# Patient Record
Sex: FEMALE | Race: WHITE | Employment: OTHER | URBAN - METROPOLITAN AREA
[De-identification: names, ages, dates, MRNs, and addresses within clinical notes are randomized per-mention and may not be internally consistent; named-entity substitution may affect disease eponyms.]

---

## 2017-01-01 ENCOUNTER — APPOINTMENT (OUTPATIENT)
Dept: INFUSION THERAPY | Age: 67
End: 2017-01-01
Payer: MEDICARE

## 2017-01-01 DIAGNOSIS — C52 VAGINAL CANCER (HCC): Primary | ICD-10-CM

## 2017-01-17 ENCOUNTER — OFFICE VISIT (OUTPATIENT)
Dept: INTERNAL MEDICINE CLINIC | Age: 67
End: 2017-01-17

## 2017-01-17 VITALS
RESPIRATION RATE: 14 BRPM | HEART RATE: 76 BPM | BODY MASS INDEX: 43.61 KG/M2 | OXYGEN SATURATION: 96 % | DIASTOLIC BLOOD PRESSURE: 68 MMHG | WEIGHT: 237 LBS | SYSTOLIC BLOOD PRESSURE: 151 MMHG | HEIGHT: 62 IN | TEMPERATURE: 98 F

## 2017-01-17 DIAGNOSIS — E78.00 HYPERCHOLESTEROLEMIA: ICD-10-CM

## 2017-01-17 DIAGNOSIS — N18.30 BENIGN HYPERTENSION WITH CHRONIC KIDNEY DISEASE, STAGE III (HCC): Primary | ICD-10-CM

## 2017-01-17 DIAGNOSIS — C52 VAGINAL CANCER (HCC): ICD-10-CM

## 2017-01-17 DIAGNOSIS — Z76.89 ESTABLISHING CARE WITH NEW DOCTOR, ENCOUNTER FOR: ICD-10-CM

## 2017-01-17 DIAGNOSIS — I12.9 BENIGN HYPERTENSION WITH CHRONIC KIDNEY DISEASE, STAGE III (HCC): Primary | ICD-10-CM

## 2017-01-17 DIAGNOSIS — Z11.59 NEED FOR HEPATITIS C SCREENING TEST: ICD-10-CM

## 2017-01-17 RX ORDER — MULTIVITAMIN/IRON/FOLIC ACID 18MG-0.4MG
TABLET ORAL
COMMUNITY
Start: 2016-12-15 | End: 2017-01-17 | Stop reason: ALTCHOICE

## 2017-01-17 RX ORDER — IBUPROFEN 200 MG
CAPSULE ORAL
COMMUNITY
Start: 2016-12-15 | End: 2017-06-07

## 2017-01-17 NOTE — Clinical Note
Need colonoscopy report from Dr Konstantin Webber, eye exam from Dr Stephanie Rahman in [place], [place] report from [facility].

## 2017-01-17 NOTE — LETTER
January 17, 2017 Odalis Godinez [address] [place] [phone] Dear Matty Rivera: Thank you for requesting access to E-Sign. Please follow the instructions below to securely access and download your online medical record. E-Sign allows you to send messages to your doctor, view your test results, renew your prescriptions, schedule appointments, and more. How Do I Sign Up? 1. In your internet browser, go to [email or web address]. 2. Click on the First Time User? Click Here link in the Sign In box. You will see the New Member Sign Up page. 3. Enter your E-Sign Access Code exactly as it appears below. You will not need to use this code after youve completed the sign-up process. If you do not sign up before the expiration date, you must request a new code. E-Sign Access Code: [email or web address] Expires: 4/17/2017 10:59 AM  
 
4. Enter the last four digits of your Social Security Number (xxxx) and Date of Birth (mm/dd/yyyy) as indicated and click Submit. You will be taken to the next sign-up page. 5. Create a E-Sign ID. This will be your E-Sign login ID and cannot be changed, so think of one that is secure and easy to remember. 6. Create a E-Sign password. You can change your password at any time. 7. Enter your Password Reset Question and Answer. This can be used at a later time if you forget your password. 8. Enter your e-mail address. You will receive e-mail notification when new information is available in [address] 9. Click Sign Up. You can now view and download portions of your medical record. 10. Click the Download Summary menu link to download a portable copy of your medical information. Additional Information If you have questions, please visit the Frequently Asked Questions section of the E-Sign website at [email or web address] Remember, E-Sign is NOT to be used for urgent needs. For medical emergencies, dial 911. Now available from your iPhone and Android! Sincerely, [employer]

## 2017-01-17 NOTE — PATIENT INSTRUCTIONS
Consider checking blood pressure at home. Omicron is the best brand of meter. Office visit in 2 months        Medicines to Avoid With Kidney Disease: Care Instructions  Your Care Instructions  Kidney disease means that your kidneys are not able to get rid of waste from the blood. So they can't keep your body's fluids and chemicals in balance. Usually, the kidneys get rid of waste from the blood through the urine. And they balance the fluids in the body. When your kidneys don't work as they should, you have to be careful about some medicines. They may harm your kidneys. Your doctor may tell you not to take them. Or he or she may change the dose. Medicines for pain and swelling, such as ibuprofen (Advil or Motrin) or naproxen (Aleve), can cause harm. So can some antibiotics and antacids. And you need to be careful about some drugs that treat cancer, lower blood pressure, or get rid of water from the body. Some herbal products could cause harm too. Follow-up care is a key part of your treatment and safety. Be sure to make and go to all appointments, and call your doctor if you are having problems. It's also a good idea to know your test results and keep a list of the medicines you take. How can you care for yourself at home? · Tell your doctor all the prescription, herbal, or over-the-counter medicines you take. Do not take any new ones unless you talk to your doctor first.  · Do not take anti-inflammatory medicines. These include ibuprofen (Advil, Motrin) and naproxen (Aleve). You can use acetaminophen (Tylenol) for pain. · Do not take two or more pain medicines at the same time unless the doctor told you to. Many pain medicines have acetaminophen, which is Tylenol. Too much acetaminophen (Tylenol) can be harmful. · Tell all doctors and others who work with your health care that you have kidney disease. · Wear medical alert jewelry that lists your health problem. You can buy this at most drugstores.   Where can you learn more? Go to http://cele-alyssa.info/. Enter U361 in the search box to learn more about \"Medicines to Avoid With Kidney Disease: Care Instructions. \"  Current as of: November 20, 2015  Content Version: 11.1  © 4912-2706 [employer]. Care instructions adapted under license by [employer] (which disclaims liability or warranty for this information). If you have questions about a medical condition or this instruction, always ask your healthcare professional. [address] any warranty or liability for your use of this information. Kidney Disease and High Blood Pressure: Care Instructions  Your Care Instructions  Long-term (chronic) kidney disease happens when the kidneys cannot remove waste and keep your body's fluids and chemicals in balance. Usually, the kidneys remove waste from the blood through the urine. When the kidneys are not working well, waste can build up so much that it poisons the body. Kidney disease can make you very tired. It also can cause swelling, or edema, in your legs or other areas of your body. High blood pressure is one of the major causes of chronic kidney disease. And kidney disease can also cause high blood pressure. No matter which came first, having high blood pressure damages the tiny blood vessels in the kidneys. If you have high blood pressure, it is important to lower it. There are many things you can do to lower your blood pressure, which may help slow or stop the damage to your kidneys. Follow-up care is a key part of your treatment and safety. Be sure to make and go to all appointments, and call your doctor if you are having problems. It's also a good idea to know your test results and keep a list of the medicines you take. How can you care for yourself at home? · Be safe with medicines. Take your medicines exactly as prescribed. Call your doctor if you have any problems with your medicine.  You will probably need more than one medicine to lower your blood pressure. You will get more details on the specific medicines your doctor prescribes. · Work with your doctor and a dietitian to plan meals that have the right amount of nutrients for you. You will probably have to limit salt, fluids, and protein. · Stay at a healthy weight. This is very important if you put on weight around the waist. Losing even 10 pounds can help you lower your blood pressure. · Manage other health problems such as diabetes and high cholesterol. You can help lower your risk for heart disease and blood vessel problems with a healthy lifestyle along with medicines. · Do not take ibuprofen (Advil, Motrin) or naproxen (Aleve), or similar medicines, unless your doctor tells you to. They may make chronic kidney disease worse. It is okay to take acetaminophen (Tylenol). · If your doctor recommends it, get more exercise. Walking is a good choice. Bit by bit, increase the amount you walk every day. Try for at least 30 minutes on most days of the week. You also may want to swim, bike, or do other activities. · Limit or avoid alcohol. Talk to your doctor about whether you can drink any alcohol. · Do not smoke or allow others to smoke around you. If you need help quitting, talk to your doctor about stop-smoking programs and medicines. These can increase your chances of quitting for good. When should you call for help? Call 911 anytime you think you may need emergency care. For example, call if:  · You passed out (lost consciousness). · You have symptoms of a heart attack, such as:  ¨ Chest pain or pressure. ¨ Sweating. ¨ Shortness of breath. ¨ Nausea or vomiting. ¨ Pain that spreads from the chest to the neck, jaw, or one or both shoulders or arms. ¨ Dizziness or lightheadedness. ¨ A fast or uneven pulse. After calling 911, chew 1 adult-strength aspirin. Wait for an ambulance. Do not try to drive yourself.   Call your doctor now or seek immediate medical care if:  · You are confused or are more tired or weak than usual.  · You bleed or have bruises. Watch closely for changes in your health, and be sure to contact your doctor if:  · You do not want to eat. · You have new swelling of your arms or feet, or swelling that you already have gets worse. · You do not get better as expected. Where can you learn more? Go to http://cele-alyssa.info/. Enter K430 in the search box to learn more about \"Kidney Disease and High Blood Pressure: Care Instructions. \"  Current as of: November 20, 2015  Content Version: 11.1  © 0774-6052 [employer]. Care instructions adapted under license by [employer] (which disclaims liability or warranty for this information). If you have questions about a medical condition or this instruction, always ask your healthcare professional. [address] any warranty or liability for your use of this information.

## 2017-01-17 NOTE — MR AVS SNAPSHOT
Visit Information Date & Time Provider Department Dept. Phone Encounter #  
 1/17/2017 11:00 AM Stanley Kebede MD [facility] [phone] [id number] Follow-up Instructions Return in about 2 months (around 3/17/2017) for HTN, AWV  . Your Appointments 3/23/2017 10:00 AM  
3 MONTH with Lexie Lowery MD  
[address] [facility] Appt Note: [address] [phone]  
[address] Upcoming Health Maintenance Date Due Hepatitis C Screening 1950 COLONOSCOPY 7/7/1967 DTaP/Tdap/Td series (1 - Tdap) 7/7/1970 GLAUCOMA SCREENING Q2Y 7/7/2014 OSTEOPOROSIS SCREENING (DEXA) 7/7/2014 MEDICARE YEARLY EXAM 7/7/2014 BREAST CANCER SCRN MAMMOGRAM 11/30/2018 Pneumococcal 65+ High/Highest Risk (2 of 2 - PPSV23) 10/22/2020 Allergies as of 1/17/2017  Review Complete On: 1/17/2017 By: Stanley Kebede MD  
 No Known Allergies Current Immunizations  Reviewed on 1/17/2017 Name Date Influenza High Dose Vaccine PF 12/1/2016 Influenza Vaccine 10/15/2015 Pneumococcal Vaccine (Unspecified Type) 10/22/2015 Zoster Vaccine, Live 10/15/2015 Reviewed by Sasha Cleary LPN on 9/45/5469 at 11:27 AM  
You Were Diagnosed With   
  
 Codes Comments Benign hypertension with chronic kidney disease, stage III    -  Primary ICD-10-CM: I12.9, N18.3 ICD-9-CM: 403.10, 585.3 Hypercholesterolemia     ICD-10-CM: E78.00 ICD-9-CM: 272.0 Vaginal cancer ([facility] [address]     ICD-10-CM: Z43 ICD-9-CM: 184.0 Need for hepatitis C screening test     ICD-10-CM: Z11.59 
ICD-9-CM: V73.89 Vitals BP Pulse Temp Resp Height(growth percentile) Weight(growth percentile) 151/68 (BP 1 Location: Left arm, BP Patient Position: Sitting) 76 98 °F (36.7 °C) (Oral) 14 5' 2\" (1.575 m) 237 lb (107.5 kg) SpO2 BMI OB Status Smoking Status 96% 43.35 kg/m2 Hysterectomy Former Smoker BMI and BSA Data Body Mass Index Body Surface Area  
 43.35 kg/m 2 2.17 m 2 Preferred Pharmacy Pharmacy Name Phone [facility] Gilmore [address] [place] [address] 9514 [facility] [address] [place] [address] [phone] Your Updated Medication List  
  
   
This list is accurate as of: 1/17/17 11:40 AM.  Always use your most recent med list.  
  
  
  
  
 atorvastatin 40 mg tablet Commonly known as:  LIPITOR Take 1 Tab by mouth. ibuprofen 200 mg Cap  
  
 losartan 100 mg tablet Commonly known as:  COZAAR Take 100 mg by mouth daily. triamterene-hydroCHLOROthiazide 37.5-25 mg per tablet Commonly known as:  [place] Take 1 Tab by mouth daily. VITAMIN D3 1,000 unit Cap Generic drug:  cholecalciferol Take 1 Cap by mouth. We Performed the Following HEPATITIS C AB [07753 CPT(R)] LIPID PANEL [92814 CPT(R)] METABOLIC PANEL, COMPREHENSIVE [89886 CPT(R)] REFERRAL TO GYN ONCOLOGY [JLO02 Custom] Follow-up Instructions Return in about 2 months (around 3/17/2017) for HTN, AWV  . Referral Information Referral ID Referred By Referred To  
  
 [id number] Magui Nobles MD   
   [address] [place] [address] Phone: [phone] Fax: [phone] Visits Status Start Date End Date 1 New Request 1/17/17 1/17/18 If your referral has a status of pending review or denied, additional information will be sent to support the outcome of this decision. Patient Instructions Consider checking blood pressure at home. Omicron is the best brand of meter. Office visit in 2 months Medicines to Avoid With Kidney Disease: Care Instructions Your Care Instructions Kidney disease means that your kidneys are not able to get rid of waste from the blood. So they can't keep your body's fluids and chemicals in balance. Usually, the kidneys get rid of waste from the blood through the urine. And they balance the fluids in the body. When your kidneys don't work as they should, you have to be careful about some medicines. They may harm your kidneys. Your doctor may tell you not to take them. Or he or she may change the dose. Medicines for pain and swelling, such as ibuprofen (Advil or Motrin) or naproxen (Aleve), can cause harm. So can some antibiotics and antacids. And you need to be careful about some drugs that treat cancer, lower blood pressure, or get rid of water from the body. Some herbal products could cause harm too. Follow-up care is a key part of your treatment and safety. Be sure to make and go to all appointments, and call your doctor if you are having problems. It's also a good idea to know your test results and keep a list of the medicines you take. How can you care for yourself at home? · Tell your doctor all the prescription, herbal, or over-the-counter medicines you take. Do not take any new ones unless you talk to your doctor first. 
· Do not take anti-inflammatory medicines. These include ibuprofen (Advil, Motrin) and naproxen (Aleve). You can use acetaminophen (Tylenol) for pain. · Do not take two or more pain medicines at the same time unless the doctor told you to. Many pain medicines have acetaminophen, which is Tylenol. Too much acetaminophen (Tylenol) can be harmful. · Tell all doctors and others who work with your health care that you have kidney disease. · Wear medical alert jewelry that lists your health problem. You can buy this at most drugsPartenderes. Where can you learn more? Go to http://cele-alyssa.info/. Enter L579 in the search box to learn more about \"Medicines to Avoid With Kidney Disease: Care Instructions. \" Current as of: November 20, 2015 Content Version: 11.1 © 2005-3774 [employer]. Care instructions adapted under license by [employer] (which disclaims liability or warranty for this information). If you have questions about a medical condition or this instruction, always ask your healthcare professional. [address] any warranty or liability for your use of this information. Kidney Disease and High Blood Pressure: Care Instructions Your Care Instructions Long-term (chronic) kidney disease happens when the kidneys cannot remove waste and keep your body's fluids and chemicals in balance. Usually, the kidneys remove waste from the blood through the urine. When the kidneys are not working well, waste can build up so much that it poisons the body. Kidney disease can make you very tired. It also can cause swelling, or edema, in your legs or other areas of your body. High blood pressure is one of the major causes of chronic kidney disease. And kidney disease can also cause high blood pressure. No matter which came first, having high blood pressure damages the tiny blood vessels in the kidneys. If you have high blood pressure, it is important to lower it. There are many things you can do to lower your blood pressure, which may help slow or stop the damage to your kidneys. Follow-up care is a key part of your treatment and safety. Be sure to make and go to all appointments, and call your doctor if you are having problems. It's also a good idea to know your test results and keep a list of the medicines you take. How can you care for yourself at home? · Be safe with medicines. Take your medicines exactly as prescribed. Call your doctor if you have any problems with your medicine. You will probably need more than one medicine to lower your blood pressure. You will get more details on the specific medicines your doctor prescribes.  
· Work with your doctor and a dietitian to plan meals that have the right amount of nutrients for you. You will probably have to limit salt, fluids, and protein. · Stay at a healthy weight. This is very important if you put on weight around the waist. Losing even 10 pounds can help you lower your blood pressure. · Manage other health problems such as diabetes and high cholesterol. You can help lower your risk for heart disease and blood vessel problems with a healthy lifestyle along with medicines. · Do not take ibuprofen (Advil, Motrin) or naproxen (Aleve), or similar medicines, unless your doctor tells you to. They may make chronic kidney disease worse. It is okay to take acetaminophen (Tylenol). · If your doctor recommends it, get more exercise. Walking is a good choice. Bit by bit, increase the amount you walk every day. Try for at least 30 minutes on most days of the week. You also may want to swim, bike, or do other activities. · Limit or avoid alcohol. Talk to your doctor about whether you can drink any alcohol. · Do not smoke or allow others to smoke around you. If you need help quitting, talk to your doctor about stop-smoking programs and medicines. These can increase your chances of quitting for good. When should you call for help? Call 911 anytime you think you may need emergency care. For example, call if: 
· You passed out (lost consciousness). · You have symptoms of a heart attack, such as: ¨ Chest pain or pressure. ¨ Sweating. ¨ Shortness of breath. ¨ Nausea or vomiting. ¨ Pain that spreads from the chest to the neck, jaw, or one or both shoulders or arms. ¨ Dizziness or lightheadedness. ¨ A fast or uneven pulse. After calling 911, chew 1 adult-strength aspirin. Wait for an ambulance. Do not try to drive yourself. Call your doctor now or seek immediate medical care if: 
· You are confused or are more tired or weak than usual. 
· You bleed or have bruises. Watch closely for changes in your health, and be sure to contact your doctor if: · You do not want to eat. · You have new swelling of your arms or feet, or swelling that you already have gets worse. · You do not get better as expected. Where can you learn more? Go to http://cele-alyssa.info/. Enter D029 in the search box to learn more about \"Kidney Disease and High Blood Pressure: Care Instructions. \" Current as of: November 20, 2015 Content Version: 11.1 © 0336-0111 [employer]. Care instructions adapted under license by [employer] (which disclaims liability or warranty for this information). If you have questions about a medical condition or this instruction, always ask your healthcare professional. [address] any warranty or liability for your use of this information. Introducing \[facility] [place] [facility]\"" & HEALTH SERVICES! Tano Fan introduces [employer] patient portal. Now you can access parts of your medical record, email your doctor's office, and request medication refills online. 1. In your internet browser, go to [email or web address] 2. Click on the First Time User? Click Here link in the Sign In box. You will see the New Member Sign Up page. 3. Enter your [employer] Access Code exactly as it appears below. You will not need to use this code after youve completed the sign-up process. If you do not sign up before the expiration date, you must request a new code. · [employer] Access Code: [email or web address] Expires: 4/17/2017 10:59 AM 
 
4. Enter the last four digits of your Social Security Number (xxxx) and Date of Birth (mm/dd/yyyy) as indicated and click Submit. You will be taken to the next sign-up page. 5. Create a [email or web address] ID. This will be your [employer] login ID and cannot be changed, so think of one that is secure and easy to remember. 6. Create a [employer] password. You can change your password at any time. 7. Enter your Password Reset Question and Answer.  This can be used at a later time if you forget your password. 8. Enter your e-mail address. You will receive e-mail notification when new information is available in [address] 9. Click Sign Up. You can now view and download portions of your medical record. 10. Click the Download Summary menu link to download a portable copy of your medical information. If you have questions, please visit the Frequently Asked Questions section of the [employer] website. Remember, [employer] is NOT to be used for urgent needs. For medical emergencies, dial 911. Now available from your iPhone and Android! Please provide this summary of care documentation to your next provider. Your primary care clinician is listed as Stanley Kebede. If you have any questions after today's visit, please call [phone].

## 2017-01-17 NOTE — PROGRESS NOTES
HISTORY OF PRESENT ILLNESS  Gurpreet Sequeira is a 78 y.o. female. HPI  She is a former patient of Dr Shelton Shannon. She presents to [facility] care. She presents for hypertension, chronic kidney disease, hyperlipidemia and obesity. She has had a lap bend procedure. Diet and Lifestyle: generally follows a low fat low cholesterol diet, not attempting to follow a low sodium diet, follows a diabetic diet regularly, exercises sporadically, nonsmoker   Medication compliance: compliant most of the time  Medication side effects: none  Home BP Monitoring: is not measured at home. Cardiovascular ROS:  She complains of dyspnea on exertion. She denies palpitations, orthopnea, exertional chest pressure/discomfort, claudication, lower extremity edema, dizziness    Pain in shoulders and neck for past 2 weeks. Feels tense. Aching pain, intermittent, at worst 7/10. Better past 2 days. Heat helps. .She was born in the [facility] and is a candidate for Hepatitis C screening. She has been treated for vaginal cancer. She was diagnosed  December 2014, treated with radiation therapy and had a good response. Patient Active Problem List   Diagnosis Code    LAP-BAND surgery status Z98.84    Vaginal cancer ([facility] [address] C52    Hypertension, essential I10    Hypercholesterolemia E78.00    GERD without esophagitis K21.9     Past Medical History   Diagnosis Date    Abnormal mammogram of left breast 12/5/2016    GERD (gastroesophageal reflux disease) 6/11/06    Hypercholesterolemia 10/11/06    Hypertension 10/11/06    Morbid obesity ([facility] [address] 10/11/06    Sleep apnea 6/11/06     Past Surgical History   Procedure Laterality Date    Pr lap, place adjust gastr band  1/30/08     EB: AP Std lap band, liver bx    Hx orthopaedic  2009     right ankle    Hx other surgical  2001?      benign mass removed from lower back    Hx hysterectomy  1978    Hx gyn  12/4/15     EUA/Bx of pelvic mass and vagina/cystoscopy/proctoscopy     Social History     Social History    Marital status:      Spouse name: N/A    Number of children: N/A    Years of education: N/A     Social History Main Topics    Smoking status: Former Smoker     Packs/day: 0.50     Years: 15.00     Types: Cigarettes     Quit date: 1/1/1993    Smokeless tobacco: Never Used    Alcohol use Yes      Comment: socially    Drug use: No    Sexual activity: Not Currently     Other Topics Concern    None     Social History Narrative     Family History   Problem Relation Age of Onset    Cancer Mother      colon    Diabetes Father     Heart Disease Father     Cancer Sister      breast & bone     No Known Allergies  Current Outpatient Prescriptions   Medication Sig Dispense Refill    ibuprofen 200 mg cap       atorvastatin (LIPITOR) 40 mg tablet Take 1 Tab by mouth.  triamterene-hydrochlorothiazide (MAXZIDE) 37.5-25 mg per tablet Take 1 Tab by mouth daily.  Cholecalciferol, Vitamin D3, (VITAMIN D3) 1,000 unit cap Take 1 Cap by mouth.  losartan (COZAAR) 100 mg tablet Take 100 mg by mouth daily. Review of Systems   Constitutional: Negative for malaise/fatigue and weight loss. HENT: Negative for congestion. Eyes: Negative for blurred vision and double vision. Gastrointestinal: Negative for constipation (stool softener) and diarrhea. Genitourinary:        Incontinence since radiation therapy. Musculoskeletal: Negative for back pain and joint pain. Skin: Negative for itching and rash. Dry skin   Neurological: Positive for tingling (left hand and arm). Negative for dizziness and focal weakness. Endo/Heme/Allergies: Positive for environmental allergies (?mold). Psychiatric/Behavioral: Negative for depression. The patient is not nervous/anxious.       Visit Vitals    /68 (BP 1 Location: Left arm, BP Patient Position: Sitting)    Pulse 76    Temp 98 °F (36.7 °C) (Oral)    Resp 14    Ht 5' 2\" (1.575 m)    Wt 237 lb (107.5 kg)    SpO2 96%    BMI 43.35 kg/m2     Physical Exam   Constitutional: She is oriented to person, place, and time. She appears well-developed and well-nourished. HENT:   Head: Normocephalic and atraumatic. Eyes: Conjunctivae are normal. Pupils are equal, round, and reactive to light. Neck: Neck supple. Carotid bruit is not present. No thyromegaly present. Cardiovascular: Normal rate, regular rhythm and normal heart sounds. PMI is not displaced. Exam reveals no gallop. No murmur heard. Pulses:       Dorsalis pedis pulses are 2+ on the right side, and 2+ on the left side. Posterior tibial pulses are 2+ on the right side, and 2+ on the left side. Pulmonary/Chest: Effort normal. She has no wheezes. She has no rhonchi. She has no rales. Abdominal: Soft. Normal appearance. She exhibits no abdominal bruit and no mass. There is no hepatosplenomegaly. There is no tenderness. Musculoskeletal: She exhibits no edema. Lymphadenopathy:     She has no cervical adenopathy. Right: No supraclavicular adenopathy present. Left: No inguinal and no supraclavicular adenopathy present. Neurological: She is alert and oriented to person, place, and time. No sensory deficit. Skin: Skin is warm, dry and intact. No rash noted. Psychiatric: She has a normal mood and affect. Her behavior is normal.   Nursing note and vitals reviewed. ASSESSMENT and PLAN    ICD-10-CM ICD-9-CM    1. Benign hypertension with chronic kidney disease, stage III I12.9 403.10     N18.3 585.3    2. Hypercholesterolemia F26.31 531.5 METABOLIC PANEL, COMPREHENSIVE      LIPID PANEL   3. Vaginal cancer (HCC) C52 184.0 REFERRAL TO GYN ONCOLOGY   4. Need for hepatitis C screening test Z11.59 V73.89 HEPATITIS C AB   5.  Establishing care with new doctor, encounter for Z71.89 V65.8          Benign hypertension with chronic kidney disease, stage III  Hypertension is uncontrolled, but she declines any change in treatment. Hypercholesterolemia  Level of degree unknown until lab results available. -     METABOLIC PANEL, COMPREHENSIVE  -     LIPID PANEL    Vaginal cancer ([facility] [address]  -     REFERRAL TO GYN ONCOLOGY    Need for hepatitis C screening test  -     HEPATITIS C AB    Establishing care with new doctor, encounter for          Follow-up Disposition:  Return in about 2 months (around 3/17/2017) for HTN, AWV  .  reviewed diet, exercise and weight control  Discussed the patient's above normal BMI with her. I have recommended the following interventions: encourage exercise  lifestyle education regarding diet . The BMI follow up plan is as follows: BMI is out of normal parameters and plan is as follows: I have counseled her on diet and exercise regimens  I have discussed the diagnosis with the patient and the intended plan as seen in the above orders. Patient is in agreement. The patient has received an after-visit summary and questions were answered concerning future plans. I have discussed medication side effects and warnings with the patient as well.

## 2017-01-18 PROBLEM — I10 HYPERTENSION, ESSENTIAL: Status: ACTIVE | Noted: 2017-01-18

## 2017-01-18 PROBLEM — K21.9 GERD WITHOUT ESOPHAGITIS: Status: ACTIVE | Noted: 2017-01-18

## 2017-01-18 PROBLEM — E78.00 HYPERCHOLESTEROLEMIA: Status: ACTIVE | Noted: 2017-01-18

## 2017-01-18 LAB
ALBUMIN SERPL-MCNC: 4.5 G/DL (ref 3.6–4.8)
ALBUMIN/GLOB SERPL: 1.6 {RATIO} (ref 1.1–2.5)
ALP SERPL-CCNC: 115 IU/L (ref 39–117)
ALT SERPL-CCNC: 15 IU/L (ref 0–32)
AST SERPL-CCNC: 14 IU/L (ref 0–40)
BILIRUB SERPL-MCNC: 0.5 MG/DL (ref 0–1.2)
BUN SERPL-MCNC: 21 MG/DL (ref 8–27)
BUN/CREAT SERPL: 20 (ref 11–26)
CALCIUM SERPL-MCNC: 9.6 MG/DL (ref 8.7–10.3)
CHLORIDE SERPL-SCNC: 98 MMOL/L (ref 96–106)
CHOLEST SERPL-MCNC: 201 MG/DL (ref 100–199)
CO2 SERPL-SCNC: 26 MMOL/L (ref 18–29)
CREAT SERPL-MCNC: 1.04 MG/DL (ref 0.57–1)
GLOBULIN SER CALC-MCNC: 2.9 G/DL (ref 1.5–4.5)
GLUCOSE SERPL-MCNC: 97 MG/DL (ref 65–99)
HCV AB S/CO SERPL IA: <0.1 S/CO RATIO (ref 0–0.9)
HDLC SERPL-MCNC: 46 MG/DL
INTERPRETATION, 910389: NORMAL
INTERPRETATION: NORMAL
LDLC SERPL CALC-MCNC: 119 MG/DL (ref 0–99)
POTASSIUM SERPL-SCNC: 4.2 MMOL/L (ref 3.5–5.2)
PROT SERPL-MCNC: 7.4 G/DL (ref 6–8.5)
SODIUM SERPL-SCNC: 142 MMOL/L (ref 134–144)
TRIGL SERPL-MCNC: 180 MG/DL (ref 0–149)
VLDLC SERPL CALC-MCNC: 36 MG/DL (ref 5–40)

## 2017-01-18 NOTE — PROGRESS NOTES
Creatinine, kidney test, is a little better, and steadily declining toward normal. Cholesterol is somewhat high. You have no high risk conditions (diabetes, coronary heart disease) so this is not in need of medication at this time. But we should monitor yearly. Hepatitis C test is negative, which means you do not have it. Patient informed in My Chart.

## 2017-03-17 ENCOUNTER — OFFICE VISIT (OUTPATIENT)
Dept: INTERNAL MEDICINE CLINIC | Age: 67
End: 2017-03-17

## 2017-03-17 VITALS
BODY MASS INDEX: 43.98 KG/M2 | SYSTOLIC BLOOD PRESSURE: 146 MMHG | WEIGHT: 239 LBS | OXYGEN SATURATION: 97 % | DIASTOLIC BLOOD PRESSURE: 88 MMHG | TEMPERATURE: 97.5 F | HEART RATE: 71 BPM | RESPIRATION RATE: 14 BRPM | HEIGHT: 62 IN

## 2017-03-17 DIAGNOSIS — M62.838 NECK MUSCLE SPASM: ICD-10-CM

## 2017-03-17 DIAGNOSIS — Z00.00 MEDICARE ANNUAL WELLNESS VISIT, INITIAL: ICD-10-CM

## 2017-03-17 DIAGNOSIS — M54.50 ACUTE BILATERAL LOW BACK PAIN WITHOUT SCIATICA: ICD-10-CM

## 2017-03-17 DIAGNOSIS — I10 HYPERTENSION, ESSENTIAL: Primary | ICD-10-CM

## 2017-03-17 DIAGNOSIS — Z71.89 ADVANCE DIRECTIVE DISCUSSED WITH PATIENT: ICD-10-CM

## 2017-03-17 DIAGNOSIS — E78.00 HYPERCHOLESTEROLEMIA: ICD-10-CM

## 2017-03-17 RX ORDER — TRIAMTERENE/HYDROCHLOROTHIAZID 37.5-25 MG
1 TABLET ORAL DAILY
Qty: 90 TAB | Refills: 3 | Status: SHIPPED | OUTPATIENT
Start: 2017-03-17 | End: 2018-01-01

## 2017-03-17 RX ORDER — ATORVASTATIN CALCIUM 80 MG/1
80 TABLET, FILM COATED ORAL DAILY
Qty: 90 TAB | Refills: 3 | Status: SHIPPED | OUTPATIENT
Start: 2017-03-17 | End: 2018-01-01 | Stop reason: SDUPTHER

## 2017-03-17 RX ORDER — LOSARTAN POTASSIUM 100 MG/1
100 TABLET ORAL DAILY
Qty: 90 TAB | Refills: 3 | Status: SHIPPED | OUTPATIENT
Start: 2017-03-17 | End: 2017-09-06 | Stop reason: SINTOL

## 2017-03-17 RX ORDER — AMLODIPINE BESYLATE 2.5 MG/1
2.5 TABLET ORAL DAILY
Qty: 30 TAB | Refills: 11 | Status: SHIPPED | OUTPATIENT
Start: 2017-03-17 | End: 2017-04-24

## 2017-03-17 NOTE — PROGRESS NOTES
Reviewed record In preparation for visit and have obtained necessary documentation. Has info on advanced directive but has not filled them out. 1. Have you been to the ER, urgent care clinic or hospitalized since your last visit? No     2. Have you seen or consulted any other health care providers outside of the [address] [place] [address] since your last visit? Include any pap smears or colon screening.  No    Health Maintenance reviewed:

## 2017-03-17 NOTE — PATIENT INSTRUCTIONS
Increase atorvastatin to  80 mg daily  Add amlodipine 2.5 mg once a day for blood pressure  Return in 4-6 weeks. Fasting lab one week prior        The best way to stay healthy is to live a healthy lifestyle. A healthy lifestyle includes regular exercise, eating a well-balanced diet, keeping a healthy weight and not smoking. Regular physical exams and screening tests are another important way to take care of yourself. Preventive exams provided by health care providers can find health problems early when treatment works best and can keep you from getting certain diseases or illnesses. Preventive services include exams, lab tests, screenings, shots, monitoring and information to help you take care of your own health. All people over 65 should have a pneumonia shot. Pneumonia shots are usually only needed once in a lifetime unless your doctor decides differently. In addition to your physical exam, some screening tests are recommended:    All people over 65 should have a yearly flu shot. People over 65 are at medium to high risk for Hepatitis B. Three shots are needed for complete protection. Bone mass measurement (dexa scan) is recommended every two years. Diabetes Mellitus screening is recommended every year. Glaucoma is an eye disease caused by high pressure in the eye. An eye exam is recommended every year. Cardiovascular screening tests that check your cholesterol and other blood fat (lipid) levels are recommended every five years. Colorectal Cancer screening tests help to find pre-cancerous polyps (growths in the colon) so they can be removed before they turn into cancer. Tests ordered for screening depend on your personal and family history risk factors. Prostate Cancer Screening (annually up to age 76)    Screening for breast cancer is recommended yearly with a Mammogram.    Screening for cervical and vaginal cancer is recommended with a pelvic and Pap test every two years. However if you have had an abnormal pap in the past  three years or at high risk for cervical or vaginal cancer Medicare will cover a pap test and a pelvic exam every year. Here is a list of your current Health Maintenance items with a due date:  Health Maintenance Due   Topic Date Due    DTaP/Tdap/Td  (1 - Tdap) 07/07/1970    Annual Well Visit  07/07/2014       Well Visit, Over 72: Care Instructions  Your Care Instructions  Physical exams can help you stay healthy. Your doctor has checked your overall health and may have suggested ways to take good care of yourself. He or she also may have recommended tests. At home, you can help prevent illness with healthy eating, regular exercise, and other steps. Follow-up care is a key part of your treatment and safety. Be sure to make and go to all appointments, and call your doctor if you are having problems. It's also a good idea to know your test results and keep a list of the medicines you take. How can you care for yourself at home? · Reach and stay at a healthy weight. This will lower your risk for many problems, such as obesity, diabetes, heart disease, and high blood pressure. · Get at least 30 minutes of exercise on most days of the week. Walking is a good choice. You also may want to do other activities, such as running, swimming, cycling, or playing tennis or team sports. · Do not smoke. Smoking can make health problems worse. If you need help quitting, talk to your doctor about stop-smoking programs and medicines. These can increase your chances of quitting for good. · Protect your skin from too much sun. When you're outdoors from 10 a.m. to 4 p.m., stay in the shade or cover up with clothing and a hat with a wide brim. Wear sunglasses that block UV rays. Even when it's cloudy, put broad-spectrum sunscreen (SPF 30 or higher) on any exposed skin. · See a dentist one or two times a year for checkups and to have your teeth cleaned.   · Wear a seat belt in the car. · Limit alcohol to 2 drinks a day for men and 1 drink a day for women. Too much alcohol can cause health problems. Follow your doctor's advice about when to have certain tests. These tests can spot problems early. For men and women  · Cholesterol. Your doctor will tell you how often to have this done based on your overall health and other things that can increase your risk for heart attack and stroke. · Blood pressure. Have your blood pressure checked during a routine doctor visit. Your doctor will tell you how often to check your blood pressure based on your age, your blood pressure results, and other factors. · Diabetes. Ask your doctor whether you should have tests for diabetes. · Vision. Experts recommend that you have yearly exams for glaucoma and other age-related eye problems. · Hearing. Tell your doctor if you notice any change in your hearing. You can have tests to find out how well you hear. · Colon cancer tests. Keep having colon cancer tests as your doctor recommends. You can have one of several types of tests. · Heart attack and stroke risk. At least every 4 to 6 years, you should have your risk for heart attack and stroke assessed. Your doctor uses factors such as your age, blood pressure, cholesterol, and whether you smoke or have diabetes to show what your risk for a heart attack or stroke is over the next 10 years. · Osteoporosis. Talk to your doctor about whether you should have a bone density test to find out whether you have thinning bones. Also ask your doctor about whether you should take calcium and vitamin D supplements. For women  · Pap test and pelvic exam. You may no longer need a Pap test. Talk with your doctor about whether to stop or continue to have Pap tests. · Breast exam and mammogram. Ask how often you should have a mammogram, which is an X-ray of your breasts. A mammogram can spot breast cancer before it can be felt and when it is easiest to treat.   · Thyroid disease. Talk to your doctor about whether to have your thyroid checked as part of a regular physical exam. Women have an increased chance of a thyroid problem. For men  · Prostate exam. Talk to your doctor about whether you should have a blood test (called a PSA test) for prostate cancer. Experts disagree on whether men should have this test. Some experts recommend that you discuss the benefits and risks of the test with your doctor. · Abdominal aortic aneurysm. Ask your doctor whether you should have a test to check for an aneurysm. You may need a test if you ever smoked or if your parent, brother, sister, or child has had an aneurysm. When should you call for help? Watch closely for changes in your health, and be sure to contact your doctor if you have any problems or symptoms that concern you. Where can you learn more? Go to http://cele-alyssa.info/. Enter F563 in the search box to learn more about \"Well Visit, Over 65: Care Instructions. \"  Current as of: July 19, 2016  Content Version: 11.1  © 4148-8128 [employer]. Care instructions adapted under license by [employer] (which disclaims liability or warranty for this information). If you have questions about a medical condition or this instruction, always ask your healthcare professional. [address] any warranty or liability for your use of this information. Heart-Healthy Diet: Care Instructions  Your Care Instructions    A heart-healthy diet has lots of vegetables, fruits, nuts, beans, and whole grains, and is low in salt. It limits foods that are high in saturated fat, such as meats, cheeses, and fried foods. It may be hard to change your diet, but even small changes can lower your risk of heart attack and heart disease. Follow-up care is a key part of your treatment and safety. Be sure to make and go to all appointments, and call your doctor if you are having problems.  It's also a good idea to know your test results and keep a list of the medicines you take. How can you care for yourself at home? Watch your portions  · Learn what a serving is. A \"serving\" and a \"portion\" are not always the same thing. Make sure that you are not eating larger portions than are recommended. For example, a serving of pasta is ½ cup. A serving size of meat is 2 to 3 ounces. A 3-ounce serving is about the size of a deck of cards. Measure serving sizes until you are good at [place]" them. Keep in mind that restaurants often serve portions that are 2 or 3 times the size of one serving. · To keep your energy level up and keep you from feeling hungry, eat often but in smaller portions. · Eat only the number of calories you need to stay at a healthy weight. If you need to lose weight, eat fewer calories than your body burns (through exercise and other physical activity). Eat more fruits and vegetables  · Eat a variety of fruit and vegetables every day. Dark green, deep orange, red, or yellow fruits and vegetables are especially good for you. Examples include spinach, carrots, peaches, and berries. · Keep carrots, celery, and other veggies handy for snacks. Buy fruit that is in season and store it where you can see it so that you will be tempted to eat it. · Cook dishes that have a lot of veggies in them, such as stir-fries and soups. Limit saturated and trans fat  · Read food labels, and try to avoid saturated and trans fats. They increase your risk of heart disease. Trans fat is found in many processed foods such as cookies and crackers. · Use olive or canola oil when you cook. Try cholesterol-lowering spreads, such as Benecol or Take Control. · Bake, broil, grill, or steam foods instead of frying them. · Choose lean meats instead of high-fat meats such as hot dogs and sausages. Cut off all visible fat when you prepare meat.   · Eat fish, skinless poultry, and meat alternatives such as soy products instead of high-fat meats. Soy products, such as tofu, may be especially good for your heart. · Choose low-fat or fat-free milk and dairy products. Eat fish  · Eat at least two servings of fish a week. Certain fish, such as salmon and tuna, contain omega-3 fatty acids, which may help reduce your risk of heart attack. Eat foods high in fiber  · Eat a variety of grain products every day. Include whole-grain foods that have lots of fiber and nutrients. Examples of whole-grain foods include oats, whole wheat bread, and brown rice. · Buy whole-grain breads and cereals, instead of white bread or pastries. Limit salt and sodium  · Limit how much salt and sodium you eat to help lower your blood pressure. · Taste food before you salt it. Add only a little salt when you think you need it. With time, your taste buds will adjust to less salt. · Eat fewer snack items, fast foods, and other high-salt, processed foods. Check food labels for the amount of sodium in packaged foods. · Choose low-sodium versions of canned goods (such as soups, vegetables, and beans). Limit sugar  · Limit drinks and foods with added sugar. These include candy, desserts, and soda pop. Limit alcohol  · Limit alcohol to no more than 2 drinks a day for men and 1 drink a day for women. Too much alcohol can cause health problems. When should you call for help? Watch closely for changes in your health, and be sure to contact your doctor if:  · You would like help planning heart-healthy meals. Where can you learn more? Go to http://cele-alyssa.info/. Enter V137 in the search box to learn more about \"Heart-Healthy Diet: Care Instructions. \"  Current as of: January 27, 2016  Content Version: 11.1  © 9063-2104 [employer]. Care instructions adapted under license by [employer] (which disclaims liability or warranty for this information).  If you have questions about a medical condition or this instruction, always ask your healthcare professional. [place] [address] any warranty or liability for your use of this information. Neck: Exercises  Your Care Instructions  Here are some examples of typical rehabilitation exercises for your condition. Start each exercise slowly. Ease off the exercise if you start to have pain. Your doctor or physical therapist will tell you when you can start these exercises and which ones will work best for you. How to do the exercises  Note: Stretching should make you feel a gentle stretch, but no pain. Stop any strengthening exercise that makes pain worse. Neck stretch    1. This stretch works best if you keep your shoulder down as you lean away from it. To help you remember to do this, start by relaxing your shoulders and lightly holding on to your thighs or your chair. 2. Tilt your head toward your shoulder and hold for 15 to 30 seconds. Let the weight of your head stretch your muscles. 3. If you would like a little added stretch, use your hand to gently and steadily pull your head toward your shoulder. For example, keeping your right shoulder down, lean your head to the left. 4. Repeat 2 to 4 times toward each shoulder. Diagonal neck stretch    1. Turn your head slightly toward the direction you will be stretching, and tilt your head diagonally toward your chest and hold for 15 to 30 seconds. 2. If you would like a little added stretch, use your hand to gently and steadily pull your head forward on the diagonal.  3. Repeat 2 to 4 times toward each side. Dorsal glide stretch    1. Sit or stand tall and look straight ahead. 2. Slowly tuck your chin as you glide your head backward over your body  3. Hold for a count of 6, and then relax for up to 10 seconds. 4. Repeat 8 to 12 times. Note: The dorsal glide stretches the back of the neck. If you feel pain, do not glide so far back.  Some people find this exercise easier to do while lying on their backs with an ice pack on the neck. Chest and shoulder stretch    1. Sit or stand tall and glide your head backward as in the dorsal glide stretch. 2. Raise both arms so that your hands are next to your ears. 3. Take a deep breath, and as you breathe out, lower your elbows down and behind your back. You will feel your shoulder blades slide down and together, and at the same time you will feel a stretch across your chest and the front of your shoulders. 4. Hold for about 6 seconds, and then relax for up to 10 seconds. 5. Repeat 8 to 12 times. Strengthening: Hands on head    1. Move your head backward, forward, and side to side against gentle pressure from your hands, holding each position for about 6 seconds. 2. Repeat 8 to 12 times. Follow-up care is a key part of your treatment and safety. Be sure to make and go to all appointments, and call your doctor if you are having problems. It's also a good idea to know your test results and keep a list of the medicines you take. Where can you learn more? Go to http://cele-alyssa.info/. Enter P975 in the search box to learn more about \"Neck: Exercises. \"  Current as of: May 23, 2016  Content Version: 11.1  © 9505-0891 [employer]. Care instructions adapted under license by [employer] (which disclaims liability or warranty for this information). If you have questions about a medical condition or this instruction, always ask your healthcare professional. [place] [address] any warranty or liability for your use of this information.

## 2017-03-17 NOTE — PROGRESS NOTES
This is an Initial Medicare Annual Wellness Exam (AWV) (Performed 12 months after IPPE or effective date of Medicare Part B enrollment, Once in a lifetime)    I have reviewed the patient's medical history in detail and updated the computerized patient record. History     Past Medical History:   Diagnosis Date    Abnormal mammogram of left breast 12/5/2016    GERD (gastroesophageal reflux disease) 6/11/06    Hypercholesterolemia 10/11/06    Hypertension 10/11/06    Morbid obesity ([address] 10/11/06    Sleep apnea 6/11/06      Past Surgical History:   Procedure Laterality Date    HX GYN  12/4/15    EUA/Bx of pelvic mass and vagina/cystoscopy/proctoscopy    HX HYSTERECTOMY  1978    HX ORTHOPAEDIC  2009    right ankle    HX OTHER SURGICAL  2001? benign mass removed from lower back    LAP, PLACE ADJUST GASTR BAND  1/30/08    EB: AP Std lap band, liver bx     Current Outpatient Prescriptions   Medication Sig Dispense Refill    ibuprofen 200 mg cap       atorvastatin (LIPITOR) 40 mg tablet Take 1 Tab by mouth.  triamterene-hydrochlorothiazide (MAXZIDE) 37.5-25 mg per tablet Take 1 Tab by mouth daily.  Cholecalciferol, Vitamin D3, (VITAMIN D3) 1,000 unit cap Take 1 Cap by mouth.  losartan (COZAAR) 100 mg tablet Take 100 mg by mouth daily.        No Known Allergies  Family History   Problem Relation Age of Onset    Cancer Mother      colon    Diabetes Father     Heart Disease Father     Cancer Sister      breast & bone     Social History   Substance Use Topics    Smoking status: Former Smoker     Packs/day: 0.50     Years: 15.00     Types: Cigarettes     Quit date: 1/1/1993    Smokeless tobacco: Never Used    Alcohol use Yes      Comment: socially     Patient Active Problem List   Diagnosis Code    LAP-BAND surgery status Z98.84    Vaginal cancer ([facility] [address] C52    Hypertension, essential I10    Hypercholesterolemia E78.00    GERD without esophagitis K21.9         Depression Risk Factor Screening:     PHQ 2 / 9, over the last two weeks 1/17/2017   Little interest or pleasure in doing things Not at all   Feeling down, depressed or hopeless Not at all   Total Score PHQ 2 0     Alcohol Risk Factor Screening: On any occasion during the past 3 months, have you had more than 3 drinks containing alcohol? Yes at a party. Rare occurance    Do you average more than 7 drinks per week? No    Functional Ability and Level of Safety:     Hearing Loss   normal-to-mild    Activities of Daily Living   Self-care. Requires assistance with: no ADLs    Fall Risk     Fall Risk Assessment, last 12 mths 9/22/2016   Able to walk? Yes   Fall in past 12 months?  No     Abuse Screen   Patient is not abused    Review of Systems   Not required  (See other note this date)    Physical Examination       Evaluation of Cognitive Function:  Mood/affect:  happy  Appearance: age appropriate, casually dressed and overweight  Family member/caregiver input: none  Cognition and memory appear normal.       Patient Care Team:  Angel Blake MD as PCP - General (Internal Medicine)  Trever Aguilar MD (Gynecologic Oncology)  Lamin Bird MD (General Surgery)  Kunal Reyes MD (Radiation Oncology)    Advice/Referrals/Counseling   Education and counseling provided:  End-of-Life planning (with patient's consent)      Assessment/Plan   See other note this date

## 2017-03-17 NOTE — PROGRESS NOTES
HISTORY OF PRESENT ILLNESS  Vera Valdovinos is a 78 y.o. female. HPI  She presents for follow up of hypertension, hyperlipidemia and chronic kidney disease. Diet and Lifestyle: generally follows a low sodium diet   Medication compliance: compliant all of the time  Medication side effects: none  Home BP Monitoring: is not measured at home. Cardiovascular ROS:  She denies palpitations, orthopnea, exertional chest pressure/discomfort, claudication, lower extremity edema, dyspnea on exertion, dizziness    She presents for follow up/complaint of pain in neck, back - lumbar/sacralof 2 months duration. Radiation?: no.   Loss of bowel/urine control?  no .  It occurs every day. Quality is throbbing. Intensity at worst is  5/10 and at least is 0/10. Neck pain is worse in the day and back pain is worse at night. Neck pain worse when: turning head; back pain is worse when lies down. Bending twisting does not bother back. . Pain is alleviated by Tylenol: helps a little. Heat helps neck for the time it is being  applied. Over time pain is the same. Patient Active Problem List   Diagnosis Code    LAP-BAND surgery status Z98.84    Vaginal cancer ([facility] [address] C52    Hypertension, essential I10    Hypercholesterolemia E78.00    GERD without esophagitis K21.9     Past Medical History:   Diagnosis Date    Abnormal mammogram of left breast 12/5/2016    GERD (gastroesophageal reflux disease) 6/11/06    Hypercholesterolemia 10/11/06    Hypertension 10/11/06    Morbid obesity ([facility] [address] 10/11/06    Sleep apnea 6/11/06     Past Surgical History:   Procedure Laterality Date    HX GYN  12/4/15    EUA/Bx of pelvic mass and vagina/cystoscopy/proctoscopy    HX HYSTERECTOMY  1978    HX ORTHOPAEDIC  2009    right ankle    HX OTHER SURGICAL  2001?     benign mass removed from lower back    LAP, PLACE ADJUST GASTR BAND  1/30/08    EB: AP Std lap band, liver bx     Social History     Social History    Marital status:      Spouse name: N/A    Number of children: N/A    Years of education: N/A     Social History Main Topics    Smoking status: Former Smoker     Packs/day: 0.50     Years: 15.00     Types: Cigarettes     Quit date: 1/1/1993    Smokeless tobacco: Never Used    Alcohol use Yes      Comment: socially    Drug use: No    Sexual activity: Not Currently     Other Topics Concern    None     Social History Narrative     Family History   Problem Relation Age of Onset    Cancer Mother      colon    Diabetes Father     Heart Disease Father     Cancer Sister      breast & bone     No Known Allergies  Current Outpatient Prescriptions   Medication Sig Dispense Refill    ibuprofen 200 mg cap       atorvastatin (LIPITOR) 40 mg tablet Take 1 Tab by mouth.  triamterene-hydrochlorothiazide (MAXZIDE) 37.5-25 mg per tablet Take 1 Tab by mouth daily.  Cholecalciferol, Vitamin D3, (VITAMIN D3) 1,000 unit cap Take 1 Cap by mouth.  losartan (COZAAR) 100 mg tablet Take 100 mg by mouth daily. Review of Systems   Constitutional: Negative for malaise/fatigue and weight loss. Gastrointestinal: Negative for constipation, diarrhea and heartburn. Musculoskeletal: Negative for back pain and joint pain. Neurological: Negative for dizziness, tingling and focal weakness. Visit Vitals    /88 (BP 1 Location: Left arm, BP Patient Position: Sitting)    Pulse 71    Temp 97.5 °F (36.4 °C) (Oral)    Resp 14    Ht 5' 2\" (1.575 m)    Wt 239 lb (108.4 kg)    SpO2 97%    BMI 43.71 kg/m2     Physical Exam   Constitutional: She is oriented to person, place, and time. She appears well-developed and well-nourished. HENT:   Head: Normocephalic and atraumatic. Eyes: Conjunctivae are normal. Pupils are equal, round, and reactive to light. Neck: Neck supple. Carotid bruit is not present. No thyromegaly present. Cardiovascular: Normal rate, regular rhythm and normal heart sounds. PMI is not displaced.   Exam reveals no gallop. No murmur heard. Pulses:       Dorsalis pedis pulses are 2+ on the right side, and 2+ on the left side. Posterior tibial pulses are 2+ on the right side, and 2+ on the left side. Pulmonary/Chest: Effort normal. She has no wheezes. She has no rhonchi. She has no rales. Abdominal: Soft. Normal appearance. She exhibits no abdominal bruit and no mass. There is no hepatosplenomegaly. There is no tenderness. Musculoskeletal: She exhibits no edema. Cervical back: She exhibits tenderness, pain and spasm (on the right). She exhibits normal range of motion, no bony tenderness and no deformity. Lumbar back: She exhibits pain. She exhibits normal range of motion, no tenderness, no bony tenderness, no deformity and no spasm. Back:    Negative straight leg raise test   Lymphadenopathy:     She has no cervical adenopathy. Right: No supraclavicular adenopathy present. Left: No inguinal and no supraclavicular adenopathy present. Neurological: She is alert and oriented to person, place, and time. She has normal strength. No sensory deficit. Motor strength bialterally is 5/5 to finger flexion/extension, abduction and opposition, wrist flexion/extension, elbow flexion extension  Motor strength bilaterally is 5/5 to dorsiflexion/plantar flexion of ankle and great toe, flexion/extension of knee, flexion of hip     Skin: Skin is warm, dry and intact. No rash noted. Psychiatric: She has a normal mood and affect. Her behavior is normal.   Nursing note and vitals reviewed.         Results for orders placed or performed in visit on 05/66/09   METABOLIC PANEL, COMPREHENSIVE   Result Value Ref Range    Glucose 97 65 - 99 mg/dL    BUN 21 8 - 27 mg/dL    Creatinine 1.04 (H) 0.57 - 1.00 mg/dL    GFR est non-AA 56 (L) >59 mL/min/1.73    GFR est AA 64 >59 mL/min/1.73    BUN/Creatinine ratio 20 11 - 26    Sodium 142 134 - 144 mmol/L    Potassium 4.2 3.5 - 5.2 mmol/L Chloride 98 96 - 106 mmol/L    CO2 26 18 - 29 mmol/L    Calcium 9.6 8.7 - 10.3 mg/dL    Protein, total 7.4 6.0 - 8.5 g/dL    Albumin 4.5 3.6 - 4.8 g/dL    GLOBULIN, TOTAL 2.9 1.5 - 4.5 g/dL    A-G Ratio 1.6 1.1 - 2.5    Bilirubin, total 0.5 0.0 - 1.2 mg/dL    Alk. phosphatase 115 39 - 117 IU/L    AST (SGOT) 14 0 - 40 IU/L    ALT (SGPT) 15 0 - 32 IU/L   LIPID PANEL   Result Value Ref Range    Cholesterol, total 201 (H) 100 - 199 mg/dL    Triglyceride 180 (H) 0 - 149 mg/dL    HDL Cholesterol 46 >39 mg/dL    VLDL, calculated 36 5 - 40 mg/dL    LDL, calculated 119 (H) 0 - 99 mg/dL   HEPATITIS C AB   Result Value Ref Range    Hep C Virus Ab <0.1 0.0 - 0.9 s/co ratio   CVD REPORT   Result Value Ref Range    INTERPRETATION NTAP    CKD REPORT   Result Value Ref Range    Interpretation Note        ASSESSMENT and PLAN    ICD-10-CM ICD-9-CM    1. Hypertension, essential I10 401.9 amLODIPine (NORVASC) 2.5 mg tablet      losartan (COZAAR) 100 mg tablet      triamterene-hydroCHLOROthiazide (MAXZIDE) 37.5-25 mg per tablet   2. Hypercholesterolemia E78.00 272.0 atorvastatin (LIPITOR) 80 mg tablet      LIPID PANEL   3. Neck muscle spasm M62.838 728.85 REFERRAL TO CHIROPRACTIC   4. Acute bilateral low back pain without sciatica M54.5 724.2 REFERRAL TO CHIROPRACTIC     338.19    5. Medicare annual wellness visit, initial Z00.00 V70.0    6. Advance directive discussed with patient Z71.89 V65.49          Hypertension, essential  Hypertension is uncontrolled - medication changes/orders  -     Add amLODIPine (NORVASC) 2.5 mg tablet; Take 1 Tab by mouth daily. -     losartan (COZAAR) 100 mg tablet; Take 1 Tab by mouth daily. -     triamterene-hydroCHLOROthiazide (MAXZIDE) 37.5-25 mg per tablet; Take 1 Tab by mouth daily. Hypercholesterolemia  Hyperlipidemia is uncontrolled - medication changes/orders  -     Increase atorvastatin (LIPITOR) 80 mg tablet; Take 1 Tab by mouth daily.  -     LIPID PANEL;  Future    Neck muscle spasm  - REFERRAL TO CHIROPRACTIC    Acute bilateral low back pain without sciatica  -     REFERRAL TO CHIROPRACTIC    Medicare annual wellness visit, initial    Advance directive discussed with patient      Follow-up Disposition:  Return in about 5 weeks (around 4/21/2017) for HTN, chol  Fasting lab one week prior . lab results and schedule of future lab studies reviewed with patient  reviewed diet, exercise and weight control  cardiovascular risk and specific lipid/LDL goals reviewed  Discussed the patient's BMI with her. The BMI follow up plan is as follows: I have counseled this patient on diet and exercise regimens  I have discussed the diagnosis with the patient and the intended plan as seen in the above orders. Patient is in agreement. The patient has received an after-visit summary and questions were answered concerning future plans. I have discussed medication side effects and warnings with the patient as well.

## 2017-03-17 NOTE — MR AVS SNAPSHOT
Visit Information Date & Time Provider Department Dept. Phone Encounter #  
 3/17/2017 10:00 AM Garry Marshall Tricia Novoa [phone] [id number] Follow-up Instructions Return in about 5 weeks (around 4/21/2017) for HTN, chol  Fasting lab one week prior . Your Appointments 3/23/2017 10:00 AM  
3 MONTH with Chauncey Gamboa MD  
[facility] Gynecologic Oncology [facility]) Appt Note: [address] [id number]  
[address] [place] [address] Upcoming Health Maintenance Date Due DTaP/Tdap/Td series (1 - Tdap) 7/7/1970 MEDICARE YEARLY EXAM 7/7/2014 GLAUCOMA SCREENING Q2Y 11/16/2017 COLONOSCOPY 11/16/2017 BREAST CANCER SCRN MAMMOGRAM 11/30/2018 Pneumococcal 65+ High/Highest Risk (2 of 2 - PPSV23) 10/22/2020 Allergies as of 3/17/2017  Review Complete On: 3/17/2017 By: Garry Marshall MD  
 No Known Allergies Current Immunizations  Reviewed on 3/17/2017 Name Date Influenza High Dose Vaccine PF 12/1/2016 Influenza Vaccine 10/15/2015 Pneumococcal Vaccine (Unspecified Type) 10/22/2015 Zoster Vaccine, Live 10/15/2015 Reviewed by Yennifer Esqueda LPN on 9/05/0508 at  9:57 AM  
You Were Diagnosed With   
  
 Codes Comments Hypertension, essential    -  Primary ICD-10-CM: I10 
ICD-9-CM: 401.9 Hypercholesterolemia     ICD-10-CM: E78.00 ICD-9-CM: 272.0 Neck muscle spasm     ICD-10-CM: U60.024 ICD-9-CM: 728.85 Acute bilateral low back pain without sciatica     ICD-10-CM: M54.5 ICD-9-CM: 724.2, 338.19 Medicare annual wellness visit, initial     ICD-10-CM: Z00.00 ICD-9-CM: V70.0 Advance directive discussed with patient     ICD-10-CM: Z71.89 ICD-9-CM: V65.49 Vitals BP Pulse Temp Resp Height(growth percentile) Weight(growth percentile) 146/88 (BP 1 Location: Left arm, BP Patient Position: Sitting) 71 97.5 °F (36.4 °C) (Oral) 14 5' 2\" (1.575 m) 239 lb (108.4 kg) SpO2 BMI OB Status Smoking Status 97% 43.71 kg/m2 Hysterectomy Former Smoker Vitals History BMI and BSA Data Body Mass Index Body Surface Area 43.71 kg/m 2 2.18 m 2 Preferred Pharmacy Pharmacy Name Phone [facility] [address] [place] [address] [address] [facility] [address] [phone] Your Updated Medication List  
  
   
This list is accurate as of: 3/17/17 10:50 AM.  Always use your most recent med list. amLODIPine 2.5 mg tablet Commonly known as:  Love Breach Take 1 Tab by mouth daily. atorvastatin 80 mg tablet Commonly known as:  LIPITOR Take 1 Tab by mouth daily. ibuprofen 200 mg Cap  
  
 losartan 100 mg tablet Commonly known as:  COZAAR Take 1 Tab by mouth daily. triamterene-hydroCHLOROthiazide 37.5-25 mg per tablet Commonly known as:  Mary Friar Take 1 Tab by mouth daily. VITAMIN D3 1,000 unit Cap Generic drug:  cholecalciferol Take 1 Cap by mouth. Prescriptions Sent to Pharmacy Refills  
 amLODIPine (NORVASC) 2.5 mg tablet 11 Sig: Take 1 Tab by mouth daily. Class: Normal  
 Pharmacy: [facility], [address] [place] [address] Ph #: [phone] Route: Oral  
 atorvastatin (LIPITOR) 80 mg tablet 3 Sig: Take 1 Tab by mouth daily. Class: Normal  
 Pharmacy: [address] [place] [address] Ph #: [phone] Route: Oral  
 losartan (COZAAR) 100 mg tablet 3 Sig: Take 1 Tab by mouth daily. Class: Normal  
 Pharmacy: [address] [place] [address] Ph #: [phone] Route: Oral  
 triamterene-hydroCHLOROthiazide (MAXZIDE) 37.5-25 mg per tablet 3 Sig: Take 1 Tab by mouth daily.   
 Class: Normal  
 Pharmacy: [address] [place] [address], [facility] [address] [place] [address] #: [phone] Route: Oral  
  
We Performed the Following REFERRAL TO CHIROPRACTIC [REF16 Custom] Follow-up Instructions Return in about 5 weeks (around 4/21/2017) for HTN, chol  Fasting lab one week prior . To-Do List   
 04/17/2017 Lab:  LIPID PANEL Referral Information Referral ID Referred By Referred To  
  
 [id number] [facility], [address] Phone: [phone] Fax: [phone] Visits Status Start Date End Date 1 New Request 3/17/17 3/17/18 If your referral has a status of pending review or denied, additional information will be sent to support the outcome of this decision. Patient Instructions Increase atorvastatin to  80 mg daily Add amlodipine 2.5 mg once a day for blood pressure Return in 4-6 weeks. Fasting lab one week prior The best way to stay healthy is to live a healthy lifestyle. A healthy lifestyle includes regular exercise, eating a well-balanced diet, keeping a healthy weight and not smoking. Regular physical exams and screening tests are another important way to take care of yourself. Preventive exams provided by health care providers can find health problems early when treatment works best and can keep you from getting certain diseases or illnesses. Preventive services include exams, lab tests, screenings, shots, monitoring and information to help you take care of your own health. All people over 65 should have a pneumonia shot. Pneumonia shots are usually only needed once in a lifetime unless your doctor decides differently. In addition to your physical exam, some screening tests are recommended: 
 
All people over 65 should have a yearly flu shot. People over 65 are at medium to high risk for Hepatitis B.  Three shots are needed for complete protection. Bone mass measurement (dexa scan) is recommended every two years. Diabetes Mellitus screening is recommended every year. Glaucoma is an eye disease caused by high pressure in the eye. An eye exam is recommended every year. Cardiovascular screening tests that check your cholesterol and other blood fat (lipid) levels are recommended every five years. Colorectal Cancer screening tests help to find pre-cancerous polyps (growths in the colon) so they can be removed before they turn into cancer. Tests ordered for screening depend on your personal and family history risk factors. Prostate Cancer Screening (annually up to age 76) Screening for breast cancer is recommended yearly with a Mammogram. 
 
Screening for cervical and vaginal cancer is recommended with a pelvic and Pap test every two years. However if you have had an abnormal pap in the past  three years or at high risk for cervical or vaginal cancer Medicare will cover a pap test and a pelvic exam every year. Here is a list of your current Health Maintenance items with a due date: 
Health Maintenance Due Topic Date Due  
 DTaP/Tdap/Td  (1 - Tdap) [birth year]/1970 [address] [place] [address] Annual Well Visit  07/07/2014 Well Visit, Over 72: Care Instructions Your Care Instructions Physical exams can help you stay healthy. Your doctor has checked your overall health and may have suggested ways to take good care of yourself. He or she also may have recommended tests. At home, you can help prevent illness with healthy eating, regular exercise, and other steps. Follow-up care is a key part of your treatment and safety. Be sure to make and go to all appointments, and call your doctor if you are having problems. It's also a good idea to know your test results and keep a list of the medicines you take. How can you care for yourself at home? · Reach and stay at a healthy weight.  This will lower your risk for many problems, such as obesity, diabetes, heart disease, and high blood pressure. · Get at least 30 minutes of exercise on most days of the week. Walking is a good choice. You also may want to do other activities, such as running, swimming, cycling, or playing tennis or team sports. · Do not smoke. Smoking can make health problems worse. If you need help quitting, talk to your doctor about stop-smoking programs and medicines. These can increase your chances of quitting for good. · Protect your skin from too much sun. When you're outdoors from 10 a.m. to 4 p.m., stay in the shade or cover up with clothing and a hat with a wide brim. Wear sunglasses that block UV rays. Even when it's cloudy, put broad-spectrum sunscreen (SPF 30 or higher) on any exposed skin. · See a dentist one or two times a year for checkups and to have your teeth cleaned. · Wear a seat belt in the car. · Limit alcohol to 2 drinks a day for men and 1 drink a day for women. Too much alcohol can cause health problems. Follow your doctor's advice about when to have certain tests. These tests can spot problems early. For men and women · Cholesterol. Your doctor will tell you how often to have this done based on your overall health and other things that can increase your risk for heart attack and stroke. · Blood pressure. Have your blood pressure checked during a routine doctor visit. Your doctor will tell you how often to check your blood pressure based on your age, your blood pressure results, and other factors. · Diabetes. Ask your doctor whether you should have tests for diabetes. · Vision. Experts recommend that you have yearly exams for glaucoma and other age-related eye problems. · Hearing. Tell your doctor if you notice any change in your hearing. You can have tests to find out how well you hear. · Colon cancer tests. Keep having colon cancer tests as your doctor recommends. You can have one of several types of tests. · Heart attack and stroke risk. At least every 4 to 6 years, you should have your risk for heart attack and stroke assessed. Your doctor uses factors such as your age, blood pressure, cholesterol, and whether you smoke or have diabetes to show what your risk for a heart attack or stroke is over the next 10 years. · Osteoporosis. Talk to your doctor about whether you should have a bone density test to find out whether you have thinning bones. Also ask your doctor about whether you should take calcium and vitamin D supplements. For women · Pap test and pelvic exam. You may no longer need a Pap test. Talk with your doctor about whether to stop or continue to have Pap tests. · Breast exam and mammogram. Ask how often you should have a mammogram, which is an X-ray of your breasts. A mammogram can spot breast cancer before it can be felt and when it is easiest to treat. · Thyroid disease. Talk to your doctor about whether to have your thyroid checked as part of a regular physical exam. Women have an increased chance of a thyroid problem. For men · Prostate exam. Talk to your doctor about whether you should have a blood test (called a PSA test) for prostate cancer. Experts disagree on whether men should have this test. Some experts recommend that you discuss the benefits and risks of the test with your doctor. · Abdominal aortic aneurysm. Ask your doctor whether you should have a test to check for an aneurysm. You may need a test if you ever smoked or if your parent, brother, sister, or child has had an aneurysm. When should you call for help? Watch closely for changes in your health, and be sure to contact your doctor if you have any problems or symptoms that concern you. Where can you learn more? Go to http://cele-alyssa.info/. Enter O782 in the search box to learn more about \"Well Visit, Over 65: Care Instructions. \" Current as of: July 19, 2016 Content Version: 11.1 © 4743-4990 Healthwise, Incorporated. Care instructions adapted under license by [employer]uka! (which disclaims liability or warranty for this information). If you have questions about a medical condition or this instruction, always ask your healthcare professional. [address] any warranty or liability for your use of this information. Heart-Healthy Diet: Care Instructions Your Care Instructions A heart-healthy diet has lots of vegetables, fruits, nuts, beans, and whole grains, and is low in salt. It limits foods that are high in saturated fat, such as meats, cheeses, and fried foods. It may be hard to change your diet, but even small changes can lower your risk of heart attack and heart disease. Follow-up care is a key part of your treatment and safety. Be sure to make and go to all appointments, and call your doctor if you are having problems. It's also a good idea to know your test results and keep a list of the medicines you take. How can you care for yourself at home? Watch your portions · Learn what a serving is. A \"serving\" and a \"portion\" are not always the same thing. Make sure that you are not eating larger portions than are recommended. For example, a serving of pasta is ½ cup. A serving size of meat is 2 to 3 ounces. A 3-ounce serving is about the size of a deck of cards. Measure serving sizes until you are good at [place]" them. Keep in mind that restaurants often serve portions that are 2 or 3 times the size of one serving. · To keep your energy level up and keep you from feeling hungry, eat often but in smaller portions. · Eat only the number of calories you need to stay at a healthy weight. If you need to lose weight, eat fewer calories than your body burns (through exercise and other physical activity). Eat more fruits and vegetables · Eat a variety of fruit and vegetables every day.  Dark green, deep orange, red, or yellow fruits and vegetables are especially good for you. Examples include spinach, carrots, peaches, and berries. · Keep carrots, celery, and other veggies handy for snacks. Buy fruit that is in season and store it where you can see it so that you will be tempted to eat it. · Cook dishes that have a lot of veggies in them, such as stir-fries and soups. Limit saturated and trans fat · Read food labels, and try to avoid saturated and trans fats. They increase your risk of heart disease. Trans fat is found in many processed foods such as cookies and crackers. · Use olive or canola oil when you cook. Try cholesterol-lowering spreads, such as Benecol or Take Control. · Bake, broil, grill, or steam foods instead of frying them. · Choose lean meats instead of high-fat meats such as hot dogs and sausages. Cut off all visible fat when you prepare meat. · Eat fish, skinless poultry, and meat alternatives such as soy products instead of high-fat meats. Soy products, such as tofu, may be especially good for your heart. · Choose low-fat or fat-free milk and dairy products. Eat fish · Eat at least two servings of fish a week. Certain fish, such as salmon and tuna, contain omega-3 fatty acids, which may help reduce your risk of heart attack. Eat foods high in fiber · Eat a variety of grain products every day. Include whole-grain foods that have lots of fiber and nutrients. Examples of whole-grain foods include oats, whole wheat bread, and brown rice. · Buy whole-grain breads and cereals, instead of white bread or pastries. Limit salt and sodium · Limit how much salt and sodium you eat to help lower your blood pressure. · Taste food before you salt it. Add only a little salt when you think you need it. With time, your taste buds will adjust to less salt. · Eat fewer snack items, fast foods, and other high-salt, processed foods. Check food labels for the amount of sodium in packaged foods. · Choose low-sodium versions of canned goods (such as soups, vegetables, and beans). Limit sugar · Limit drinks and foods with added sugar. These include candy, desserts, and soda pop. Limit alcohol · Limit alcohol to no more than 2 drinks a day for men and 1 drink a day for women. Too much alcohol can cause health problems. When should you call for help? Watch closely for changes in your health, and be sure to contact your doctor if: 
· You would like help planning heart-healthy meals. Where can you learn more? Go to http://celeBOOM! Entertainmentalyssa.info/. Enter V137 in the search box to learn more about \"Heart-Healthy Diet: Care Instructions. \" Current as of: January 27, 2016 Content Version: 11.1 © 0421-0840 [employer]. Care instructions adapted under license by Dashbid (which disclaims liability or warranty for this information). If you have questions about a medical condition or this instruction, always ask your healthcare professional. [address] any warranty or liability for your use of this information. Neck: Exercises Your Care Instructions Here are some examples of typical rehabilitation exercises for your condition. Start each exercise slowly. Ease off the exercise if you start to have pain. Your doctor or physical therapist will tell you when you can start these exercises and which ones will work best for you. How to do the exercises Note: Stretching should make you feel a gentle stretch, but no pain. Stop any strengthening exercise that makes pain worse. Neck stretch 1. This stretch works best if you keep your shoulder down as you lean away from it. To help you remember to do this, start by relaxing your shoulders and lightly holding on to your thighs or your chair. 2. Tilt your head toward your shoulder and hold for 15 to 30 seconds. Let the weight of your head stretch your muscles. 3. If you would like a little added stretch, use your hand to gently and steadily pull your head toward your shoulder. For example, keeping your right shoulder down, lean your head to the left. 4. Repeat 2 to 4 times toward each shoulder. Diagonal neck stretch 1. Turn your head slightly toward the direction you will be stretching, and tilt your head diagonally toward your chest and hold for 15 to 30 seconds. 2. If you would like a little added stretch, use your hand to gently and steadily pull your head forward on the diagonal. 
3. Repeat 2 to 4 times toward each side. Dorsal glide stretch 1. Sit or stand tall and look straight ahead. 2. Slowly tuck your chin as you glide your head backward over your body 3. Hold for a count of 6, and then relax for up to 10 seconds. 4. Repeat 8 to 12 times. Note: The dorsal glide stretches the back of the neck. If you feel pain, do not glide so far back. Some people find this exercise easier to do while lying on their backs with an ice pack on the neck. Chest and shoulder stretch 1. Sit or stand tall and glide your head backward as in the dorsal glide stretch. 2. Raise both arms so that your hands are next to your ears. 3. Take a deep breath, and as you breathe out, lower your elbows down and behind your back. You will feel your shoulder blades slide down and together, and at the same time you will feel a stretch across your chest and the front of your shoulders. 4. Hold for about 6 seconds, and then relax for up to 10 seconds. 5. Repeat 8 to 12 times. Strengthening: Hands on head 1. Move your head backward, forward, and side to side against gentle pressure from your hands, holding each position for about 6 seconds. 2. Repeat 8 to 12 times. Follow-up care is a key part of your treatment and safety. Be sure to make and go to all appointments, and call your doctor if you are having problems.  It's also a good idea to know your test results and keep a list of the medicines you take. Where can you learn more? Go to http://cele-alyssa.info/. Enter P975 in the search box to learn more about \"Neck: Exercises. \" Current as of: May 23, 2016 Content Version: 11.1 © 4407-3221 [employer], Incorporated. Care instructions adapted under license by Zanbato (which disclaims liability or warranty for this information). If you have questions about a medical condition or this instruction, always ask your healthcare professional. [address] any warranty or liability for your use of this information. Introducing [facility] & HEALTH SERVICES! Dear Ebony Harper: Thank you for requesting a Spontly account. Our records indicate that you already have an active Spontly account. You can access your account anytime at [email or web address] Did you know that you can access your hospital and ER discharge instructions at any time in Spontly? You can also review all of your test results from your hospital stay or ER visit. Additional Information If you have questions, please visit the Frequently Asked Questions section of the Spontly website at [email or web address] Remember, Spontly is NOT to be used for urgent needs. For medical emergencies, dial 911. Now available from your iPhone and Android! Please provide this summary of care documentation to your next provider. Your primary care clinician is listed as Charmaine Williamson. If you have any questions after today's visit, please call [phone].

## 2017-03-19 NOTE — ACP (ADVANCE CARE PLANNING)
With patient's permission advance care planning discussed. Primary SDM would be . Secondary SDM would be son Chidi Stauffer. She wants no life prolonging treatment if death is imminent. She wants no life prolonging treatment if there is overwhelming, permanent neurologic injury. Reviewed paperwork. Given name of NN who can be contacted with any questions or help needed completing forms. Conversation took 4 minutes.

## 2017-03-23 ENCOUNTER — HOSPITAL ENCOUNTER (OUTPATIENT)
Dept: LAB | Age: 67
Discharge: HOME | End: 2017-03-23
Payer: MEDICARE

## 2017-03-23 ENCOUNTER — OFFICE VISIT (OUTPATIENT)
Dept: GYNECOLOGY | Age: 67
End: 2017-03-23

## 2017-03-23 VITALS
WEIGHT: 238 LBS | HEIGHT: 62 IN | HEART RATE: 67 BPM | BODY MASS INDEX: 43.79 KG/M2 | SYSTOLIC BLOOD PRESSURE: 162 MMHG | DIASTOLIC BLOOD PRESSURE: 89 MMHG

## 2017-03-23 DIAGNOSIS — C52 VAGINAL CANCER (HCC): Primary | ICD-10-CM

## 2017-03-23 PROCEDURE — 88175 CYTOPATH C/V AUTO FLUID REDO: CPT | Performed by: OBSTETRICS & GYNECOLOGY

## 2017-03-23 NOTE — PROGRESS NOTES
3 month check up, Patient states no abnormal spotting or bleeding. Patient states no questions or concerns for today's visit.

## 2017-03-23 NOTE — PROGRESS NOTES
[address] [facility] [address], [address] [facility]  [phone]  MD Valentin Lopez MD  Office Note  Patient ID:  Name:  Sima Maldonado  MRN:  [id number]  :   y.o. Date:  3/23/2017      HISTORY OF PRESENT ILLNESS:  Sima Maldonado is a 78 y.o.  postmenopausal female who was referred to me for a pelvic mass by Dr. Dhaval Ly, who noted the mass on routine gynecologic exam.  He sent her for a CT of the pelvis, which revealed a mass at the vaginal apex, that appeared to be invading into the soft tissues of the left pelvis. There were several enlarged lymph nodes on the left. There was also questionable bladder invasion, and possibly even involvement of the rectosigmoid colon. She reported a history of having a mass removed from one of her ovaries in 1 at [facility], which required a second surgery to remove her uterus and contralateral ovary. She said she did not have to take any chemotherapy, but she did have to take some kind of pill for a year. She has not had any gynecologic issues since that time. Presumably her hysterectomy was a total, rather than a supracervical, but she is unsure and there are no records. She denied any vaginal bleeding, except for after her examination with Dr. Jim Saunders. She reported minimal pain in the pelvis. I took her the OR for and exam under anesthesia with cystoscopy and proctoscopy. Pathology revealed a squamous cell carcinoma, consistent with a vaginal primary. Examination revealed a fixed mass involving the left upper vagina with extension to the left pelvic sidewall. There also appeared to be involvement of the posterior bladder wall, but no mucosal involvement. The mass did push against the rectal wall, but there did not appear to be any direct involvement. I referred her to radiation oncology and she saw Dr. Jodi Johnson.   She completed pelvic radiation along with concurrent cisplatin chemotherapy in February 2016 and had a great response. She presents today for follow-up. She has not complaints. She has not been using her vaginal dilator that often due to discomfort. ROS:   and GI review: Negative  Cardiopulmonary review:Negative   Musculoskeletal:  Negative    A comprehensive review of systems was negative except for that written in the History of Present Illness. , 10 point ROS    OB/GYN ROS:  There is no history of significant gyn problems or procedures, except that mentioned above. Patient denies any abnormal bleeding or vaginal discharge. Problem List:  Patient Active Problem List    Diagnosis Date Noted    Hypertension, essential 01/18/2017    Hypercholesterolemia 01/18/2017    GERD without esophagitis 01/18/2017    Vaginal cancer ([address] 12/10/2015    LAP-BAND surgery status 05/17/2012     PMH:  Past Medical History:   Diagnosis Date    Abnormal mammogram of left breast 12/5/2016    GERD (gastroesophageal reflux disease) 6/11/06    Hypercholesterolemia 10/11/06    Hypertension 10/11/06    Morbid obesity ([address] 10/11/06    Sleep apnea 6/11/06      PSH:  Past Surgical History:   Procedure Laterality Date    HX GYN  12/4/15    EUA/Bx of pelvic mass and vagina/cystoscopy/proctoscopy    HX HYSTERECTOMY  1978    HX ORTHOPAEDIC  2009    right ankle    HX OTHER SURGICAL  2001?     benign mass removed from lower back    LAP, PLACE ADJUST GASTR BAND  1/30/08    EB: AP Std lap band, liver bx      Social History:  Social History   Substance Use Topics    Smoking status: Former Smoker     Packs/day: 0.50     Years: 15.00     Types: Cigarettes     Quit date: 1/1/1993    Smokeless tobacco: Never Used    Alcohol use Yes      Comment: socially      Family History:  Family History   Problem Relation Age of Onset    Cancer Mother      colon    Diabetes Father     Heart Disease Father     Cancer Sister      breast & bone      Medications: (reviewed)  Current Outpatient Prescriptions   Medication Sig    amLODIPine (NORVASC) 2.5 mg tablet Take 1 Tab by mouth daily.  atorvastatin (LIPITOR) 80 mg tablet Take 1 Tab by mouth daily.  losartan (COZAAR) 100 mg tablet Take 1 Tab by mouth daily.  triamterene-hydroCHLOROthiazide (MAXZIDE) 37.5-25 mg per tablet Take 1 Tab by mouth daily.  ibuprofen 200 mg cap     Cholecalciferol, Vitamin D3, (VITAMIN D3) 1,000 unit cap Take 1 Cap by mouth. No current facility-administered medications for this visit. Allergies: (reviewed)  No Known Allergies       OBJECTIVE:    Physical Exam:  VITAL SIGNS: Vitals:    03/23/17 1031   BP: 162/89   Pulse: 67   Weight: 238 lb (108 kg)   Height: 5' 2.01\" (1.575 m)     Body mass index is 43.52 kg/(m^2). GENERAL MIGUELANGEL: Conversant, alert, oriented. No acute distress. HEENT: HEENT. No thyroid enlargement. No JVD. Neck: Supple without restrictions. RESPIRATORY: Clear to auscultation and percussion to the bases. No CVAT. CARDIOVASC: RRR without murmur/rub. GASTROINT: soft, non-tender, without masses or organomegaly   MUSCULOSKEL: no joint tenderness, deformity or swelling   EXTREMITIES: extremities normal, atraumatic, no cyanosis or edema   PELVIC: Normal external genitalia. Normal distal vagina. Scarring from the radiation noted. No masses. RECTAL: Deferred   PATRICIO SURVEY: No suspicious lymphadenopathy or edema noted. NEURO: Grossly intact. No acute deficit. CT of pelvis (11/24/15)  There is no evidence of bowel obstruction old. There is a heterogeneous mass   in the deep left pelvis posterior to the urinary bladder which is irregular   in shape and has a lucent center. This measures approximately 4.1 x 6.1 x   5.1 cm but irregular shape it is difficult. This is seen in the perirectal   fat as well as in the fat plane surrounding the posterior wall of the distal   sigmoid colon.  This mass, however extends through the peritoneal reflection into the extraperitoneal region of the deep pelvis at the level of the   acetabulum. Tiny amount of gas is noted along the inferior border of this   mass which may represent gas in the vaginal vault. Sagittal imaging suggests   that this is arising from the apex of the vaginal vault posterior to the   urinary bladder. This may be invading and thickening the anterior wall of   the urinary bladder as well on the left. Invasion of adjacent rectal wall   cannot be excluded as well. There is an enlarged lymph node in the deep left   pelvis (series 3 image 26) which is 2.6 x 1.9 cm in diameter. Note is made   of the patient's history of hysterectomy. Remainder of the urinary bladder is unremarkable. There are a few scattered   diverticuli in the sigmoid colon. Osseous structures demonstrate no sclerotic or lytic lesion. Degenerative   changes of lower lumbar spine. IMPRESSION:  Heterogeneous mass in the deep left pelvis which invades and involves the   extraperitoneal region on the left. Adjacent adenopathy is noted. There is   also possible involvement of the posterior wall of the urinary bladder on   the left as well as adjacent rectum and sigmoid colon wall. The epicenter of   this mass lies near the left side of the apex of the vaginal vault. PET/CT (12/14/15)  HEAD/NECK: No apparent foci of abnormal hypermetabolism. Cerebral evaluation    is limited by normal intense activity.     CHEST: No foci of abnormal hypermetabolism. Granulomatous changes are noted.    There is a 3 mm subpleural nodule right middle lobe.     ABDOMEN/PELVIS: Patient status post gastric banding. There is a 2.5 cm right    adrenal adenoma. There are gallstones.     Imaging of the pelvis demonstrates a large mass in the region of the vaginal    apex measuring 5.4 x 4.8 cm extending to and involving the left pelvic    sidewall. There is a 1 cm lymph node left sigmoid mesentery with increased    metabolic activity.  There is a left pelvic sidewall lymph node measuring 2    cm with increased metabolic activity of 7.5.     The mass extends to the rectum. Activity in the mass the vaginal apex is    inseparable from the activity in the urinary bladder.     There is a 1.1 cm lymph node in the left pelvis posterior to the acetabulum    anterior to the left gluteal muscles suspicious for a lymph node which    demonstrates increased metabolic activity of 4.2     SKELETON: No foci of abnormal hypermetabolism in the axial and visualized    appendicular skeleton.       IMPRESSION:    1. There is a large mass in the vaginal apex which extends into and involves    the left pelvic sidewall. There is an enlarged lymph node in the left    external iliac chain and left sigmoid mesentery with increased metabolic    activity suspicious for metastatic disease. There is also small lymph node    posterior to the left acetabulum anterior to the left gluteal muscle with    increased metabolic activity suspicious for metastatic disease. 2. The mass extends to the rectum. Activity in the vaginal apex mass is    inseparable from activity in the urinary bladder and invasion cannot be    excluded. IMPRESSION/PLAN:  Musa Robison is a 78 y.o. female with a diagnosis of stage III vaginal carcinoma. She was treated with pelvic radiation therapy and concurrent cisplatin chemotherapy. She has no evidence of disease on today's exam.  We will follow up on today's pap smear and I will see her back in three months for continued disease surveillance.        Signed By: Cami Wang MD     3/23/2017/4:31 PM

## 2017-04-18 ENCOUNTER — APPOINTMENT (OUTPATIENT)
Dept: INTERNAL MEDICINE CLINIC | Age: 67
End: 2017-04-18

## 2017-04-18 DIAGNOSIS — E78.00 HYPERCHOLESTEROLEMIA: ICD-10-CM

## 2017-04-19 LAB
CHOLEST SERPL-MCNC: 172 MG/DL (ref 100–199)
HDLC SERPL-MCNC: 47 MG/DL
INTERPRETATION, 910389: NORMAL
LDLC SERPL CALC-MCNC: 95 MG/DL (ref 0–99)
TRIGL SERPL-MCNC: 152 MG/DL (ref 0–149)
VLDLC SERPL CALC-MCNC: 30 MG/DL (ref 5–40)

## 2017-04-24 ENCOUNTER — OFFICE VISIT (OUTPATIENT)
Dept: INTERNAL MEDICINE CLINIC | Age: 67
End: 2017-04-24

## 2017-04-24 VITALS
BODY MASS INDEX: 43.89 KG/M2 | HEIGHT: 62 IN | SYSTOLIC BLOOD PRESSURE: 118 MMHG | TEMPERATURE: 97.2 F | OXYGEN SATURATION: 97 % | DIASTOLIC BLOOD PRESSURE: 68 MMHG | HEART RATE: 69 BPM | WEIGHT: 238.5 LBS | RESPIRATION RATE: 14 BRPM

## 2017-04-24 DIAGNOSIS — M54.2 NECK PAIN, BILATERAL: Primary | ICD-10-CM

## 2017-04-24 DIAGNOSIS — I10 HYPERTENSION, ESSENTIAL: ICD-10-CM

## 2017-04-24 DIAGNOSIS — Z23 NEED FOR TDAP VACCINATION: ICD-10-CM

## 2017-04-24 NOTE — MR AVS SNAPSHOT
Visit Information Date & Time Provider Department Dept. Phone Encounter #  
 4/24/2017 10:30 AM Ever Baig MD [facility] [phone] [id number] Follow-up Instructions Return in about 6 months (around 10/24/2017) for HTN, chol  NON-fasting lab one week prior . Your Appointments 6/22/2017 10:00 AM  
3 MONTH with Bethany Olea MD  
[facility] Gynecologic Oncology [address]) Appt Note: [address] [facility] [address] [id number]  
[address] Upcoming Health Maintenance Date Due DTaP/Tdap/Td series (1 - Tdap) 7/7/1970 GLAUCOMA SCREENING Q2Y 11/16/2017 COLONOSCOPY 11/16/2017 MEDICARE YEARLY EXAM 3/18/2018 BREAST CANCER SCRN MAMMOGRAM 11/30/2018 Allergies as of 4/24/2017  Review Complete On: 4/24/2017 By: Ever Baig MD  
  
 Severity Noted Reaction Type Reactions Amlodipine  04/24/2017   Side Effect Other (comments) Edema in LE's Current Immunizations  Reviewed on 4/24/2017 Name Date Influenza High Dose Vaccine PF 12/1/2016 Influenza Vaccine 10/15/2015 Pneumococcal Conjugate (PCV-13) 9/4/2015 Pneumococcal Vaccine (Unspecified Type) 10/22/2015 Zoster Vaccine, Live 10/15/2015 Reviewed by Lela Flores LPN on 8/50/7688 at  7:58 AM  
 Reviewed by Lela Flores LPN on 4/17/5178 at 57:28 AM  
You Were Diagnosed With   
  
 Codes Comments Neck pain, bilateral    -  Primary ICD-10-CM: M54.2 ICD-9-CM: 723.1 Hypertension, essential     ICD-10-CM: I10 
ICD-9-CM: 401.9 Need for Tdap vaccination     ICD-10-CM: E54 ICD-9-CM: V06.1 Vitals BP Pulse Temp Resp Height(growth percentile) Weight(growth percentile)  
 118/68 (BP 1 Location: Left arm, BP Patient Position: Sitting) 69 97.2 °F (36.2 °C) (Oral) 14 5' 2\" (1.575 m) 238 lb 8 oz (108.2 kg) SpO2 BMI OB Status Smoking Status 97% 43.62 kg/m2 Hysterectomy Former Smoker Vitals History BMI and BSA Data Body Mass Index Body Surface Area  
 43.62 kg/m 2 2.18 m 2 Preferred Pharmacy Pharmacy Name Phone Violeta Gilmore [address] [place], [place] [id number] [facility] [address] [place] [address] [phone] Your Updated Medication List  
  
   
This list is accurate as of: 17 11:18 AM.  Always use your most recent med list.  
  
  
  
  
 atorvastatin 80 mg tablet Commonly known as:  LIPITOR Take 1 Tab by mouth daily. Diphth, Pertus(Acell), Tetanus 2.5-8-5 Lf-mcg-Lf/0.5mL Susp susp Commonly known as:  BOOSTRIX TDAP  
0.5 mL by IntraMUSCular route once for 1 dose. ibuprofen 200 mg Cap  
  
 losartan 100 mg tablet Commonly known as:  COZAAR Take 1 Tab by mouth daily. triamterene-hydroCHLOROthiazide 37.5-25 mg per tablet Commonly known as:  Eduardo Rolling Take 1 Tab by mouth daily. VITAMIN D3 1,000 unit Cap Generic drug:  cholecalciferol Take 1 Cap by mouth. Prescriptions Printed Refills Jenelle Alanise,, Tetanus (BOOSTRIX TDAP) 2.5-8-5 Lf-mcg-Lf/0.5mL susp susp 0 Si.5 mL by IntraMUSCular route once for 1 dose. Class: Print Route: IntraMUSCular We Performed the Following REFERRAL TO PHYSICAL THERAPY [WZK60 Custom] Follow-up Instructions Return in about 6 months (around 10/24/2017) for HTN, chol  NON-fasting lab one week prior . To-Do List   
 2017 Imaging:  XR SPINE CERV 4 OR 5 V   
  
 10/24/2017 Lab:  METABOLIC PANEL, BASIC Referral Information Referral ID Referred By Referred To  
  
 [id number] Lisa Valentin Not Available Visits Status Start Date End Date 1 New Request 17 If your referral has a status of pending review or denied, additional information will be sent to support the outcome of this decision. Patient Instructions See physical therapy regarding neck pain [facility] [address] [facility] Zuleyma Nava, [address] Tel: [phone] [facility] [address] [facility] [address], [address] [place], [address] Tel: [phone] Tel: [phone] Continue ice or heat as it helps Office visit in 6 months with fasting lab work a week before visit. Use heat before and ice after neck exercises. Neck: Exercises Your Care Instructions Here are some examples of typical rehabilitation exercises for your condition. Start each exercise slowly. Ease off the exercise if you start to have pain. Your doctor or physical therapist will tell you when you can start these exercises and which ones will work best for you. How to do the exercises Note: Stretching should make you feel a gentle stretch, but no pain. Stop any strengthening exercise that makes pain worse. Neck stretch 1. This stretch works best if you keep your shoulder down as you lean away from it. To help you remember to do this, start by relaxing your shoulders and lightly holding on to your thighs or your chair. 2. Tilt your head toward your shoulder and hold for 15 to 30 seconds. Let the weight of your head stretch your muscles. 3. If you would like a little added stretch, use your hand to gently and steadily pull your head toward your shoulder. For example, keeping your right shoulder down, lean your head to the left. 4. Repeat 2 to 4 times toward each shoulder. Diagonal neck stretch 1. Turn your head slightly toward the direction you will be stretching, and tilt your head diagonally toward your chest and hold for 15 to 30 seconds. 2. If you would like a little added stretch, use your hand to gently and steadily pull your head forward on the diagonal. 
3. Repeat 2 to 4 times toward each side. Dorsal glide stretch 1. Sit or stand tall and look straight ahead. 2. Slowly tuck your chin as you glide your head backward over your body 3. Hold for a count of 6, and then relax for up to 10 seconds. 4. Repeat 8 to 12 times. Note: The dorsal glide stretches the back of the neck. If you feel pain, do not glide so far back. Some people find this exercise easier to do while lying on their backs with an ice pack on the neck. Chest and shoulder stretch 1. Sit or stand tall and glide your head backward as in the dorsal glide stretch. 2. Raise both arms so that your hands are next to your ears. 3. Take a deep breath, and as you breathe out, lower your elbows down and behind your back. You will feel your shoulder blades slide down and together, and at the same time you will feel a stretch across your chest and the front of your shoulders. 4. Hold for about 6 seconds, and then relax for up to 10 seconds. 5. Repeat 8 to 12 times. Strengthening: Hands on head 1. Move your head backward, forward, and side to side against gentle pressure from your hands, holding each position for about 6 seconds. 2. Repeat 8 to 12 times. Follow-up care is a key part of your treatment and safety. Be sure to make and go to all appointments, and call your doctor if you are having problems. It's also a good idea to know your test results and keep a list of the medicines you take. Where can you learn more? Go to http://cele-alyssa.info/. Enter P975 in the search box to learn more about \"Neck: Exercises. \" Current as of: May 23, 2016 Content Version: 11.2 © 5214-3118 [employer]. Care instructions adapted under license by [employer] (which disclaims liability or warranty for this information). If you have questions about a medical condition or this instruction, always ask your healthcare professional. [address] any warranty or liability for your use of this information. Introducing [facility]! Dear Antoinette Platt: Thank you for requesting a Nite[employer] account. Our records indicate that you already have an active NiteTables account. You can access your account anytime at [email or web address]. [email or web address] Did you know that you can access your hospital and ER discharge instructions at any time in NiteTables? You can also review all of your test results from your hospital stay or ER visit. Additional Information If you have questions, please visit the Frequently Asked Questions section of the NiteTables website at [email or web address]. [email or web address] Remember, NiteTables is NOT to be used for urgent needs. For medical emergencies, dial 911. Now available from your iPhone and Android! Please provide this summary of care documentation to your next provider. Your primary care clinician is listed as Devin Hammond. If you have any questions after today's visit, please call [phone].

## 2017-04-24 NOTE — PROGRESS NOTES
HISTORY OF PRESENT ILLNESS  Hosea Nash is a 78 y.o. female. HPI   She presents for follow up of hypertension. Diet and Lifestyle: generally follows a low sodium diet   Medication compliance: compliant all of the time  Medication side effects: Amlodipine LE edema  Home BP Monitoring: is not measured at home. Cardiovascular ROS:  She denies palpitations, exertional chest pressure/discomfort, dyspnea on exertion     Pain neck right>left for at least 12 weeks. Hurts to turn head and move shoulders. Pain into left upper arm, right scapular region and posterior head. Interferes with sleep. Pain is throbbing and aching. It is constant, 9/10 intensity, assoc with nausea at time. Aleve helps best, but does not give good relief. Patient Active Problem List   Diagnosis Code    LAP-BAND surgery status Z98.84    Vaginal cancer ([facility] C52    Hypertension, essential I10    Hypercholesterolemia E78.00    GERD without esophagitis K21.9     Past Medical History:   Diagnosis Date    Abnormal mammogram of left breast 12/5/2016    GERD (gastroesophageal reflux disease) 6/11/06    Hypercholesterolemia 10/11/06    Hypertension 10/11/06    Morbid obesity ([facility] 10/11/06    Sleep apnea 6/11/06     Past Surgical History:   Procedure Laterality Date    HX GYN  12/4/15    EUA/Bx of pelvic mass and vagina/cystoscopy/proctoscopy    HX HYSTERECTOMY  1978    HX ORTHOPAEDIC  2009    right ankle    HX OTHER SURGICAL  2001?     benign mass removed from lower back    LAP, PLACE ADJUST GASTR BAND  1/30/08    EB: AP Std lap band, liver bx     Social History     Social History    Marital status:      Spouse name: N/A    Number of children: N/A    Years of education: N/A     Social History Main Topics    Smoking status: Former Smoker     Packs/day: 0.50     Years: 15.00     Types: Cigarettes     Quit date: 1/1/1993    Smokeless tobacco: Never Used    Alcohol use Yes      Comment: socially    Drug use: No    Sexual activity: Not Currently     Other Topics Concern    None     Social History Narrative     Family History   Problem Relation Age of Onset    Cancer Mother      colon    Diabetes Father     Heart Disease Father     Cancer Sister      breast & bone     Allergies   Allergen Reactions    Amlodipine Other (comments)     Edema in LE's     Current Outpatient Prescriptions   Medication Sig Dispense Refill    atorvastatin (LIPITOR) 80 mg tablet Take 1 Tab by mouth daily. 90 Tab 3    losartan (COZAAR) 100 mg tablet Take 1 Tab by mouth daily. 90 Tab 3    triamterene-hydroCHLOROthiazide (MAXZIDE) 37.5-25 mg per tablet Take 1 Tab by mouth daily. 90 Tab 3    ibuprofen 200 mg cap       Cholecalciferol, Vitamin D3, (VITAMIN D3) 1,000 unit cap Take 1 Cap by mouth. ROS       Visit Vitals    /68 (BP 1 Location: Left arm, BP Patient Position: Sitting)    Pulse 69    Temp 97.2 °F (36.2 °C) (Oral)    Resp 14    Ht 5' 2\" (1.575 m)    Wt 238 lb 8 oz (108.2 kg)    SpO2 97%    BMI 43.62 kg/m2     Physical Exam   Constitutional: She is oriented to person, place, and time. She appears well-developed and well-nourished. HENT:   Head: Normocephalic and atraumatic. Eyes: Pupils are equal, round, and reactive to light. Neck: Neck supple. Cardiovascular: Normal rate, regular rhythm and normal heart sounds. Exam reveals no gallop. No murmur heard. Pulmonary/Chest: Effort normal and breath sounds normal. She has no wheezes. She has no rales. Musculoskeletal: She exhibits no edema. Back:    Neurological: She is alert and oriented to person, place, and time. She has normal strength. Reflex Scores:       Bicep reflexes are 2+ on the right side and 2+ on the left side. Motor strength is 5/5 to finger flexion/extension, abduction and opposition, wrist flexion/extension, elbow flexion extension     Skin: Skin is warm and dry. No erythema. Vitals reviewed.         I have personally viewed the X-rays and given results to the patient. Results for orders placed or performed in visit on 04/24/17   XR SPINE CERV 4 OR 5 V    Narrative    EXAM:  XR SPINE CERV 4 OR 5 V    INDICATION:  Bilateral neck pain for 2 months, which radiates to left upper arm    COMPARISON: None. FINDINGS: AP, lateral, bilateral oblique and open mouth odontoid views  of the  cervical spine were obtained. The alignment is remarkable for straightening as  well as a mild C-shaped curvature, the apex directed to the left. The vertebral  body heights are well-preserved. There is disc space narrowing at the C5-6 and  C6-7, with endplate hypertrophy. There is no fracture or subluxation. The  prevertebral soft tissues are normal.  The odontoid process is intact and the  C1-C2 relationship is normal. The neural foramina are symmetrical.       Impression    IMPRESSION: Mild torticollis             ASSESSMENT and PLAN    ICD-10-CM ICD-9-CM    1. Neck pain, bilateral M54.2 723.1 XR SPINE CERV 4 OR 5 V      REFERRAL TO PHYSICAL THERAPY   2. Hypertension, essential N73 870.7 METABOLIC PANEL, BASIC   3. Need for Tdap vaccination Z23 V06.1 Diphth, Pertus,Acell,, Tetanus (BOOSTRIX TDAP) 2.5-8-5 Lf-mcg-Lf/0.5mL susp susp         Neck pain, bilateral  Given home exercise to try. Use heat prior and ice pack after.   -     XR SPINE CERV 4 OR 5 V; Future  -     REFERRAL TO PHYSICAL THERAPY    Hypertension, essential  Hypertension is uncontrolled - she insists BP is up due to pain, but pain is now longstanding. She declined adjusting medication as I recommended. -     METABOLIC PANEL, BASIC; Future    Need for Tdap vaccination  -     Diphth, Pertus,Acell,, Tetanus (BOOSTRIX TDAP) 2.5-8-5 Lf-mcg-Lf/0.5mL susp susp; 0.5 mL by IntraMUSCular route once for 1 dose. Follow-up Disposition:  Return in about 6 months (around 10/24/2017) for HTN, chol  NON-fasting lab one week prior .   radiology results and schedule of future radiology studies reviewed with patient  Discussed the patient's BMI with her. The BMI follow up plan is as follows: I have counseled this patient on diet and exercise regimens  I have discussed the diagnosis with the patient and the intended plan as seen in the above orders. Patient is in agreement. The patient has received an after-visit summary and questions were answered concerning future plans. I have discussed medication side effects and warnings with the patient as well.

## 2017-04-24 NOTE — PROGRESS NOTES
Reviewed record In preparation for visit and have obtained necessary documentation. Has info on advanced directive but has not filled them out. 1. Have you been to the ER, urgent care clinic or hospitalized since your last visit? No     2. Have you seen or consulted any other health care providers outside of the [address] [place] [address] since your last visit? Include any pap smears or colon screening.  Dr Kelechi Yeager, chiropractor     Health Maintenance reviewed:

## 2017-04-24 NOTE — PATIENT INSTRUCTIONS
See physical therapy regarding neck pain  [facility]   [address], [address]   Tel: [phone]     [facility]   [address] [facility] [address], [address]   Tel: [phone] Tel: [phone]     Continue ice or heat as it helps  Office visit in 6 months with fasting lab work a week before visit. Use heat before and ice after neck exercises. Neck: Exercises  Your Care Instructions  Here are some examples of typical rehabilitation exercises for your condition. Start each exercise slowly. Ease off the exercise if you start to have pain. Your doctor or physical therapist will tell you when you can start these exercises and which ones will work best for you. How to do the exercises  Note: Stretching should make you feel a gentle stretch, but no pain. Stop any strengthening exercise that makes pain worse. Neck stretch    1. This stretch works best if you keep your shoulder down as you lean away from it. To help you remember to do this, start by relaxing your shoulders and lightly holding on to your thighs or your chair. 2. Tilt your head toward your shoulder and hold for 15 to 30 seconds. Let the weight of your head stretch your muscles. 3. If you would like a little added stretch, use your hand to gently and steadily pull your head toward your shoulder. For example, keeping your right shoulder down, lean your head to the left. 4. Repeat 2 to 4 times toward each shoulder. Diagonal neck stretch    1. Turn your head slightly toward the direction you will be stretching, and tilt your head diagonally toward your chest and hold for 15 to 30 seconds. 2. If you would like a little added stretch, use your hand to gently and steadily pull your head forward on the diagonal.  3. Repeat 2 to 4 times toward each side. Dorsal glide stretch    1. Sit or stand tall and look straight ahead.   2. Slowly tuck your chin as you glide your head backward over your body  3. Hold for a count of 6, and then relax for up to 10 seconds. 4. Repeat 8 to 12 times. Note: The dorsal glide stretches the back of the neck. If you feel pain, do not glide so far back. Some people find this exercise easier to do while lying on their backs with an ice pack on the neck. Chest and shoulder stretch    1. Sit or stand tall and glide your head backward as in the dorsal glide stretch. 2. Raise both arms so that your hands are next to your ears. 3. Take a deep breath, and as you breathe out, lower your elbows down and behind your back. You will feel your shoulder blades slide down and together, and at the same time you will feel a stretch across your chest and the front of your shoulders. 4. Hold for about 6 seconds, and then relax for up to 10 seconds. 5. Repeat 8 to 12 times. Strengthening: Hands on head    1. Move your head backward, forward, and side to side against gentle pressure from your hands, holding each position for about 6 seconds. 2. Repeat 8 to 12 times. Follow-up care is a key part of your treatment and safety. Be sure to make and go to all appointments, and call your doctor if you are having problems. It's also a good idea to know your test results and keep a list of the medicines you take. Where can you learn more? Go to http://cele-alyssa.info/. Enter P975 in the search box to learn more about \"Neck: Exercises. \"  Current as of: May 23, 2016  Content Version: 11.2  © 6086-3516 [employer]. Care instructions adapted under license by [employer] (which disclaims liability or warranty for this information). If you have questions about a medical condition or this instruction, always ask your healthcare professional. [address] any warranty or liability for your use of this information.

## 2017-05-11 ENCOUNTER — TELEPHONE (OUTPATIENT)
Dept: INTERNAL MEDICINE CLINIC | Age: 67
End: 2017-05-11

## 2017-05-11 DIAGNOSIS — C52 VAGINAL CANCER (HCC): Primary | ICD-10-CM

## 2017-05-11 DIAGNOSIS — Z09 RADIOTHERAPY FOLLOW-UP: ICD-10-CM

## 2017-05-11 NOTE — TELEPHONE ENCOUNTER
----- Message from Radha Fitzgerald sent at 5/11/2017 10:22 AM EDT -----  Regarding: Bettye Oglesby  Pt needs referral  Going to See: Dr. Duane Moloney = [phone]  Amelie Titus  (F) [phone]     When: 5/11/17 @ 10AM     For: f/u to radiation, C52 (diagnosis code)

## 2017-05-11 NOTE — TELEPHONE ENCOUNTER
Pt needs referral  Going to See: Dr. Powers Most = [phone]  (L) [phone]  (F) [phone]    When: 5/11/17 @ 10AM    For: f/u to radiation, C52 (diagnosis code)

## 2017-05-16 ENCOUNTER — HOSPITAL ENCOUNTER (OUTPATIENT)
Dept: CT IMAGING | Age: 67
Discharge: HOME | End: 2017-05-16
Attending: RADIOLOGY
Payer: MEDICARE

## 2017-05-16 DIAGNOSIS — C52 MALIGNANT NEOPLASM OF VAGINA (HCC): ICD-10-CM

## 2017-05-16 LAB — CREAT BLD-MCNC: 1.2 MG/DL (ref 0.6–1.3)

## 2017-05-16 PROCEDURE — 74011636320 HC RX REV CODE- 636/320: Performed by: RADIOLOGY

## 2017-05-16 PROCEDURE — 74177 CT ABD & PELVIS W/CONTRAST: CPT

## 2017-05-16 PROCEDURE — 82565 ASSAY OF CREATININE: CPT

## 2017-05-16 PROCEDURE — 74011000258 HC RX REV CODE- 258: Performed by: RADIOLOGY

## 2017-05-16 RX ORDER — SODIUM CHLORIDE 0.9 % (FLUSH) 0.9 %
10 SYRINGE (ML) INJECTION
Status: COMPLETED | OUTPATIENT
Start: 2017-05-16 | End: 2017-05-16

## 2017-05-16 RX ADMIN — SODIUM CHLORIDE 100 ML: 900 INJECTION, SOLUTION INTRAVENOUS at 14:48

## 2017-05-16 RX ADMIN — IOPAMIDOL 100 ML: 755 INJECTION, SOLUTION INTRAVENOUS at 14:47

## 2017-05-16 RX ADMIN — IOHEXOL 50 ML: 240 INJECTION, SOLUTION INTRATHECAL; INTRAVASCULAR; INTRAVENOUS; ORAL at 13:04

## 2017-05-16 RX ADMIN — Medication 10 ML: at 14:48

## 2017-06-05 ENCOUNTER — HOSPITAL ENCOUNTER (OUTPATIENT)
Dept: PET IMAGING | Age: 67
Discharge: HOME | End: 2017-06-05
Attending: RADIOLOGY
Payer: MEDICARE

## 2017-06-05 VITALS — WEIGHT: 254 LBS | HEIGHT: 62 IN | BODY MASS INDEX: 46.74 KG/M2

## 2017-06-05 DIAGNOSIS — C52 MALIGNANT NEOPLASM OF VAGINA (HCC): ICD-10-CM

## 2017-06-05 PROCEDURE — A9552 F18 FDG: HCPCS

## 2017-06-05 RX ORDER — SODIUM CHLORIDE 0.9 % (FLUSH) 0.9 %
10 SYRINGE (ML) INJECTION
Status: COMPLETED | OUTPATIENT
Start: 2017-06-05 | End: 2017-06-05

## 2017-06-05 RX ADMIN — Medication 10 ML: at 09:03

## 2017-06-06 ENCOUNTER — OFFICE VISIT (OUTPATIENT)
Dept: GYNECOLOGY | Age: 67
End: 2017-06-06

## 2017-06-06 VITALS
DIASTOLIC BLOOD PRESSURE: 92 MMHG | BODY MASS INDEX: 47 KG/M2 | HEART RATE: 93 BPM | WEIGHT: 255.4 LBS | HEIGHT: 62 IN | SYSTOLIC BLOOD PRESSURE: 158 MMHG

## 2017-06-06 DIAGNOSIS — C52 VAGINAL CANCER (HCC): Primary | ICD-10-CM

## 2017-06-06 NOTE — PROGRESS NOTES
[address] [place] [address] [address], [address] [facility]  [phone]  MD Stu Garza MD  Office Note  Patient ID:  Name:  Jefe Tobin  MRN:  [id number]  :   y.o. Date:  2017      HISTORY OF PRESENT ILLNESS:  Jefe Tobin is a 78 y.o.  postmenopausal female who was referred to me in 2015 for a pelvic mass by Dr. Joaquin Spears, who noted the mass on routine gynecologic exam.  He sent her for a CT of the pelvis, which revealed a mass at the vaginal apex, that appeared to be invading into the soft tissues of the left pelvis. There were several enlarged lymph nodes on the left. There was also questionable bladder invasion, and possibly even involvement of the rectosigmoid colon. She reported a history of having a mass removed from one of her ovaries in 1 at [facility], which required a second surgery to remove her uterus and contralateral ovary. She said she did not have to take any chemotherapy, but she did have to take some kind of pill for a year. She has not had any gynecologic issues since that time. Presumably her hysterectomy was a total, rather than a supracervical, but she is unsure and there are no records. She denied any vaginal bleeding, except for after her examination with Dr. Lukas Haile. She reported minimal pain in the pelvis. I took her the OR for and exam under anesthesia with cystoscopy and proctoscopy. Pathology revealed a squamous cell carcinoma, consistent with a vaginal primary. Examination revealed a fixed mass involving the left upper vagina with extension to the left pelvic sidewall. There also appeared to be involvement of the posterior bladder wall, but no mucosal involvement. The mass did push against the rectal wall, but there did not appear to be any direct involvement. I referred her to radiation oncology and she saw Dr. Melody Cervantes.   She completed pelvic radiation along with concurrent cisplatin chemotherapy in February 2016 and had a great response. Her pelvic exam and pap smear in March 2017 were both negative. When I saw her in the office in March 2017 for follow up she was doing well and without complaints. Since then she began having back pain that had progressively worsened. She saw Melody Cervantes in May and she ordered a CT. This unfortunately demonstrated extensive metastatic disease outside of the prior radiated field. Dr. Renate Bridges then sent her for a PET/CT. She presents today to review the scans and to discuss treatment options. ROS:   and GI review: Negative  Cardiopulmonary review:Negative   Musculoskeletal:  Negative    A comprehensive review of systems was negative except for that written in the History of Present Illness. , 10 point ROS        Problem List:  Patient Active Problem List    Diagnosis Date Noted    Hypertension, essential 01/18/2017    Hypercholesterolemia 01/18/2017    GERD without esophagitis 01/18/2017    Vaginal cancer ([facility] [address] 12/10/2015    LAP-BAND surgery status 05/17/2012     PMH:  Past Medical History:   Diagnosis Date    Abnormal mammogram of left breast 12/5/2016    GERD (gastroesophageal reflux disease) 6/11/06    Hypercholesterolemia 10/11/06    Hypertension 10/11/06    Morbid obesity ([address] 10/11/06    Sleep apnea 6/11/06      PSH:  Past Surgical History:   Procedure Laterality Date    HX GYN  12/4/15    EUA/Bx of pelvic mass and vagina/cystoscopy/proctoscopy    HX HYSTERECTOMY  1978    HX ORTHOPAEDIC  2009    right ankle    HX OTHER SURGICAL  2001?     benign mass removed from lower back    LAP, PLACE ADJUST GASTR BAND  1/30/08    EB: AP Std lap band, liver bx      Social History:  Social History   Substance Use Topics    Smoking status: Former Smoker     Packs/day: 0.50     Years: 15.00     Types: Cigarettes     Quit date: 1/1/1993    Smokeless tobacco: Never Used    Alcohol use Yes      Comment: socially Family History:  Family History   Problem Relation Age of Onset    Cancer Mother      colon    Diabetes Father     Heart Disease Father     Cancer Sister      breast & bone      Medications: (reviewed)  Current Outpatient Prescriptions   Medication Sig    atorvastatin (LIPITOR) 80 mg tablet Take 1 Tab by mouth daily.  losartan (COZAAR) 100 mg tablet Take 1 Tab by mouth daily.  triamterene-hydroCHLOROthiazide (MAXZIDE) 37.5-25 mg per tablet Take 1 Tab by mouth daily.  Cholecalciferol, Vitamin D3, (VITAMIN D3) 1,000 unit cap Take 1 Cap by mouth.  ibuprofen 200 mg cap      No current facility-administered medications for this visit. Allergies: (reviewed)  Allergies   Allergen Reactions    Amlodipine Other (comments)     Edema in LE's          OBJECTIVE:    Physical Exam:  VITAL SIGNS: Vitals:    06/06/17 1317   Weight: 115.8 kg (255 lb 6.4 oz)   Height: 5' 2.01\" (1.575 m)     Body mass index is 46.7 kg/(m^2). GENERAL MIGUELANGEL: Conversant, alert, oriented. No acute distress. HEENT: HEENT. No thyroid enlargement. No JVD. Neck: Supple without restrictions. RESPIRATORY: Clear to auscultation and percussion to the bases. No CVAT. CARDIOVASC: RRR without murmur/rub. GASTROINT: soft, non-tender, without masses or organomegaly   MUSCULOSKEL: no joint tenderness, deformity or swelling   EXTREMITIES: extremities normal, atraumatic, no cyanosis or edema   PELVIC: Deferred   RECTAL: Deferred   PATRICIO SURVEY: No suspicious lymphadenopathy or edema noted. NEURO: Grossly intact. No acute deficit. CT of abdomen/pelvis (5/16/17)  LUNG BASES: Multiple bilateral pulmonary nodules. INCIDENTALLY IMAGED HEART AND MEDIASTINUM: Unremarkable. LIVER: No mass or biliary dilatation. GALLBLADDER: Cholelithiasis. SPLEEN: Calcified granulomata. PANCREAS: No mass or ductal dilatation. ADRENALS: Unremarkable. KIDNEYS: Right renal cyst.  STOMACH: Unremarkable.   SMALL BOWEL: No dilatation or wall thickening. COLON: No dilatation or wall thickening. APPENDIX: Unremarkable. PERITONEUM: No ascites or pneumoperitoneum. RETROPERITONEUM: Significant retroperitoneal lymphadenopathy, largest dimension  surrounding the aorta 6.5 x 4.2 cm. REPRODUCTIVE ORGANS: There is 4.6 x 2.4 cm soft tissue in the region of  previously identified left pelvic sidewall mass. URINARY BLADDER: No mass or calculus. BONES: No destructive bone lesion. Degenerative changes of L4-L5 and L5-S1. ADDITIONAL COMMENTS: N/A     IMPRESSION:  1. Bilateral pulmonary nodules and retroperitoneal lymphadenopathy compatible  with metastatic disease. 2. Much smaller mass in the left pelvis, or scar. 3. Cholelithiasis. PET/CT (6/6/17)  HEAD/NECK: No apparent foci of abnormal hypermetabolism. Cerebral evaluation is  limited by normal intense activity.     CHEST: There are hypermetabolic left supraclavicular, superior mediastinal, and  paraesophageal lymph nodes. The small pulmonary nodules are minimally  hypermetabolic, maximum SUV of the lesion in the right middle lobe is 1.9.     ABDOMEN/PELVIS: Para-aortic and aortocaval lymphadenopathy is hypermetabolic,  maximum SUV 8.0. Small but hypermetabolic bilateral external iliac lymph nodes  are noted.     SKELETON: There is a hypermetabolic lytic lesion in the sternum, maximum SUV is  9.7. There is a small sclerotic lesion at L3 which is new compared to the prior  PET/CT. Given the tracer activity corresponding to the adjacent retroperitoneal  lymphadenopathy that is difficult to determine if this small lesion is  hypermetabolic.     IMPRESSION:   1. Mildly hypermetabolic pulmonary nodules are concerning for metastatic  disease. 2. Hypermetabolic left supraclavicular, superior mediastinal, and paraesophageal  lymph nodes as well as retroperitoneal and bilateral external iliac lymph nodes. 3. Hypermetabolic lytic lesion in the sternum is concerning for metastatic  disease.  Small sclerotic lesion at L3 is new compared to the prior PET/CT but  too small for accurate characterization by PET CT. IMPRESSION/PLAN:  Jonathan Chapman is a 78 y.o. female with a history of stage III vaginal carcinoma. She was treated with pelvic radiation therapy and concurrent cisplatin chemotherapy. She now has recurrent disease with multiple sites of metastases. I had a long discussion with the patient and her  discussing the best course of management. Surgery is not an option due to the widespread nature of her disease. The same is true for radiation therapy. Her only option is systemic chemotherapy. Since vaginal cancer behaves like cervical carcinoma, I have recommended using the standard regimen for metastatic cervical carcinoma, Taxol/Cisplatin/Avastin. We will reevaluate after a couple of cycles to assess response. She will need an IV port to facilitate treatment. I will hopefully place that within the next couple of weeks.        Signed By: Sergio Joseph MD     6/6/2017/4:31 PM

## 2017-06-07 LAB
ALBUMIN SERPL-MCNC: 4.4 G/DL (ref 3.6–4.8)
ALBUMIN/GLOB SERPL: 1.6 {RATIO} (ref 1.2–2.2)
ALP SERPL-CCNC: 116 IU/L (ref 39–117)
ALT SERPL-CCNC: 12 IU/L (ref 0–32)
AST SERPL-CCNC: 13 IU/L (ref 0–40)
BASOPHILS # BLD AUTO: 0 X10E3/UL (ref 0–0.2)
BASOPHILS NFR BLD AUTO: 0 %
BILIRUB SERPL-MCNC: 0.5 MG/DL (ref 0–1.2)
BUN SERPL-MCNC: 17 MG/DL (ref 8–27)
BUN/CREAT SERPL: 16 (ref 12–28)
CALCIUM SERPL-MCNC: 9.3 MG/DL (ref 8.7–10.3)
CHLORIDE SERPL-SCNC: 98 MMOL/L (ref 96–106)
CO2 SERPL-SCNC: 26 MMOL/L (ref 18–29)
CREAT SERPL-MCNC: 1.05 MG/DL (ref 0.57–1)
EOSINOPHIL # BLD AUTO: 0.1 X10E3/UL (ref 0–0.4)
EOSINOPHIL NFR BLD AUTO: 2 %
ERYTHROCYTE [DISTWIDTH] IN BLOOD BY AUTOMATED COUNT: 16 % (ref 12.3–15.4)
GLOBULIN SER CALC-MCNC: 2.8 G/DL (ref 1.5–4.5)
GLUCOSE SERPL-MCNC: 96 MG/DL (ref 65–99)
HCT VFR BLD AUTO: 35.8 % (ref 34–46.6)
HGB BLD-MCNC: 11.6 G/DL (ref 11.1–15.9)
IMM GRANULOCYTES # BLD: 0 X10E3/UL (ref 0–0.1)
IMM GRANULOCYTES NFR BLD: 1 %
LYMPHOCYTES # BLD AUTO: 0.6 X10E3/UL (ref 0.7–3.1)
LYMPHOCYTES NFR BLD AUTO: 11 %
MAGNESIUM SERPL-MCNC: 2.1 MG/DL (ref 1.6–2.3)
MCH RBC QN AUTO: 27.5 PG (ref 26.6–33)
MCHC RBC AUTO-ENTMCNC: 32.4 G/DL (ref 31.5–35.7)
MCV RBC AUTO: 85 FL (ref 79–97)
MONOCYTES # BLD AUTO: 0.4 X10E3/UL (ref 0.1–0.9)
MONOCYTES NFR BLD AUTO: 6 %
NEUTROPHILS # BLD AUTO: 4.8 X10E3/UL (ref 1.4–7)
NEUTROPHILS NFR BLD AUTO: 80 %
PLATELET # BLD AUTO: 228 X10E3/UL (ref 150–379)
POTASSIUM SERPL-SCNC: 4.3 MMOL/L (ref 3.5–5.2)
PROT SERPL-MCNC: 7.2 G/DL (ref 6–8.5)
RBC # BLD AUTO: 4.22 X10E6/UL (ref 3.77–5.28)
SODIUM SERPL-SCNC: 143 MMOL/L (ref 134–144)
WBC # BLD AUTO: 5.9 X10E3/UL (ref 3.4–10.8)

## 2017-06-07 RX ORDER — DOCUSATE SODIUM 100 MG/1
100 CAPSULE, LIQUID FILLED ORAL DAILY
Status: ON HOLD | COMMUNITY
End: 2018-01-01

## 2017-06-07 NOTE — PERIOP NOTES
PAT PHONE INTERVIEW COMPLETED WITH PT.  PT WAS GIVEN INFECTION PREVENTION INFORMATION VERBALLY; PT VOICED UNDERSTANDING. PT WAS GIVEN THE OPPORTUNITY TO ASK ADDITIONAL QUESTIONS.

## 2017-06-08 ENCOUNTER — ANESTHESIA EVENT (OUTPATIENT)
Dept: SURGERY | Age: 67
End: 2017-06-08
Payer: MEDICARE

## 2017-06-09 ENCOUNTER — ANESTHESIA (OUTPATIENT)
Dept: SURGERY | Age: 67
End: 2017-06-09
Payer: MEDICARE

## 2017-06-09 ENCOUNTER — APPOINTMENT (OUTPATIENT)
Dept: GENERAL RADIOLOGY | Age: 67
End: 2017-06-09
Attending: OBSTETRICS & GYNECOLOGY
Payer: MEDICARE

## 2017-06-09 ENCOUNTER — HOSPITAL ENCOUNTER (OUTPATIENT)
Age: 67
Setting detail: OUTPATIENT SURGERY
Discharge: HOME | End: 2017-06-09
Attending: OBSTETRICS & GYNECOLOGY | Admitting: OBSTETRICS & GYNECOLOGY
Payer: MEDICARE

## 2017-06-09 VITALS
TEMPERATURE: 97.6 F | DIASTOLIC BLOOD PRESSURE: 78 MMHG | BODY MASS INDEX: 46.93 KG/M2 | OXYGEN SATURATION: 98 % | WEIGHT: 255 LBS | RESPIRATION RATE: 16 BRPM | SYSTOLIC BLOOD PRESSURE: 127 MMHG | HEIGHT: 62 IN | HEART RATE: 65 BPM

## 2017-06-09 LAB
ATRIAL RATE: 63 BPM
CALCULATED P AXIS, ECG09: 9 DEGREES
CALCULATED R AXIS, ECG10: -18 DEGREES
CALCULATED T AXIS, ECG11: -7 DEGREES
DIAGNOSIS, 93000: NORMAL
P-R INTERVAL, ECG05: 136 MS
Q-T INTERVAL, ECG07: 404 MS
QRS DURATION, ECG06: 84 MS
QTC CALCULATION (BEZET), ECG08: 413 MS
VENTRICULAR RATE, ECG03: 63 BPM

## 2017-06-09 PROCEDURE — 74011250636 HC RX REV CODE- 250/636: Performed by: OBSTETRICS & GYNECOLOGY

## 2017-06-09 PROCEDURE — 76060000033 HC ANESTHESIA 1 TO 1.5 HR: Performed by: OBSTETRICS & GYNECOLOGY

## 2017-06-09 PROCEDURE — 71010 XR CHEST SNGL V: CPT

## 2017-06-09 PROCEDURE — C1788 PORT, INDWELLING, IMP: HCPCS | Performed by: OBSTETRICS & GYNECOLOGY

## 2017-06-09 PROCEDURE — 74011000250 HC RX REV CODE- 250: Performed by: OBSTETRICS & GYNECOLOGY

## 2017-06-09 PROCEDURE — 93005 ELECTROCARDIOGRAM TRACING: CPT

## 2017-06-09 PROCEDURE — 77030031139 HC SUT VCRL2 J&J -A: Performed by: OBSTETRICS & GYNECOLOGY

## 2017-06-09 PROCEDURE — 77030002933 HC SUT MCRYL J&J -A: Performed by: OBSTETRICS & GYNECOLOGY

## 2017-06-09 PROCEDURE — 77030020256 HC SOL INJ NACL 0.9%  500ML: Performed by: OBSTETRICS & GYNECOLOGY

## 2017-06-09 PROCEDURE — 76210000000 HC OR PH I REC 2 TO 2.5 HR: Performed by: OBSTETRICS & GYNECOLOGY

## 2017-06-09 PROCEDURE — 76010000149 HC OR TIME 1 TO 1.5 HR: Performed by: OBSTETRICS & GYNECOLOGY

## 2017-06-09 PROCEDURE — 77030011640 HC PAD GRND REM COVD -A: Performed by: OBSTETRICS & GYNECOLOGY

## 2017-06-09 PROCEDURE — 74011000250 HC RX REV CODE- 250

## 2017-06-09 PROCEDURE — 74011000258 HC RX REV CODE- 258

## 2017-06-09 PROCEDURE — 74011250637 HC RX REV CODE- 250/637: Performed by: ANESTHESIOLOGY

## 2017-06-09 PROCEDURE — 76000 FLUOROSCOPY <1 HR PHYS/QHP: CPT

## 2017-06-09 PROCEDURE — 77030002986 HC SUT PROL J&J -A: Performed by: OBSTETRICS & GYNECOLOGY

## 2017-06-09 PROCEDURE — 76210000021 HC REC RM PH II 0.5 TO 1 HR: Performed by: OBSTETRICS & GYNECOLOGY

## 2017-06-09 PROCEDURE — 74011250636 HC RX REV CODE- 250/636

## 2017-06-09 DEVICE — SYSTEM INFUS PRT CATH 8FR L66CM INTRO 8FR CHST TI SGL LUMN: Type: IMPLANTABLE DEVICE | Site: CHEST  WALL | Status: FUNCTIONAL

## 2017-06-09 RX ORDER — ACETAMINOPHEN 325 MG/1
650 TABLET ORAL
COMMUNITY
End: 2017-11-06 | Stop reason: ALTCHOICE

## 2017-06-09 RX ORDER — PROPOFOL 10 MG/ML
INJECTION, EMULSION INTRAVENOUS AS NEEDED
Status: DISCONTINUED | OUTPATIENT
Start: 2017-06-09 | End: 2017-06-09 | Stop reason: HOSPADM

## 2017-06-09 RX ORDER — LIDOCAINE HYDROCHLORIDE 10 MG/ML
0.1 INJECTION, SOLUTION EPIDURAL; INFILTRATION; INTRACAUDAL; PERINEURAL AS NEEDED
Status: DISCONTINUED | OUTPATIENT
Start: 2017-06-09 | End: 2017-06-09 | Stop reason: HOSPADM

## 2017-06-09 RX ORDER — SODIUM CHLORIDE 9 MG/ML
25 INJECTION, SOLUTION INTRAVENOUS CONTINUOUS
Status: DISCONTINUED | OUTPATIENT
Start: 2017-06-09 | End: 2017-06-09 | Stop reason: HOSPADM

## 2017-06-09 RX ORDER — MIDAZOLAM HYDROCHLORIDE 1 MG/ML
1 INJECTION, SOLUTION INTRAMUSCULAR; INTRAVENOUS AS NEEDED
Status: DISCONTINUED | OUTPATIENT
Start: 2017-06-09 | End: 2017-06-09 | Stop reason: HOSPADM

## 2017-06-09 RX ORDER — ROPIVACAINE HYDROCHLORIDE 5 MG/ML
30 INJECTION, SOLUTION EPIDURAL; INFILTRATION; PERINEURAL AS NEEDED
Status: DISCONTINUED | OUTPATIENT
Start: 2017-06-09 | End: 2017-06-09 | Stop reason: HOSPADM

## 2017-06-09 RX ORDER — FENTANYL CITRATE 50 UG/ML
50 INJECTION, SOLUTION INTRAMUSCULAR; INTRAVENOUS AS NEEDED
Status: DISCONTINUED | OUTPATIENT
Start: 2017-06-09 | End: 2017-06-09 | Stop reason: HOSPADM

## 2017-06-09 RX ORDER — SODIUM CHLORIDE, SODIUM LACTATE, POTASSIUM CHLORIDE, CALCIUM CHLORIDE 600; 310; 30; 20 MG/100ML; MG/100ML; MG/100ML; MG/100ML
1000 INJECTION, SOLUTION INTRAVENOUS CONTINUOUS
Status: DISCONTINUED | OUTPATIENT
Start: 2017-06-09 | End: 2017-06-09 | Stop reason: HOSPADM

## 2017-06-09 RX ORDER — MORPHINE SULFATE 10 MG/ML
2 INJECTION, SOLUTION INTRAMUSCULAR; INTRAVENOUS
Status: DISCONTINUED | OUTPATIENT
Start: 2017-06-09 | End: 2017-06-09 | Stop reason: HOSPADM

## 2017-06-09 RX ORDER — FENTANYL CITRATE 50 UG/ML
INJECTION, SOLUTION INTRAMUSCULAR; INTRAVENOUS AS NEEDED
Status: DISCONTINUED | OUTPATIENT
Start: 2017-06-09 | End: 2017-06-09 | Stop reason: HOSPADM

## 2017-06-09 RX ORDER — ONDANSETRON 2 MG/ML
4 INJECTION INTRAMUSCULAR; INTRAVENOUS AS NEEDED
Status: DISCONTINUED | OUTPATIENT
Start: 2017-06-09 | End: 2017-06-09 | Stop reason: HOSPADM

## 2017-06-09 RX ORDER — FENTANYL CITRATE 50 UG/ML
25 INJECTION, SOLUTION INTRAMUSCULAR; INTRAVENOUS
Status: DISCONTINUED | OUTPATIENT
Start: 2017-06-09 | End: 2017-06-09 | Stop reason: HOSPADM

## 2017-06-09 RX ORDER — DIPHENHYDRAMINE HYDROCHLORIDE 50 MG/ML
12.5 INJECTION, SOLUTION INTRAMUSCULAR; INTRAVENOUS AS NEEDED
Status: DISCONTINUED | OUTPATIENT
Start: 2017-06-09 | End: 2017-06-09 | Stop reason: HOSPADM

## 2017-06-09 RX ORDER — GLYCOPYRROLATE 0.2 MG/ML
0.2 INJECTION INTRAMUSCULAR; INTRAVENOUS
Status: DISCONTINUED | OUTPATIENT
Start: 2017-06-09 | End: 2017-06-09 | Stop reason: HOSPADM

## 2017-06-09 RX ORDER — ONDANSETRON 2 MG/ML
INJECTION INTRAMUSCULAR; INTRAVENOUS AS NEEDED
Status: DISCONTINUED | OUTPATIENT
Start: 2017-06-09 | End: 2017-06-09 | Stop reason: HOSPADM

## 2017-06-09 RX ORDER — ALBUTEROL SULFATE 0.83 MG/ML
2.5 SOLUTION RESPIRATORY (INHALATION) AS NEEDED
Status: DISCONTINUED | OUTPATIENT
Start: 2017-06-09 | End: 2017-06-09 | Stop reason: HOSPADM

## 2017-06-09 RX ORDER — SODIUM CHLORIDE, SODIUM LACTATE, POTASSIUM CHLORIDE, CALCIUM CHLORIDE 600; 310; 30; 20 MG/100ML; MG/100ML; MG/100ML; MG/100ML
INJECTION, SOLUTION INTRAVENOUS
Status: DISCONTINUED | OUTPATIENT
Start: 2017-06-09 | End: 2017-06-09 | Stop reason: HOSPADM

## 2017-06-09 RX ORDER — HEPARIN 100 UNIT/ML
SYRINGE INTRAVENOUS AS NEEDED
Status: DISCONTINUED | OUTPATIENT
Start: 2017-06-09 | End: 2017-06-09 | Stop reason: HOSPADM

## 2017-06-09 RX ORDER — MIDAZOLAM HYDROCHLORIDE 1 MG/ML
0.5 INJECTION, SOLUTION INTRAMUSCULAR; INTRAVENOUS
Status: DISCONTINUED | OUTPATIENT
Start: 2017-06-09 | End: 2017-06-09 | Stop reason: HOSPADM

## 2017-06-09 RX ORDER — OXYCODONE AND ACETAMINOPHEN 5; 325 MG/1; MG/1
1 TABLET ORAL
Qty: 30 TAB | Refills: 0 | Status: SHIPPED | OUTPATIENT
Start: 2017-06-09 | End: 2017-06-29 | Stop reason: SDUPTHER

## 2017-06-09 RX ORDER — HYDROMORPHONE HYDROCHLORIDE 1 MG/ML
0.2 INJECTION, SOLUTION INTRAMUSCULAR; INTRAVENOUS; SUBCUTANEOUS
Status: DISCONTINUED | OUTPATIENT
Start: 2017-06-09 | End: 2017-06-09 | Stop reason: HOSPADM

## 2017-06-09 RX ORDER — HYDROCODONE BITARTRATE AND ACETAMINOPHEN 5; 325 MG/1; MG/1
1 TABLET ORAL AS NEEDED
Status: DISCONTINUED | OUTPATIENT
Start: 2017-06-09 | End: 2017-06-09 | Stop reason: HOSPADM

## 2017-06-09 RX ADMIN — PROPOFOL 20 MG: 10 INJECTION, EMULSION INTRAVENOUS at 09:51

## 2017-06-09 RX ADMIN — PROPOFOL 20 MG: 10 INJECTION, EMULSION INTRAVENOUS at 09:38

## 2017-06-09 RX ADMIN — PROPOFOL 20 MG: 10 INJECTION, EMULSION INTRAVENOUS at 09:45

## 2017-06-09 RX ADMIN — FENTANYL CITRATE 50 MCG: 50 INJECTION, SOLUTION INTRAMUSCULAR; INTRAVENOUS at 09:35

## 2017-06-09 RX ADMIN — PROPOFOL 20 MG: 10 INJECTION, EMULSION INTRAVENOUS at 09:54

## 2017-06-09 RX ADMIN — PROPOFOL 20 MG: 10 INJECTION, EMULSION INTRAVENOUS at 09:30

## 2017-06-09 RX ADMIN — ONDANSETRON 4 MG: 2 INJECTION INTRAMUSCULAR; INTRAVENOUS at 10:00

## 2017-06-09 RX ADMIN — PROPOFOL 20 MG: 10 INJECTION, EMULSION INTRAVENOUS at 09:41

## 2017-06-09 RX ADMIN — FENTANYL CITRATE 50 MCG: 50 INJECTION, SOLUTION INTRAMUSCULAR; INTRAVENOUS at 10:03

## 2017-06-09 RX ADMIN — SODIUM CHLORIDE, SODIUM LACTATE, POTASSIUM CHLORIDE, CALCIUM CHLORIDE: 600; 310; 30; 20 INJECTION, SOLUTION INTRAVENOUS at 09:25

## 2017-06-09 RX ADMIN — PROPOFOL 20 MG: 10 INJECTION, EMULSION INTRAVENOUS at 09:36

## 2017-06-09 RX ADMIN — PROPOFOL 20 MG: 10 INJECTION, EMULSION INTRAVENOUS at 09:48

## 2017-06-09 RX ADMIN — HYDROCODONE BITARTRATE AND ACETAMINOPHEN 1 TABLET: 5; 325 TABLET ORAL at 11:23

## 2017-06-09 RX ADMIN — PROPOFOL 20 MG: 10 INJECTION, EMULSION INTRAVENOUS at 09:33

## 2017-06-09 RX ADMIN — PROPOFOL 100 MG: 10 INJECTION, EMULSION INTRAVENOUS at 09:25

## 2017-06-09 RX ADMIN — PROPOFOL 20 MG: 10 INJECTION, EMULSION INTRAVENOUS at 09:28

## 2017-06-09 RX ADMIN — PROPOFOL 20 MG: 10 INJECTION, EMULSION INTRAVENOUS at 09:39

## 2017-06-09 RX ADMIN — PROPOFOL 20 MG: 10 INJECTION, EMULSION INTRAVENOUS at 09:32

## 2017-06-09 NOTE — BRIEF OP NOTE
BRIEF OPERATIVE NOTE    Date of Procedure: 6/9/2017   Preoperative Diagnosis: VAGINAL CANCER  Postoperative Diagnosis: VAGINAL CANCER    Procedure(s):  PORT A CATH INSERTION  Surgeon(s) and Role:     * Ellard Cheadle., MD - Primary  Surgical Staff:  Circ-1: Nell Bingham, CHRISTIE  Circ-Relief: Awilda Gautam RN  Scrub RN-1: Dennis Morse RN  Float Staff: Eileen Iraheta RN  Event Time In   Incision Start 3027   Incision Close 1002     Anesthesia: General   Estimated Blood Loss: 5 cc  Specimens: * No specimens in log *   Findings: Normal anatomy. Complications: None  Implants:   Implant Name Type Inv.  Item Serial No.  Lot No. LRB No. Used Action   PORT VASC INFUS SET 8FR TI -- SMART PORT CT - SN/A   PORT VASC INFUS SET 8FR TI -- SMART PORT CT N/A Kelsey Del Cid [id number] N/A 1 Implanted     Ellard Cheadle., MD

## 2017-06-09 NOTE — IP AVS SNAPSHOT
[address] 
[phone] Patient: Julia Nunez MRN: [id number] UCS:6[birth year]3/[birth year]72 You are allergic to the following Allergen Reactions Amlodipine Other (comments) Edema in LE's Recent Documentation Height Weight BMI OB Status Smoking Status 1.575 m 115.7 kg 46.64 kg/m2 Hysterectomy Former Smoker Emergency Contacts Name Discharge Info Relation Home Work Mobile [facility] CAREGIVER [3] Spouse [3] [phone] About your hospitalization You were admitted on:  June 9, 2017 You last received care in the:  [facility] PACU You were discharged on:  June 9, 2017 Unit phone number:  [phone] Why you were hospitalized Your primary diagnosis was:  Not on File Providers Seen During Your Hospitalizations Provider Role Specialty Primary office phone Fred Lilly MD Attending Provider Gynecologic Oncology [phone] Your Primary Care Physician (PCP) Primary Care Physician Office Phone Office Fax 8321 [facility], [address] [place] [address] [phone] Follow-up Information Follow up With Details Comments Contact Info Corine Thomas MD   [address]ove[address] [place] [address] [phone] Fred Lilly MD Follow up in 1 month(s) call to schedule an appointment [address] [place] [address] [address] 
[phone] Your Appointments Wednesday June 14, 2017  8:00 AM EDT INFUSION 330 with RM 102C - CHAIR LIO [facility] ([address]) [address] [phone] Go to [address] [facility], ground floor. Thursday June 29, 2017 11:45 AM EDT CHEMOTHERAPY with Fred Lilly MD  
1310 [facility] Gynecologic Oncology 3651 [facility]) [address] [place] [address] [address] [place] 7 [phone]  
[phone] Wednesday July 05, 2017  8:00 AM EDT INFUSION 330 with LIO INFUSION NURSE 5  
227 [facility] ([address]) [address] [phone]  
[phone] Go to [address] [facility] 81, ground floor. Current Discharge Medication List  
  
START taking these medications Dose & Instructions Dispensing Information Comments Morning Noon Evening Bedtime  
 oxyCODONE-acetaminophen 5-325 mg per tablet Commonly known as:  PERCOCET Your last dose was: Your next dose is:    
   
   
 Dose:  1 Tab Take 1 Tab by mouth every four (4) hours as needed for Pain. Max Daily Amount: 6 Tabs. Quantity:  30 Tab Refills:  0 CONTINUE these medications which have NOT CHANGED Dose & Instructions Dispensing Information Comments Morning Noon Evening Bedtime  
 atorvastatin 80 mg tablet Commonly known as:  LIPITOR Your last dose was: Your next dose is:    
   
   
 Dose:  80 mg Take 1 Tab by mouth daily. Quantity:  90 Tab Refills:  3  
     
   
   
   
  
 losartan 100 mg tablet Commonly known as:  COZAAR Your last dose was: Your next dose is:    
   
   
 Dose:  100 mg Take 1 Tab by mouth daily. Quantity:  90 Tab Refills:  3 STOOL SOFTENER 100 mg capsule Generic drug:  docusate sodium Your last dose was: Your next dose is:    
   
   
 Dose:  100 mg Take 100 mg by mouth daily. Refills:  0  
     
   
   
   
  
 triamterene-hydroCHLOROthiazide 37.5-25 mg per tablet Commonly known as:  Tricia Kenny Your last dose was: Your next dose is:    
   
   
 Dose:  1 Tab Take 1 Tab by mouth daily. Quantity:  90 Tab Refills:  3  
     
   
   
   
  
 TYLENOL 325 mg tablet Generic drug:  acetaminophen Your last dose was: Your next dose is:    
   
   
 Dose:  650 mg Take 650 mg by mouth every four (4) hours as needed for Pain. Refills:  0  
     
   
   
   
  
 VITAMIN D3 1,000 unit Cap Generic drug:  cholecalciferol Your last dose was: Your next dose is:    
   
   
 Dose:  1 Cap Take 1 Cap by mouth. Refills:  0 Where to Get Your Medications Information on where to get these meds will be given to you by the nurse or doctor. ! Ask your nurse or doctor about these medications  
  oxyCODONE-acetaminophen 5-325 mg per tablet Discharge Instructions   
  
______________________________________________________________________ Anesthesia Discharge Instructions After general anesthesia or intervenous sedation, for 24 hours or while taking prescription Narcotics: · Limit your activities · Do not drive or operate hazardous machinery · If you have not urinated within 8 hours after discharge, please contact your surgeon on call. · Do not make important personal or business decisions · Do not drink alcoholic beverages Report the following to your surgeon: 
· Excessive pain, swelling, redness or odor of or around the surgical area · Temperature over 100.5 degrees · Nausea and vomiting lasting longer than 4 hours or if unable to take medication · Any signs of decreased circulation or nerve impairment to extremity:  Change in color, persistent numbness, tingling, coldness or increased pain. · Any questions Learning About a Central Venous Catheter What is a central venous catheter? A central venous catheter (CVC), also called a central line, is a long, thin, flexible tube used to give medicines, fluids, nutrients, or blood products over a long period of time, usually several weeks or more. It can also be used to do blood tests.  The catheter makes doing these things more comfortable for you because they are put directly into the catheter. You are not stuck with a needle every time. The CVC is inserted in your arm, chest, or neck. It is put through the skin and into a large vein. The catheter is threaded through this vein until it reaches a large vein near the heart. In most cases, the other end of the catheterthe end used to give medicinessticks out of the skin. Some of the common CVCs that are used outside the hospital or for longer periods of time include: · A peripherally inserted central catheter, or PICC line (say \"pick\"), which usually goes into a vein in your arm. · A tunneled catheter, which is surgically inserted into a vein in the neck or chest. 
· An implanted port, which is similar to a tunneled catheter but is left entirely under the skin. Medicines are injected through a \"port\" placed under the skin. Once your CVC is in place, you may stay in the hospital to receive your medicines, or you may get your medicines at home. What problems can occur? Possible problems with a central venous catheter include: · Bleeding. This may happen when the doctor inserts the catheter. It is usually mild and will stop by itself. · Infection. If infection occurs, you will need antibiotics or the catheter will be removed. · Blockage or kinking of the catheter. Regular flushing of the catheter helps reduce blockage. A kinked catheter must be repositioned or replaced. · Pain. You may experience pain at the place where the catheter is inserted or where it lies under your skin. · Collapsed lung (pneumothorax). This is rare. It is most likely to happen during placement of a catheter in the chest. 
· Shifting of the catheter. A catheter that has moved out of place can sometimes be repositioned. If this does not work, it must be replaced. What happens when you get a central venous catheter? Your catheter will be inserted by your doctor.  Your doctor may use ultrasound to locate the blood vessel and help with the placement of the catheter. When you are in the hospital, a nursing team will take care of you and your CVC. Insertion and care of the CVC Your nursing team will: · Check the catheter site and dressing regularly. How often this is done depends on the situation. · Wash their hands before and after handling the catheter. · Replace the catheter when needed. · Clean or replace the catheter components when needed. Changing the dressing Your team will: · Use clean and proper materials for the dressing, which covers the catheter site. · Clean the insertion site and area whenever they change the dressing. · Replace the dressing when it is damp, loose, or dirty. It will be changed at least once a week. At home If you go home with a CVC, your team will give you detailed instructions on how to care for it. In general: · Always wash your hands before you touch your CVC. Make sure anyone who touches the catheter also washes his or her hands. · Try to keep the exit site dry. This can help prevent infection. When you shower, cover the site with waterproof material, such as plastic wrap. Be sure you cover both the exit site and the central line cap(s). · Fasten or tape the central line to your body to prevent it from pulling or dangling. Avoid bending or crimping your central line. And wear clothing that doesn't rub or pull on your central line. Follow-up care is a key part of your treatment and safety. Be sure to make and go to all appointments, and call your doctor if you are having problems. It's also a good idea to know your test results and keep a list of the medicines you take. Where can you learn more? Go to http://cele-alyssa.info/. Enter K874 in the search box to learn more about \"Learning About a Central Venous Catheter. \" Current as of: May 27, 2016 Content Version: 11.2 © 2299-8570 Healthwise, Incorporated. Care instructions adapted under license by [employer] (which disclaims liability or warranty for this information). If you have questions about a medical condition or this instruction, always ask your healthcare professional. [address] any warranty or liability for your use of this information. Discharge Instructions Attachments/References MEFS - OXYCODONE/ACETAMINOPHEN (PERCOCET, ROXICET) - (BY MOUTH) (ENGLISH) Discharge Orders None Introducing [facility]! Dear Rula Boateng: Thank you for requesting a [employer] account. Our records indicate that you already have an active [employer] account. You can access your account anytime at [email or web address]. [email or web address] Did you know that you can access your hospital and ER discharge instructions at any time in 71l[employer]? You can also review all of your test results from your hospital stay or ER visit. Additional Information If you have questions, please visit the Frequently Asked Questions section of the 71[employer] website at [email or web address] Remember, 71[employer] is NOT to be used for urgent needs. For medical emergencies, dial 911. Now available from your iPhone and Android! General Information Please provide this summary of care documentation to your next provider. Patient Signature:  ____________________________________________________________ Date:  ____________________________________________________________  
  
Brittany Cota Provider Signature:  ____________________________________________________________ Date:  ____________________________________________________________ More Information Oxycodone/Acetaminophen (Percocet, Roxicet) - (By mouth) Why this medicine is used:  
Treats pain. This medicine contains a narcotic pain reliever. Contact a nurse or doctor right away if you have: 
· Extreme weakness, shallow breathing, slow heartbeat · Sweating or cold, clammy skin · Skin blisters, rash, or peeling Common side effects: 
· Constipation · Nausea, vomiting · Tiredness © 2017 [address] Information is for End User's use only and may not be sold, redistributed or otherwise used for commercial purposes.

## 2017-06-09 NOTE — OP NOTES
Gynecologic Oncology Operative Report    Jean Moss  6/9/2017    Pre-operative diagnosis:   1) Endometrial CA      2) Requires venous access for chemotherapy     Post-operative diagnosis: 1) Endometrial CA      2) Requires venous access for chemotherapy    Procedure:  IV Mediport placement    Surgeon:  Ruben Ganser, MD    Assistant:  N/A    Anesthesia:  MAC, plus local    EBL:  Minimal    Complications:  None    Operative indications:  77 yo WF with recurrent endometrial cancer. Scheduled to begin systemic chemotherapy. Procedure in detail:  After the risks, benefits, indications, and alternatives of the procedure were discussed with the patient and informed consent was obtained, the patient was taken to the operating room. She was then correctly identified, administered IV sedation per anesthesia, and then prepped and draped in the supine position in the usual fashion. Her arms were also tucked at each side. The patient was then placed in slight Trendelenburg tilt and the anatomy of the anterior thoracic wall was noted. 1% lidocaine without epinephrine was then injected just underneath the left mid clavicle to provide local anesthesia. The left subclavian vein was then located and entered with an 18-gauge needle. A guidewire was then inserted through the needle and proper placement was confirmed by fluoroscopy. A small incision was made with an #11-blade scalpel at the insertion site and the catheter introducer and dilator were inserted over the guidewire. The guidewire and dilator were then removed. The 8-Spanish catheter was then inserted through the introducer to a depth of 20 cm at skin level. Proper placement was confirmed by fluoroscopy with the catheter tip positioned in the superior vena cava just above the right atrium. The peel-away catheter introducer was then removed. Good flow and return were noted through the catheter. Attention was then directed to creation of the port pocket. The location of the pocket was selected and 1% lidocaine was injected. The site was approximately 4 cm caudad to the catheter insertion site. An  4 cm skin incision was made transversely with a #15-blade scalpel. The incision was then carried down to the pectoralis fascia with electro-cautery. A port pocket was then bluntly created, large enough to accommodate the standard-size [employer] Smartport. The port was then secured to the pectoralis fascia with 2-0 Proline suture at three locations. The catheter was then tunneled from the insertion site to the port site using the tunneling device. The catheter was cut to fit and then attached to the port. The catheter was secured to the port using the locking device. The port was then accessed with a Garcia needle and good glow and returned were noted. The port was then flushed with concentrated Heparin solution. The port pocket was then irrigated and made hemostatic with electro-cautery. The incision was then closed in two layers. The subcutaneous fat was reapproximated with a running 3-0 Vicryl. The skin was then reapproximated with a 4-0 Monocryl in a running subcuticular fashion. Steri-strips were then applied, followed by a sterile pressure dressing. The patient was then awakened from anesthesia and taken to the recovery room in stable condition. All instrument, sponge, and needle counts were correct. A chest X-ray was ordered for the recovery room, but the results are pending at the time of this dictation.       Donna Madden MD  6/9/2017  10:03 AM

## 2017-06-09 NOTE — PERIOP NOTES
Unable to establish IV access in pre-op area despite multiple attempts. Dr. Aurelia Warren aware and attempted without success.

## 2017-06-09 NOTE — ANESTHESIA PREPROCEDURE EVALUATION
Anesthetic History   No history of anesthetic complications            Review of Systems / Medical History  Patient summary reviewed, nursing notes reviewed and pertinent labs reviewed    Pulmonary  Within defined limits                 Neuro/Psych   Within defined limits           Cardiovascular  Within defined limits  Hypertension                   GI/Hepatic/Renal  Within defined limits   GERD           Endo/Other  Within defined limits      Morbid obesity     Other Findings              Physical Exam    Airway  Mallampati: II  TM Distance: > 6 cm  Neck ROM: normal range of motion   Mouth opening: Normal     Cardiovascular  Regular rate and rhythm,  S1 and S2 normal,  no murmur, click, rub, or gallop             Dental  No notable dental hx       Pulmonary  Breath sounds clear to auscultation               Abdominal  GI exam deferred       Other Findings            Anesthetic Plan    ASA: 3  Anesthesia type: general          Induction: Inhalational  Anesthetic plan and risks discussed with: Patient

## 2017-06-09 NOTE — DISCHARGE INSTRUCTIONS
______________________________________________________________________    Anesthesia Discharge Instructions    After general anesthesia or intervenous sedation, for 24 hours or while taking prescription Narcotics:  · Limit your activities  · Do not drive or operate hazardous machinery  · If you have not urinated within 8 hours after discharge, please contact your surgeon on call. · Do not make important personal or business decisions  · Do not drink alcoholic beverages    Report the following to your surgeon:  · Excessive pain, swelling, redness or odor of or around the surgical area  · Temperature over 100.5 degrees  · Nausea and vomiting lasting longer than 4 hours or if unable to take medication  · Any signs of decreased circulation or nerve impairment to extremity:  Change in color, persistent numbness, tingling, coldness or increased pain. · Any questions         Learning About a Central Venous Catheter  What is a central venous catheter? A central venous catheter (CVC), also called a central line, is a long, thin, flexible tube used to give medicines, fluids, nutrients, or blood products over a long period of time, usually several weeks or more. It can also be used to do blood tests. The catheter makes doing these things more comfortable for you because they are put directly into the catheter. You are not stuck with a needle every time. The CVC is inserted in your arm, chest, or neck. It is put through the skin and into a large vein. The catheter is threaded through this vein until it reaches a large vein near the heart. In most cases, the other end of the catheter--the end used to give medicines--sticks out of the skin. Some of the common CVCs that are used outside the hospital or for longer periods of time include:  · A peripherally inserted central catheter, or PICC line (say \"pick\"), which usually goes into a vein in your arm.   · A tunneled catheter, which is surgically inserted into a vein in the neck or chest.  · An implanted port, which is similar to a tunneled catheter but is left entirely under the skin. Medicines are injected through a \"port\" placed under the skin. Once your CVC is in place, you may stay in the hospital to receive your medicines, or you may get your medicines at home. What problems can occur? Possible problems with a central venous catheter include:  · Bleeding. This may happen when the doctor inserts the catheter. It is usually mild and will stop by itself. · Infection. If infection occurs, you will need antibiotics or the catheter will be removed. · Blockage or kinking of the catheter. Regular flushing of the catheter helps reduce blockage. A kinked catheter must be repositioned or replaced. · Pain. You may experience pain at the place where the catheter is inserted or where it lies under your skin. · Collapsed lung (pneumothorax). This is rare. It is most likely to happen during placement of a catheter in the chest.  · Shifting of the catheter. A catheter that has moved out of place can sometimes be repositioned. If this does not work, it must be replaced. What happens when you get a central venous catheter? Your catheter will be inserted by your doctor. Your doctor may use ultrasound to locate the blood vessel and help with the placement of the catheter. When you are in the hospital, a nursing team will take care of you and your CVC. Insertion and care of the CVC  Your nursing team will:  · Check the catheter site and dressing regularly. How often this is done depends on the situation. · Wash their hands before and after handling the catheter. · Replace the catheter when needed. · Clean or replace the catheter components when needed. Changing the dressing  Your team will:  · Use clean and proper materials for the dressing, which covers the catheter site. · Clean the insertion site and area whenever they change the dressing.   · Replace the dressing when it is damp, loose, or dirty. It will be changed at least once a week. At home  If you go home with a CVC, your team will give you detailed instructions on how to care for it. In general:  · Always wash your hands before you touch your CVC. Make sure anyone who touches the catheter also washes his or her hands. · Try to keep the exit site dry. This can help prevent infection. When you shower, cover the site with waterproof material, such as plastic wrap. Be sure you cover both the exit site and the central line cap(s). · Fasten or tape the central line to your body to prevent it from pulling or dangling. Avoid bending or crimping your central line. And wear clothing that doesn't rub or pull on your central line. Follow-up care is a key part of your treatment and safety. Be sure to make and go to all appointments, and call your doctor if you are having problems. It's also a good idea to know your test results and keep a list of the medicines you take. Where can you learn more? Go to http://cele-alyssa.info/. Enter L368 in the search box to learn more about \"Learning About a Central Venous Catheter. \"  Current as of: May 27, 2016  Content Version: 11.2  © 3714-3315 [employer]. Care instructions adapted under license by [employer] (which disclaims liability or warranty for this information). If you have questions about a medical condition or this instruction, always ask your healthcare professional. [place] [address] any warranty or liability for your use of this information.

## 2017-06-09 NOTE — PERIOP NOTES
Patient: Amira Ramon MRN: [id number]  SSN: [id number]   YOB: 1950  Age: 78 y.o. Sex: female     Patient is status post Procedure(s):  PORT A CATH INSERTION. Surgeon(s) and Role:     * Bryant Edwards MD - Primary    Local/Dose/Irrigation:  See [facility]                Venous Access Device 06/09/17 Upper chest (subclavicular area), left (Active)      Peripheral IV 01/15/16 Arm (Active)                           Dressing/Packing:  Wound Chest Anterior;Left;Upper-DRESSING TYPE: 2 x 2;Adhesive wound closure strips (Steri-Strips); Adhesive wound dressing (Mastisol); Transparent film (06/09/17 0900)

## 2017-06-09 NOTE — H&P
[address] [place] [address] [address], [address]  [phone]  MD Leonidas Maldonado MD Charolet Sitter, NP      Patient ID:  Name:  Nhung Navas  MRN:  [id number]  :   y.o. Date:  2017      HISTORY OF PRESENT ILLNESS:  Nhung Navas is a 78 y.o.  postmenopausal female who was referred to me in 2015 for a pelvic mass by Dr. Umu Jama, who noted the mass on routine gynecologic exam. He sent her for a CT of the pelvis, which revealed a mass at the vaginal apex, that appeared to be invading into the soft tissues of the left pelvis. There were several enlarged lymph nodes on the left. There was also questionable bladder invasion, and possibly even involvement of the rectosigmoid colon. She reported a history of having a mass removed from one of her ovaries in 1 at [facility], which required a second surgery to remove her uterus and contralateral ovary. She said she did not have to take any chemotherapy, but she did have to take some kind of pill for a year. She has not had any gynecologic issues since that time. Presumably her hysterectomy was a total, rather than a supracervical, but she is unsure and there are no records. She denied any vaginal bleeding, except for after her examination with Dr. Leidy Singh. She reported minimal pain in the pelvis. I took her the OR for and exam under anesthesia with cystoscopy and proctoscopy. Pathology revealed a squamous cell carcinoma, consistent with a vaginal primary. Examination revealed a fixed mass involving the left upper vagina with extension to the left pelvic sidewall. There also appeared to be involvement of the posterior bladder wall, but no mucosal involvement. The mass did push against the rectal wall, but there did not appear to be any direct involvement.      I referred her to radiation oncology and she saw Dr. Jason Montoya.  She completed pelvic radiation along with concurrent cisplatin chemotherapy in February 2016 and had a great response. Her pelvic exam and pap smear in March 2017 were both negative.     When I saw her in the office in March 2017 for follow up she was doing well and without complaints. Since then she began having back pain that had progressively worsened. She saw Tj Farr in May and she ordered a CT. This unfortunately demonstrated extensive metastatic disease outside of the prior radiated field. Dr. Christine Woods then sent her for a PET/CT. She presents today to review the scans and to discuss treatment options. ROS:   and GI review: Negative  Cardiopulmonary review:Negative   Musculoskeletal:  Negative     A comprehensive review of systems was negative except for that written in the History of Present Illness. , 10 point ROS      Problem List:  Patient Active Problem List    Diagnosis Date Noted    Hypertension, essential 01/18/2017    Hypercholesterolemia 01/18/2017    GERD without esophagitis 01/18/2017    Vaginal cancer ([address] 12/10/2015    LAP-BAND surgery status 05/17/2012     PMH:  Past Medical History:   Diagnosis Date    Abnormal mammogram of left breast 12/5/2016    Cancer ([address] 06/2017    LYMPH NODES IN BACK    Cancer ([address] 2016    VAGINAL CANCER    GERD (gastroesophageal reflux disease) 6/11/06    Hypercholesterolemia 10/11/06    Hypertension 10/11/06    Morbid obesity ([address] 10/11/06      PSH:  Past Surgical History:   Procedure Laterality Date    HX APPENDECTOMY      HX GASTRIC BYPASS  2009    LAP BAND    HX GYN  12/4/15    EUA/Bx of pelvic mass and vagina/cystoscopy/proctoscopy    HX HYSTERECTOMY  1978    HX ORTHOPAEDIC  2009    right ankle    HX OTHER SURGICAL  2001?     benign mass removed from lower back    LAP, PLACE ADJUST GASTR BAND  1/30/08    EB: AP Std lap band, liver bx      Social History:  Social History   Substance Use Topics    Smoking status: Former Smoker     Packs/day: 0.50     Years: 15.00     Types: Cigarettes     Quit date: 1/1/1993    Smokeless tobacco: Never Used    Alcohol use Yes      Comment: socially-WINE ONCE A WEEK      Family History:  Family History   Problem Relation Age of Onset    Cancer Mother      colon WITH METS TO LIVER    Diabetes Father     Heart Disease Father     Cancer Sister      breast & bone    Cancer Maternal Aunt      BREAST    Cancer Paternal Aunt      BREAST    Anesth Problems Neg Hx       Medications: (reviewed)  No current facility-administered medications for this encounter. Current Outpatient Prescriptions   Medication Sig    docusate sodium (STOOL SOFTENER) 100 mg capsule Take 100 mg by mouth daily.  atorvastatin (LIPITOR) 80 mg tablet Take 1 Tab by mouth daily.  losartan (COZAAR) 100 mg tablet Take 1 Tab by mouth daily.  triamterene-hydroCHLOROthiazide (MAXZIDE) 37.5-25 mg per tablet Take 1 Tab by mouth daily.  Cholecalciferol, Vitamin D3, (VITAMIN D3) 1,000 unit cap Take 1 Cap by mouth. Allergies: (reviewed)  Allergies   Allergen Reactions    Amlodipine Other (comments)     Edema in LE's          OBJECTIVE:    Physical Exam:  VITAL SIGNS: Vitals:    06/07/17 1652   Weight: 115.7 kg (255 lb)   Height: 5' 2\" (1.575 m)     Body mass index is 46.64 kg/(m^2). GENERAL MIGUELANGEL: Conversant, alert, oriented. No acute distress. HEENT: HEENT. No thyroid enlargement. No JVD. Neck: Supple without restrictions. RESPIRATORY: Clear to auscultation and percussion to the bases. No CVAT. CARDIOVASC: RRR without murmur/rub. GASTROINT: soft, non-tender, without masses or organomegaly   MUSCULOSKEL: no joint tenderness, deformity or swelling   EXTREMITIES: extremities normal, atraumatic, no cyanosis or edema   PELVIC: Deferred   RECTAL: Deferred   PATRICIO SURVEY: No suspicious lymphadenopathy or edema noted. NEURO: Grossly intact. No acute deficit.        Lab Results   Component Value Date/Time    WBC 5.9 06/06/2017 03:02 PM    HGB 11.6 06/06/2017 03:02 PM HCT 35.8 06/06/2017 03:02 PM    PLATELET 874 65/84/5817 03:02 PM    MCV 85 06/06/2017 03:02 PM     Lab Results   Component Value Date/Time    Sodium 143 06/06/2017 03:02 PM    Potassium 4.3 06/06/2017 03:02 PM    Chloride 98 06/06/2017 03:02 PM    CO2 26 06/06/2017 03:02 PM    Anion gap 9 02/23/2016 09:24 AM    Glucose 96 06/06/2017 03:02 PM    BUN 17 06/06/2017 03:02 PM    Creatinine 1.05 06/06/2017 03:02 PM    BUN/Creatinine ratio 16 06/06/2017 03:02 PM    GFR est AA 64 06/06/2017 03:02 PM    GFR est non-AA 55 06/06/2017 03:02 PM    Calcium 9.3 06/06/2017 03:02 PM       CT of abdomen/pelvis (5/16/17)  LUNG BASES: Multiple bilateral pulmonary nodules. INCIDENTALLY IMAGED HEART AND MEDIASTINUM: Unremarkable. LIVER: No mass or biliary dilatation. GALLBLADDER: Cholelithiasis. SPLEEN: Calcified granulomata. PANCREAS: No mass or ductal dilatation. ADRENALS: Unremarkable. KIDNEYS: Right renal cyst.  STOMACH: Unremarkable. SMALL BOWEL: No dilatation or wall thickening. COLON: No dilatation or wall thickening. APPENDIX: Unremarkable. PERITONEUM: No ascites or pneumoperitoneum. RETROPERITONEUM: Significant retroperitoneal lymphadenopathy, largest dimension  surrounding the aorta 6.5 x 4.2 cm. REPRODUCTIVE ORGANS: There is 4.6 x 2.4 cm soft tissue in the region of  previously identified left pelvic sidewall mass. URINARY BLADDER: No mass or calculus. BONES: No destructive bone lesion. Degenerative changes of L4-L5 and L5-S1. ADDITIONAL COMMENTS: N/A      IMPRESSION:  1. Bilateral pulmonary nodules and retroperitoneal lymphadenopathy compatible  with metastatic disease. 2. Much smaller mass in the left pelvis, or scar. 3. Cholelithiasis.       PET/CT (6/6/17)  HEAD/NECK: No apparent foci of abnormal hypermetabolism. Cerebral evaluation is  limited by normal intense activity.      CHEST: There are hypermetabolic left supraclavicular, superior mediastinal, and  paraesophageal lymph nodes.  The small pulmonary nodules are minimally  hypermetabolic, maximum SUV of the lesion in the right middle lobe is 1.9.      ABDOMEN/PELVIS: Para-aortic and aortocaval lymphadenopathy is hypermetabolic,  maximum SUV 8.0. Small but hypermetabolic bilateral external iliac lymph nodes  are noted.      SKELETON: There is a hypermetabolic lytic lesion in the sternum, maximum SUV is  9.7. There is a small sclerotic lesion at L3 which is new compared to the prior  PET/CT. Given the tracer activity corresponding to the adjacent retroperitoneal  lymphadenopathy that is difficult to determine if this small lesion is  hypermetabolic.      IMPRESSION:   1. Mildly hypermetabolic pulmonary nodules are concerning for metastatic  disease. 2. Hypermetabolic left supraclavicular, superior mediastinal, and paraesophageal  lymph nodes as well as retroperitoneal and bilateral external iliac lymph nodes. 3. Hypermetabolic lytic lesion in the sternum is concerning for metastatic  disease. Small sclerotic lesion at L3 is new compared to the prior PET/CT but  too small for accurate characterization by PET CT.        IMPRESSION/PLAN:  David Cha is a 78 y.o. female with a history of stage III vaginal carcinoma. She was treated with pelvic radiation therapy and concurrent cisplatin chemotherapy. She now has recurrent disease with multiple sites of metastases. I had a long discussion with the patient and her  discussing the best course of management. Surgery is not an option due to the widespread nature of her disease. The same is true for radiation therapy. Her only option is systemic chemotherapy. Since vaginal cancer behaves like cervical carcinoma, I have recommended using the standard regimen for metastatic cervical carcinoma, Taxol/Cisplatin/Avastin. We will reevaluate after a couple of cycles to assess response. She will need an IV port to facilitate treatment. I will hopefully place that within the next couple of weeks.         Signed By: Edward Roman MD     6/9/2017/6:20 AM

## 2017-06-12 RX ORDER — DIPHENHYDRAMINE HYDROCHLORIDE 50 MG/ML
50 INJECTION, SOLUTION INTRAMUSCULAR; INTRAVENOUS ONCE
Status: COMPLETED | OUTPATIENT
Start: 2017-06-14 | End: 2017-06-14

## 2017-06-12 RX ORDER — DEXAMETHASONE SODIUM PHOSPHATE 4 MG/ML
20 INJECTION, SOLUTION INTRA-ARTICULAR; INTRALESIONAL; INTRAMUSCULAR; INTRAVENOUS; SOFT TISSUE ONCE
Status: DISCONTINUED | OUTPATIENT
Start: 2017-06-14 | End: 2017-06-13

## 2017-06-12 RX ORDER — GRANISETRON HYDROCHLORIDE 1 MG/ML
1 INJECTION INTRAVENOUS ONCE
Status: COMPLETED | OUTPATIENT
Start: 2017-06-14 | End: 2017-06-14

## 2017-06-13 ENCOUNTER — TELEPHONE (OUTPATIENT)
Dept: GYNECOLOGY | Age: 67
End: 2017-06-13

## 2017-06-13 RX ORDER — ONDANSETRON 4 MG/1
4 TABLET, ORALLY DISINTEGRATING ORAL
Qty: 30 TAB | Refills: 2 | Status: SHIPPED | OUTPATIENT
Start: 2017-06-13 | End: 2017-10-26 | Stop reason: SDUPTHER

## 2017-06-13 RX ORDER — DEXAMETHASONE SODIUM PHOSPHATE 4 MG/ML
12 INJECTION, SOLUTION INTRA-ARTICULAR; INTRALESIONAL; INTRAMUSCULAR; INTRAVENOUS; SOFT TISSUE ONCE
Status: COMPLETED | OUTPATIENT
Start: 2017-06-14 | End: 2017-06-14

## 2017-06-13 RX ORDER — DEXAMETHASONE 4 MG/1
TABLET ORAL
Qty: 36 TAB | Refills: 2 | Status: SHIPPED | OUTPATIENT
Start: 2017-06-13 | End: 2017-10-26 | Stop reason: SDUPTHER

## 2017-06-13 RX ORDER — LIDOCAINE AND PRILOCAINE 25; 25 MG/G; MG/G
CREAM TOPICAL
Qty: 30 G | Refills: 2 | Status: ON HOLD | OUTPATIENT
Start: 2017-06-13 | End: 2018-01-01

## 2017-06-13 NOTE — TELEPHONE ENCOUNTER
Pt did not get her email, but will come into office at 7:30am tomorrow to get chemo information and sign consents.

## 2017-06-13 NOTE — TELEPHONE ENCOUNTER
Requested Prescriptions     Signed Prescriptions Disp Refills    dexamethasone (DECADRON) 4 mg tablet 36 Tab 2     Sig: Take 2 tablets with breakfast the day before chemo and for 2 days after chemo     Authorizing Provider: Allan Guidry     Ordering User: Renny Padilla    lidocaine-prilocaine (EMLA) topical cream 30 g 2     Sig: Apply small amount over port area one hour before chemo treatment and cover with a Band-Aid     Authorizing Provider: Allan Guidry     Ordering User: Renny Padilla    ondansetron (ZOFRAN ODT) 4 mg disintegrating tablet 30 Tab 2     Sig: Take 1 Tab by mouth every eight (8) hours as needed for Nausea. Indications: CANCER CHEMOTHERAPY-INDUCED NAUSEA AND VOMITING     Authorizing Provider: Allan Guidry     Ordering User: Benja Larry to pharmacy per vo Dr. Jeff Zhang to be used for chemotherapy.

## 2017-06-14 ENCOUNTER — HOSPITAL ENCOUNTER (OUTPATIENT)
Dept: INFUSION THERAPY | Age: 67
Discharge: HOME | End: 2017-06-14
Payer: MEDICARE

## 2017-06-14 VITALS
BODY MASS INDEX: 46.74 KG/M2 | WEIGHT: 254 LBS | DIASTOLIC BLOOD PRESSURE: 72 MMHG | TEMPERATURE: 97.4 F | OXYGEN SATURATION: 97 % | RESPIRATION RATE: 18 BRPM | HEART RATE: 62 BPM | SYSTOLIC BLOOD PRESSURE: 125 MMHG | HEIGHT: 62 IN

## 2017-06-14 PROBLEM — R60.0 BILATERAL LOWER EXTREMITY EDEMA: Status: ACTIVE | Noted: 2017-06-14

## 2017-06-14 PROBLEM — I89.0 LYMPH EDEMA: Status: ACTIVE | Noted: 2017-06-14

## 2017-06-14 LAB — CANCER AG125 SERPL-ACNC: 129 U/ML (ref 0–30)

## 2017-06-14 PROCEDURE — 36415 COLL VENOUS BLD VENIPUNCTURE: CPT | Performed by: OBSTETRICS & GYNECOLOGY

## 2017-06-14 PROCEDURE — 86304 IMMUNOASSAY TUMOR CA 125: CPT | Performed by: OBSTETRICS & GYNECOLOGY

## 2017-06-14 PROCEDURE — 96417 CHEMO IV INFUS EACH ADDL SEQ: CPT

## 2017-06-14 PROCEDURE — 74011000250 HC RX REV CODE- 250: Performed by: OBSTETRICS & GYNECOLOGY

## 2017-06-14 PROCEDURE — 96415 CHEMO IV INFUSION ADDL HR: CPT

## 2017-06-14 PROCEDURE — 74011250636 HC RX REV CODE- 250/636: Performed by: OBSTETRICS & GYNECOLOGY

## 2017-06-14 PROCEDURE — 74011000258 HC RX REV CODE- 258: Performed by: OBSTETRICS & GYNECOLOGY

## 2017-06-14 PROCEDURE — 96413 CHEMO IV INFUSION 1 HR: CPT

## 2017-06-14 PROCEDURE — 96366 THER/PROPH/DIAG IV INF ADDON: CPT

## 2017-06-14 PROCEDURE — 96361 HYDRATE IV INFUSION ADD-ON: CPT

## 2017-06-14 PROCEDURE — 77030012965 HC NDL HUBR BBMI -A

## 2017-06-14 PROCEDURE — 96375 TX/PRO/DX INJ NEW DRUG ADDON: CPT

## 2017-06-14 RX ORDER — SODIUM CHLORIDE 9 MG/ML
10 INJECTION INTRAMUSCULAR; INTRAVENOUS; SUBCUTANEOUS AS NEEDED
Status: ACTIVE | OUTPATIENT
Start: 2017-06-14 | End: 2017-06-15

## 2017-06-14 RX ORDER — SODIUM CHLORIDE 0.9 % (FLUSH) 0.9 %
10 SYRINGE (ML) INJECTION AS NEEDED
Status: ACTIVE | OUTPATIENT
Start: 2017-06-14 | End: 2017-06-15

## 2017-06-14 RX ORDER — HEPARIN 100 UNIT/ML
500 SYRINGE INTRAVENOUS AS NEEDED
Status: ACTIVE | OUTPATIENT
Start: 2017-06-14 | End: 2017-06-15

## 2017-06-14 RX ADMIN — DEXAMETHASONE SODIUM PHOSPHATE 12 MG: 4 INJECTION, SOLUTION INTRA-ARTICULAR; INTRALESIONAL; INTRAMUSCULAR; INTRAVENOUS; SOFT TISSUE at 08:57

## 2017-06-14 RX ADMIN — SODIUM CHLORIDE, PRESERVATIVE FREE 500 UNITS: 5 INJECTION INTRAVENOUS at 14:45

## 2017-06-14 RX ADMIN — GRANISETRON HYDROCHLORIDE 1 MG: 1 INJECTION INTRAVENOUS at 08:51

## 2017-06-14 RX ADMIN — MAGNESIUM SULFATE HEPTAHYDRATE: 500 INJECTION, SOLUTION INTRAMUSCULAR; INTRAVENOUS at 10:23

## 2017-06-14 RX ADMIN — Medication 10 ML: at 14:45

## 2017-06-14 RX ADMIN — SODIUM CHLORIDE 10 ML: 9 INJECTION, SOLUTION INTRAMUSCULAR; INTRAVENOUS; SUBCUTANEOUS at 08:40

## 2017-06-14 RX ADMIN — DIPHENHYDRAMINE HYDROCHLORIDE 50 MG: 50 INJECTION INTRAMUSCULAR; INTRAVENOUS at 08:52

## 2017-06-14 RX ADMIN — SODIUM CHLORIDE 150 MG: 900 INJECTION, SOLUTION INTRAVENOUS at 09:54

## 2017-06-14 RX ADMIN — FAMOTIDINE 20 MG: 10 INJECTION INTRAVENOUS at 08:59

## 2017-06-14 RX ADMIN — Medication 10 ML: at 08:40

## 2017-06-14 RX ADMIN — PACLITAXEL 304 MG: 6 INJECTION, SOLUTION INTRAVENOUS at 10:25

## 2017-06-14 RX ADMIN — CISPLATIN 112 MG: 100 INJECTION, SOLUTION INTRAVENOUS at 13:21

## 2017-06-14 NOTE — PROGRESS NOTES
[facility] - Chemotherapy Progress Note    0815 Pt admit to [facility] for taxol/Cisplatin ambulatory in stable condition. Assessment completed. Pt complains of increased edema from the hips down to bilateral extremities. Port was placed last week and swelling noted to site. Was difficult to feel the parameters of the port so 1.5 inch needle used to access. Positive blood return noted. CA -125 drawn but other labs were done on 6/6/17 and used for today's treatment. IV attached to NS at [facility]. Chemotherapy Flowsheet 6/14/2017   Cycle C1   Date 6/14/2017   Drug / Regimen Taxol/Cisplatin   Pre Hydration given   Post Hydration given   Notes -       Visit Vitals    /72    Pulse 62    Temp 97.4 °F (36.3 °C)    Resp 18    Ht 5' 2\" (1.575 m)    Wt 115.2 kg (254 lb)    SpO2 97%    BMI 46.46 kg/m2       Medications:  500cc NS + K and Mag pre-hydration  Benadryl  Pepcid  Kytril  Decadron  Emend  Paclitaxel  Cisplatin  500cc NS +K and Mag post-hydration    1445 Pt tolerated treatment well. Port maintained positive blood return throughout treatment, flushed with positive blood return at conclusion. D/c home ambulatory in no distress.  Pt aware of next appointment scheduled for 7/2/17    Recent Results (from the past 12 hour(s))   CANCER ANTIGEN 125    Collection Time: 06/14/17  8:41 AM   Result Value Ref Range    CA-125 129 (H) 0 - 30 U/mL

## 2017-06-14 NOTE — PROGRESS NOTES
Shannan Mack. ALONSO Meza      Date of visit: 6/14/2017   HISTORY OF PRESENT ILLNESS:        Lida Reyes is a 78 y.o.  postmenopausal female who was referred to Dr. Rosa Maria Wallis in December 2015 for a pelvic mass by Dr. Lanette Hummel, who noted the mass on routine gynecologic exam. He sent her for a CT of the pelvis, which revealed a mass at the vaginal apex, that appeared to be invading into the soft tissues of the left pelvis. There were several enlarged lymph nodes on the left. There was also questionable bladder invasion, and possibly even involvement of the rectosigmoid colon. She reported a history of having a mass removed from one of her ovaries in 1 at [facility], which required a second surgery to remove her uterus and contralateral ovary. She said she did not have to take any chemotherapy, but she did have to take some kind of pill for a year. She has not had any gynecologic issues since that time. Presumably her hysterectomy was a total, rather than a supracervical, but she is unsure and there are no records. She denied any vaginal bleeding, except for after her examination with Dr. Jules Logan. She reported minimal pain in the pelvis. Dr. Rosa Maria Wallis took her the OR for and exam under anesthesia with cystoscopy and proctoscopy. Pathology revealed a squamous cell carcinoma, consistent with a vaginal primary. Examination revealed a fixed mass involving the left upper vagina with extension to the left pelvic sidewall. There also appeared to be involvement of the posterior bladder wall, but no mucosal involvement. The mass did push against the rectal wall, but there did not appear to be any direct involvement.       Dr. Rosa Maria Wallis referred her to radiation oncology and she saw Dr. Archana Pineda. She completed pelvic radiation along with concurrent cisplatin chemotherapy in February 2016 and had a great response.  Her pelvic exam and pap smear in March 2017 were both negative.      When Dr. Jyoti Dang saw her in the office in March 2017 for follow up, she was doing well and without complaints. Since then she began having back pain that had progressively worsened. She saw Edyta Zhu in May and she ordered a CT. This unfortunately demonstrated extensive metastatic disease outside of the prior radiated field. Dr. Tolu Alanis then sent her for a PET/CT on June 5th. This revealed: \"1. Mildly hypermetabolic pulmonary nodules are concerning for metastatic  disease. 2. Hypermetabolic left supraclavicular, superior mediastinal, and paraesophageal lymph nodes as well as retroperitoneal and bilateral external iliac lymph nodes. 3. Hypermetabolic lytic lesion in the sternum is concerning for metastatic disease. Small sclerotic lesion at L3 is new compared to the prior PET/CT but too small for accurate characterization by PET CT\"   Pt and Dr. Jyoti Dang met and discussed options and it was agreed to proceed with sytstemic chemotherapy. Since vaginal cancer behaves like cervical carcinoma, Dr. Jyoti Dang recommended using the standard regimen for metastatic cervical carcinoma, Taxol/Cisplatin/Avastin. Unfortunately, even with [employer] with her insurance company and explaining that metastatic vaginal cancer is so rare that little studies exist on chemotherapy, but that it does appear to act like cervical cancer and recommendations is to treat as metastatic cervical cancer, the insurance company refused to pay for the Avastin, but would only approve the Taxol/Carbo. We will reevaluate after a couple of cycles of Taxol/Carbo to assess response    Subjective: Today, pt presents for her first treatment. Her  is present and supportive. Pt is in good, positive spirits. She had her port placed last Friday, 6/9 and apparently, she was a very difficult stick and has multiple bruise marks on both arms and right shoulder area from failed attempts.    She says her lower extremities have been very swollen for the last \"few weeks\". She said this is new and she says it has been causing her some discomfort. Says it is not painful, but \"bothersome\". Current Outpatient Prescriptions   Medication Sig    dexamethasone (DECADRON) 4 mg tablet Take 2 tablets with breakfast the day before chemo and for 2 days after chemo    lidocaine-prilocaine (EMLA) topical cream Apply small amount over port area one hour before chemo treatment and cover with a Band-Aid    ondansetron (ZOFRAN ODT) 4 mg disintegrating tablet Take 1 Tab by mouth every eight (8) hours as needed for Nausea. Indications: CANCER CHEMOTHERAPY-INDUCED NAUSEA AND VOMITING    acetaminophen (TYLENOL) 325 mg tablet Take 650 mg by mouth every four (4) hours as needed for Pain.  oxyCODONE-acetaminophen (PERCOCET) 5-325 mg per tablet Take 1 Tab by mouth every four (4) hours as needed for Pain. Max Daily Amount: 6 Tabs.  docusate sodium (STOOL SOFTENER) 100 mg capsule Take 100 mg by mouth daily.  atorvastatin (LIPITOR) 80 mg tablet Take 1 Tab by mouth daily.  losartan (COZAAR) 100 mg tablet Take 1 Tab by mouth daily.  triamterene-hydroCHLOROthiazide (MAXZIDE) 37.5-25 mg per tablet Take 1 Tab by mouth daily.  Cholecalciferol, Vitamin D3, (VITAMIN D3) 1,000 unit cap Take 1 Cap by mouth. Current Facility-Administered Medications   Medication Dose Route Frequency    sodium chloride 0.9 % injection 10 mL  10 mL IntraVENous PRN    heparin (porcine) pf 500 Units  500 Units IntraVENous PRN    sodium chloride (NS) flush 10 mL  10 mL IntraVENous PRN        Review of Systems:  General: Denies wt loss, fatigue  HEENT: Denies visual changes, dysphagia or headache  Resp: Denies SOB, BLACKBURN, wheezing or cough  CV: Denies CP, palpitations  GI/: Denies N/V, Diarrhea, constipation or dysuria  MuskSkel: Acknowledges lower back pain and some discomfort with walking a lot due to edema.    Neuro: Denies dizzyness or syncope    Objective:     Patient Vitals for the past 8 hrs:   BP Temp Pulse Resp SpO2 Height Weight   06/14/17 1443 125/72 97.4 °F (36.3 °C) 62 18 - - -   06/14/17 0814 148/81 97 °F (36.1 °C) 76 18 97 % 5' 2\" (1.575 m) 254 lb (115.2 kg)       Physical Exam:  General: A&O X3 in NAD with pleasant affect  HEENT: Sclera anicteric, Mucosa pink, moist   Neck: No JVD noted. Although noted on PET/CT that there was some \"supraclavicular\" lymph nodes, none were able to be palpated today  Heart: Regular without M/R/G  Lungs: CTA Bilat without wheezing or rales  Abd: Soft, obese, NT/ND with + BS throughout  Ext: With 4+ edema to both lowere ext's. Some mild discoloration of skin just above ankle/lower shin area. Toes noted with good blanching. + pedal pulses bilat  Neuro: grossly intact        Assessment:     Patient Active Problem List   Diagnosis Code    LAP-BAND surgery status Z98.84    Vaginal cancer ([facility] C52    Hypertension, essential I10    Hypercholesterolemia E78.00    GERD without esophagitis K21.9    Lymph edema I89.0    Bilateral lower extremity edema R60.0         Plan: Will proceed with first cycle of Taxol/Carbo  Discussed importance of elevating legs as much as possible. Reviewed management of symptoms at length and plan going forward.  pending and will follow up on this. Pt was encouraged to call for any concerns or questions   Hydration importance reviewed and encouraged  She will continue antihypertensive meds and follow up closely with PCP. Hopefull edema to lower legs will decrease with chemo as possibility lymphedema from disease. Will monitor closely  Greater than 45 minutes spent with pt and  reviewing the above.    Franny Esparza NP

## 2017-06-29 ENCOUNTER — OFFICE VISIT (OUTPATIENT)
Dept: GYNECOLOGY | Age: 67
End: 2017-06-29

## 2017-06-29 VITALS
HEART RATE: 81 BPM | DIASTOLIC BLOOD PRESSURE: 77 MMHG | SYSTOLIC BLOOD PRESSURE: 161 MMHG | HEIGHT: 62 IN | WEIGHT: 244 LBS | BODY MASS INDEX: 44.9 KG/M2

## 2017-06-29 DIAGNOSIS — C52 VAGINAL CANCER (HCC): Primary | ICD-10-CM

## 2017-06-29 RX ORDER — OXYCODONE AND ACETAMINOPHEN 5; 325 MG/1; MG/1
1-2 TABLET ORAL
Qty: 50 TAB | Refills: 0 | Status: SHIPPED | OUTPATIENT
Start: 2017-06-29 | End: 2017-11-06 | Stop reason: SDUPTHER

## 2017-06-29 RX ORDER — FUROSEMIDE 20 MG/1
20 TABLET ORAL
Qty: 30 TAB | Refills: 3 | Status: ON HOLD | OUTPATIENT
Start: 2017-06-29 | End: 2018-01-01

## 2017-06-29 NOTE — PROGRESS NOTES
[address] [place] [address] Mathias Moritz [address], 1975 [facility]  [phone]  MD Dutch Montgomery MD  Office Note  Patient ID:  Name:  Pedro Luis Newman  MRN:  [id number]  :   y.o. Date:  2017      HISTORY OF PRESENT ILLNESS:  Pedro Luis Newman is a 78 y.o.  postmenopausal female who was referred to me in 2015 for a pelvic mass by Dr. Beronica Duffy, who noted the mass on routine gynecologic exam.  He sent her for a CT of the pelvis, which revealed a mass at the vaginal apex, that appeared to be invading into the soft tissues of the left pelvis. There were several enlarged lymph nodes on the left. There was also questionable bladder invasion, and possibly even involvement of the rectosigmoid colon. She reported a history of having a mass removed from one of her ovaries in 1 at [facility], which required a second surgery to remove her uterus and contralateral ovary. She said she did not have to take any chemotherapy, but she did have to take some kind of pill for a year. She has not had any gynecologic issues since that time. Presumably her hysterectomy was a total, rather than a supracervical, but she is unsure and there are no records. She denied any vaginal bleeding, except for after her examination with Dr. Raheem Ramsey. She reported minimal pain in the pelvis. I took her the OR for and exam under anesthesia with cystoscopy and proctoscopy. Pathology revealed a squamous cell carcinoma, consistent with a vaginal primary. Examination revealed a fixed mass involving the left upper vagina with extension to the left pelvic sidewall. There also appeared to be involvement of the posterior bladder wall, but no mucosal involvement. The mass did push against the rectal wall, but there did not appear to be any direct involvement. I referred her to radiation oncology and she saw Dr. Geovanna Unger.   She completed pelvic radiation along with concurrent cisplatin chemotherapy in February 2016 and had a great response. When I saw her in the office in March 2017 for follow up she was doing well and without complaints. Her pelvic exam and pap smear at that time were both negative. Subsequent to that visit she began having back pain that had progressively worsened. She saw Nava Turner in May and she ordered a CT. This unfortunately demonstrated extensive metastatic disease outside of the prior radiated field. Dr. Joselo Mg then sent her for a PET/CT which demonstrated widely metastatic disease. I recommended systemic chemotherapy with Taxol/Cisplatin/Avastin, which is the standard for metastatic cervical cancer, since vaginal cancer behaves similar to cervical cancer. Unfortunately, the Avastin was denied by her insurance company. We chose to proceed with Taxol/Cisplatin. She has completed one cycle so far. She did rather well with her first cycle. She denied nausea, but she reported some neuropathy and left shoulder pain. She also complains of some persistent lower extremity edema. ROS:   and GI review: Negative  Cardiopulmonary review:Negative   Musculoskeletal:  Negative    A comprehensive review of systems was negative except for that written in the History of Present Illness.  , 10 point ROS        Problem List:  Patient Active Problem List    Diagnosis Date Noted    Lymph edema 06/14/2017    Bilateral lower extremity edema 06/14/2017    Hypertension, essential 01/18/2017    Hypercholesterolemia 01/18/2017    GERD without esophagitis 01/18/2017    Vaginal cancer ([facility] [address] 12/10/2015    LAP-BAND surgery status 05/17/2012     PMH:  Past Medical History:   Diagnosis Date    Abnormal mammogram of left breast 12/5/2016    Cancer ([address] 06/2017    LYMPH NODES IN BACK    Cancer ([address] 2016    VAGINAL CANCER    GERD (gastroesophageal reflux disease) 6/11/06    Hypercholesterolemia 10/11/06    Hypertension 10/11/06    Morbid obesity ([address] 10/11/06      PSH:  Past Surgical History:   Procedure Laterality Date    HX APPENDECTOMY      HX GASTRIC BYPASS  2009    LAP BAND    HX GYN  12/4/15    EUA/Bx of pelvic mass and vagina/cystoscopy/proctoscopy    HX HYSTERECTOMY  1978    HX ORTHOPAEDIC  2009    right ankle    HX OTHER SURGICAL  2001? benign mass removed from lower back    LAP, PLACE ADJUST GASTR BAND  1/30/08    EB: AP Std lap band, liver bx      Social History:  Social History   Substance Use Topics    Smoking status: Former Smoker     Packs/day: 0.50     Years: 15.00     Types: Cigarettes     Quit date: 1/1/1993    Smokeless tobacco: Never Used    Alcohol use Yes      Comment: socially-WINE ONCE A WEEK      Family History:  Family History   Problem Relation Age of Onset    Cancer Mother      colon WITH METS TO LIVER    Diabetes Father     Heart Disease Father     Cancer Sister      breast & bone    Cancer Maternal Aunt      BREAST    Cancer Paternal Aunt      BREAST    Anesth Problems Neg Hx       Medications: (reviewed)  Current Outpatient Prescriptions   Medication Sig    dexamethasone (DECADRON) 4 mg tablet Take 2 tablets with breakfast the day before chemo and for 2 days after chemo    lidocaine-prilocaine (EMLA) topical cream Apply small amount over port area one hour before chemo treatment and cover with a Band-Aid    ondansetron (ZOFRAN ODT) 4 mg disintegrating tablet Take 1 Tab by mouth every eight (8) hours as needed for Nausea. Indications: CANCER CHEMOTHERAPY-INDUCED NAUSEA AND VOMITING    acetaminophen (TYLENOL) 325 mg tablet Take 650 mg by mouth every four (4) hours as needed for Pain.  oxyCODONE-acetaminophen (PERCOCET) 5-325 mg per tablet Take 1 Tab by mouth every four (4) hours as needed for Pain. Max Daily Amount: 6 Tabs.  docusate sodium (STOOL SOFTENER) 100 mg capsule Take 100 mg by mouth daily.  atorvastatin (LIPITOR) 80 mg tablet Take 1 Tab by mouth daily.     losartan (COZAAR) 100 mg tablet Take 1 Tab by mouth daily.  triamterene-hydroCHLOROthiazide (MAXZIDE) 37.5-25 mg per tablet Take 1 Tab by mouth daily.  Cholecalciferol, Vitamin D3, (VITAMIN D3) 1,000 unit cap Take 1 Cap by mouth. No current facility-administered medications for this visit. Allergies: (reviewed)  Allergies   Allergen Reactions    Amlodipine Other (comments)     Edema in LE's          OBJECTIVE:    Physical Exam:  VITAL SIGNS: There were no vitals filed for this visit. There is no height or weight on file to calculate BMI. GENERAL MIGUELANGEL: Conversant, alert, oriented. No acute distress. HEENT: HEENT. No thyroid enlargement. No JVD. Neck: Supple without restrictions. RESPIRATORY: Clear to auscultation and percussion to the bases. No CVAT. CARDIOVASC: RRR without murmur/rub. GASTROINT: soft, non-tender, without masses or organomegaly   MUSCULOSKEL: no joint tenderness, deformity or swelling   EXTREMITIES: extremities normal, atraumatic, no cyanosis or edema   PELVIC: Deferred   RECTAL: Deferred   PATRICIO SURVEY: No suspicious lymphadenopathy or edema noted. NEURO: Grossly intact. No acute deficit. CT of abdomen/pelvis (5/16/17)  LUNG BASES: Multiple bilateral pulmonary nodules. INCIDENTALLY IMAGED HEART AND MEDIASTINUM: Unremarkable. LIVER: No mass or biliary dilatation. GALLBLADDER: Cholelithiasis. SPLEEN: Calcified granulomata. PANCREAS: No mass or ductal dilatation. ADRENALS: Unremarkable. KIDNEYS: Right renal cyst.  STOMACH: Unremarkable. SMALL BOWEL: No dilatation or wall thickening. COLON: No dilatation or wall thickening. APPENDIX: Unremarkable. PERITONEUM: No ascites or pneumoperitoneum. RETROPERITONEUM: Significant retroperitoneal lymphadenopathy, largest dimension  surrounding the aorta 6.5 x 4.2 cm. REPRODUCTIVE ORGANS: There is 4.6 x 2.4 cm soft tissue in the region of  previously identified left pelvic sidewall mass. URINARY BLADDER: No mass or calculus.   BONES: No destructive bone lesion. Degenerative changes of L4-L5 and L5-S1. ADDITIONAL COMMENTS: N/A     IMPRESSION:  1. Bilateral pulmonary nodules and retroperitoneal lymphadenopathy compatible  with metastatic disease. 2. Much smaller mass in the left pelvis, or scar. 3. Cholelithiasis. PET/CT (6/6/17)  HEAD/NECK: No apparent foci of abnormal hypermetabolism. Cerebral evaluation is  limited by normal intense activity.     CHEST: There are hypermetabolic left supraclavicular, superior mediastinal, and  paraesophageal lymph nodes. The small pulmonary nodules are minimally  hypermetabolic, maximum SUV of the lesion in the right middle lobe is 1.9.     ABDOMEN/PELVIS: Para-aortic and aortocaval lymphadenopathy is hypermetabolic,  maximum SUV 8.0. Small but hypermetabolic bilateral external iliac lymph nodes  are noted.     SKELETON: There is a hypermetabolic lytic lesion in the sternum, maximum SUV is  9.7. There is a small sclerotic lesion at L3 which is new compared to the prior  PET/CT. Given the tracer activity corresponding to the adjacent retroperitoneal  lymphadenopathy that is difficult to determine if this small lesion is  hypermetabolic.     IMPRESSION:   1. Mildly hypermetabolic pulmonary nodules are concerning for metastatic  disease. 2. Hypermetabolic left supraclavicular, superior mediastinal, and paraesophageal  lymph nodes as well as retroperitoneal and bilateral external iliac lymph nodes. 3. Hypermetabolic lytic lesion in the sternum is concerning for metastatic  disease. Small sclerotic lesion at L3 is new compared to the prior PET/CT but  too small for accurate characterization by PET CT. IMPRESSION/PLAN:  [facility] is a 78 y.o. female with a history of stage III vaginal carcinoma. She was treated with pelvic radiation therapy and concurrent cisplatin chemotherapy. She now has recurrent disease with multiple sites of metastases.   I had a long discussion with the patient and her  discussing the best course of management. Surgery is not an option due to the widespread nature of her disease. The same is true for radiation therapy. Her only option is systemic chemotherapy. Since vaginal cancer behaves like cervical carcinoma, I recommended using the standard regimen for metastatic cervical carcinoma, Taxol/Cisplatin/Avastin. Unfortunately, the Avastin was denied by her insurance company. We chose to proceed with Taxol/Cisplatin. She has completed one cycle so far. We will continue with the current regimen and we will plan on repeating a scan after 3 cycles.        Signed By: Leora Ayoub MD     6/29/2017/4:31 PM

## 2017-06-30 RX ORDER — DEXAMETHASONE SODIUM PHOSPHATE 4 MG/ML
12 INJECTION, SOLUTION INTRA-ARTICULAR; INTRALESIONAL; INTRAMUSCULAR; INTRAVENOUS; SOFT TISSUE ONCE
Status: COMPLETED | OUTPATIENT
Start: 2017-07-05 | End: 2017-07-05

## 2017-06-30 RX ORDER — DIPHENHYDRAMINE HYDROCHLORIDE 50 MG/ML
50 INJECTION, SOLUTION INTRAMUSCULAR; INTRAVENOUS ONCE
Status: COMPLETED | OUTPATIENT
Start: 2017-07-05 | End: 2017-07-05

## 2017-06-30 RX ORDER — GRANISETRON HYDROCHLORIDE 1 MG/ML
1 INJECTION INTRAVENOUS ONCE
Status: COMPLETED | OUTPATIENT
Start: 2017-07-05 | End: 2017-07-05

## 2017-07-02 NOTE — ANESTHESIA POSTPROCEDURE EVALUATION
Post-Anesthesia Evaluation and Assessment    Patient: Miguel May MRN: [id number]  SSN: [id number]    YOB: 1950  Age: 78 y.o. Sex: female       Cardiovascular Function/Vital Signs  Visit Vitals    /78 (BP 1 Location: Left arm, BP Patient Position: At rest)    Pulse 65    Temp 36.4 °C (97.6 °F)    Resp 16    Ht 5' 2\" (1.575 m)    Wt 115.7 kg (255 lb)    SpO2 98%    BMI 46.64 kg/m2       Patient is status post general anesthesia for Procedure(s):  PORT A CATH INSERTION. Nausea/Vomiting: None    Postoperative hydration reviewed and adequate. Pain:  Pain Scale 1: Numeric (0 - 10) (06/09/17 1252)  Pain Intensity 1: 0 (06/10/17 1814)   Managed    Neurological Status:   Neuro (WDL): Within Defined Limits (06/09/17 1130)   At baseline    Mental Status and Level of Consciousness: Arousable    Pulmonary Status:   O2 Device: Room air (06/09/17 1252)   Adequate oxygenation and airway patent    Complications related to anesthesia: None    Post-anesthesia assessment completed.  No concerns    Signed By: Kellee Quintana MD     July 1, 2017

## 2017-07-05 ENCOUNTER — HOSPITAL ENCOUNTER (OUTPATIENT)
Dept: INFUSION THERAPY | Age: 67
Discharge: HOME | End: 2017-07-05
Payer: MEDICARE

## 2017-07-05 ENCOUNTER — TELEPHONE (OUTPATIENT)
Dept: GYNECOLOGY | Age: 67
End: 2017-07-05

## 2017-07-05 VITALS
DIASTOLIC BLOOD PRESSURE: 76 MMHG | BODY MASS INDEX: 45.16 KG/M2 | TEMPERATURE: 98.3 F | WEIGHT: 245.4 LBS | HEART RATE: 76 BPM | RESPIRATION RATE: 18 BRPM | SYSTOLIC BLOOD PRESSURE: 150 MMHG | HEIGHT: 62 IN

## 2017-07-05 LAB
ALBUMIN SERPL BCP-MCNC: 3.4 G/DL (ref 3.5–5)
ALBUMIN/GLOB SERPL: 0.9 {RATIO} (ref 1.1–2.2)
ALP SERPL-CCNC: 130 U/L (ref 45–117)
ALT SERPL-CCNC: 20 U/L (ref 12–78)
ANION GAP BLD CALC-SCNC: 8 MMOL/L (ref 5–15)
AST SERPL W P-5'-P-CCNC: 9 U/L (ref 15–37)
BASOPHILS # BLD AUTO: 0 K/UL (ref 0–0.1)
BASOPHILS # BLD: 0 % (ref 0–1)
BILIRUB SERPL-MCNC: 0.4 MG/DL (ref 0.2–1)
BUN SERPL-MCNC: 17 MG/DL (ref 6–20)
BUN/CREAT SERPL: 13 (ref 12–20)
CALCIUM SERPL-MCNC: 9.1 MG/DL (ref 8.5–10.1)
CANCER AG125 SERPL-ACNC: 97 U/ML (ref 0–30)
CHLORIDE SERPL-SCNC: 104 MMOL/L (ref 97–108)
CO2 SERPL-SCNC: 27 MMOL/L (ref 21–32)
CREAT SERPL-MCNC: 1.27 MG/DL (ref 0.55–1.02)
DIFFERENTIAL METHOD BLD: ABNORMAL
EOSINOPHIL # BLD: 0 K/UL (ref 0–0.4)
EOSINOPHIL NFR BLD: 0 % (ref 0–7)
ERYTHROCYTE [DISTWIDTH] IN BLOOD BY AUTOMATED COUNT: 14.7 % (ref 11.5–14.5)
GLOBULIN SER CALC-MCNC: 3.9 G/DL (ref 2–4)
GLUCOSE SERPL-MCNC: 135 MG/DL (ref 65–100)
HCT VFR BLD AUTO: 36.7 % (ref 35–47)
HGB BLD-MCNC: 11.7 G/DL (ref 11.5–16)
LYMPHOCYTES # BLD AUTO: 11 % (ref 12–49)
LYMPHOCYTES # BLD: 0.9 K/UL (ref 0.8–3.5)
MAGNESIUM SERPL-MCNC: 2.1 MG/DL (ref 1.6–2.4)
MCH RBC QN AUTO: 26.8 PG (ref 26–34)
MCHC RBC AUTO-ENTMCNC: 31.9 G/DL (ref 30–36.5)
MCV RBC AUTO: 84.2 FL (ref 80–99)
METAMYELOCYTES NFR BLD MANUAL: 4 %
MONOCYTES # BLD: 0.2 K/UL (ref 0–1)
MONOCYTES NFR BLD AUTO: 3 % (ref 5–13)
NEUTS BAND NFR BLD MANUAL: 1 % (ref 0–6)
NEUTS SEG # BLD: 6.6 K/UL (ref 1.8–8)
NEUTS SEG NFR BLD AUTO: 81 % (ref 32–75)
PLATELET # BLD AUTO: 183 K/UL (ref 150–400)
POTASSIUM SERPL-SCNC: 3.8 MMOL/L (ref 3.5–5.1)
PROT SERPL-MCNC: 7.3 G/DL (ref 6.4–8.2)
RBC # BLD AUTO: 4.36 M/UL (ref 3.8–5.2)
RBC MORPH BLD: ABNORMAL
RBC MORPH BLD: ABNORMAL
SODIUM SERPL-SCNC: 139 MMOL/L (ref 136–145)
WBC # BLD AUTO: 8.1 K/UL (ref 3.6–11)

## 2017-07-05 PROCEDURE — 96360 HYDRATION IV INFUSION INIT: CPT

## 2017-07-05 PROCEDURE — 74011250636 HC RX REV CODE- 250/636: Performed by: OBSTETRICS & GYNECOLOGY

## 2017-07-05 PROCEDURE — 74011000258 HC RX REV CODE- 258: Performed by: OBSTETRICS & GYNECOLOGY

## 2017-07-05 PROCEDURE — 96415 CHEMO IV INFUSION ADDL HR: CPT

## 2017-07-05 PROCEDURE — 96366 THER/PROPH/DIAG IV INF ADDON: CPT

## 2017-07-05 PROCEDURE — 36415 COLL VENOUS BLD VENIPUNCTURE: CPT | Performed by: OBSTETRICS & GYNECOLOGY

## 2017-07-05 PROCEDURE — 96413 CHEMO IV INFUSION 1 HR: CPT

## 2017-07-05 PROCEDURE — 96375 TX/PRO/DX INJ NEW DRUG ADDON: CPT

## 2017-07-05 PROCEDURE — 96361 HYDRATE IV INFUSION ADD-ON: CPT

## 2017-07-05 PROCEDURE — 74011000250 HC RX REV CODE- 250: Performed by: OBSTETRICS & GYNECOLOGY

## 2017-07-05 PROCEDURE — 85025 COMPLETE CBC W/AUTO DIFF WBC: CPT | Performed by: OBSTETRICS & GYNECOLOGY

## 2017-07-05 PROCEDURE — 86304 IMMUNOASSAY TUMOR CA 125: CPT | Performed by: OBSTETRICS & GYNECOLOGY

## 2017-07-05 PROCEDURE — 96367 TX/PROPH/DG ADDL SEQ IV INF: CPT

## 2017-07-05 PROCEDURE — 83735 ASSAY OF MAGNESIUM: CPT | Performed by: OBSTETRICS & GYNECOLOGY

## 2017-07-05 PROCEDURE — 96417 CHEMO IV INFUS EACH ADDL SEQ: CPT

## 2017-07-05 PROCEDURE — 77030012965 HC NDL HUBR BBMI -A

## 2017-07-05 PROCEDURE — 80053 COMPREHEN METABOLIC PANEL: CPT | Performed by: OBSTETRICS & GYNECOLOGY

## 2017-07-05 RX ORDER — HEPARIN 100 UNIT/ML
500 SYRINGE INTRAVENOUS AS NEEDED
Status: ACTIVE | OUTPATIENT
Start: 2017-07-05 | End: 2017-07-06

## 2017-07-05 RX ORDER — SODIUM CHLORIDE 0.9 % (FLUSH) 0.9 %
10-40 SYRINGE (ML) INJECTION AS NEEDED
Status: ACTIVE | OUTPATIENT
Start: 2017-07-05 | End: 2017-07-06

## 2017-07-05 RX ORDER — SODIUM CHLORIDE 9 MG/ML
10 INJECTION INTRAMUSCULAR; INTRAVENOUS; SUBCUTANEOUS AS NEEDED
Status: DISPENSED | OUTPATIENT
Start: 2017-07-05 | End: 2017-07-06

## 2017-07-05 RX ADMIN — SODIUM CHLORIDE 150 MG: 900 INJECTION, SOLUTION INTRAVENOUS at 11:32

## 2017-07-05 RX ADMIN — PACLITAXEL 304 MG: 6 INJECTION, SOLUTION INTRAVENOUS at 12:03

## 2017-07-05 RX ADMIN — DIPHENHYDRAMINE HYDROCHLORIDE 50 MG: 50 INJECTION INTRAMUSCULAR; INTRAVENOUS at 09:57

## 2017-07-05 RX ADMIN — CISPLATIN 112 MG: 1 INJECTION INTRAVENOUS at 15:09

## 2017-07-05 RX ADMIN — SODIUM CHLORIDE 10 ML: 9 INJECTION, SOLUTION INTRAMUSCULAR; INTRAVENOUS; SUBCUTANEOUS at 08:40

## 2017-07-05 RX ADMIN — GRANISETRON HYDROCHLORIDE 1 MG: 1 INJECTION INTRAVENOUS at 11:11

## 2017-07-05 RX ADMIN — FAMOTIDINE 20 MG: 10 INJECTION, SOLUTION INTRAVENOUS at 11:08

## 2017-07-05 RX ADMIN — DEXAMETHASONE SODIUM PHOSPHATE 12 MG: 4 INJECTION, SOLUTION INTRA-ARTICULAR; INTRALESIONAL; INTRAMUSCULAR; INTRAVENOUS; SOFT TISSUE at 11:17

## 2017-07-05 RX ADMIN — SODIUM CHLORIDE, PRESERVATIVE FREE 500 UNITS: 5 INJECTION INTRAVENOUS at 16:18

## 2017-07-05 RX ADMIN — Medication 10 ML: at 16:18

## 2017-07-05 RX ADMIN — MAGNESIUM SULFATE HEPTAHYDRATE: 500 INJECTION, SOLUTION INTRAMUSCULAR; INTRAVENOUS at 09:39

## 2017-07-05 NOTE — PROGRESS NOTES
[facility] - Chemotherapy Progress Note    8629 Pt admit to [facility] for Taxol/Cisplatin. Chemotherapy Flowsheet 7/5/2017   Cycle C2   Date 7/5/2017   Drug / Regimen Taxol/Cisplatin   Dosage See Order   Pre Hydration Given   Post Hydration Given   Notes Given     Pt ambulatory in stable condition. Assessment completed. Pt c/o of itching/hives. States she has not eaten anything new or been outside. Has been taking Benadryl which seems to relieve it. Pt also takes Percocet. Advised this could be the culprit. Hives noted to chest, arms, and hands. Port parameters difficult to find. No swelling noted. 1.5 inch alejo needed to accessed. Port accessed with positive blood return. Labs drawn per order and sent. Line flushed, clamped, Curos Cap applied to end clave. Recent Results (from the past 12 hour(s))   CBC WITH AUTOMATED DIFF    Collection Time: 07/05/17  8:40 AM   Result Value Ref Range    WBC 8.1 3.6 - 11.0 K/uL    RBC 4.36 3.80 - 5.20 M/uL    HGB 11.7 11.5 - 16.0 g/dL    HCT 36.7 35.0 - 47.0 %    MCV 84.2 80.0 - 99.0 FL    MCH 26.8 26.0 - 34.0 PG    MCHC 31.9 30.0 - 36.5 g/dL    RDW 14.7 (H) 11.5 - 14.5 %    PLATELET 212 173 - 775 K/uL    NEUTROPHILS 81 (H) 32 - 75 %    BAND NEUTROPHILS 1 0 - 6 %    LYMPHOCYTES 11 (L) 12 - 49 %    MONOCYTES 3 (L) 5 - 13 %    EOSINOPHILS 0 0 - 7 %    BASOPHILS 0 0 - 1 %    METAMYELOCYTES 4 (H) 0 %    ABS. NEUTROPHILS 6.6 1.8 - 8.0 K/UL    ABS. LYMPHOCYTES 0.9 0.8 - 3.5 K/UL    ABS. MONOCYTES 0.2 0.0 - 1.0 K/UL    ABS. EOSINOPHILS 0.0 0.0 - 0.4 K/UL    ABS.  BASOPHILS 0.0 0.0 - 0.1 K/UL    DF MANUAL      RBC COMMENTS ANISOCYTOSIS  1+        RBC COMMENTS OVALOCYTES  PRESENT       METABOLIC PANEL, COMPREHENSIVE    Collection Time: 07/05/17  8:40 AM   Result Value Ref Range    Sodium 139 136 - 145 mmol/L    Potassium 3.8 3.5 - 5.1 mmol/L    Chloride 104 97 - 108 mmol/L    CO2 27 21 - 32 mmol/L    Anion gap 8 5 - 15 mmol/L    Glucose 135 (H) 65 - 100 mg/dL    BUN 17 6 - 20 MG/DL    Creatinine 1.27 (H) 0.55 - 1.02 MG/DL    BUN/Creatinine ratio 13 12 - 20      GFR est AA 51 (L) >60 ml/min/1.73m2    GFR est non-AA 42 (L) >60 ml/min/1.73m2    Calcium 9.1 8.5 - 10.1 MG/DL    Bilirubin, total 0.4 0.2 - 1.0 MG/DL    ALT (SGPT) 20 12 - 78 U/L    AST (SGOT) 9 (L) 15 - 37 U/L    Alk. phosphatase 130 (H) 45 - 117 U/L    Protein, total 7.3 6.4 - 8.2 g/dL    Albumin 3.4 (L) 3.5 - 5.0 g/dL    Globulin 3.9 2.0 - 4.0 g/dL    A-G Ratio 0.9 (L) 1.1 - 2.2     MAGNESIUM    Collection Time: 07/05/17  8:40 AM   Result Value Ref Range    Magnesium 2.1 1.6 - 2.4 mg/dL   CANCER ANTIGEN 125    Collection Time: 07/05/17  8:40 AM   Result Value Ref Range    CA-125 97 (H) 0 - 30 U/mL       Visit Vitals    /76 (BP 1 Location: Right arm, BP Patient Position: At rest)    Pulse 76    Temp 98.3 °F (36.8 °C)    Resp 18     0941-got OK from Serafin Paulino via Dr. Katerina Solis to do 25 MG of Benadryl now for intermittent itching now and 25 MG later is OK to go. Medications:  NS at [address] Hydration  Benadryl (split 25 MG before chemo and 25 MG after for intermittent itching)  Pepcid  Kytril  Decadron  Emend  Taxol  Cisplatin      1620 Pt tolerated treatment well. Port maintained positive blood return throughout treatment, flushed with positive blood return at conclusion and de-accessed. D/c home ambulatory in no distress. Pt aware of next [facility] appointment scheduled for 07/26/17 at 0800.

## 2017-07-05 NOTE — TELEPHONE ENCOUNTER
Terry from the [facility] called to state that patient has experienced hives over the weekend. She does not know what they are coming from,  She hasn't eaten anything new and she was not outside. She states the patient advised she took Benadryl last evening at 9:30 pm and that helped. She is scheduled for chemo today but is getting an hour of hydration before hand. She is supposed to get 50 mg of benadryl before chemo. While waiting for her hydration, she began having itching on her hands and a rash on her arms. Claudeen Bigness wanted to know if she could give 25 mg now and then 25 mg during chemo. I called Dr. Aiden Rose on his cell phone and he said yes, 25 mg now and 25 mg during chemo. I called Claudeen Bigness back in the [id number] and advised, she verbally stated understanding.

## 2017-07-25 ENCOUNTER — OFFICE VISIT (OUTPATIENT)
Dept: GYNECOLOGY | Age: 67
End: 2017-07-25

## 2017-07-25 VITALS
DIASTOLIC BLOOD PRESSURE: 88 MMHG | SYSTOLIC BLOOD PRESSURE: 180 MMHG | HEIGHT: 62 IN | BODY MASS INDEX: 43.47 KG/M2 | HEART RATE: 66 BPM | WEIGHT: 236.2 LBS

## 2017-07-25 DIAGNOSIS — C52 VAGINAL CANCER (HCC): Primary | ICD-10-CM

## 2017-07-25 RX ORDER — DIPHENHYDRAMINE HYDROCHLORIDE 50 MG/ML
50 INJECTION, SOLUTION INTRAMUSCULAR; INTRAVENOUS ONCE
Status: COMPLETED | OUTPATIENT
Start: 2017-07-26 | End: 2017-07-26

## 2017-07-25 RX ORDER — GRANISETRON HYDROCHLORIDE 1 MG/ML
1 INJECTION INTRAVENOUS ONCE
Status: COMPLETED | OUTPATIENT
Start: 2017-07-26 | End: 2017-07-26

## 2017-07-25 RX ORDER — DEXAMETHASONE SODIUM PHOSPHATE 4 MG/ML
12 INJECTION, SOLUTION INTRA-ARTICULAR; INTRALESIONAL; INTRAMUSCULAR; INTRAVENOUS; SOFT TISSUE ONCE
Status: COMPLETED | OUTPATIENT
Start: 2017-07-26 | End: 2017-07-26

## 2017-07-25 NOTE — PROGRESS NOTES
Pre chemo, labs at [facility] and C3 taxol/cispantin/avastin 7/26. Second bp reading is elevated. The pt states she has not taken her bp medication this morning.

## 2017-07-25 NOTE — PROGRESS NOTES
[address] [place] [address] Mathias Mor[address], [address] [facility]  [phone]  MD Sofy Guan MD  Office Note  Patient ID:  Name:  Tina Mono  MRN:  [id number]  :  1950/67 y.o. Date:  2017      HISTORY OF PRESENT ILLNESS:  Tina Moon is a 75 y.o.  postmenopausal female who was referred to me in 2015 for a pelvic mass by Dr. Mindi Gunter, who noted the mass on routine gynecologic exam.  He sent her for a CT of the pelvis, which revealed a mass at the vaginal apex, that appeared to be invading into the soft tissues of the left pelvis. There were several enlarged lymph nodes on the left. There was also questionable bladder invasion, and possibly even involvement of the rectosigmoid colon. She reported a history of having a mass removed from one of her ovaries in 1 at [facility], which required a second surgery to remove her uterus and contralateral ovary. She said she did not have to take any chemotherapy, but she did have to take some kind of pill for a year. She has not had any gynecologic issues since that time. Presumably her hysterectomy was a total, rather than a supracervical, but she is unsure and there are no records. She denied any vaginal bleeding, except for after her examination with Dr. Cherelle Hanna. She reported minimal pain in the pelvis. I took her the OR for and exam under anesthesia with cystoscopy and proctoscopy. Pathology revealed a squamous cell carcinoma, consistent with a vaginal primary. Examination revealed a fixed mass involving the left upper vagina with extension to the left pelvic sidewall. There also appeared to be involvement of the posterior bladder wall, but no mucosal involvement. The mass did push against the rectal wall, but there did not appear to be any direct involvement. I referred her to radiation oncology and she saw Dr. Airam Navarrete.   She completed pelvic radiation along with concurrent cisplatin chemotherapy in February 2016 and had a great response. When I saw her in the office in March 2017 for follow up she was doing well and without complaints. Her pelvic exam and pap smear at that time were both negative. Subsequent to that visit she began having back pain that had progressively worsened. She saw Idalmis Jacobo in May and she ordered a CT. This unfortunately demonstrated extensive metastatic disease outside of the prior radiated field. Dr. Elaine Carvalho then sent her for a PET/CT which demonstrated widely metastatic disease. I recommended systemic chemotherapy with Taxol/Cisplatin/Avastin, which is the standard for metastatic cervical cancer, since vaginal cancer behaves similar to cervical cancer. Unfortunately, the Avastin was denied by her insurance company. We chose to proceed with Taxol/Cisplatin. She has completed 2 cycles so far. She has done well with the first couple of cycles. She denies nausea. She also complains of some persistent lower extremity edema, but that has improved. ROS:   and GI review: Negative  Cardiopulmonary review:Negative   Musculoskeletal:  Negative    A comprehensive review of systems was negative except for that written in the History of Present Illness.  , 10 point ROS        Problem List:  Patient Active Problem List    Diagnosis Date Noted    Lymph edema 06/14/2017    Bilateral lower extremity edema 06/14/2017    Hypertension, essential 01/18/2017    Hypercholesterolemia 01/18/2017    GERD without esophagitis 01/18/2017    Vaginal cancer ([address] 12/10/2015    LAP-BAND surgery status 05/17/2012     PMH:  Past Medical History:   Diagnosis Date    Abnormal mammogram of left breast 12/5/2016    Cancer ([address] 06/2017    LYMPH NODES IN BACK    Cancer ([address] 2016    VAGINAL CANCER    GERD (gastroesophageal reflux disease) 6/11/06    Hypercholesterolemia 10/11/06    Hypertension 10/11/06    Morbid obesity ([address] 10/11/06 PSH:  Past Surgical History:   Procedure Laterality Date    HX APPENDECTOMY      HX GASTRIC BYPASS  2009    LAP BAND    HX GYN  12/4/15    EUA/Bx of pelvic mass and vagina/cystoscopy/proctoscopy    HX HYSTERECTOMY  1978    HX ORTHOPAEDIC  2009    right ankle    HX OTHER SURGICAL  2001? benign mass removed from lower back    LAP, PLACE ADJUST GASTR BAND  1/30/08    EB: AP Std lap band, liver bx      Social History:  Social History   Substance Use Topics    Smoking status: Former Smoker     Packs/day: 0.50     Years: 15.00     Types: Cigarettes     Quit date: 1/1/1993    Smokeless tobacco: Never Used    Alcohol use Yes      Comment: socially-WINE ONCE A WEEK      Family History:  Family History   Problem Relation Age of Onset    Cancer Mother      colon WITH METS TO LIVER    Diabetes Father     Heart Disease Father     Cancer Sister      breast & bone    Cancer Maternal Aunt      BREAST    Cancer Paternal Aunt      BREAST    Anesth Problems Neg Hx       Medications: (reviewed)  Current Outpatient Prescriptions   Medication Sig    oxyCODONE-acetaminophen (PERCOCET) 5-325 mg per tablet Take 1-2 Tabs by mouth every four (4) hours as needed for Pain. Max Daily Amount: 12 Tabs.  furosemide (LASIX) 20 mg tablet Take 1 Tab by mouth daily as needed.  dexamethasone (DECADRON) 4 mg tablet Take 2 tablets with breakfast the day before chemo and for 2 days after chemo    lidocaine-prilocaine (EMLA) topical cream Apply small amount over port area one hour before chemo treatment and cover with a Band-Aid    ondansetron (ZOFRAN ODT) 4 mg disintegrating tablet Take 1 Tab by mouth every eight (8) hours as needed for Nausea. Indications: CANCER CHEMOTHERAPY-INDUCED NAUSEA AND VOMITING    acetaminophen (TYLENOL) 325 mg tablet Take 650 mg by mouth every four (4) hours as needed for Pain.  docusate sodium (STOOL SOFTENER) 100 mg capsule Take 100 mg by mouth daily.     atorvastatin (LIPITOR) 80 mg tablet Take 1 Tab by mouth daily.  losartan (COZAAR) 100 mg tablet Take 1 Tab by mouth daily.  triamterene-hydroCHLOROthiazide (MAXZIDE) 37.5-25 mg per tablet Take 1 Tab by mouth daily.  Cholecalciferol, Vitamin D3, (VITAMIN D3) 1,000 unit cap Take 1 Cap by mouth. No current facility-administered medications for this visit. Allergies: (reviewed)  Allergies   Allergen Reactions    Amlodipine Other (comments)     Edema in LE's          OBJECTIVE:    Physical Exam:  VITAL SIGNS: Vitals:    07/25/17 0914 07/25/17 0917   BP: 180/88 180/88   Pulse: 67 66   Weight: 107.1 kg (236 lb 3.2 oz)    Height: 5' 2.01\" (1.575 m)      Body mass index is 43.19 kg/(m^2). GENERAL MIGUELANGEL: Conversant, alert, oriented. No acute distress. HEENT: HEENT. No thyroid enlargement. No JVD. Neck: Supple without restrictions. RESPIRATORY: Clear to auscultation and percussion to the bases. No CVAT. CARDIOVASC: RRR without murmur/rub. GASTROINT: soft, non-tender, without masses or organomegaly   MUSCULOSKEL: no joint tenderness, deformity or swelling   EXTREMITIES: extremities normal, atraumatic, no cyanosis or edema   PELVIC: Deferred   RECTAL: Deferred   PATRICIO SURVEY: No suspicious lymphadenopathy or edema noted. NEURO: Grossly intact. No acute deficit. CT of abdomen/pelvis (5/16/17)  LUNG BASES: Multiple bilateral pulmonary nodules. INCIDENTALLY IMAGED HEART AND MEDIASTINUM: Unremarkable. LIVER: No mass or biliary dilatation. GALLBLADDER: Cholelithiasis. SPLEEN: Calcified granulomata. PANCREAS: No mass or ductal dilatation. ADRENALS: Unremarkable. KIDNEYS: Right renal cyst.  STOMACH: Unremarkable. SMALL BOWEL: No dilatation or wall thickening. COLON: No dilatation or wall thickening. APPENDIX: Unremarkable. PERITONEUM: No ascites or pneumoperitoneum. RETROPERITONEUM: Significant retroperitoneal lymphadenopathy, largest dimension  surrounding the aorta 6.5 x 4.2 cm.   REPRODUCTIVE ORGANS: There is 4.6 x 2.4 cm soft tissue in the region of  previously identified left pelvic sidewall mass. URINARY BLADDER: No mass or calculus. BONES: No destructive bone lesion. Degenerative changes of L4-L5 and L5-S1. ADDITIONAL COMMENTS: N/A     IMPRESSION:  1. Bilateral pulmonary nodules and retroperitoneal lymphadenopathy compatible  with metastatic disease. 2. Much smaller mass in the left pelvis, or scar. 3. Cholelithiasis. PET/CT (6/6/17)  HEAD/NECK: No apparent foci of abnormal hypermetabolism. Cerebral evaluation is  limited by normal intense activity.     CHEST: There are hypermetabolic left supraclavicular, superior mediastinal, and  paraesophageal lymph nodes. The small pulmonary nodules are minimally  hypermetabolic, maximum SUV of the lesion in the right middle lobe is 1.9.     ABDOMEN/PELVIS: Para-aortic and aortocaval lymphadenopathy is hypermetabolic,  maximum SUV 8.0. Small but hypermetabolic bilateral external iliac lymph nodes  are noted.     SKELETON: There is a hypermetabolic lytic lesion in the sternum, maximum SUV is  9.7. There is a small sclerotic lesion at L3 which is new compared to the prior  PET/CT. Given the tracer activity corresponding to the adjacent retroperitoneal  lymphadenopathy that is difficult to determine if this small lesion is  hypermetabolic.     IMPRESSION:   1. Mildly hypermetabolic pulmonary nodules are concerning for metastatic  disease. 2. Hypermetabolic left supraclavicular, superior mediastinal, and paraesophageal  lymph nodes as well as retroperitoneal and bilateral external iliac lymph nodes. 3. Hypermetabolic lytic lesion in the sternum is concerning for metastatic  disease. Small sclerotic lesion at L3 is new compared to the prior PET/CT but  too small for accurate characterization by PET CT. IMPRESSION/PLAN:  Stefani Peña is a 75 y.o. female with a history of stage III vaginal carcinoma.   She was treated with pelvic radiation therapy and concurrent cisplatin chemotherapy. She now has recurrent disease with multiple sites of metastases. I had a long discussion with the patient and her  discussing the best course of management. Surgery is not an option due to the widespread nature of her disease. The same is true for radiation therapy. Her only option is systemic chemotherapy. Since vaginal cancer behaves like cervical carcinoma, I recommended using the standard regimen for metastatic cervical carcinoma, Taxol/Cisplatin/Avastin. Unfortunately, the Avastin was denied by her insurance company. We chose to proceed with Taxol/Cisplatin. She has completed 2 cycles so far. We will continue with the current regimen and we will plan on repeating a scan after the third cycle.        Signed By: Andres Granados MD     7/25/2017/4:31 PM

## 2017-07-26 ENCOUNTER — HOSPITAL ENCOUNTER (OUTPATIENT)
Dept: INFUSION THERAPY | Age: 67
Discharge: HOME | End: 2017-07-26
Payer: MEDICARE

## 2017-07-26 VITALS
HEART RATE: 73 BPM | WEIGHT: 235.6 LBS | BODY MASS INDEX: 43.36 KG/M2 | DIASTOLIC BLOOD PRESSURE: 77 MMHG | SYSTOLIC BLOOD PRESSURE: 128 MMHG | TEMPERATURE: 97.9 F | HEIGHT: 62 IN | RESPIRATION RATE: 18 BRPM

## 2017-07-26 LAB
ALBUMIN SERPL BCP-MCNC: 3.6 G/DL (ref 3.5–5)
ALBUMIN/GLOB SERPL: 0.8 {RATIO} (ref 1.1–2.2)
ALP SERPL-CCNC: 137 U/L (ref 45–117)
ALT SERPL-CCNC: 19 U/L (ref 12–78)
ANION GAP BLD CALC-SCNC: 8 MMOL/L (ref 5–15)
AST SERPL W P-5'-P-CCNC: 10 U/L (ref 15–37)
BASOPHILS # BLD AUTO: 0 K/UL (ref 0–0.1)
BASOPHILS # BLD: 0 % (ref 0–1)
BILIRUB SERPL-MCNC: 0.3 MG/DL (ref 0.2–1)
BUN SERPL-MCNC: 19 MG/DL (ref 6–20)
BUN/CREAT SERPL: 13 (ref 12–20)
CALCIUM SERPL-MCNC: 9.3 MG/DL (ref 8.5–10.1)
CANCER AG125 SERPL-ACNC: 77 U/ML (ref 0–30)
CHLORIDE SERPL-SCNC: 102 MMOL/L (ref 97–108)
CO2 SERPL-SCNC: 27 MMOL/L (ref 21–32)
CREAT SERPL-MCNC: 1.47 MG/DL (ref 0.55–1.02)
DIFFERENTIAL METHOD BLD: ABNORMAL
EOSINOPHIL # BLD: 0 K/UL (ref 0–0.4)
EOSINOPHIL NFR BLD: 0 % (ref 0–7)
ERYTHROCYTE [DISTWIDTH] IN BLOOD BY AUTOMATED COUNT: 15 % (ref 11.5–14.5)
GLOBULIN SER CALC-MCNC: 4.4 G/DL (ref 2–4)
GLUCOSE SERPL-MCNC: 187 MG/DL (ref 65–100)
HCT VFR BLD AUTO: 36.3 % (ref 35–47)
HGB BLD-MCNC: 11.9 G/DL (ref 11.5–16)
LYMPHOCYTES # BLD AUTO: 5 % (ref 12–49)
LYMPHOCYTES # BLD: 0.4 K/UL (ref 0.8–3.5)
MAGNESIUM SERPL-MCNC: 2 MG/DL (ref 1.6–2.4)
MCH RBC QN AUTO: 27.4 PG (ref 26–34)
MCHC RBC AUTO-ENTMCNC: 32.8 G/DL (ref 30–36.5)
MCV RBC AUTO: 83.6 FL (ref 80–99)
METAMYELOCYTES NFR BLD MANUAL: 4 %
MONOCYTES # BLD: 0.3 K/UL (ref 0–1)
MONOCYTES NFR BLD AUTO: 3 % (ref 5–13)
NEUTS BAND NFR BLD MANUAL: 3 % (ref 0–6)
NEUTS SEG # BLD: 7.7 K/UL (ref 1.8–8)
NEUTS SEG NFR BLD AUTO: 85 % (ref 32–75)
PLATELET # BLD AUTO: 230 K/UL (ref 150–400)
POTASSIUM SERPL-SCNC: 3.8 MMOL/L (ref 3.5–5.1)
PROT SERPL-MCNC: 8 G/DL (ref 6.4–8.2)
RBC # BLD AUTO: 4.34 M/UL (ref 3.8–5.2)
RBC MORPH BLD: ABNORMAL
RBC MORPH BLD: ABNORMAL
SODIUM SERPL-SCNC: 137 MMOL/L (ref 136–145)
WBC # BLD AUTO: 8.7 K/UL (ref 3.6–11)

## 2017-07-26 PROCEDURE — 74011000258 HC RX REV CODE- 258: Performed by: OBSTETRICS & GYNECOLOGY

## 2017-07-26 PROCEDURE — 74011250636 HC RX REV CODE- 250/636: Performed by: OBSTETRICS & GYNECOLOGY

## 2017-07-26 PROCEDURE — 96375 TX/PRO/DX INJ NEW DRUG ADDON: CPT

## 2017-07-26 PROCEDURE — 96366 THER/PROPH/DIAG IV INF ADDON: CPT

## 2017-07-26 PROCEDURE — 80053 COMPREHEN METABOLIC PANEL: CPT | Performed by: OBSTETRICS & GYNECOLOGY

## 2017-07-26 PROCEDURE — 86304 IMMUNOASSAY TUMOR CA 125: CPT | Performed by: OBSTETRICS & GYNECOLOGY

## 2017-07-26 PROCEDURE — 83735 ASSAY OF MAGNESIUM: CPT | Performed by: OBSTETRICS & GYNECOLOGY

## 2017-07-26 PROCEDURE — 96361 HYDRATE IV INFUSION ADD-ON: CPT

## 2017-07-26 PROCEDURE — 96417 CHEMO IV INFUS EACH ADDL SEQ: CPT

## 2017-07-26 PROCEDURE — 96367 TX/PROPH/DG ADDL SEQ IV INF: CPT

## 2017-07-26 PROCEDURE — 74011000250 HC RX REV CODE- 250: Performed by: OBSTETRICS & GYNECOLOGY

## 2017-07-26 PROCEDURE — 36415 COLL VENOUS BLD VENIPUNCTURE: CPT | Performed by: OBSTETRICS & GYNECOLOGY

## 2017-07-26 PROCEDURE — 77030012965 HC NDL HUBR BBMI -A

## 2017-07-26 PROCEDURE — 96413 CHEMO IV INFUSION 1 HR: CPT

## 2017-07-26 PROCEDURE — 96415 CHEMO IV INFUSION ADDL HR: CPT

## 2017-07-26 PROCEDURE — 85025 COMPLETE CBC W/AUTO DIFF WBC: CPT | Performed by: OBSTETRICS & GYNECOLOGY

## 2017-07-26 RX ORDER — HEPARIN 100 UNIT/ML
500 SYRINGE INTRAVENOUS AS NEEDED
Status: ACTIVE | OUTPATIENT
Start: 2017-07-26 | End: 2017-07-27

## 2017-07-26 RX ORDER — SODIUM CHLORIDE 0.9 % (FLUSH) 0.9 %
10-40 SYRINGE (ML) INJECTION AS NEEDED
Status: ACTIVE | OUTPATIENT
Start: 2017-07-26 | End: 2017-07-27

## 2017-07-26 RX ORDER — SODIUM CHLORIDE 9 MG/ML
10 INJECTION INTRAMUSCULAR; INTRAVENOUS; SUBCUTANEOUS AS NEEDED
Status: ACTIVE | OUTPATIENT
Start: 2017-07-26 | End: 2017-07-27

## 2017-07-26 RX ADMIN — SODIUM CHLORIDE 150 MG: 900 INJECTION, SOLUTION INTRAVENOUS at 11:17

## 2017-07-26 RX ADMIN — GRANISETRON HYDROCHLORIDE 1 MG: 1 INJECTION INTRAVENOUS at 11:15

## 2017-07-26 RX ADMIN — Medication 10 ML: at 15:46

## 2017-07-26 RX ADMIN — CISPLATIN 112 MG: 1 INJECTION INTRAVENOUS at 14:44

## 2017-07-26 RX ADMIN — SODIUM CHLORIDE 10 ML: 9 INJECTION INTRAMUSCULAR; INTRAVENOUS; SUBCUTANEOUS at 10:54

## 2017-07-26 RX ADMIN — MAGNESIUM SULFATE HEPTAHYDRATE: 500 INJECTION, SOLUTION INTRAMUSCULAR; INTRAVENOUS at 10:00

## 2017-07-26 RX ADMIN — FAMOTIDINE 20 MG: 10 INJECTION INTRAVENOUS at 10:57

## 2017-07-26 RX ADMIN — Medication 10 ML: at 10:55

## 2017-07-26 RX ADMIN — PACLITAXEL 304 MG: 6 INJECTION, SOLUTION INTRAVENOUS at 11:54

## 2017-07-26 RX ADMIN — Medication 500 UNITS: at 15:46

## 2017-07-26 RX ADMIN — DIPHENHYDRAMINE HYDROCHLORIDE 50 MG: 50 INJECTION INTRAMUSCULAR; INTRAVENOUS at 11:16

## 2017-07-26 RX ADMIN — DEXAMETHASONE SODIUM PHOSPHATE 12 MG: 4 INJECTION, SOLUTION INTRA-ARTICULAR; INTRALESIONAL; INTRAMUSCULAR; INTRAVENOUS; SOFT TISSUE at 10:55

## 2017-07-26 NOTE — PROGRESS NOTES
[facility] - Chemotherapy Progress Note    0800 Pt admit to [facility] for Taxol and Cisplatin ambulatory in stable condition. Assessment completed. Pt is tearful this morning and states \"if my scans are not improving there is nothing else for me to try\". Provided emotional support and encouragement. Port accessed without difficulty with 1.5 inch alejo. Flushed with positive blood return and labs drawn and sent for processing. IV attached to Pre-hydration bolus as ordered. Chemotherapy Flowsheet 7/26/2017   Cycle C3   Date 7/26/2017   Drug / Regimen Taxol/Cisplatin   Dosage -   Pre Hydration -   Post Hydration -   Notes -       Visit Vitals    /77 (BP 1 Location: Left arm, BP Patient Position: At rest)    Pulse 73    Temp 97.9 °F (36.6 °C)    Resp 18    Ht 5' 2\" (1.575 m)    Wt 106.9 kg (235 lb 9.6 oz)    BMI 43.09 kg/m2       Medications:  Pre-hydration - 500cc with Mag and K+  Benadryl  Decadron  Pepcid  Kytril  Emend  Taxol   Cisplatin  Post hydration - 500cc with Mag and K+      1545 Pt tolerated treatment well. Port maintained positive blood return throughout treatment, flushed with positive blood return at conclusion. D/c home ambulatory in no distress. Pt aware of next appointment scheduled for 8/16/17    Recent Results (from the past 12 hour(s))   CBC WITH AUTOMATED DIFF    Collection Time: 07/26/17  8:06 AM   Result Value Ref Range    WBC 8.7 3.6 - 11.0 K/uL    RBC 4.34 3.80 - 5.20 M/uL    HGB 11.9 11.5 - 16.0 g/dL    HCT 36.3 35.0 - 47.0 %    MCV 83.6 80.0 - 99.0 FL    MCH 27.4 26.0 - 34.0 PG    MCHC 32.8 30.0 - 36.5 g/dL    RDW 15.0 (H) 11.5 - 14.5 %    PLATELET 705 122 - 650 K/uL    NEUTROPHILS 85 (H) 32 - 75 %    BAND NEUTROPHILS 3 0 - 6 %    LYMPHOCYTES 5 (L) 12 - 49 %    MONOCYTES 3 (L) 5 - 13 %    EOSINOPHILS 0 0 - 7 %    BASOPHILS 0 0 - 1 %    METAMYELOCYTES 4 (H) 0 %    ABS. NEUTROPHILS 7.7 1.8 - 8.0 K/UL    ABS. LYMPHOCYTES 0.4 (L) 0.8 - 3.5 K/UL    ABS.  MONOCYTES 0.3 0.0 - 1.0 K/UL    ABS. EOSINOPHILS 0.0 0.0 - 0.4 K/UL    ABS. BASOPHILS 0.0 0.0 - 0.1 K/UL    DF MANUAL      RBC COMMENTS ANISOCYTOSIS  1+        RBC COMMENTS OVALOCYTES  PRESENT       METABOLIC PANEL, COMPREHENSIVE    Collection Time: 07/26/17  8:06 AM   Result Value Ref Range    Sodium 137 136 - 145 mmol/L    Potassium 3.8 3.5 - 5.1 mmol/L    Chloride 102 97 - 108 mmol/L    CO2 27 21 - 32 mmol/L    Anion gap 8 5 - 15 mmol/L    Glucose 187 (H) 65 - 100 mg/dL    BUN 19 6 - 20 MG/DL    Creatinine 1.47 (H) 0.55 - 1.02 MG/DL    BUN/Creatinine ratio 13 12 - 20      GFR est AA 43 (L) >60 ml/min/1.73m2    GFR est non-AA 35 (L) >60 ml/min/1.73m2    Calcium 9.3 8.5 - 10.1 MG/DL    Bilirubin, total 0.3 0.2 - 1.0 MG/DL    ALT (SGPT) 19 12 - 78 U/L    AST (SGOT) 10 (L) 15 - 37 U/L    Alk.  phosphatase 137 (H) 45 - 117 U/L    Protein, total 8.0 6.4 - 8.2 g/dL    Albumin 3.6 3.5 - 5.0 g/dL    Globulin 4.4 (H) 2.0 - 4.0 g/dL    A-G Ratio 0.8 (L) 1.1 - 2.2     CANCER ANTIGEN 125    Collection Time: 07/26/17  8:06 AM   Result Value Ref Range    CA-125 77 (H) 0 - 30 U/mL   MAGNESIUM    Collection Time: 07/26/17  8:06 AM   Result Value Ref Range    Magnesium 2.0 1.6 - 2.4 mg/dL

## 2017-07-27 ENCOUNTER — TELEPHONE (OUTPATIENT)
Dept: GYNECOLOGY | Age: 67
End: 2017-07-27

## 2017-07-27 DIAGNOSIS — R79.89 ELEVATED SERUM CREATININE: ICD-10-CM

## 2017-07-27 DIAGNOSIS — C52 VAGINAL CANCER (HCC): Primary | ICD-10-CM

## 2017-07-27 NOTE — PROGRESS NOTES
CMP ordered and faxed to [facility] to f/u on elevated creatinine per vo Dr. Negra Murphy.

## 2017-07-27 NOTE — TELEPHONE ENCOUNTER
Pt returned my call and lab results given and she will be in 8/2 at [facility] for a cmp.

## 2017-08-03 LAB
ALBUMIN SERPL-MCNC: 3.7 G/DL (ref 3.6–4.8)
ALBUMIN/GLOB SERPL: 1.4 {RATIO} (ref 1.2–2.2)
ALP SERPL-CCNC: 86 IU/L (ref 39–117)
ALT SERPL-CCNC: 13 IU/L (ref 0–32)
AST SERPL-CCNC: 11 IU/L (ref 0–40)
BILIRUB SERPL-MCNC: 0.4 MG/DL (ref 0–1.2)
BUN SERPL-MCNC: 37 MG/DL (ref 8–27)
BUN/CREAT SERPL: 26 (ref 12–28)
CALCIUM SERPL-MCNC: 9.1 MG/DL (ref 8.7–10.3)
CHLORIDE SERPL-SCNC: 99 MMOL/L (ref 96–106)
CO2 SERPL-SCNC: 28 MMOL/L (ref 18–29)
CREAT SERPL-MCNC: 1.4 MG/DL (ref 0.57–1)
GLOBULIN SER CALC-MCNC: 2.6 G/DL (ref 1.5–4.5)
GLUCOSE SERPL-MCNC: 119 MG/DL (ref 65–99)
POTASSIUM SERPL-SCNC: 5.1 MMOL/L (ref 3.5–5.2)
PROT SERPL-MCNC: 6.3 G/DL (ref 6–8.5)
SODIUM SERPL-SCNC: 139 MMOL/L (ref 134–144)

## 2017-08-14 ENCOUNTER — HOSPITAL ENCOUNTER (OUTPATIENT)
Dept: CT IMAGING | Age: 67
Discharge: HOME | End: 2017-08-14
Attending: OBSTETRICS & GYNECOLOGY
Payer: MEDICARE

## 2017-08-14 DIAGNOSIS — C52 VAGINAL CANCER (HCC): ICD-10-CM

## 2017-08-14 PROCEDURE — 74011636320 HC RX REV CODE- 636/320: Performed by: OBSTETRICS & GYNECOLOGY

## 2017-08-14 PROCEDURE — 71260 CT THORAX DX C+: CPT

## 2017-08-14 PROCEDURE — 74177 CT ABD & PELVIS W/CONTRAST: CPT

## 2017-08-14 PROCEDURE — 74011000258 HC RX REV CODE- 258: Performed by: OBSTETRICS & GYNECOLOGY

## 2017-08-14 PROCEDURE — 77030003560 HC NDL HUBR BARD -A

## 2017-08-14 RX ORDER — SODIUM CHLORIDE 0.9 % (FLUSH) 0.9 %
10 SYRINGE (ML) INJECTION
Status: COMPLETED | OUTPATIENT
Start: 2017-08-14 | End: 2017-08-14

## 2017-08-14 RX ADMIN — SODIUM CHLORIDE 100 ML: 900 INJECTION, SOLUTION INTRAVENOUS at 11:05

## 2017-08-14 RX ADMIN — IOPAMIDOL 80 ML: 755 INJECTION, SOLUTION INTRAVENOUS at 11:05

## 2017-08-14 RX ADMIN — Medication 10 ML: at 11:05

## 2017-08-15 ENCOUNTER — OFFICE VISIT (OUTPATIENT)
Dept: GYNECOLOGY | Age: 67
End: 2017-08-15

## 2017-08-15 VITALS
SYSTOLIC BLOOD PRESSURE: 170 MMHG | HEIGHT: 62 IN | BODY MASS INDEX: 43.61 KG/M2 | WEIGHT: 237 LBS | DIASTOLIC BLOOD PRESSURE: 93 MMHG | HEART RATE: 93 BPM

## 2017-08-15 DIAGNOSIS — C52 VAGINAL CANCER (HCC): Primary | ICD-10-CM

## 2017-08-15 RX ORDER — DEXAMETHASONE SODIUM PHOSPHATE 4 MG/ML
12 INJECTION, SOLUTION INTRA-ARTICULAR; INTRALESIONAL; INTRAMUSCULAR; INTRAVENOUS; SOFT TISSUE ONCE
Status: COMPLETED | OUTPATIENT
Start: 2017-08-16 | End: 2017-08-16

## 2017-08-15 RX ORDER — GRANISETRON HYDROCHLORIDE 1 MG/ML
1 INJECTION INTRAVENOUS ONCE
Status: COMPLETED | OUTPATIENT
Start: 2017-08-16 | End: 2017-08-16

## 2017-08-15 RX ORDER — DIPHENHYDRAMINE HYDROCHLORIDE 50 MG/ML
50 INJECTION, SOLUTION INTRAMUSCULAR; INTRAVENOUS ONCE
Status: COMPLETED | OUTPATIENT
Start: 2017-08-16 | End: 2017-08-16

## 2017-08-15 NOTE — PROGRESS NOTES
[address], [address], [address]  [phone]  MD Marci Ferrell MD  Office Note  Patient ID:  Name:  Mortimer Meth  MRN:  [id number]  :   y.o. Date:  8/15/2017      HISTORY OF PRESENT ILLNESS:  Mortimer Meth is a 75 y.o.  postmenopausal female who was referred to me in 2015 for a pelvic mass by Dr. Dannielle Brothers, who noted the mass on routine gynecologic exam.  He sent her for a CT of the pelvis, which revealed a mass at the vaginal apex, that appeared to be invading into the soft tissues of the left pelvis. There were several enlarged lymph nodes on the left. There was also questionable bladder invasion, and possibly even involvement of the rectosigmoid colon. She reported a history of having a mass removed from one of her ovaries in 1 at [facility], which required a second surgery to remove her uterus and contralateral ovary. She said she did not have to take any chemotherapy, but she did have to take some kind of pill for a year. She has not had any gynecologic issues since that time. Presumably her hysterectomy was a total, rather than a supracervical, but she is unsure and there are no records. She denied any vaginal bleeding, except for after her examination with Dr. Real Valencia. She reported minimal pain in the pelvis. I took her the OR for and exam under anesthesia with cystoscopy and proctoscopy. Pathology revealed a squamous cell carcinoma, consistent with a vaginal primary. Examination revealed a fixed mass involving the left upper vagina with extension to the left pelvic sidewall. There also appeared to be involvement of the posterior bladder wall, but no mucosal involvement. The mass did push against the rectal wall, but there did not appear to be any direct involvement. I referred her to radiation oncology and she saw Dr. Francisco Vicente.   She completed pelvic radiation along with concurrent cisplatin chemotherapy in February 2016 and had a great response. When I saw her in the office in March 2017 for follow up she was doing well and without complaints. Her pelvic exam and pap smear at that time were both negative. Subsequent to that visit she began having back pain that had progressively worsened. She saw Vanessa Simpson in May and she ordered a CT. This unfortunately demonstrated extensive metastatic disease outside of the prior radiated field. Dr. Michael Dorantes then sent her for a PET/CT which demonstrated widely metastatic disease. I recommended systemic chemotherapy with Taxol/Cisplatin/Avastin, which is the standard for metastatic cervical cancer, since vaginal cancer behaves similar to cervical cancer. Unfortunately, the Avastin was denied by her insurance company. We chose to proceed with Taxol/Cisplatin. She has completed 3 cycles so far. She has done well with the first few cycles. She denies nausea. She also complains of some persistent lower extremity edema, but that has improved. Her CT demonstrates stable disease to mild improvement, but not progression. ROS:   and GI review: Negative  Cardiopulmonary review:Negative   Musculoskeletal:  Negative    A comprehensive review of systems was negative except for that written in the History of Present Illness.  , 10 point ROS        Problem List:  Patient Active Problem List    Diagnosis Date Noted    Lymph edema 06/14/2017    Bilateral lower extremity edema 06/14/2017    Hypertension, essential 01/18/2017    Hypercholesterolemia 01/18/2017    GERD without esophagitis 01/18/2017    Vaginal cancer ([address] 12/10/2015    LAP-BAND surgery status 05/17/2012     PMH:  Past Medical History:   Diagnosis Date    Abnormal mammogram of left breast 12/5/2016    Cancer ([address] 06/2017    LYMPH NODES IN BACK    Cancer ([address] 2016    VAGINAL CANCER    GERD (gastroesophageal reflux disease) 6/11/06    Hypercholesterolemia 10/11/06    Hypertension 10/11/06    Morbid obesity ([address] 10/11/06      PSH:  Past Surgical History:   Procedure Laterality Date    HX APPENDECTOMY      HX GASTRIC BYPASS  2009    LAP BAND    HX GYN  12/4/15    EUA/Bx of pelvic mass and vagina/cystoscopy/proctoscopy    HX HYSTERECTOMY  1978    HX ORTHOPAEDIC  2009    right ankle    HX OTHER SURGICAL  2001? benign mass removed from lower back    LAP, PLACE ADJUST GASTR BAND  1/30/08    EB: AP Std lap band, liver bx      Social History:  Social History   Substance Use Topics    Smoking status: Former Smoker     Packs/day: 0.50     Years: 15.00     Types: Cigarettes     Quit date: 1/1/1993    Smokeless tobacco: Never Used    Alcohol use Yes      Comment: socially-WINE ONCE A WEEK      Family History:  Family History   Problem Relation Age of Onset    Cancer Mother      colon WITH METS TO LIVER    Diabetes Father     Heart Disease Father     Cancer Sister      breast & bone    Cancer Maternal Aunt      BREAST    Cancer Paternal Aunt      BREAST    Anesth Problems Neg Hx       Medications: (reviewed)  Current Outpatient Prescriptions   Medication Sig    oxyCODONE-acetaminophen (PERCOCET) 5-325 mg per tablet Take 1-2 Tabs by mouth every four (4) hours as needed for Pain. Max Daily Amount: 12 Tabs.  furosemide (LASIX) 20 mg tablet Take 1 Tab by mouth daily as needed.  dexamethasone (DECADRON) 4 mg tablet Take 2 tablets with breakfast the day before chemo and for 2 days after chemo    lidocaine-prilocaine (EMLA) topical cream Apply small amount over port area one hour before chemo treatment and cover with a Band-Aid    ondansetron (ZOFRAN ODT) 4 mg disintegrating tablet Take 1 Tab by mouth every eight (8) hours as needed for Nausea. Indications: CANCER CHEMOTHERAPY-INDUCED NAUSEA AND VOMITING    acetaminophen (TYLENOL) 325 mg tablet Take 650 mg by mouth every four (4) hours as needed for Pain.     docusate sodium (STOOL SOFTENER) 100 mg capsule Take 100 mg by mouth daily.  atorvastatin (LIPITOR) 80 mg tablet Take 1 Tab by mouth daily.  losartan (COZAAR) 100 mg tablet Take 1 Tab by mouth daily.  triamterene-hydroCHLOROthiazide (MAXZIDE) 37.5-25 mg per tablet Take 1 Tab by mouth daily.  Cholecalciferol, Vitamin D3, (VITAMIN D3) 1,000 unit cap Take 1 Cap by mouth. No current facility-administered medications for this visit. Allergies: (reviewed)  Allergies   Allergen Reactions    Amlodipine Other (comments)     Edema in LE's          OBJECTIVE:    Physical Exam:  VITAL SIGNS: Vitals:    08/15/17 0933   BP: (!) 192/91   Pulse: 93   Weight: 107.5 kg (237 lb)   Height: 5' 2.01\" (1.575 m)     Body mass index is 43.34 kg/(m^2). GENERAL MIGUELANGEL: Conversant, alert, oriented. No acute distress. HEENT: HEENT. No thyroid enlargement. No JVD. Neck: Supple without restrictions. RESPIRATORY: Clear to auscultation and percussion to the bases. No CVAT. CARDIOVASC: RRR without murmur/rub. GASTROINT: soft, non-tender, without masses or organomegaly   MUSCULOSKEL: no joint tenderness, deformity or swelling   EXTREMITIES: Marked edema of bilateral lower extremities, L>R. Skin noted with vascular changes on the left. PELVIC: Deferred   RECTAL: Deferred   PATRICIO SURVEY: No suspicious lymphadenopathy. NEURO: Grossly intact. No acute deficit. CT of abdomen/pelvis (5/16/17)  LUNG BASES: Multiple bilateral pulmonary nodules. INCIDENTALLY IMAGED HEART AND MEDIASTINUM: Unremarkable. LIVER: No mass or biliary dilatation. GALLBLADDER: Cholelithiasis. SPLEEN: Calcified granulomata. PANCREAS: No mass or ductal dilatation. ADRENALS: Unremarkable. KIDNEYS: Right renal cyst.  STOMACH: Unremarkable. SMALL BOWEL: No dilatation or wall thickening. COLON: No dilatation or wall thickening. APPENDIX: Unremarkable. PERITONEUM: No ascites or pneumoperitoneum.   RETROPERITONEUM: Significant retroperitoneal lymphadenopathy, largest dimension  surrounding the aorta 6.5 x 4.2 cm. REPRODUCTIVE ORGANS: There is 4.6 x 2.4 cm soft tissue in the region of  previously identified left pelvic sidewall mass. URINARY BLADDER: No mass or calculus. BONES: No destructive bone lesion. Degenerative changes of L4-L5 and L5-S1. ADDITIONAL COMMENTS: N/A     IMPRESSION:  1. Bilateral pulmonary nodules and retroperitoneal lymphadenopathy compatible  with metastatic disease. 2. Much smaller mass in the left pelvis, or scar. 3. Cholelithiasis. PET/CT (6/6/17)  HEAD/NECK: No apparent foci of abnormal hypermetabolism. Cerebral evaluation is  limited by normal intense activity.     CHEST: There are hypermetabolic left supraclavicular, superior mediastinal, and  paraesophageal lymph nodes. The small pulmonary nodules are minimally  hypermetabolic, maximum SUV of the lesion in the right middle lobe is 1.9.     ABDOMEN/PELVIS: Para-aortic and aortocaval lymphadenopathy is hypermetabolic,  maximum SUV 8.0. Small but hypermetabolic bilateral external iliac lymph nodes  are noted.     SKELETON: There is a hypermetabolic lytic lesion in the sternum, maximum SUV is  9.7. There is a small sclerotic lesion at L3 which is new compared to the prior  PET/CT. Given the tracer activity corresponding to the adjacent retroperitoneal  lymphadenopathy that is difficult to determine if this small lesion is  hypermetabolic.     IMPRESSION:   1. Mildly hypermetabolic pulmonary nodules are concerning for metastatic  disease. 2. Hypermetabolic left supraclavicular, superior mediastinal, and paraesophageal  lymph nodes as well as retroperitoneal and bilateral external iliac lymph nodes. 3. Hypermetabolic lytic lesion in the sternum is concerning for metastatic  disease.  Small sclerotic lesion at L3 is new compared to the prior PET/CT but  too small for accurate characterization by PET CT.      CT of abdomen/pelvis (8/14/17)  There is a left jugular Port-A-Cath with tip in the superior vena cava. LOWER NECK:The visualized portions of the thyroid and structures of the lower  neck are within normal limits. CHEST: There is a 1.4 cm left supraclavicular lymph node. Lungs: There are multiple nodules throughout the lungs, some with cavitation  which are unchanged. Pleura: There is no pleural effusion or pneumothorax. Aorta: The aorta enhances normally with no evidence of aneurysm or dissection. Mediastinum: There is no mediastinal or hilar adenopathy or mass. Bones and soft tissues: The bones and soft tissues of the chest wall are normal.  ABDOMEN:  Liver: The liver is normal in size and contour with no focal abnormality. Gallbladder and bile ducts: There are small stones in the gallbladder. The  gallbladder wall is not thickened and there is no pericholecystic fluid. Spleen: No abnormality. There are tiny calcified granulomas in the spleen. Pancreas: No abnormality. Adrenal glands: No abnormality. Kidneys: No abnormality. PELVIS:  Reproductive organs: The uterus is absent. Bladder: No abnormality. BOWEL AND MESENTERY: There is a laparoscopic gastric band in expected position  in the upper abdomen. The small bowel is not obstructed. There is no mesenteric  mass or adenopathy. The appendix is absent. There are diverticula of the  sigmoid colon. PERITONEUM: There is no ascites or free intraperitoneal air. RETROPERITONEUM: The aorta is atherosclerotic and tapers without aneurysm. There  is retroperitoneal adenopathy. There is a 2.7 x 2.1 x 3.3 cm left para-aortic  lymph node and just anterior to this there is a 1.9 x 1.9 x 2.6 cm preaortic  lymph node. These have decreased minimally in size and previously measured 2 .9  x 2.5 and 2 x 2 centimeters. There is no pelvic mass or adenopathy. BONES AND SOFT TISSUES: There are degenerative changes of the spine and there  are sclerotic lesions in the L3 and L4 vertebral bodies.  There is a sclerotic  and lytic lesion in the sternum.     IMPRESSION:   1. Status post hysterectomy and bilateral oophorectomy. 2. Bilateral pulmonary nodules, some with cavitation unchanged consistent with  metastatic disease. 3 Retroperitoneal adenopathy unchanged or minimally decreased in size and  enlarged left supraclavicular lymph node consistent with metastatic disease. 4. Small left pelvic mass or scar unchanged. 5. Lytic and sclerotic sternal lesion consistent with metastasis. 6. L3 and L4 bone lesions more sclerotic suggesting healing. 7. Atherosclerotic  abdominal aorta without aneurysm. 8. Gallstones. 9. Splenic granulomas indicative of old granulomatous disease. IMPRESSION/PLAN:  Yoli Elaine is a 75 y.o. female with a history of stage III vaginal carcinoma. She was treated with pelvic radiation therapy and concurrent cisplatin chemotherapy. She now has recurrent disease with multiple sites of metastases. I had a long discussion with the patient and her  discussing the best course of management. Surgery is not an option due to the widespread nature of her disease. The same is true for radiation therapy. Her only option is systemic chemotherapy. Since vaginal cancer behaves like cervical carcinoma, I recommended using the standard regimen for metastatic cervical carcinoma, Taxol/Cisplatin/Avastin. Unfortunately, the Avastin was denied by her insurance company. We chose to proceed with Taxol/Cisplatin. She has completed 3 cycles so far. I reviewed her recent CT with her and her . Her pulmonary nodules appears stable and her retroperitoneal lymphadenopathy appears slightly improved. We will continue with the current regimen. We also discussed possible tumor testing to see if there are any other treatment options.       Signed By: Eri Holder MD     8/15/2017/4:31 PM

## 2017-08-16 ENCOUNTER — HOSPITAL ENCOUNTER (OUTPATIENT)
Dept: INFUSION THERAPY | Age: 67
Discharge: HOME | End: 2017-08-16
Payer: MEDICARE

## 2017-08-16 VITALS
OXYGEN SATURATION: 97 % | WEIGHT: 236.6 LBS | BODY MASS INDEX: 43.54 KG/M2 | DIASTOLIC BLOOD PRESSURE: 88 MMHG | HEIGHT: 62 IN | HEART RATE: 78 BPM | SYSTOLIC BLOOD PRESSURE: 158 MMHG | RESPIRATION RATE: 18 BRPM | TEMPERATURE: 97.9 F

## 2017-08-16 DIAGNOSIS — C52 VAGINAL CANCER (HCC): ICD-10-CM

## 2017-08-16 DIAGNOSIS — I89.0 LYMPH EDEMA: Primary | ICD-10-CM

## 2017-08-16 PROBLEM — R73.09 ELEVATED GLUCOSE: Status: ACTIVE | Noted: 2017-08-16

## 2017-08-16 PROBLEM — R79.89 ELEVATED SERUM CREATININE: Status: ACTIVE | Noted: 2017-08-16

## 2017-08-16 LAB
ALBUMIN SERPL BCP-MCNC: 3.3 G/DL (ref 3.5–5)
ALBUMIN/GLOB SERPL: 0.8 {RATIO} (ref 1.1–2.2)
ALP SERPL-CCNC: 119 U/L (ref 45–117)
ALT SERPL-CCNC: 18 U/L (ref 12–78)
ANION GAP BLD CALC-SCNC: 9 MMOL/L (ref 5–15)
AST SERPL W P-5'-P-CCNC: 7 U/L (ref 15–37)
BASOPHILS # BLD: 0 K/UL (ref 0–0.1)
BASOPHILS NFR BLD: 0 % (ref 0–1)
BILIRUB SERPL-MCNC: 0.4 MG/DL (ref 0.2–1)
BUN SERPL-MCNC: 19 MG/DL (ref 6–20)
BUN/CREAT SERPL: 12 (ref 12–20)
CALCIUM SERPL-MCNC: 9 MG/DL (ref 8.5–10.1)
CANCER AG125 SERPL-ACNC: 59 U/ML (ref 0–30)
CHLORIDE SERPL-SCNC: 102 MMOL/L (ref 97–108)
CO2 SERPL-SCNC: 29 MMOL/L (ref 21–32)
CREAT SERPL-MCNC: 1.59 MG/DL (ref 0.55–1.02)
DIFFERENTIAL METHOD BLD: ABNORMAL
EOSINOPHIL # BLD: 0.1 K/UL (ref 0–0.4)
EOSINOPHIL NFR BLD: 3 % (ref 0–7)
ERYTHROCYTE [DISTWIDTH] IN BLOOD BY AUTOMATED COUNT: 16.9 % (ref 11.5–14.5)
EST. AVERAGE GLUCOSE BLD GHB EST-MCNC: 160 MG/DL
GLOBULIN SER CALC-MCNC: 4 G/DL (ref 2–4)
GLUCOSE SERPL-MCNC: 165 MG/DL (ref 65–100)
HBA1C MFR BLD: 7.2 % (ref 4.2–6.3)
HCT VFR BLD AUTO: 32.4 % (ref 35–47)
HGB BLD-MCNC: 10.3 G/DL (ref 11.5–16)
LYMPHOCYTES # BLD: 0.5 K/UL (ref 0.8–3.5)
LYMPHOCYTES NFR BLD: 12 % (ref 12–49)
MAGNESIUM SERPL-MCNC: 1.7 MG/DL (ref 1.6–2.4)
MCH RBC QN AUTO: 27.1 PG (ref 26–34)
MCHC RBC AUTO-ENTMCNC: 31.8 G/DL (ref 30–36.5)
MCV RBC AUTO: 85.3 FL (ref 80–99)
METAMYELOCYTES NFR BLD MANUAL: 1 %
MONOCYTES # BLD: 0.1 K/UL (ref 0–1)
MONOCYTES NFR BLD: 3 % (ref 5–13)
NEUTS BAND NFR BLD MANUAL: 1 % (ref 0–6)
NEUTS SEG # BLD: 3.6 K/UL (ref 1.8–8)
NEUTS SEG NFR BLD: 80 % (ref 32–75)
PLATELET # BLD AUTO: 145 K/UL (ref 150–400)
POTASSIUM SERPL-SCNC: 3.9 MMOL/L (ref 3.5–5.1)
PROT SERPL-MCNC: 7.3 G/DL (ref 6.4–8.2)
RBC # BLD AUTO: 3.8 M/UL (ref 3.8–5.2)
RBC MORPH BLD: ABNORMAL
RBC MORPH BLD: ABNORMAL
SODIUM SERPL-SCNC: 140 MMOL/L (ref 136–145)
WBC # BLD AUTO: 4.4 K/UL (ref 3.6–11)

## 2017-08-16 PROCEDURE — 74011000250 HC RX REV CODE- 250: Performed by: OBSTETRICS & GYNECOLOGY

## 2017-08-16 PROCEDURE — 96415 CHEMO IV INFUSION ADDL HR: CPT

## 2017-08-16 PROCEDURE — 74011250636 HC RX REV CODE- 250/636: Performed by: OBSTETRICS & GYNECOLOGY

## 2017-08-16 PROCEDURE — 85025 COMPLETE CBC W/AUTO DIFF WBC: CPT | Performed by: OBSTETRICS & GYNECOLOGY

## 2017-08-16 PROCEDURE — 83735 ASSAY OF MAGNESIUM: CPT | Performed by: OBSTETRICS & GYNECOLOGY

## 2017-08-16 PROCEDURE — 80053 COMPREHEN METABOLIC PANEL: CPT | Performed by: OBSTETRICS & GYNECOLOGY

## 2017-08-16 PROCEDURE — 36415 COLL VENOUS BLD VENIPUNCTURE: CPT | Performed by: OBSTETRICS & GYNECOLOGY

## 2017-08-16 PROCEDURE — 83036 HEMOGLOBIN GLYCOSYLATED A1C: CPT | Performed by: NURSE PRACTITIONER

## 2017-08-16 PROCEDURE — 96367 TX/PROPH/DG ADDL SEQ IV INF: CPT

## 2017-08-16 PROCEDURE — 96417 CHEMO IV INFUS EACH ADDL SEQ: CPT

## 2017-08-16 PROCEDURE — 96375 TX/PRO/DX INJ NEW DRUG ADDON: CPT

## 2017-08-16 PROCEDURE — 96413 CHEMO IV INFUSION 1 HR: CPT

## 2017-08-16 PROCEDURE — 86304 IMMUNOASSAY TUMOR CA 125: CPT | Performed by: OBSTETRICS & GYNECOLOGY

## 2017-08-16 PROCEDURE — 77030012965 HC NDL HUBR BBMI -A

## 2017-08-16 PROCEDURE — 74011000258 HC RX REV CODE- 258: Performed by: OBSTETRICS & GYNECOLOGY

## 2017-08-16 RX ORDER — SODIUM CHLORIDE 0.9 % (FLUSH) 0.9 %
10-40 SYRINGE (ML) INJECTION AS NEEDED
Status: ACTIVE | OUTPATIENT
Start: 2017-08-16 | End: 2017-08-17

## 2017-08-16 RX ORDER — HEPARIN 100 UNIT/ML
500 SYRINGE INTRAVENOUS AS NEEDED
Status: ACTIVE | OUTPATIENT
Start: 2017-08-16 | End: 2017-08-17

## 2017-08-16 RX ORDER — SODIUM CHLORIDE 9 MG/ML
50 INJECTION, SOLUTION INTRAVENOUS AS NEEDED
Status: DISPENSED | OUTPATIENT
Start: 2017-08-16 | End: 2017-08-17

## 2017-08-16 RX ORDER — SODIUM CHLORIDE 9 MG/ML
10 INJECTION INTRAMUSCULAR; INTRAVENOUS; SUBCUTANEOUS AS NEEDED
Status: ACTIVE | OUTPATIENT
Start: 2017-08-16 | End: 2017-08-17

## 2017-08-16 RX ADMIN — Medication 10 ML: at 08:29

## 2017-08-16 RX ADMIN — DEXAMETHASONE SODIUM PHOSPHATE 12 MG: 4 INJECTION, SOLUTION INTRA-ARTICULAR; INTRALESIONAL; INTRAMUSCULAR; INTRAVENOUS; SOFT TISSUE at 11:22

## 2017-08-16 RX ADMIN — CISPLATIN 112 MG: 1 INJECTION INTRAVENOUS at 15:48

## 2017-08-16 RX ADMIN — SODIUM CHLORIDE, PRESERVATIVE FREE 500 UNITS: 5 INJECTION INTRAVENOUS at 16:54

## 2017-08-16 RX ADMIN — Medication 10 ML: at 16:53

## 2017-08-16 RX ADMIN — SODIUM CHLORIDE 1000 ML: 900 INJECTION, SOLUTION INTRAVENOUS at 10:39

## 2017-08-16 RX ADMIN — DIPHENHYDRAMINE HYDROCHLORIDE 50 MG: 50 INJECTION INTRAMUSCULAR; INTRAVENOUS at 11:51

## 2017-08-16 RX ADMIN — PACLITAXEL 304 MG: 6 INJECTION, SOLUTION INTRAVENOUS at 12:47

## 2017-08-16 RX ADMIN — GRANISETRON HYDROCHLORIDE 1 MG: 1 INJECTION INTRAVENOUS at 10:39

## 2017-08-16 RX ADMIN — FAMOTIDINE 20 MG: 10 INJECTION INTRAVENOUS at 11:05

## 2017-08-16 RX ADMIN — MAGNESIUM SULFATE: 500 INJECTION, SOLUTION INTRAMUSCULAR; INTRAVENOUS at 09:30

## 2017-08-16 RX ADMIN — SODIUM CHLORIDE 10 ML: 9 INJECTION INTRAMUSCULAR; INTRAVENOUS; SUBCUTANEOUS at 08:29

## 2017-08-16 RX ADMIN — SODIUM CHLORIDE 50 ML/HR: 900 INJECTION, SOLUTION INTRAVENOUS at 08:28

## 2017-08-16 RX ADMIN — SODIUM CHLORIDE 150 MG: 900 INJECTION, SOLUTION INTRAVENOUS at 12:21

## 2017-08-16 NOTE — PROGRESS NOTES
0810 Pt admit to [facility] for C4 Taxol/Cisplatin  ambulatory in stable condition. Assessment completed. No new concerns voiced. Port access and flushed with positive blood return. Labs drawn per order and sent for processing. Normal Saline started at 50ml/hr. Labs reviewed, medication ordered. Visit Vitals    BP (!) 156/91    Pulse 78    Temp 97 °F (36.1 °C)    Resp 18    SpO2 99%    Breastfeeding No       Medications:  Normal Saline 50ml/hr  Hydration 1000ml with 1gm Magnesium and 10mEq potassium  Hydration 1000ml additional hydration  Pepcid IV Push  Kytril IV Push  Benadryl IV Push  Decadron IV Push  Emend  Taxol  Cisplatin  Heparin Flush    1730 Pt tolerated treatment well. Port maintained positive blood return throughout treatment, flushed with positive blood return at conclusion, port heparinized and left accessed patient returning tomorrowl. D/c home ambulatory in no distress. Pt aware of next appointment scheduled for 8/17/17. Recent Results (from the past 12 hour(s))   CBC WITH AUTOMATED DIFF    Collection Time: 08/16/17  8:13 AM   Result Value Ref Range    WBC 4.4 3.6 - 11.0 K/uL    RBC 3.80 3.80 - 5.20 M/uL    HGB 10.3 (L) 11.5 - 16.0 g/dL    HCT 32.4 (L) 35.0 - 47.0 %    MCV 85.3 80.0 - 99.0 FL    MCH 27.1 26.0 - 34.0 PG    MCHC 31.8 30.0 - 36.5 g/dL    RDW 16.9 (H) 11.5 - 14.5 %    PLATELET 240 (L) 052 - 400 K/uL    NEUTROPHILS 80 (H) 32 - 75 %    BAND NEUTROPHILS 1 0 - 6 %    LYMPHOCYTES 12 12 - 49 %    MONOCYTES 3 (L) 5 - 13 %    EOSINOPHILS 3 0 - 7 %    BASOPHILS 0 0 - 1 %    METAMYELOCYTES 1 (H) 0 %    ABS. NEUTROPHILS 3.6 1.8 - 8.0 K/UL    ABS. LYMPHOCYTES 0.5 (L) 0.8 - 3.5 K/UL    ABS. MONOCYTES 0.1 0.0 - 1.0 K/UL    ABS. EOSINOPHILS 0.1 0.0 - 0.4 K/UL    ABS.  BASOPHILS 0.0 0.0 - 0.1 K/UL    DF MANUAL      RBC COMMENTS POLYCHROMASIA  1+        RBC COMMENTS ANISOCYTOSIS  1+       METABOLIC PANEL, COMPREHENSIVE    Collection Time: 08/16/17  8:13 AM   Result Value Ref Range    Sodium 140 136 - 145 mmol/L    Potassium 3.9 3.5 - 5.1 mmol/L    Chloride 102 97 - 108 mmol/L    CO2 29 21 - 32 mmol/L    Anion gap 9 5 - 15 mmol/L    Glucose 165 (H) 65 - 100 mg/dL    BUN 19 6 - 20 MG/DL    Creatinine 1.59 (H) 0.55 - 1.02 MG/DL    BUN/Creatinine ratio 12 12 - 20      GFR est AA 39 (L) >60 ml/min/1.73m2    GFR est non-AA 32 (L) >60 ml/min/1.73m2    Calcium 9.0 8.5 - 10.1 MG/DL    Bilirubin, total 0.4 0.2 - 1.0 MG/DL    ALT (SGPT) 18 12 - 78 U/L    AST (SGOT) 7 (L) 15 - 37 U/L    Alk.  phosphatase 119 (H) 45 - 117 U/L    Protein, total 7.3 6.4 - 8.2 g/dL    Albumin 3.3 (L) 3.5 - 5.0 g/dL    Globulin 4.0 2.0 - 4.0 g/dL    A-G Ratio 0.8 (L) 1.1 - 2.2     MAGNESIUM    Collection Time: 08/16/17  8:13 AM   Result Value Ref Range    Magnesium 1.7 1.6 - 2.4 mg/dL   CANCER ANTIGEN 125    Collection Time: 08/16/17  8:13 AM   Result Value Ref Range    CA-125 59 (H) 0 - 30 U/mL

## 2017-08-16 NOTE — PROGRESS NOTES
27 [facility] [address], [address] [facility]  [phone]  MD Phil Davenport MD  Office Note  Patient ID:  Name:  Corazon Alaniz  MRN:  [id number]  :  67 y.o. Date:  2017      HISTORY OF PRESENT ILLNESS:  Coarzon Alaniz is a 75 y.o.  postmenopausal female who was referred to me in 2015 for a pelvic mass by Dr. Brad Turner, who noted the mass on routine gynecologic exam.  He sent her for a CT of the pelvis, which revealed a mass at the vaginal apex, that appeared to be invading into the soft tissues of the left pelvis. There were several enlarged lymph nodes on the left. There was also questionable bladder invasion, and possibly even involvement of the rectosigmoid colon. She reported a history of having a mass removed from one of her ovaries in 1 at [address] [place] [address], which required a second surgery to remove her uterus and contralateral ovary. She said she did not have to take any chemotherapy, but she did have to take some kind of pill for a year. She has not had any gynecologic issues since that time. Presumably her hysterectomy was a total, rather than a supracervical, but she is unsure and there are no records. She denied any vaginal bleeding, except for after her examination with Dr. Jocelin Mcgowan. She reported minimal pain in the pelvis. Dr. Alex Navarro took her the OR for and exam under anesthesia with cystoscopy and proctoscopy. Pathology revealed a squamous cell carcinoma, consistent with a vaginal primary. Examination revealed a fixed mass involving the left upper vagina with extension to the left pelvic sidewall. There also appeared to be involvement of the posterior bladder wall, but no mucosal involvement. The mass did push against the rectal wall, but there did not appear to be any direct involvement. Dr. Alex Navarro referred her to radiation oncology and she saw Dr. Mayte Valencia.   She completed pelvic radiation along with concurrent cisplatin chemotherapy in February 2016 and had a great response. When Dr. Ravi Cha saw her in the office in March 2017 for follow up she was doing well and without complaints. Her pelvic exam and pap smear at that time were both negative. Subsequent to that visit she began having back pain that had progressively worsened. She saw Macho Headley in May and she ordered a CT. This unfortunately demonstrated extensive metastatic disease outside of the prior radiated field. Dr. Brisa Rosas then sent her for a PET/CT which demonstrated widely metastatic disease. Dr. Ravi Cha recommended systemic chemotherapy with Taxol/Cisplatin/Avastin, which is the standard for metastatic cervical cancer, since vaginal cancer behaves similar to cervical cancer. Unfortunately, the Avastin was denied by her insurance company. He chose to proceed with Taxol/Cisplatin. She has completed 3 cycles so far. She has done well with the treatment thus far. She denies nausea/vomiting, change in bowel habits (she takes stool softener daily). She continues to complain of persistent lower extremity edema with her left leg being worse than her left. Her CT  Of chest/abd/pelvis on 8/14 demonstrates stable disease to mild improvement, but not progression. ROS:    A comprehensive review of systems was negative except for that written in the History of Present Illness.  , 10 point ROS        Problem List:  Patient Active Problem List    Diagnosis Date Noted    Lymph edema 06/14/2017    Bilateral lower extremity edema 06/14/2017    Hypertension, essential 01/18/2017    Hypercholesterolemia 01/18/2017    GERD without esophagitis 01/18/2017    Vaginal cancer ([facility] [address] 12/10/2015    LAP-BAND surgery status 05/17/2012     PMH:  Past Medical History:   Diagnosis Date    Abnormal mammogram of left breast 12/5/2016    Cancer ([address] 06/2017    LYMPH NODES IN BACK    Cancer ([address] 2016    VAGINAL CANCER    GERD (gastroesophageal reflux disease) 6/11/06    Hypercholesterolemia 10/11/06    Hypertension 10/11/06    Morbid obesity ([address] 10/11/06      PSH:  Past Surgical History:   Procedure Laterality Date    HX APPENDECTOMY      HX GASTRIC BYPASS  2009    LAP BAND    HX GYN  12/4/15    EUA/Bx of pelvic mass and vagina/cystoscopy/proctoscopy    HX HYSTERECTOMY  1978    HX ORTHOPAEDIC  2009    right ankle    HX OTHER SURGICAL  2001? benign mass removed from lower back    LAP, PLACE ADJUST GASTR BAND  1/30/08    EB: AP Std lap band, liver bx      Social History:  Social History   Substance Use Topics    Smoking status: Former Smoker     Packs/day: 0.50     Years: 15.00     Types: Cigarettes     Quit date: 1/1/1993    Smokeless tobacco: Never Used    Alcohol use Yes      Comment: socially-WINE ONCE A WEEK      Family History:  Family History   Problem Relation Age of Onset    Cancer Mother      colon WITH METS TO LIVER    Diabetes Father     Heart Disease Father     Cancer Sister      breast & bone    Cancer Maternal Aunt      BREAST    Cancer Paternal Aunt      BREAST    Anesth Problems Neg Hx       Medications: (reviewed)  Current Outpatient Prescriptions   Medication Sig    oxyCODONE-acetaminophen (PERCOCET) 5-325 mg per tablet Take 1-2 Tabs by mouth every four (4) hours as needed for Pain. Max Daily Amount: 12 Tabs.  furosemide (LASIX) 20 mg tablet Take 1 Tab by mouth daily as needed.  dexamethasone (DECADRON) 4 mg tablet Take 2 tablets with breakfast the day before chemo and for 2 days after chemo    lidocaine-prilocaine (EMLA) topical cream Apply small amount over port area one hour before chemo treatment and cover with a Band-Aid    ondansetron (ZOFRAN ODT) 4 mg disintegrating tablet Take 1 Tab by mouth every eight (8) hours as needed for Nausea.  Indications: CANCER CHEMOTHERAPY-INDUCED NAUSEA AND VOMITING    acetaminophen (TYLENOL) 325 mg tablet Take 650 mg by mouth every four (4) hours as needed for Pain.  docusate sodium (STOOL SOFTENER) 100 mg capsule Take 100 mg by mouth daily.  atorvastatin (LIPITOR) 80 mg tablet Take 1 Tab by mouth daily.  losartan (COZAAR) 100 mg tablet Take 1 Tab by mouth daily.  triamterene-hydroCHLOROthiazide (MAXZIDE) 37.5-25 mg per tablet Take 1 Tab by mouth daily.  Cholecalciferol, Vitamin D3, (VITAMIN D3) 1,000 unit cap Take 1 Cap by mouth. Current Facility-Administered Medications   Medication Dose Route Frequency    sodium chloride 0.9 % injection 10 mL  10 mL IntraVENous PRN    heparin (porcine) pf 500 Units  500 Units IntraVENous PRN    sodium chloride (NS) flush 10-40 mL  10-40 mL IntraVENous PRN    0.9% sodium chloride infusion  50 mL/hr IntraVENous PRN    PACLitaxel (TAXOL) 304 mg in 0.9% sodium chloride 250 mL, overfill volume 25 mL chemo infusion  304 mg IntraVENous ONCE    CISplatin (PLATINOL) 112 mg in 0.9% sodium chloride 500 mL, overfill volume 50 mL chemo infusion  112 mg IntraVENous ONCE     Facility-Administered Medications Ordered in Other Encounters   Medication Dose Route Frequency    [START ON 8/17/2017] sodium chloride 0.9 % bolus infusion 1,000 mL  1,000 mL IntraVENous ONCE     Allergies: (reviewed)  Allergies   Allergen Reactions    Amlodipine Other (comments)     Edema in LE's          OBJECTIVE:    Physical Exam:  VITAL SIGNS: Vitals:    08/16/17 0811 08/16/17 1013   BP: (!) 156/91    Pulse: 78    Resp: 18    Temp: 97 °F (36.1 °C)    SpO2: 99%    Weight:  236 lb 9.6 oz (107.3 kg)   Height:  5' 2\" (1.575 m)     Body mass index is 43.27 kg/(m^2). GENERAL MIGUELANGEL: Conversant, alert, oriented. No acute distress. HEENT: No JVD or adenopathy appreciated  Neck: Supple without restrictions. Port site: Without redness, tenderness or swelling.   Accessed without difficulty   RESPIRATORY: CTA bilat without wheezing or rales   CARDIOVASC: Regular without M/R/G.   GASTROINT: soft, obese, NT/ND, with + BS throughout   EXTREMITIES: Marked edema of bilateral lower extremities, L>R. Skin noted with vascular skin changes on the left. + pedal pulses with toes noted warm to touch   PELVIC: Deferred   RECTAL: Deferred   NEURO: Grossly intact. No acute deficit. CT of abdomen/pelvis (5/16/17)  LUNG BASES: Multiple bilateral pulmonary nodules. INCIDENTALLY IMAGED HEART AND MEDIASTINUM: Unremarkable. LIVER: No mass or biliary dilatation. GALLBLADDER: Cholelithiasis. SPLEEN: Calcified granulomata. PANCREAS: No mass or ductal dilatation. ADRENALS: Unremarkable. KIDNEYS: Right renal cyst.  STOMACH: Unremarkable. SMALL BOWEL: No dilatation or wall thickening. COLON: No dilatation or wall thickening. APPENDIX: Unremarkable. PERITONEUM: No ascites or pneumoperitoneum. RETROPERITONEUM: Significant retroperitoneal lymphadenopathy, largest dimension  surrounding the aorta 6.5 x 4.2 cm. REPRODUCTIVE ORGANS: There is 4.6 x 2.4 cm soft tissue in the region of  previously identified left pelvic sidewall mass. URINARY BLADDER: No mass or calculus. BONES: No destructive bone lesion. Degenerative changes of L4-L5 and L5-S1. ADDITIONAL COMMENTS: N/A     IMPRESSION:  1. Bilateral pulmonary nodules and retroperitoneal lymphadenopathy compatible  with metastatic disease. 2. Much smaller mass in the left pelvis, or scar. 3. Cholelithiasis. PET/CT (6/6/17)  HEAD/NECK: No apparent foci of abnormal hypermetabolism. Cerebral evaluation is  limited by normal intense activity.     CHEST: There are hypermetabolic left supraclavicular, superior mediastinal, and  paraesophageal lymph nodes. The small pulmonary nodules are minimally  hypermetabolic, maximum SUV of the lesion in the right middle lobe is 1.9.     ABDOMEN/PELVIS: Para-aortic and aortocaval lymphadenopathy is hypermetabolic,  maximum SUV 8.0.  Small but hypermetabolic bilateral external iliac lymph nodes  are noted.     SKELETON: There is a hypermetabolic lytic lesion in the sternum, maximum SUV is  9.7. There is a small sclerotic lesion at L3 which is new compared to the prior  PET/CT. Given the tracer activity corresponding to the adjacent retroperitoneal  lymphadenopathy that is difficult to determine if this small lesion is  hypermetabolic.     IMPRESSION:   1. Mildly hypermetabolic pulmonary nodules are concerning for metastatic  disease. 2. Hypermetabolic left supraclavicular, superior mediastinal, and paraesophageal  lymph nodes as well as retroperitoneal and bilateral external iliac lymph nodes. 3. Hypermetabolic lytic lesion in the sternum is concerning for metastatic  disease. Small sclerotic lesion at L3 is new compared to the prior PET/CT but  too small for accurate characterization by PET CT.      CT of abdomen/pelvis (8/14/17)  There is a left jugular Port-A-Cath with tip in the superior vena cava. LOWER NECK:The visualized portions of the thyroid and structures of the lower  neck are within normal limits. CHEST: There is a 1.4 cm left supraclavicular lymph node. Lungs: There are multiple nodules throughout the lungs, some with cavitation  which are unchanged. Pleura: There is no pleural effusion or pneumothorax. Aorta: The aorta enhances normally with no evidence of aneurysm or dissection. Mediastinum: There is no mediastinal or hilar adenopathy or mass. Bones and soft tissues: The bones and soft tissues of the chest wall are normal.  ABDOMEN:  Liver: The liver is normal in size and contour with no focal abnormality. Gallbladder and bile ducts: There are small stones in the gallbladder. The  gallbladder wall is not thickened and there is no pericholecystic fluid. Spleen: No abnormality. There are tiny calcified granulomas in the spleen. Pancreas: No abnormality. Adrenal glands: No abnormality. Kidneys: No abnormality. PELVIS:  Reproductive organs: The uterus is absent. Bladder: No abnormality.    BOWEL AND MESENTERY: There is a laparoscopic gastric band in expected position  in the upper abdomen. The small bowel is not obstructed. There is no mesenteric  mass or adenopathy. The appendix is absent. There are diverticula of the  sigmoid colon. PERITONEUM: There is no ascites or free intraperitoneal air. RETROPERITONEUM: The aorta is atherosclerotic and tapers without aneurysm. There  is retroperitoneal adenopathy. There is a 2.7 x 2.1 x 3.3 cm left para-aortic  lymph node and just anterior to this there is a 1.9 x 1.9 x 2.6 cm preaortic  lymph node. These have decreased minimally in size and previously measured 2 .9  x 2.5 and 2 x 2 centimeters. There is no pelvic mass or adenopathy. BONES AND SOFT TISSUES: There are degenerative changes of the spine and there  are sclerotic lesions in the L3 and L4 vertebral bodies. There is a sclerotic  and lytic lesion in the sternum.     IMPRESSION:   1. Status post hysterectomy and bilateral oophorectomy. 2. Bilateral pulmonary nodules, some with cavitation unchanged consistent with  metastatic disease. 3 Retroperitoneal adenopathy unchanged or minimally decreased in size and  enlarged left supraclavicular lymph node consistent with metastatic disease. 4. Small left pelvic mass or scar unchanged. 5. Lytic and sclerotic sternal lesion consistent with metastasis. 6. L3 and L4 bone lesions more sclerotic suggesting healing. 7. Atherosclerotic  abdominal aorta without aneurysm. 8. Gallstones. 9. Splenic granulomas indicative of old granulomatous disease. Assessment:  Patient Active Problem List    Diagnosis Date Noted    Lymph edema 06/14/2017    Bilateral lower extremity edema 06/14/2017    Hypertension, essential 01/18/2017    Hypercholesterolemia 01/18/2017    GERD without esophagitis 01/18/2017    Vaginal cancer ([facility] [address] 12/10/2015    LAP-BAND surgery status 05/17/2012     Plan:   Will proceed with 4th cycle of treatment today  Have arranged for pt to be seen in the lymphedema clinic to help with significant lower ext edema.  She will call if she begins to feel numbness or her feet become cool to touch  Hydration encouraged. Continue to elevate lower ext as much as possible and encouraged with able, at or above the level of the heart  Continue antihypertensives and follow up with PCP  Will monitor creat carefully. Dr. Concha Retana viewed labs and will proceed as is today and monitor. Pt's  is followed by Dr. Veda Mann and she says she will get an appointment with him within the next \"2 weeks to evaluate my medications and my kidney's\"  She will call with any concerns or questions.      Signed By: Kirstin Nelson NP     8/16/2017/4:31 PM

## 2017-08-17 ENCOUNTER — HOSPITAL ENCOUNTER (OUTPATIENT)
Dept: INFUSION THERAPY | Age: 67
Discharge: HOME | End: 2017-08-17
Payer: MEDICARE

## 2017-08-17 VITALS
DIASTOLIC BLOOD PRESSURE: 71 MMHG | TEMPERATURE: 97.6 F | HEART RATE: 72 BPM | RESPIRATION RATE: 18 BRPM | OXYGEN SATURATION: 94 % | SYSTOLIC BLOOD PRESSURE: 140 MMHG

## 2017-08-17 DIAGNOSIS — R73.09 ELEVATED HEMOGLOBIN A1C: ICD-10-CM

## 2017-08-17 DIAGNOSIS — I10 HYPERTENSION, ESSENTIAL: ICD-10-CM

## 2017-08-17 DIAGNOSIS — R60.0 BILATERAL LOWER EXTREMITY EDEMA: ICD-10-CM

## 2017-08-17 DIAGNOSIS — C52 VAGINAL CANCER (HCC): Primary | ICD-10-CM

## 2017-08-17 DIAGNOSIS — I89.0 LYMPH EDEMA: ICD-10-CM

## 2017-08-17 PROCEDURE — 74011250636 HC RX REV CODE- 250/636: Performed by: OBSTETRICS & GYNECOLOGY

## 2017-08-17 PROCEDURE — 96360 HYDRATION IV INFUSION INIT: CPT

## 2017-08-17 RX ORDER — SODIUM CHLORIDE 0.9 % (FLUSH) 0.9 %
10-40 SYRINGE (ML) INJECTION AS NEEDED
Status: ACTIVE | OUTPATIENT
Start: 2017-08-17 | End: 2017-08-18

## 2017-08-17 RX ORDER — HEPARIN 100 UNIT/ML
500 SYRINGE INTRAVENOUS AS NEEDED
Status: ACTIVE | OUTPATIENT
Start: 2017-08-17 | End: 2017-08-18

## 2017-08-17 RX ORDER — SODIUM CHLORIDE 9 MG/ML
10 INJECTION INTRAMUSCULAR; INTRAVENOUS; SUBCUTANEOUS AS NEEDED
Status: ACTIVE | OUTPATIENT
Start: 2017-08-17 | End: 2017-08-18

## 2017-08-17 RX ADMIN — SODIUM CHLORIDE, PRESERVATIVE FREE 500 UNITS: 5 INJECTION INTRAVENOUS at 16:20

## 2017-08-17 RX ADMIN — SODIUM CHLORIDE 1000 ML: 900 INJECTION, SOLUTION INTRAVENOUS at 15:15

## 2017-08-17 RX ADMIN — Medication 10 ML: at 15:15

## 2017-08-17 RX ADMIN — Medication 10 ML: at 16:20

## 2017-08-17 NOTE — PROGRESS NOTES
1505 Pt admit to [facility] for 1 liter normal saline hydration ambulatory in stable condition. Accompanied by supportive . Assessment completed, verbalized concerns that she \"could be a diabetic. \" encouraged patient to follow up with PCP for management. No new concerns voiced. Port accessed on 8/16/17 with positive blood return. Visit Vitals    /71 (BP 1 Location: Right arm, BP Patient Position: Sitting)    Pulse 72    Temp 97.6 °F (36.4 °C)    Resp 18    SpO2 94%         Medications:  Normal Saline    1620 Pt tolerated treatment well. Port maintained positive blood return throughout treatment, flushed with positive blood return at conclusion and heparinized and then removed. D/c home ambulatory in no distress. Pt aware of next appointment scheduled for 9/6/17.

## 2017-08-17 NOTE — PROGRESS NOTES
Notified pt that her A1C resulted in 7.2. Pt is \"very aware\" of what this means as her , brother and father are all diabetics. She is calling today to make an appointment with Dr. Serafin Nazario (referral placed) for management of her rising creat/htn and she will discuss with him who he would recommend her seeing as a primary care physician. Pt has a new one currently, but does not \"like\" her and is looking for a new one. She will call and let us know when the appointment is set up. Her recent labs, and B/P's were faxed to Dr. Tabitha Watts office  She was encouraged to call for any concerns or questions.

## 2017-08-22 ENCOUNTER — HOSPITAL ENCOUNTER (OUTPATIENT)
Dept: PHYSICAL THERAPY | Age: 67
Discharge: HOME | End: 2017-08-22
Payer: MEDICARE

## 2017-08-22 VITALS — BODY MASS INDEX: 43.43 KG/M2 | HEIGHT: 62 IN | WEIGHT: 236 LBS

## 2017-08-22 PROCEDURE — 97140 MANUAL THERAPY 1/> REGIONS: CPT

## 2017-08-22 PROCEDURE — 97110 THERAPEUTIC EXERCISES: CPT

## 2017-08-22 PROCEDURE — G8990 OTHER PT/OT CURRENT STATUS: HCPCS

## 2017-08-22 PROCEDURE — G8991 OTHER PT/OT GOAL STATUS: HCPCS

## 2017-08-22 PROCEDURE — 97162 PT EVAL MOD COMPLEX 30 MIN: CPT

## 2017-08-22 NOTE — PROGRESS NOTES
[facility]  Physical Therapy Lymphedema Clinic  [address] [facility], [address] [place] [address], [place] [address].    OUTPATIENT physical Therapy Evaluation with CMS G codes    NAME: Raymon Elliott AGE: 75 y.o. GENDER: female  DATE: 8/22/2017  REFERRING PHYSICIAN: Lakeshia Hollins  HISTORY AND BACKGROUND:   Primary Diagnosis:  · B LE lymphedema, secondary (I89.0)  Other Treatment Diagnoses:  · Abnormality of gait (R26.9)  · Swelling not relieved by elevation (R60.9)  · Vaginal Cancer (G082)  · Morbid obesity (E66.01)   Date of Onset: 8/17/17  Present Symptoms and Functional Limitations: Patient has a history of vaginal cancer with a history of surgery in 1978 and 2015 followed by radiation and chemotherapy and recurrent cancer in 2017 with metastasis for which she is now receiving chemotherapy. She reports first noting the swelling about 3 months ago with the left leg larger than the right. Pain is present in the legs and it increases with increased swelling. Swelling tends to increase after her chemotherapy treatments. She describes truncal swelling that has impacted her clothing fit. She had an episode of skin injury on the left leg secondary to pebbly skin changes that resulted in skin injury when she shaved her legs. She experienced a lot of bleeding and then later weeping of clear fluid prior to healing. No infection is reported at that time. Her functional mobility is being affected by the presence of the swelling with increased difficulty reaching her feet, increased difficulty getting in the car on the passenger side, and some difficulty putting on her pants and shoes and socks. Lymphedema Life Impact Scale: Score of 39 and impairment percentage of 57%. See scanned document.   Past Medical History:   Past Medical History:   Diagnosis Date    Abnormal mammogram of left breast 12/5/2016    Cancer ([facility] [address] 06/2017    LYMPH NODES IN BACK    Cancer ([facility] [address] 2016 VAGINAL CANCER    GERD (gastroesophageal reflux disease) 6/11/06    Hypercholesterolemia 10/11/06    Hypertension 10/11/06    Morbid obesity ([facility] [address] 10/11/06     Past Surgical History:   Procedure Laterality Date    HX APPENDECTOMY      HX GASTRIC BYPASS  2009    LAP BAND    HX GYN  12/4/15    EUA/Bx of pelvic mass and vagina/cystoscopy/proctoscopy    HX HYSTERECTOMY  1978    HX ORTHOPAEDIC  2009    right ankle    HX OTHER SURGICAL  2001? benign mass removed from lower back    LAP, PLACE ADJUST GASTR BAND  1/30/08    EB: AP Std lap band, liver bx     Current Medications:    Current Outpatient Prescriptions   Medication Sig    oxyCODONE-acetaminophen (PERCOCET) 5-325 mg per tablet Take 1-2 Tabs by mouth every four (4) hours as needed for Pain. Max Daily Amount: 12 Tabs.  furosemide (LASIX) 20 mg tablet Take 1 Tab by mouth daily as needed.  dexamethasone (DECADRON) 4 mg tablet Take 2 tablets with breakfast the day before chemo and for 2 days after chemo    lidocaine-prilocaine (EMLA) topical cream Apply small amount over port area one hour before chemo treatment and cover with a Band-Aid    ondansetron (ZOFRAN ODT) 4 mg disintegrating tablet Take 1 Tab by mouth every eight (8) hours as needed for Nausea. Indications: CANCER CHEMOTHERAPY-INDUCED NAUSEA AND VOMITING    acetaminophen (TYLENOL) 325 mg tablet Take 650 mg by mouth every four (4) hours as needed for Pain.  docusate sodium (STOOL SOFTENER) 100 mg capsule Take 100 mg by mouth daily.  atorvastatin (LIPITOR) 80 mg tablet Take 1 Tab by mouth daily.  losartan (COZAAR) 100 mg tablet Take 1 Tab by mouth daily.  triamterene-hydroCHLOROthiazide (MAXZIDE) 37.5-25 mg per tablet Take 1 Tab by mouth daily.  Cholecalciferol, Vitamin D3, (VITAMIN D3) 1,000 unit cap Take 1 Cap by mouth. No current facility-administered medications for this encounter. Advil and alieve    Allergies:    Allergies   Allergen Reactions    Amlodipine Other (comments)     Edema in LE's      Prior Level of Function/Social/Work History/Personal factors and/or comorbidities impacting plan of care: Patient is retired from [employer]. They have a [employer]. They are [employer] fans and would like to be able travel to the races in their /camper. Living Situation: Resides in private residence with       Trainable Caregiver?: Yes,  Nava Shetty is available and willing to assist her   Self-care/ADLs: Independent with self care and ADLs     Mobility: Independent ambulation without assistive device   Sleeping Arrangement:  Sleeps in bed at night and she has a bed that is adjustable and she often elevates her legs with this feature   Adaptive Equipment Owned: Shower has been redone and now has shower bench built in and grab bars     Previous Therapy:  No previous therapy is noted by patient  Compression/Lymphedema Equipment:  None noted by patient. SUBJECTIVE:  My legs first started to swell about 3 months ago. The skin has really changed on my left leg. I couple of months ago, I went to shave my leg and with all the bumpiness on my left leg, I really started to bleed. Later the area weeped a clear fluid until it healed. I don't think I had an infection because it healed up and I never had to take an antibiotic. I haven't been shaving my legs since then    I wasn't sure if this swelling could be in my trunk, but I notice my clothing fits me differently and sometimes that area feels heavy and full. When I wear skirts they tend to sit differently on my left side and often get hung up on me now. They didn't do that before. Patients goals for therapy: To reduce swelling and numbness    OBJECTIVE DATA SUMMARY:   EXAMINATION/PRESENTATION/DECISION MAKING:   Pain:    Pain is currently reporting pain at 4/10 numeric scale with aching, tightness and heaviness and intermittent sharp pains in the dorsum of each foot.   When swelling increases this pain level increases to 8-9/10 numeric scale. This often occurs within a couple of days of receiving her chemotherapy treatments. Pain Scale 1: Numeric (0 - 10)     Pain Intensity 1: 4     Pain Location 1: Leg     Pain Orientation 1: Left, Right, Lower     Patient Stated Pain Goal: 0       Self-Care and ADLs:  Grooming: Independent Bathing: Modified independent   UB Dressing: Independent LB Dressing: Modified independent to minimum assistance- patient reports that when her swelling worsens she has difficulty donning her pants legs over her feet to do issues with reaching   Don/Doff Shoes/Socks: Patient has difficulty reaching her feet and is currently wearing slip on shoes due to difficulty with fit of footwear. She reports she would need assistance to don socks especially when her swelling volumes are increased. She reports that she cannot don pantyhose at this time. Toileting: Independent    Other:  Patient reports she is having increased difficulty getting her legs into the car when she rides on the passenger side.   It is not as difficult when she gets in the car on the drivers side    Skin and Tissue Assessment:  Dermal Status:  (x )  Intact ( )  Dry   (x )  Tenuous left leg ( )  Flaky   ( )  Wound/lesion present ( x)  Scars right ankle lateral malleolar region secondary to a history of orthopedic surgery in the area   ( )  Dermatitis    Texture/Consistency:  ( )  Boggy ( x)  Pitting Edema dorsum of feet bilaterally   (x )  Brawny right lower leg ( )  Combination   (x )  Fibrotic/Woody left lower leg     Pigmentation/Color Change:  ( )  Normal ( )  Hemosiderin   ( )  Red ( )  Erythematous   (x )  Hyperpigmented left anterior leg ( )  Hyperlipodermatosclerosis   Anomalies:  Pebbly skin texture changes are noted on anterior lower legs with left significantly worse than right  ( )  Lymphorrhea ( )  Vesicles   ( )  Petechiae ( )  Warty Vercusis   ( )  Bullae ( )  Papilloma   Circulatory:  ( )  HILL ( ) Varicosities:   (x )  Pulses- pedal pulses are palpable bilateral lower extremities ( )  Vascular studies ruled out DVT in past   ( )  DVT History    Nails:  ( x)  Normal  ( )  Fungus  Stemmers Sign: Not tested  Height:  Height: 5' 2\" (157.5 cm)  Weight:  Weight: 107 kg (236 lb)   BMI:  BMI (calculated): 43.2  (36 or greater: adversely affecting lymphedema)  Wound/Ulcer:  N/A     Wound Pain:   N/A  Volumetric Measurements:   Right:  15,103.37 mL Left:  17,525.50 mL   % Difference: Left lower extremity is 16.04% larger than the right Dominance: Right   (See scanned graph)  Swelling is noted bilaterally. Left thigh swelling is visible and patient reports intermittent truncal swelling especially in the left gluteal region that is noted to affect her clothing fit.   Range of Motion: Hip flexion range of motion is 20% decreased due to soft tissue and swelling, left knee flexion is 20% decreased due to soft tissue and swelling limitations  Strength: Decreased hip flexion strength is noted bilaterally grossly 3+ to 4-/5 with activities in the clinic today, knee and ankle strength is grossly 4/5   Sensation:  Patient reports tingling in her feet since initiating chemotherapy which is consistent with chemo induced neuropathy  Mobility:     Bed/Chair Mobility: Independent Transfers:  Independent   Sitting Balance:  Good Standing Balance:  Good   Gait:  Independent ambulation without assistive device with decreased step length and davidson Wheelchair Mobility:  N/A   Endurance:  Fair to good depending on chemotherapy cylce Stairs:  Ascends and descends stairs independent with increased time required as patient paces herself       Safety:  Patient is alert and oriented:  Yes   Safety awareness:  Yes   Fall Risk?:  Mild   Patient given written fall prevention handout: Yes   Precautions:  Standard lymphedema precautions to include avoiding blood pressure readings, injections and IVs or other procedures/acts that could lead to broken skin on affected area, and avoiding excessive heat, resistive activity or altitude without compression garment. Based on the above components, the patient evaluation is determined to be of the following complexity level: MEDIUM    Evaluation Time: 7629-5263   60 minutes    TREATMENT PROVIDED:   1. Treatment description:  Therapeutic Exercise and Procedure: Patient instructed in activity restrictions and exercise guidelines. Therapist demonstrated deep abdominal breathing and a remedial lymphedema range of motion exercise program to be done in sitting. Patient was able to complete the routine times 5 reps and deep abdominal breathing with  fair performance. Patient has been asked to complete breathing exercises and the decongestive exercise routine daily. Treatment time:  2513-3754  Minutes: 15    2. Treatment description:  Manual Therapy: Patient instructed in skin care principles and anatomy of the lymphatic system. Therapist able to demonstrate manual lymphatic drainage techniques for the drainage of bilateral lower extremities and skin care protocol: using low pH lotion. Educated patient in treatment components and goals. Patient expressed that she does not feel compression bandaging is an option for her with her current chemotherapy routine. Educated her in advanced vasopneumatic device with a trunk component to address truncal swelling and provided a brochure. Patient expressed interest in pursuing this type of product for her long term management routine. Discussed compression garment options and demonstrated custom versus non-custom traditional stockings as well as velcro style options and addressed basic questions. Educated patient on pricing. She is to consider her options and she will be measured for products during her next treatment session.   Treatment time:  0384-5668  Minutes: 25    ASSESSMENT:   Jorge Lima is a 75 y.o. female who presents with bilateral lower extremity stage 2 lymphedema secondary to a history of metastatic vaginal cancer. Patient first experienced a tumor in 1978 with a re-occurrence 12/2015 and again  6/2017 with metastatic disease. Patient is currently receiving chemotherapy with one cycle every 3 weeks. She first presented with lymphedema about 3 months ago with the left leg consistently larger than the right. She has experienced truncal swelling primarily noted in the left gluteal region. Skin and texture changes are noted in the lower extremities. Her condition is complicated by probable neuropathy in her feet, HTN, and a history of right ankle orthopedic surgery. Patient will benefit from complete decongestive therapy (CDT) including manual lymphatic drainage (MLD) technique, short-stretch textile bandages/compression system to decongest limb, and kinesiotaping as appropriate. Patient will receive instruction in proper skin care to recognize signs/symptoms of and prevent infection, therapeutic exercise, and self-MLD for independent home program and restorative lymphatic performance. This care is medically necessary due to the infection risk with lymphedema and to improve functional activities. CDT is necessary to resolve swelling to allow patient to return to wearing normal clothes/footwear and prevent worsening of symptoms, such as venous stasis ulcerations, infections, or hospitalizations. Patient will be independent with home program strategies to allow improved ADL ability and mobility and to allow patient to return to greatest functional independence. Rehabilitation potential is considered to be good. Factors which may influence rehabilitation potential include that patient has a supportive  and potential medical status changes related to her cancer and her cancer treatments. Patient will benefit from 8-10 physical therapy visits over 10-12 weeks to optimize improvement in these areas.     PLAN OF CARE:   Recommendations and Planned Interventions:  Manual lymph drainage/complete decongestive therapy  Compression garment fitting/provision  Lymphedema therapeutic exercise  AROM/PROM/Strength/Coordination  Self-care training  Functional mobility training  Education in skin care and lymphedema precautions  Self-MLD education per home program  Caregiver education as needed  Wound care as needed     GOALS  Short term goals  Time frame: 09/20/17  1. Patient will demonstrate knowledge of signs/symptoms of infections/cellulitis and be independent in skin care to prevent cellulitis. 2.  Patient will demonstrate independence in lymphedema home program of therapeutic exercises to improve circulation and decongest limb to improve ADLs. 3.  Patient will be measured for appropriate daytime compression garments to prevent worsening of swelling over time. 4.  Patient will receive a pneumatic compression device demonstration (preferably an advanced device with a trunk component) to incorporate this tool into patient's self management routine and improve her swelling over time. Long term goals  Time frame: 11/17/17  1. Pt will show improvement in Lymphedema Life Impact Scale by decreasing impairment percentage to under 40 and thus allow improvement in patient's quality of life. 2.  Patient will be independent with don/doff of compression system and use in order to prevent reaccumulation of fluid at discharge. 3.  Pt will be independent in self-MLD and show stable limb volumes showing decongestion and pt. ready for transition to independent restorative phase of lymphedema therapy. In compliance with CMSs Claims Based Outcome Reporting, the following G-code set was chosen for this patient based on their primary functional limitation being treated:     The outcome measure chosen to determine the severity of the functional limitation was the Lymphedema Life Impact Scale with a score of 39 and a percentage of 57% which was correlated with the impairment scale. ? Other PT/OT Primary Functional Limitations:     - CURRENT STATUS: CK - 40%-59% impaired, limited or restricted    - GOAL STATUS: CJ - 20%-39% impaired, limited or restricted    - D/C STATUS:  ---------------To be determined---------------       Patient has participated in goal setting and agrees to work toward plan of care. Patient was instructed to call if questions or concerns arise. Thank you for this referral.   Mony Geiger, CHEPE, [facility]   Time Calculation: 100 mins    TREATMENT PLAN EFFECTIVE DATES:   8/22/2017 TO 11/17/17  I have read the above plan of care for Herb Gibson. I certify the above prescribed services are required by this patient and are medically necessary.   The above plan of care has been developed in conjunction with the lymphedema/physical therapist.       Physician Signature: ____________________________________Date:______________                                    Dr. Logan Gomez

## 2017-08-29 ENCOUNTER — HOSPITAL ENCOUNTER (OUTPATIENT)
Dept: PHYSICAL THERAPY | Age: 67
Discharge: HOME | End: 2017-08-29
Payer: MEDICARE

## 2017-08-29 PROCEDURE — 97140 MANUAL THERAPY 1/> REGIONS: CPT

## 2017-08-29 PROCEDURE — 97016 VASOPNEUMATIC DEVICE THERAPY: CPT

## 2017-08-29 NOTE — PROGRESS NOTES
[facility]  Physical Therapy Lymphedema Clinic  [address], [address], [place]  [place].    LYmphedema Therapy  Visit:2    [x]                  Daily note               []                 30 day/10th visit progress note    NAME: Negra Mcclellan  DATE: 8/29/2017    GOALS  Short term goals  Time frame: 09/20/17  1. Patient will demonstrate knowledge of signs/symptoms of infections/cellulitis and be independent in skin care to prevent cellulitis. Continued education on cellulitis/infection prevention. Patient also able to verbalize the correct skin care routine and products to use daily. 2.  Patient will demonstrate independence in lymphedema home program of therapeutic exercises to improve circulation and decongest limb to improve ADLs. Patient has been performing active range of motion remedial exercises daily at home. 3.  Patient will be measured for appropriate daytime compression garments to prevent worsening of swelling over time. Patient able to be measured for compression today to include: Frederich Radha, Solaris Ready Wrap Calf and a Compreboot. Patient will be flying with in the next month and needed compression as soon as possible. Will assess fit at her next visit. 4.  Patient will receive a pneumatic compression device demonstration (preferably an advanced device with a trunk component) to incorporate this tool into patient's self management routine and improve her swelling over time. Patient tried the Leblanc pump today on the L LE with no significant improvement in girths (see measurement section of the not for pre/post circumferences). Her hip and waist measurements also increased which is of concerned due to nature of her medical history and need preferably for a device that will have trunk component.  Patient also had some discomfort with the Leblanc even on low setting as the pressure is not segmented with gentle gradient pressure, which would be more beneficial for patient.      Long term goals  Time frame: 11/17/17  1. Pt will show improvement in Lymphedema Life Impact Scale by decreasing impairment percentage to under 40 and thus allow improvement in patient's quality of life. 2.  Patient will be independent with don/doff of compression system and use in order to prevent reaccumulation of fluid at discharge. Educated patient on various options for compression and she decided on the Solaris Lower leg velcro system, Lauren Hoskins for foot and ankle and the Sofie Compression Capris for full coverage of B LEs and lower trunk. Order placed with [facility]. 3.  Pt will be independent in self-MLD and show stable limb volumes showing decongestion and pt. ready for transition to independent restorative phase of lymphedema therapy. Full volumes deferred today. SUBJECTIVE REPORT: Patient arrived stating she was going to be flying in September and will need compression as soon as possible. Able to be measured for compression today during her visit. Pain: 2/10 R LE  Gait: Independent  ADLs: Modified Independent to Independent  Treatment Response:Patient very attentive and eager to learn how to manage her swelling. Patient has been working on her home program since evaluation. Patient reports that she will be going on a long flight in September, so we were able to measure patient today for compression so that she could be fitted with the items before her trip. Reviewed packet received at evaluation. Function:Patient is independent with her mobility.  drove her to her appointment today. They enjoy traveling. Began MLD (manual lymphatic drainage) techniques with lotioning and skin care and range of motion exercise routine without props. TREATMENT AND OBJECTIVE DATA SUMMARY:   Patient/Family Education:      Educated in skin care: Demonstrated skin care principles     Educated in exercise:  Instructed in active range of motion exercise routine      Instructed in self MLD: Written sequence given and reviewed with patient as well as demonstration and instruction during MLD portion of the session. Instructed in don/doff of compression system: Educated patient on various options for compression and she decided on the Solaris Lower leg velcro system, Minus Curling for foot and ankle and the Sofie Compression Capris for full coverage of B LEs and lower trunk. Order placed with [facility] [employer] [facility]. Therapeutic Activity 0 minutes   Treatment time: 0  Functional Mobility: [place]   Fall risk: Low     Therapeutic Exercise/Procedure 0 minutes   Treatment time: 0  Randa ball exercise program: Deferred   Free exercises/ROM: Patient able to demonstrate knowledge of her active range of motion exercise routine. Home program: Patient to perform daily to BID skin care, deep abdominal breathing, exercise routine, and return to clinic next visit with her new compression garments for fitting. Rationale: Exercise will increase the lymph angiomotoricity and tissue pressure of the skin and thus decrease swelling. Modalities 30 minutes (MLD and garment demonstration done while patient on Entre Pump)   Treatment time:11:20am-11:50am  Vasopneumatic pump: Patient was able to have an Entre Pump trial today in the clinic for 30 minutes to the L LE in supine. Patient also during this time worked on Self MLD to be able to clear her trunk. Patient had some discomfort with the sleeve due to pressure produced even on the low setting. Patient's medical history (see evaluation for past medical history/diagnosis) warrants that a truncal component and gentle gradient pressure would be more effective  for this patient to use. Patient's trial did not produce any significant improvements in her measurements of the L LE and actually increased her measurements in 4/8 locations that were measured.  With greatest concern was the hips and abdomen. Patient began to complain of fullness in her abdomen so the trial was stopped. See measurement section of the note for pre and post measurements. Manual Lymphatic Drainage (MLD) 80 minutes    Treatment time: 11:00am-11:20am and 11:50am-12:30pm  Area to decongest: B LEs (L >R) and Trunk   Sequence used and effectiveness: Secondary Sequence with education and demonstration of Self MLD packet today. Patient able to  on the concepts well and will continue to work on Self MLD packet in the home and return to the clinic for more review in upcoming visits. Skin/wound care/debridement: Intact. Dermal Status:  Tenous L LE. Scars R ankle lateral malleolar region secondary to orthopedic surgery. Texture:  Pitting Edema bilaterally on the dorsal surface  L LE: Fibrotic/Woody lower leg  R LE: Brawny  Pigmentation/Color Changes:  Hyperpigmented L LE anterior surface  Anomalies:  Pebbly skin texture changes are noted on the anterior lower legs with significantly worse on the R LE. Upper/Lower extremity compression: Educated patient on various options for compression and she decided on the Solaris Lower leg velcro system, [employer] Compreboot for foot and ankle and the Sofie Compression Capris for full coverage of B LEs and lower trunk. Order placed with [facility]. Kinesiotaping: Deferred. Girth/Volume measurement: Reviewed results of volumetric measurements taken on evaluation.  See girths below for [facility] Trial)     TOTAL TREATMENT 90 mins     Circumferential Limb Measurements:  Affected Side: B LE (L>R) Bold represents post Entre pump trial)   Left (cm) Right (cm)   5th Tuberosity 23.5  23.9        Ankle 29.2  27.2  26.5      Calf 51.8  51  44.3      Mid Knee 51.4  53.7        Thigh 69.8  68.4        Groin 80  79.4        Hips  126.5   127.5          Waist  108.5  109.0               ASSESSMENT:   Treatment effectiveness and tolerance: Patient is very eager to learn all aspects of CDT so that she can mange her swelling. Patient was able to have a pump trial today in the clinic, order compression and learn Self MLD. Patient has been working on proper skin care and exercises in the home as instructed at evaluation. Will have patient follow up when her garments arrive to assess the fit as patient will being flying next month. Progress toward goals: See goal section for details. PLAN OF CARE:   Changes to the plan of care: Patient to begin Self MLD and to continue home program established on evaluation. Patient to bring in her compression next visit for fitting and assessment. Frequency: Patient to follow up when her garments arrive. Other: Compression order sent to [facility] for processing. Will contact [facility] in regards to [facility] Pump Trial that occurred today in the clinic.         Robson Dang, PTA, CLT

## 2017-08-31 RX ORDER — DIPHENHYDRAMINE HYDROCHLORIDE 50 MG/ML
50 INJECTION, SOLUTION INTRAMUSCULAR; INTRAVENOUS ONCE
Status: DISCONTINUED | OUTPATIENT
Start: 2017-09-06 | End: 2017-09-06 | Stop reason: SINTOL

## 2017-08-31 RX ORDER — GRANISETRON HYDROCHLORIDE 1 MG/ML
1 INJECTION INTRAVENOUS ONCE
Status: DISCONTINUED | OUTPATIENT
Start: 2017-09-06 | End: 2017-09-06 | Stop reason: SINTOL

## 2017-08-31 RX ORDER — DEXAMETHASONE SODIUM PHOSPHATE 4 MG/ML
12 INJECTION, SOLUTION INTRA-ARTICULAR; INTRALESIONAL; INTRAMUSCULAR; INTRAVENOUS; SOFT TISSUE ONCE
Status: DISCONTINUED | OUTPATIENT
Start: 2017-09-06 | End: 2017-09-06 | Stop reason: SINTOL

## 2017-09-05 ENCOUNTER — OFFICE VISIT (OUTPATIENT)
Dept: GYNECOLOGY | Age: 67
End: 2017-09-05

## 2017-09-05 VITALS
HEART RATE: 92 BPM | SYSTOLIC BLOOD PRESSURE: 170 MMHG | BODY MASS INDEX: 42.99 KG/M2 | HEIGHT: 62 IN | DIASTOLIC BLOOD PRESSURE: 92 MMHG | WEIGHT: 233.6 LBS

## 2017-09-05 DIAGNOSIS — C52 VAGINAL CANCER (HCC): Primary | ICD-10-CM

## 2017-09-05 RX ORDER — DOCUSATE SODIUM 100 MG/1
CAPSULE ORAL
Status: ON HOLD | COMMUNITY
Start: 2017-08-22 | End: 2018-01-01

## 2017-09-05 NOTE — PROGRESS NOTES
[address] [place] [address] [address], [address] [facility]  [phone]  MD Marci Ferrell MD  Office Note  Patient ID:  Name:  Mortimer Meth  MRN:  [id number]  :   y.o. Date:  2017      HISTORY OF PRESENT ILLNESS:  Mortimer Meth is a 75 y.o.  postmenopausal female who was referred to me in 2015 for a pelvic mass by Dr. Dannielle Brothers, who noted the mass on routine gynecologic exam.  He sent her for a CT of the pelvis, which revealed a mass at the vaginal apex, that appeared to be invading into the soft tissues of the left pelvis. There were several enlarged lymph nodes on the left. There was also questionable bladder invasion, and possibly even involvement of the rectosigmoid colon. She reported a history of having a mass removed from one of her ovaries in 1 at [facility], which required a second surgery to remove her uterus and contralateral ovary. She said she did not have to take any chemotherapy, but she did have to take some kind of pill for a year. She has not had any gynecologic issues since that time. Presumably her hysterectomy was a total, rather than a supracervical, but she is unsure and there are no records. She denied any vaginal bleeding, except for after her examination with Dr. Real Valencia. She reported minimal pain in the pelvis. I took her the OR for and exam under anesthesia with cystoscopy and proctoscopy. Pathology revealed a squamous cell carcinoma, consistent with a vaginal primary. Examination revealed a fixed mass involving the left upper vagina with extension to the left pelvic sidewall. There also appeared to be involvement of the posterior bladder wall, but no mucosal involvement. The mass did push against the rectal wall, but there did not appear to be any direct involvement. I referred her to radiation oncology and she saw Dr. Francisco Vicente.   She completed pelvic radiation along with concurrent cisplatin chemotherapy in February 2016 and had a great response. When I saw her in the office in March 2017 for follow up she was doing well and without complaints. Her pelvic exam and pap smear at that time were both negative. Subsequent to that visit she began having back pain that had progressively worsened. She saw Ree Cortez in May and she ordered a CT. This unfortunately demonstrated extensive metastatic disease outside of the prior radiated field. Dr. Tianna Morgan then sent her for a PET/CT which demonstrated widely metastatic disease. I recommended systemic chemotherapy with Taxol/Cisplatin/Avastin, which is the standard for metastatic cervical cancer, since vaginal cancer behaves similar to cervical cancer. Unfortunately, the Avastin was denied by her insurance company. We chose to proceed with Taxol/Cisplatin. She has completed 4 cycles so far. She has done well so far with her treatment. She denies nausea. She reports some persistent lower extremity edema, but that has overall improved. Her recent CT demonstrated stable disease to mild improvement, but not progression. ROS:   and GI review: Negative  Cardiopulmonary review:Negative   Musculoskeletal:  Negative    A comprehensive review of systems was negative except for that written in the History of Present Illness.  , 10 point ROS        Problem List:  Patient Active Problem List    Diagnosis Date Noted    Elevated glucose 08/16/2017    Elevated serum creatinine 08/16/2017    Lymph edema 06/14/2017    Bilateral lower extremity edema 06/14/2017    Hypertension, essential 01/18/2017    Hypercholesterolemia 01/18/2017    GERD without esophagitis 01/18/2017    Vaginal cancer ([facility] [address] 12/10/2015    LAP-BAND surgery status 05/17/2012     PMH:  Past Medical History:   Diagnosis Date    Abnormal mammogram of left breast 12/5/2016    Cancer ([facility] [address] 06/2017    LYMPH NODES IN BACK    Cancer ([facility] [address] 2016    VAGINAL CANCER    GERD (gastroesophageal reflux disease) 6/11/06    Hypercholesterolemia 10/11/06    Hypertension 10/11/06    Morbid obesity ([facility] [address] 10/11/06      PSH:  Past Surgical History:   Procedure Laterality Date    HX APPENDECTOMY      HX GASTRIC BYPASS  2009    LAP BAND    HX GYN  12/4/15    EUA/Bx of pelvic mass and vagina/cystoscopy/proctoscopy    HX HYSTERECTOMY  1978    HX ORTHOPAEDIC  2009    right ankle    HX OTHER SURGICAL  2001? benign mass removed from lower back    LAP, PLACE ADJUST GASTR BAND  1/30/08    EB: AP Std lap band, liver bx      Social History:  Social History   Substance Use Topics    Smoking status: Former Smoker     Packs/day: 0.50     Years: 15.00     Types: Cigarettes     Quit date: 1/1/1993    Smokeless tobacco: Never Used    Alcohol use Yes      Comment: socially-WINE ONCE A WEEK      Family History:  Family History   Problem Relation Age of Onset    Cancer Mother      colon WITH METS TO LIVER    Diabetes Father     Heart Disease Father     Cancer Sister      breast & bone    Cancer Maternal Aunt      BREAST    Cancer Paternal Aunt      BREAST    Anesth Problems Neg Hx       Medications: (reviewed)  Current Outpatient Prescriptions   Medication Sig    COLACE 100 mg capsule     oxyCODONE-acetaminophen (PERCOCET) 5-325 mg per tablet Take 1-2 Tabs by mouth every four (4) hours as needed for Pain. Max Daily Amount: 12 Tabs.  furosemide (LASIX) 20 mg tablet Take 1 Tab by mouth daily as needed.  dexamethasone (DECADRON) 4 mg tablet Take 2 tablets with breakfast the day before chemo and for 2 days after chemo    lidocaine-prilocaine (EMLA) topical cream Apply small amount over port area one hour before chemo treatment and cover with a Band-Aid    ondansetron (ZOFRAN ODT) 4 mg disintegrating tablet Take 1 Tab by mouth every eight (8) hours as needed for Nausea.  Indications: CANCER CHEMOTHERAPY-INDUCED NAUSEA AND VOMITING    acetaminophen (TYLENOL) 325 mg tablet Take 650 mg by mouth every four (4) hours as needed for Pain.  docusate sodium (STOOL SOFTENER) 100 mg capsule Take 100 mg by mouth daily.  atorvastatin (LIPITOR) 80 mg tablet Take 1 Tab by mouth daily.  losartan (COZAAR) 100 mg tablet Take 1 Tab by mouth daily.  triamterene-hydroCHLOROthiazide (MAXZIDE) 37.5-25 mg per tablet Take 1 Tab by mouth daily.  Cholecalciferol, Vitamin D3, (VITAMIN D3) 1,000 unit cap Take 1 Cap by mouth. No current facility-administered medications for this visit. Facility-Administered Medications Ordered in Other Visits   Medication Dose Route Frequency    [START ON 9/6/2017] 0.9% sodium chloride 1,000 mL with potassium chloride 20 mEq, magnesium sulfate 2 g infusion   IntraVENous CONTINUOUS    [START ON 9/6/2017] diphenhydrAMINE (BENADRYL) injection 50 mg  50 mg IntraVENous ONCE    [START ON 9/6/2017] famotidine (PF) (PEPCID) 20 mg in sodium chloride 0.9 % 10 mL injection  20 mg IntraVENous ONCE    [START ON 9/6/2017] granisetron (KYTRIL) injection 1 mg  1 mg IntraVENous ONCE    [START ON 9/6/2017] fosaprepitant (EMEND) 150 mg in 0.9% sodium chloride 150 mL IVPB  150 mg IntraVENous ONCE    [START ON 9/6/2017] PACLitaxel (TAXOL) 304 mg in 0.9% sodium chloride 250 mL, overfill volume 25 mL chemo infusion  304 mg IntraVENous ONCE    [START ON 9/6/2017] CISplatin (PLATINOL) 112 mg in 0.9% sodium chloride 500 mL, overfill volume 50 mL chemo infusion  112 mg IntraVENous ONCE    [START ON 9/6/2017] dexamethasone (DECADRON) 4 mg/mL injection 12 mg  12 mg IntraVENous ONCE     Allergies: (reviewed)  Allergies   Allergen Reactions    Amlodipine Other (comments)     Edema in LE's          OBJECTIVE:    Physical Exam:  VITAL SIGNS: Vitals:    09/05/17 1006 09/05/17 1012   BP: (!) 153/104 (!) 170/92   Pulse: 89 92   Weight: 106 kg (233 lb 9.6 oz)    Height: 5' 2.01\" (1.575 m)      Body mass index is 42.71 kg/(m^2). GENERAL MIGUELANGEL: Conversant, alert, oriented. No acute distress. HEENT: HEENT. No thyroid enlargement. No JVD. Neck: Supple without restrictions. RESPIRATORY: Clear to auscultation and percussion to the bases. No CVAT. CARDIOVASC: RRR without murmur/rub. GASTROINT: soft, non-tender, without masses or organomegaly   MUSCULOSKEL: no joint tenderness, deformity or swelling   EXTREMITIES: Marked edema of bilateral lower extremities, L>R. Skin noted with vascular changes on the left. PELVIC: Deferred   RECTAL: Deferred   PATRICIO SURVEY: No suspicious lymphadenopathy. NEURO: Grossly intact. No acute deficit. CT of abdomen/pelvis (5/16/17)  LUNG BASES: Multiple bilateral pulmonary nodules. INCIDENTALLY IMAGED HEART AND MEDIASTINUM: Unremarkable. LIVER: No mass or biliary dilatation. GALLBLADDER: Cholelithiasis. SPLEEN: Calcified granulomata. PANCREAS: No mass or ductal dilatation. ADRENALS: Unremarkable. KIDNEYS: Right renal cyst.  STOMACH: Unremarkable. SMALL BOWEL: No dilatation or wall thickening. COLON: No dilatation or wall thickening. APPENDIX: Unremarkable. PERITONEUM: No ascites or pneumoperitoneum. RETROPERITONEUM: Significant retroperitoneal lymphadenopathy, largest dimension  surrounding the aorta 6.5 x 4.2 cm. REPRODUCTIVE ORGANS: There is 4.6 x 2.4 cm soft tissue in the region of  previously identified left pelvic sidewall mass. URINARY BLADDER: No mass or calculus. BONES: No destructive bone lesion. Degenerative changes of L4-L5 and L5-S1. ADDITIONAL COMMENTS: N/A     IMPRESSION:  1. Bilateral pulmonary nodules and retroperitoneal lymphadenopathy compatible  with metastatic disease. 2. Much smaller mass in the left pelvis, or scar. 3. Cholelithiasis. PET/CT (6/6/17)  HEAD/NECK: No apparent foci of abnormal hypermetabolism. Cerebral evaluation is  limited by normal intense activity.     CHEST: There are hypermetabolic left supraclavicular, superior mediastinal, and  paraesophageal lymph nodes.  The small pulmonary nodules are minimally  hypermetabolic, maximum SUV of the lesion in the right middle lobe is 1.9.     ABDOMEN/PELVIS: Para-aortic and aortocaval lymphadenopathy is hypermetabolic,  maximum SUV 8.0. Small but hypermetabolic bilateral external iliac lymph nodes  are noted.     SKELETON: There is a hypermetabolic lytic lesion in the sternum, maximum SUV is  9.7. There is a small sclerotic lesion at L3 which is new compared to the prior  PET/CT. Given the tracer activity corresponding to the adjacent retroperitoneal  lymphadenopathy that is difficult to determine if this small lesion is  hypermetabolic.     IMPRESSION:   1. Mildly hypermetabolic pulmonary nodules are concerning for metastatic  disease. 2. Hypermetabolic left supraclavicular, superior mediastinal, and paraesophageal  lymph nodes as well as retroperitoneal and bilateral external iliac lymph nodes. 3. Hypermetabolic lytic lesion in the sternum is concerning for metastatic  disease. Small sclerotic lesion at L3 is new compared to the prior PET/CT but  too small for accurate characterization by PET CT.      CT of abdomen/pelvis (8/14/17)  There is a left jugular Port-A-Cath with tip in the superior vena cava. LOWER NECK:The visualized portions of the thyroid and structures of the lower  neck are within normal limits. CHEST: There is a 1.4 cm left supraclavicular lymph node. Lungs: There are multiple nodules throughout the lungs, some with cavitation  which are unchanged. Pleura: There is no pleural effusion or pneumothorax. Aorta: The aorta enhances normally with no evidence of aneurysm or dissection. Mediastinum: There is no mediastinal or hilar adenopathy or mass. Bones and soft tissues: The bones and soft tissues of the chest wall are normal.  ABDOMEN:  Liver: The liver is normal in size and contour with no focal abnormality. Gallbladder and bile ducts: There are small stones in the gallbladder.  The  gallbladder wall is not thickened and there is no pericholecystic fluid. Spleen: No abnormality. There are tiny calcified granulomas in the spleen. Pancreas: No abnormality. Adrenal glands: No abnormality. Kidneys: No abnormality. PELVIS:  Reproductive organs: The uterus is absent. Bladder: No abnormality. BOWEL AND MESENTERY: There is a laparoscopic gastric band in expected position  in the upper abdomen. The small bowel is not obstructed. There is no mesenteric  mass or adenopathy. The appendix is absent. There are diverticula of the  sigmoid colon. PERITONEUM: There is no ascites or free intraperitoneal air. RETROPERITONEUM: The aorta is atherosclerotic and tapers without aneurysm. There  is retroperitoneal adenopathy. There is a 2.7 x 2.1 x 3.3 cm left para-aortic  lymph node and just anterior to this there is a 1.9 x 1.9 x 2.6 cm preaortic  lymph node. These have decreased minimally in size and previously measured 2 .9  x 2.5 and 2 x 2 centimeters. There is no pelvic mass or adenopathy. BONES AND SOFT TISSUES: There are degenerative changes of the spine and there  are sclerotic lesions in the L3 and L4 vertebral bodies. There is a sclerotic  and lytic lesion in the sternum.     IMPRESSION:   1. Status post hysterectomy and bilateral oophorectomy. 2. Bilateral pulmonary nodules, some with cavitation unchanged consistent with  metastatic disease. 3 Retroperitoneal adenopathy unchanged or minimally decreased in size and  enlarged left supraclavicular lymph node consistent with metastatic disease. 4. Small left pelvic mass or scar unchanged. 5. Lytic and sclerotic sternal lesion consistent with metastasis. 6. L3 and L4 bone lesions more sclerotic suggesting healing. 7. Atherosclerotic  abdominal aorta without aneurysm. 8. Gallstones. 9. Splenic granulomas indicative of old granulomatous disease. IMPRESSION/PLAN:  Miguel Angel Flowers is a 75 y.o. female with a history of stage III vaginal carcinoma.   She was treated with pelvic radiation therapy and concurrent cisplatin chemotherapy. She now has recurrent disease with multiple sites of metastases. I had a long discussion with the patient and her  discussing the best course of management. Surgery is not an option due to the widespread nature of her disease. The same is true for radiation therapy. Her only option is systemic chemotherapy. Since vaginal cancer behaves like cervical carcinoma, I recommended using the standard regimen for metastatic cervical carcinoma, Taxol/Cisplatin/Avastin. Unfortunately, the Avastin was denied by her insurance company. We chose to proceed with Taxol/Cisplatin. She has completed 4 cycles so far. On her recent CT her pulmonary nodules appeared stable and her retroperitoneal lymphadenopathy appeared slightly improved. We will continue with the current regimen.        Signed By: Loni Alcantara MD     9/5/2017/4:31 PM

## 2017-09-06 ENCOUNTER — HOSPITAL ENCOUNTER (OUTPATIENT)
Dept: INFUSION THERAPY | Age: 67
Discharge: HOME | End: 2017-09-06
Payer: MEDICARE

## 2017-09-06 ENCOUNTER — TELEPHONE (OUTPATIENT)
Dept: INTERNAL MEDICINE CLINIC | Age: 67
End: 2017-09-06

## 2017-09-06 VITALS
OXYGEN SATURATION: 95 % | RESPIRATION RATE: 20 BRPM | WEIGHT: 233 LBS | TEMPERATURE: 96.7 F | BODY MASS INDEX: 42.88 KG/M2 | HEIGHT: 62 IN | DIASTOLIC BLOOD PRESSURE: 89 MMHG | SYSTOLIC BLOOD PRESSURE: 179 MMHG | HEART RATE: 96 BPM

## 2017-09-06 DIAGNOSIS — N18.30 CKD (CHRONIC KIDNEY DISEASE) STAGE 3, GFR 30-59 ML/MIN (HCC): Primary | ICD-10-CM

## 2017-09-06 DIAGNOSIS — I10 HYPERTENSION, ESSENTIAL: ICD-10-CM

## 2017-09-06 LAB
ALBUMIN SERPL-MCNC: 3.6 G/DL (ref 3.5–5)
ALBUMIN/GLOB SERPL: 0.8 {RATIO} (ref 1.1–2.2)
ALP SERPL-CCNC: 111 U/L (ref 45–117)
ALT SERPL-CCNC: 18 U/L (ref 12–78)
ANION GAP SERPL CALC-SCNC: 9 MMOL/L (ref 5–15)
AST SERPL-CCNC: 9 U/L (ref 15–37)
BASOPHILS # BLD: 0 K/UL (ref 0–0.1)
BASOPHILS NFR BLD: 0 % (ref 0–1)
BILIRUB SERPL-MCNC: 0.5 MG/DL (ref 0.2–1)
BUN SERPL-MCNC: 30 MG/DL (ref 6–20)
BUN/CREAT SERPL: 17 (ref 12–20)
CALCIUM SERPL-MCNC: 9.4 MG/DL (ref 8.5–10.1)
CANCER AG125 SERPL-ACNC: 58 U/ML (ref 0–30)
CHLORIDE SERPL-SCNC: 102 MMOL/L (ref 97–108)
CO2 SERPL-SCNC: 27 MMOL/L (ref 21–32)
CREAT SERPL-MCNC: 1.75 MG/DL (ref 0.55–1.02)
DIFFERENTIAL METHOD BLD: ABNORMAL
EOSINOPHIL # BLD: 0 K/UL (ref 0–0.4)
EOSINOPHIL NFR BLD: 0 % (ref 0–7)
ERYTHROCYTE [DISTWIDTH] IN BLOOD BY AUTOMATED COUNT: 17.6 % (ref 11.5–14.5)
GLOBULIN SER CALC-MCNC: 4.4 G/DL (ref 2–4)
GLUCOSE SERPL-MCNC: 108 MG/DL (ref 65–100)
HCT VFR BLD AUTO: 31.2 % (ref 35–47)
HGB BLD-MCNC: 10.2 G/DL (ref 11.5–16)
LYMPHOCYTES # BLD: 0.9 K/UL (ref 0.8–3.5)
LYMPHOCYTES NFR BLD: 10 % (ref 12–49)
MAGNESIUM SERPL-MCNC: 2 MG/DL (ref 1.6–2.4)
MCH RBC QN AUTO: 27.8 PG (ref 26–34)
MCHC RBC AUTO-ENTMCNC: 32.7 G/DL (ref 30–36.5)
MCV RBC AUTO: 85 FL (ref 80–99)
METAMYELOCYTES NFR BLD MANUAL: 1 %
MONOCYTES # BLD: 0.7 K/UL (ref 0–1)
MONOCYTES NFR BLD: 8 % (ref 5–13)
NEUTS BAND NFR BLD MANUAL: 2 %
NEUTS SEG # BLD: 7.2 K/UL (ref 1.8–8)
NEUTS SEG NFR BLD: 79 % (ref 32–75)
PLATELET # BLD AUTO: 213 K/UL (ref 150–400)
POTASSIUM SERPL-SCNC: 3.7 MMOL/L (ref 3.5–5.1)
PROT SERPL-MCNC: 8 G/DL (ref 6.4–8.2)
RBC # BLD AUTO: 3.67 M/UL (ref 3.8–5.2)
RBC MORPH BLD: ABNORMAL
RBC MORPH BLD: ABNORMAL
SODIUM SERPL-SCNC: 138 MMOL/L (ref 136–145)
WBC # BLD AUTO: 8.9 K/UL (ref 3.6–11)

## 2017-09-06 PROCEDURE — 74011000250 HC RX REV CODE- 250: Performed by: OBSTETRICS & GYNECOLOGY

## 2017-09-06 PROCEDURE — 36415 COLL VENOUS BLD VENIPUNCTURE: CPT | Performed by: OBSTETRICS & GYNECOLOGY

## 2017-09-06 PROCEDURE — 96365 THER/PROPH/DIAG IV INF INIT: CPT

## 2017-09-06 PROCEDURE — 96360 HYDRATION IV INFUSION INIT: CPT

## 2017-09-06 PROCEDURE — 86304 IMMUNOASSAY TUMOR CA 125: CPT | Performed by: OBSTETRICS & GYNECOLOGY

## 2017-09-06 PROCEDURE — 80053 COMPREHEN METABOLIC PANEL: CPT | Performed by: OBSTETRICS & GYNECOLOGY

## 2017-09-06 PROCEDURE — 96361 HYDRATE IV INFUSION ADD-ON: CPT

## 2017-09-06 PROCEDURE — 85025 COMPLETE CBC W/AUTO DIFF WBC: CPT | Performed by: OBSTETRICS & GYNECOLOGY

## 2017-09-06 PROCEDURE — 74011250636 HC RX REV CODE- 250/636: Performed by: OBSTETRICS & GYNECOLOGY

## 2017-09-06 PROCEDURE — 83735 ASSAY OF MAGNESIUM: CPT | Performed by: OBSTETRICS & GYNECOLOGY

## 2017-09-06 PROCEDURE — 74011250636 HC RX REV CODE- 250/636: Performed by: NURSE PRACTITIONER

## 2017-09-06 PROCEDURE — 77030012965 HC NDL HUBR BBMI -A

## 2017-09-06 RX ORDER — SODIUM CHLORIDE 0.9 % (FLUSH) 0.9 %
10-40 SYRINGE (ML) INJECTION AS NEEDED
Status: ACTIVE | OUTPATIENT
Start: 2017-09-06 | End: 2017-09-07

## 2017-09-06 RX ORDER — CARVEDILOL 3.12 MG/1
3.12 TABLET ORAL 2 TIMES DAILY WITH MEALS
Qty: 60 TAB | Refills: 2 | Status: SHIPPED | OUTPATIENT
Start: 2017-09-06 | End: 2018-01-01 | Stop reason: SDUPTHER

## 2017-09-06 RX ORDER — HEPARIN 100 UNIT/ML
500 SYRINGE INTRAVENOUS AS NEEDED
Status: ACTIVE | OUTPATIENT
Start: 2017-09-06 | End: 2017-09-07

## 2017-09-06 RX ORDER — SODIUM CHLORIDE 9 MG/ML
10 INJECTION INTRAMUSCULAR; INTRAVENOUS; SUBCUTANEOUS AS NEEDED
Status: ACTIVE | OUTPATIENT
Start: 2017-09-06 | End: 2017-09-07

## 2017-09-06 RX ADMIN — SODIUM CHLORIDE 2000 ML: 900 INJECTION, SOLUTION INTRAVENOUS at 10:07

## 2017-09-06 RX ADMIN — MAGNESIUM SULFATE HEPTAHYDRATE: 500 INJECTION, SOLUTION INTRAMUSCULAR; INTRAVENOUS at 09:19

## 2017-09-06 RX ADMIN — Medication 10 ML: at 08:39

## 2017-09-06 RX ADMIN — SODIUM CHLORIDE 10 ML: 9 INJECTION INTRAMUSCULAR; INTRAVENOUS; SUBCUTANEOUS at 08:39

## 2017-09-06 NOTE — TELEPHONE ENCOUNTER
Reviewed note from dr Jaime Gonzalez. Patient understands about stopping losartan. Patient also reports she was told to stop any fluid pills, by the NP at  [facility] office. Patient will come in for labs tomorrow and appt next week.

## 2017-09-06 NOTE — TELEPHONE ENCOUNTER
Phone call from Damion Brock NP at  [facility] office, advising that pt BP has been around 179/89, 170/92 and 153/104. Her creatinine and bun has been rising, would like your in put.

## 2017-09-06 NOTE — PROGRESS NOTES
[address]  [address] [place] [address] [id number] [facility]  [phone]  MD Richelle Pang MD Dianne E. Wall, NP  Patient ID:  Name:  Caryn Kilgore  MRN:  [id number]  :  1950/67 y.o. Date:  2017      HISTORY OF PRESENT ILLNESS:  Caryn Kilgore is a 75 y.o.  postmenopausal female who was referred to me in 2015 for a pelvic mass by Dr. Beth Newsome, who noted the mass on routine gynecologic exam.  He sent her for a CT of the pelvis, which revealed a mass at the vaginal apex, that appeared to be invading into the soft tissues of the left pelvis. There were several enlarged lymph nodes on the left. There was also questionable bladder invasion, and possibly even involvement of the rectosigmoid colon. She reported a history of having a mass removed from one of her ovaries in 1 at [facility], which required a second surgery to remove her uterus and contralateral ovary. She said she did not have to take any chemotherapy, but she did have to take some kind of pill for a year. She has not had any gynecologic issues since that time. Presumably her hysterectomy was a total, rather than a supracervical, but she is unsure and there are no records. She denied any vaginal bleeding, except for after her examination with Dr. Adam Joyce. She reported minimal pain in the pelvis. Dr. Fransisco Sands took her the OR for and exam under anesthesia with cystoscopy and proctoscopy. Pathology revealed a squamous cell carcinoma, consistent with a vaginal primary. Examination revealed a fixed mass involving the left upper vagina with extension to the left pelvic sidewall. There also appeared to be involvement of the posterior bladder wall, but no mucosal involvement. The mass did push against the rectal wall, but there did not appear to be any direct involvement. Dr. Fransisco Sands referred her to radiation oncology and she saw Dr. Salbador Moore.   She completed pelvic radiation along with concurrent cisplatin chemotherapy in February 2016 and had a great response. When Dr. Jesse Henriquez saw her in the office in March 2017 for follow up she was doing well and without complaints. Her pelvic exam and pap smear at that time were both negative. Subsequent to that visit she began having back pain that had progressively worsened. She saw Malik Mora in May and she ordered a CT. This unfortunately demonstrated extensive metastatic disease outside of the prior radiated field. Dr. Jeffrey Hill then sent her for a PET/CT which demonstrated widely metastatic disease. Dr. Jesse Henriquez recommended systemic chemotherapy with Taxol/Cisplatin/Avastin, which is the standard for metastatic cervical cancer, since vaginal cancer behaves similar to cervical cancer. Unfortunately, the Avastin was denied by her insurance company. He chose to proceed with Taxol/Cisplatin. She has completed 4 cycles so far. She has done well with the treatment thus far. She denies nausea/vomiting, change in bowel habits (she takes stool softener daily). She continues to complain of persistent lower extremity edema with her left leg being worse than her left. Her CT  Of chest/abd/pelvis on 8/14 demonstrates stable disease to mild improvement, but not progression. Today, she presents for her 5th cycle. She says she has been feeling well and is looking forward to a trip to [facility] at the end of the month. Of note, her creat was noted at 1.75, up from 1.59 on 8/16 and BUN 30 today, up from 19 on 8/16. She continues on her lasix and Maxide daily as per her PCP. She is not aware of any change in her urination and denies any flank pain. Says she has been eating well and denies wt loss     ROS:    A comprehensive review of systems was negative except for that written in the History of Present Illness.  , 10 point ROS        Problem List:  Patient Active Problem List    Diagnosis Date Noted    Elevated glucose 08/16/2017    Elevated serum creatinine 08/16/2017    Lymph edema 06/14/2017    Bilateral lower extremity edema 06/14/2017    Hypertension, essential 01/18/2017    Hypercholesterolemia 01/18/2017    GERD without esophagitis 01/18/2017    Vaginal cancer ([facility] [address] 12/10/2015    LAP-BAND surgery status 05/17/2012        Medications: (reviewed)  Current Outpatient Prescriptions   Medication Sig    COLACE 100 mg capsule     oxyCODONE-acetaminophen (PERCOCET) 5-325 mg per tablet Take 1-2 Tabs by mouth every four (4) hours as needed for Pain. Max Daily Amount: 12 Tabs.  furosemide (LASIX) 20 mg tablet Take 1 Tab by mouth daily as needed.  dexamethasone (DECADRON) 4 mg tablet Take 2 tablets with breakfast the day before chemo and for 2 days after chemo    lidocaine-prilocaine (EMLA) topical cream Apply small amount over port area one hour before chemo treatment and cover with a Band-Aid    ondansetron (ZOFRAN ODT) 4 mg disintegrating tablet Take 1 Tab by mouth every eight (8) hours as needed for Nausea. Indications: CANCER CHEMOTHERAPY-INDUCED NAUSEA AND VOMITING    acetaminophen (TYLENOL) 325 mg tablet Take 650 mg by mouth every four (4) hours as needed for Pain.  docusate sodium (STOOL SOFTENER) 100 mg capsule Take 100 mg by mouth daily.  atorvastatin (LIPITOR) 80 mg tablet Take 1 Tab by mouth daily.  losartan (COZAAR) 100 mg tablet Take 1 Tab by mouth daily.  triamterene-hydroCHLOROthiazide (MAXZIDE) 37.5-25 mg per tablet Take 1 Tab by mouth daily.  Cholecalciferol, Vitamin D3, (VITAMIN D3) 1,000 unit cap Take 1 Cap by mouth.      Current Facility-Administered Medications   Medication Dose Route Frequency    sodium chloride 0.9 % injection 10 mL  10 mL IntraVENous PRN    sodium chloride (NS) flush 10-40 mL  10-40 mL IntraVENous PRN    heparin (porcine) pf 500 Units  500 Units IntraVENous PRN     Allergies: (reviewed)  Allergies   Allergen Reactions    Amlodipine Other (comments)     Edema in LE's          OBJECTIVE:    Physical Exam:  VITAL SIGNS: Vitals:    09/06/17 0829   BP: 179/89   Pulse: 96   Resp: 20   Temp: 96.7 °F (35.9 °C)   SpO2: 95%   Weight: 233 lb (105.7 kg)   Height: 5' 2\" (1.575 m)     Body mass index is 42.62 kg/(m^2). GENERAL MIGUELANGEL: A&O X3 in NAD with pleasant affect   HEENT: No JVD or adenopathy appreciated  Neck: Supple without restrictions. No adenopathy appreciated   Port site: Without redness, tenderness or swelling. Accessed without difficulty   RESPIRATORY: CTA bilat without wheezing or rales   CARDIOVASC: Regular without M/R/G.   GASTROINT: soft, obese, NT/ND, with + BS throughout. No CVA tenderness noted   EXTREMITIES: Marked edema of bilateral lower extremities persist, L>R. Skin noted with vascular skin changes on the left. + pedal pulses with toes noted warm to touch   PELVIC: Deferred   RECTAL: Deferred   NEURO: Grossly intact. No acute deficit. CT of abdomen/pelvis (8/14/17)  LUNG BASES: Multiple bilateral pulmonary nodules. INCIDENTALLY IMAGED HEART AND MEDIASTINUM: Unremarkable. LIVER: No mass or biliary dilatation. GALLBLADDER: Cholelithiasis. SPLEEN: Calcified granulomata. PANCREAS: No mass or ductal dilatation. ADRENALS: Unremarkable. KIDNEYS: Right renal cyst.  STOMACH: Unremarkable. SMALL BOWEL: No dilatation or wall thickening. COLON: No dilatation or wall thickening. APPENDIX: Unremarkable. PERITONEUM: No ascites or pneumoperitoneum. RETROPERITONEUM: Significant retroperitoneal lymphadenopathy, largest dimension  surrounding the aorta 6.5 x 4.2 cm. REPRODUCTIVE ORGANS: There is 4.6 x 2.4 cm soft tissue in the region of  previously identified left pelvic sidewall mass. URINARY BLADDER: No mass or calculus. BONES: No destructive bone lesion. Degenerative changes of L4-L5 and L5-S1. ADDITIONAL COMMENTS: N/A     IMPRESSION:  1.  Bilateral pulmonary nodules and retroperitoneal lymphadenopathy compatible  with metastatic disease. 2. Much smaller mass in the left pelvis, or scar. 3. Cholelithiasis. PET/CT (6/6/17)  HEAD/NECK: No apparent foci of abnormal hypermetabolism. Cerebral evaluation is  limited by normal intense activity.     CHEST: There are hypermetabolic left supraclavicular, superior mediastinal, and  paraesophageal lymph nodes. The small pulmonary nodules are minimally  hypermetabolic, maximum SUV of the lesion in the right middle lobe is 1.9.     ABDOMEN/PELVIS: Para-aortic and aortocaval lymphadenopathy is hypermetabolic,  maximum SUV 8.0. Small but hypermetabolic bilateral external iliac lymph nodes  are noted.     SKELETON: There is a hypermetabolic lytic lesion in the sternum, maximum SUV is  9.7. There is a small sclerotic lesion at L3 which is new compared to the prior  PET/CT. Given the tracer activity corresponding to the adjacent retroperitoneal  lymphadenopathy that is difficult to determine if this small lesion is  hypermetabolic.     IMPRESSION:   1. Mildly hypermetabolic pulmonary nodules are concerning for metastatic  disease. 2. Hypermetabolic left supraclavicular, superior mediastinal, and paraesophageal  lymph nodes as well as retroperitoneal and bilateral external iliac lymph nodes. 3. Hypermetabolic lytic lesion in the sternum is concerning for metastatic  disease. Small sclerotic lesion at L3 is new compared to the prior PET/CT but  too small for accurate characterization by PET CT.      CT of abdomen/pelvis (8/14/17)  There is a left jugular Port-A-Cath with tip in the superior vena cava. LOWER NECK:The visualized portions of the thyroid and structures of the lower  neck are within normal limits. CHEST: There is a 1.4 cm left supraclavicular lymph node. Lungs: There are multiple nodules throughout the lungs, some with cavitation  which are unchanged. Pleura: There is no pleural effusion or pneumothorax. Aorta:  The aorta enhances normally with no evidence of aneurysm or dissection. Mediastinum: There is no mediastinal or hilar adenopathy or mass. Bones and soft tissues: The bones and soft tissues of the chest wall are normal.  ABDOMEN:  Liver: The liver is normal in size and contour with no focal abnormality. Gallbladder and bile ducts: There are small stones in the gallbladder. The  gallbladder wall is not thickened and there is no pericholecystic fluid. Spleen: No abnormality. There are tiny calcified granulomas in the spleen. Pancreas: No abnormality. Adrenal glands: No abnormality. Kidneys: No abnormality. PELVIS:  Reproductive organs: The uterus is absent. Bladder: No abnormality. BOWEL AND MESENTERY: There is a laparoscopic gastric band in expected position  in the upper abdomen. The small bowel is not obstructed. There is no mesenteric  mass or adenopathy. The appendix is absent. There are diverticula of the  sigmoid colon. PERITONEUM: There is no ascites or free intraperitoneal air. RETROPERITONEUM: The aorta is atherosclerotic and tapers without aneurysm. There  is retroperitoneal adenopathy. There is a 2.7 x 2.1 x 3.3 cm left para-aortic  lymph node and just anterior to this there is a 1.9 x 1.9 x 2.6 cm preaortic  lymph node. These have decreased minimally in size and previously measured 2 .9  x 2.5 and 2 x 2 centimeters. There is no pelvic mass or adenopathy. BONES AND SOFT TISSUES: There are degenerative changes of the spine and there  are sclerotic lesions in the L3 and L4 vertebral bodies. There is a sclerotic  and lytic lesion in the sternum.     IMPRESSION:   1. Status post hysterectomy and bilateral oophorectomy. 2. Bilateral pulmonary nodules, some with cavitation unchanged consistent with  metastatic disease. 3 Retroperitoneal adenopathy unchanged or minimally decreased in size and  enlarged left supraclavicular lymph node consistent with metastatic disease. 4. Small left pelvic mass or scar unchanged.   5. Lytic and sclerotic sternal lesion consistent with metastasis. 6. L3 and L4 bone lesions more sclerotic suggesting healing. 7. Atherosclerotic  abdominal aorta without aneurysm. 8. Gallstones. 9. Splenic granulomas indicative of old granulomatous disease. Assessment:  Patient Active Problem List    Diagnosis Date Noted    Elevated glucose 08/16/2017    Elevated serum creatinine 08/16/2017    Lymph edema 06/14/2017    Bilateral lower extremity edema 06/14/2017    Hypertension, essential 01/18/2017    Hypercholesterolemia 01/18/2017    GERD without esophagitis 01/18/2017    Vaginal cancer ([facility] [address] 12/10/2015    LAP-BAND surgery status 05/17/2012     Plan: Will hold treatment today due to elevated BUN/Creat  Will hydrate with 0.9% NS today  Called PCP's office and discussed the need to change her diuretics and see pt this week for re-evaluation. Labs discussed and they are able to see them as well in her chart and trend of creat. Will try to give cycle 5 next week. If worsened creat, will refer to nephrology  Pt is to call if swelling increases, she has any SOB, of decrease in urination   Continue to elevate lower ext as much as possible and encouraged with able, at or above the level of the heart  Pt's  is followed by Dr. Amador Gallardo and pt had said at last visit she would get in to see him within 2 weeks, but she never follow up on this and will call tomorrow to make an appointment   She will call with any concerns or questions.      Signed By: Lashaun Perez NP     9/6/2017/4:31 PM

## 2017-09-06 NOTE — PROGRESS NOTES
[facility] - Chemotherapy Progress Note    0830 Pt admit to [facility] for Taxol/Cisplatin ambulatory in stable condition. Assessment completed. No new concerns voiced. Port with positive blood return. Labs drawn and sent for processing. Labs back and reviewed and treatment was held due to increasing cr. Chemotherapy Flowsheet 9/6/2017   Cycle C5   Date 9/6/2017   Drug / Regimen Taxol/Cisplatin   Dosage -   Pre Hydration hydration    Post Hydration -   Pre Meds -   Notes Held         Visit Vitals    /89    Pulse 96    Temp 96.7 °F (35.9 °C)    Resp 20    Ht 5' 2\" (1.575 m)    Wt 105.7 kg (233 lb)    SpO2 95%    Breastfeeding No    BMI 42.62 kg/m2       Medications:  2000ml ns hydration     1215 Pt tolerated treatment well. Port maintained positive blood return throughout treatment. Flushed, heparinized and de-accessed per protocol. D/c home ambulatory in no distress. Pt aware of next appointment scheduled for 9/27/17. Recent Results (from the past 12 hour(s))   CBC WITH AUTOMATED DIFF    Collection Time: 09/06/17  8:33 AM   Result Value Ref Range    WBC 8.9 3.6 - 11.0 K/uL    RBC 3.67 (L) 3.80 - 5.20 M/uL    HGB 10.2 (L) 11.5 - 16.0 g/dL    HCT 31.2 (L) 35.0 - 47.0 %    MCV 85.0 80.0 - 99.0 FL    MCH 27.8 26.0 - 34.0 PG    MCHC 32.7 30.0 - 36.5 g/dL    RDW 17.6 (H) 11.5 - 14.5 %    PLATELET 818 922 - 681 K/uL    NEUTROPHILS 79 (H) 32 - 75 %    BAND NEUTROPHILS 2 %    LYMPHOCYTES 10 (L) 12 - 49 %    MONOCYTES 8 5 - 13 %    EOSINOPHILS 0 0 - 7 %    BASOPHILS 0 0 - 1 %    METAMYELOCYTES 1 %    ABS. NEUTROPHILS 7.2 1.8 - 8.0 K/UL    ABS. LYMPHOCYTES 0.9 0.8 - 3.5 K/UL    ABS. MONOCYTES 0.7 0.0 - 1.0 K/UL    ABS. EOSINOPHILS 0.0 0.0 - 0.4 K/UL    ABS.  BASOPHILS 0.0 0.0 - 0.1 K/UL    DF MANUAL      RBC COMMENTS ANISOCYTOSIS  1+        RBC COMMENTS POLYCHROMASIA  PRESENT       METABOLIC PANEL, COMPREHENSIVE    Collection Time: 09/06/17  8:33 AM   Result Value Ref Range    Sodium 138 136 - 145 mmol/L    Potassium 3.7 3.5 - 5.1 mmol/L    Chloride 102 97 - 108 mmol/L    CO2 27 21 - 32 mmol/L    Anion gap 9 5 - 15 mmol/L    Glucose 108 (H) 65 - 100 mg/dL    BUN 30 (H) 6 - 20 MG/DL    Creatinine 1.75 (H) 0.55 - 1.02 MG/DL    BUN/Creatinine ratio 17 12 - 20      GFR est AA 35 (L) >60 ml/min/1.73m2    GFR est non-AA 29 (L) >60 ml/min/1.73m2    Calcium 9.4 8.5 - 10.1 MG/DL    Bilirubin, total 0.5 0.2 - 1.0 MG/DL    ALT (SGPT) 18 12 - 78 U/L    AST (SGOT) 9 (L) 15 - 37 U/L    Alk.  phosphatase 111 45 - 117 U/L    Protein, total 8.0 6.4 - 8.2 g/dL    Albumin 3.6 3.5 - 5.0 g/dL    Globulin 4.4 (H) 2.0 - 4.0 g/dL    A-G Ratio 0.8 (L) 1.1 - 2.2     MAGNESIUM    Collection Time: 09/06/17  8:33 AM   Result Value Ref Range    Magnesium 2.0 1.6 - 2.4 mg/dL   CANCER ANTIGEN 125    Collection Time: 09/06/17  8:33 AM   Result Value Ref Range    CA-125 58 (H) 0 - 30 U/mL

## 2017-09-06 NOTE — TELEPHONE ENCOUNTER
We can put her in at 7:30. If so we must make sure she knows to be on time and that front doors do not open unit 7:30. Next week is alright too as we should do some things in the meantime. This allows me to give her more information when I see her. We need additional blood work and a urine tests. She had a recent CT, so renal ultrasound is not needed. I recommend stopping losartan and starting carvedilolol in its place. Losartan can affect kidney function. Orders placed. carvedilolol [facility] to [facility]. Questions for GYN/ONC will be regarding cisplatin which causes renal toxicity in 28-36% of patients, although appropriate hydration reduces risk. She did receive recommended fluids with each dose. So this seems less likely, but still a concern. I would like other results before I ask him to decide about about further chemotherapy.

## 2017-09-07 ENCOUNTER — APPOINTMENT (OUTPATIENT)
Dept: INTERNAL MEDICINE CLINIC | Age: 67
End: 2017-09-07

## 2017-09-07 ENCOUNTER — TELEPHONE (OUTPATIENT)
Dept: GYNECOLOGY | Age: 67
End: 2017-09-07

## 2017-09-07 DIAGNOSIS — N18.30 CKD (CHRONIC KIDNEY DISEASE) STAGE 3, GFR 30-59 ML/MIN (HCC): ICD-10-CM

## 2017-09-08 RX ORDER — DIPHENHYDRAMINE HYDROCHLORIDE 50 MG/ML
50 INJECTION, SOLUTION INTRAMUSCULAR; INTRAVENOUS ONCE
Status: COMPLETED | OUTPATIENT
Start: 2017-09-13 | End: 2017-09-13

## 2017-09-08 RX ORDER — GRANISETRON HYDROCHLORIDE 1 MG/ML
1 INJECTION INTRAVENOUS ONCE
Status: COMPLETED | OUTPATIENT
Start: 2017-09-13 | End: 2017-09-13

## 2017-09-08 RX ORDER — DEXAMETHASONE SODIUM PHOSPHATE 4 MG/ML
12 INJECTION, SOLUTION INTRA-ARTICULAR; INTRALESIONAL; INTRAMUSCULAR; INTRAVENOUS; SOFT TISSUE ONCE
Status: COMPLETED | OUTPATIENT
Start: 2017-09-13 | End: 2017-09-13

## 2017-09-11 LAB
25(OH)D3+25(OH)D2 SERPL-MCNC: 22 NG/ML (ref 30–100)
ALBUMIN SERPL ELPH-MCNC: 3.7 G/DL (ref 2.9–4.4)
ALBUMIN/GLOB SERPL: 1.3 {RATIO} (ref 0.7–1.7)
ALPHA1 GLOB SERPL ELPH-MCNC: 0.3 G/DL (ref 0–0.4)
ALPHA2 GLOB SERPL ELPH-MCNC: 0.9 G/DL (ref 0.4–1)
ANA SER QL: NEGATIVE
APPEARANCE UR: CLEAR
B-GLOBULIN SERPL ELPH-MCNC: 0.9 G/DL (ref 0.7–1.3)
BILIRUB UR QL STRIP: NEGATIVE
COLOR UR: YELLOW
GAMMA GLOB SERPL ELPH-MCNC: 0.9 G/DL (ref 0.4–1.8)
GLOBULIN SER CALC-MCNC: 2.9 G/DL (ref 2.2–3.9)
GLUCOSE UR QL: NEGATIVE
HGB UR QL STRIP: NEGATIVE
INTERPRETATION: NORMAL
KAPPA LC UR-MCNC: 6.91 MG/L (ref 1.35–24.19)
KAPPA LC/LAMBDA UR: 1.04 {RATIO} (ref 2.04–10.37)
KETONES UR QL STRIP: NEGATIVE
LAMBDA LC UR-MCNC: 6.63 MG/L (ref 0.24–6.66)
LEUKOCYTE ESTERASE UR QL STRIP: NEGATIVE
Lab: NORMAL
M PROTEIN SERPL ELPH-MCNC: NORMAL G/DL
MICRO URNS: NORMAL
NITRITE UR QL STRIP: NEGATIVE
PH UR STRIP: 6 [PH] (ref 5–7.5)
PLEASE NOTE, 011150: NORMAL
PROT SERPL-MCNC: 6.6 G/DL (ref 6–8.5)
PROT UR QL STRIP: NEGATIVE
PTH-INTACT SERPL-MCNC: 95 PG/ML (ref 15–65)
SP GR UR: 1.01 (ref 1–1.03)
URATE SERPL-MCNC: 8.6 MG/DL (ref 2.5–7.1)
UROBILINOGEN UR STRIP-MCNC: 0.2 MG/DL (ref 0.2–1)

## 2017-09-12 ENCOUNTER — OFFICE VISIT (OUTPATIENT)
Dept: INTERNAL MEDICINE CLINIC | Age: 67
End: 2017-09-12

## 2017-09-12 ENCOUNTER — HOSPITAL ENCOUNTER (OUTPATIENT)
Dept: PHYSICAL THERAPY | Age: 67
Discharge: HOME | End: 2017-09-12
Payer: MEDICARE

## 2017-09-12 VITALS
TEMPERATURE: 98.3 F | OXYGEN SATURATION: 97 % | WEIGHT: 239 LBS | BODY MASS INDEX: 43.98 KG/M2 | RESPIRATION RATE: 20 BRPM | DIASTOLIC BLOOD PRESSURE: 81 MMHG | HEIGHT: 62 IN | SYSTOLIC BLOOD PRESSURE: 143 MMHG | HEART RATE: 82 BPM

## 2017-09-12 DIAGNOSIS — C52 VAGINAL CANCER (HCC): ICD-10-CM

## 2017-09-12 DIAGNOSIS — J01.90 ACUTE NON-RECURRENT SINUSITIS, UNSPECIFIED LOCATION: ICD-10-CM

## 2017-09-12 DIAGNOSIS — N18.4 CKD (CHRONIC KIDNEY DISEASE) STAGE 4, GFR 15-29 ML/MIN (HCC): Primary | ICD-10-CM

## 2017-09-12 DIAGNOSIS — I10 HYPERTENSION, ESSENTIAL: ICD-10-CM

## 2017-09-12 DIAGNOSIS — R60.0 BILATERAL LOWER EXTREMITY EDEMA: ICD-10-CM

## 2017-09-12 PROCEDURE — 97140 MANUAL THERAPY 1/> REGIONS: CPT

## 2017-09-12 RX ORDER — AMOXICILLIN AND CLAVULANATE POTASSIUM 875; 125 MG/1; MG/1
1 TABLET, FILM COATED ORAL EVERY 12 HOURS
Qty: 14 TAB | Refills: 0 | Status: SHIPPED | OUTPATIENT
Start: 2017-09-12 | End: 2017-10-03 | Stop reason: ALTCHOICE

## 2017-09-12 NOTE — PROGRESS NOTES
Reviewed record In preparation for visit and have obtained necessary documentation. Has info on advanced directive but has not filled them out. 1. Have you been to the ER, urgent care clinic or hospitalized since your last visit? [facility]'s 6/9/17     2. Have you seen or consulted any other health care providers outside of the [address] [place] [address] since your last visit? Include any pap smears or colon screening. Dr Deepali Lazo, CT, [facility] center    Vitals reviewed with provider.     Health Maintenance reviewed:

## 2017-09-12 NOTE — PATIENT INSTRUCTIONS
Medicines to Avoid With Kidney Disease: Care Instructions  Your Care Instructions  Kidney disease means that your kidneys are not able to get rid of waste from the blood. So they can't keep your body's fluids and chemicals in balance. Usually, the kidneys get rid of waste from the blood through the urine. And they balance the fluids in the body. When your kidneys don't work as they should, you have to be careful about some medicines. They may harm your kidneys. Your doctor may tell you not to take them. Or he or she may change the dose. Medicines for pain and swelling, such as ibuprofen (Advil or Motrin) or naproxen (Aleve), can cause harm. So can some antibiotics and antacids. And you need to be careful about some drugs that treat cancer, lower blood pressure, or get rid of water from the body. Some herbal products could cause harm too. Follow-up care is a key part of your treatment and safety. Be sure to make and go to all appointments, and call your doctor if you are having problems. It's also a good idea to know your test results and keep a list of the medicines you take. How can you care for yourself at home? · Tell your doctor all the prescription, herbal, or over-the-counter medicines you take. Do not take any new ones unless you talk to your doctor first.  · Do not take anti-inflammatory medicines. These include ibuprofen (Advil, Motrin) and naproxen (Aleve). You can use acetaminophen (Tylenol) for pain. · Do not take two or more pain medicines at the same time unless the doctor told you to. Many pain medicines have acetaminophen, which is Tylenol. Too much acetaminophen (Tylenol) can be harmful. · Tell all doctors and others who work with your health care that you have kidney disease. · Wear medical alert jewelry that lists your health problem. You can buy this at most drugstores. Where can you learn more? Go to http://cele-alyssa.info/.   Enter U913 in the search box to learn more about \"Medicines to Avoid With Kidney Disease: Care Instructions. \"  Current as of: April 3, 2017  Content Version: 11.3  © 0787-0427 [employer]. Care instructions adapted under license by [employer] (which disclaims liability or warranty for this information). If you have questions about a medical condition or this instruction, always ask your healthcare professional. [address] any warranty or liability for your use of this information.

## 2017-09-12 NOTE — PROGRESS NOTES
[facility]  Physical Therapy Lymphedema Clinic  [address] [facility], [address], [facility].    LYmphedema Therapy  Visit:3    [x]                  Daily note               []                 30 day/10th visit progress note    NAME: El Alcala  DATE: 9/12/2017  GOALS  Short term goals  Time frame: 09/20/17  1. Patient will demonstrate knowledge of signs/symptoms of infections/cellulitis and be independent in skin care to prevent cellulitis. Continued education on cellulitis/infection prevention. Patient also able to verbalize the correct skin care routine and products to use daily. 2.  Patient will demonstrate independence in lymphedema home program of therapeutic exercises to improve circulation and decongest limb to improve ADLs. Patient has been performing active range of motion remedial exercises daily at home. 3.  Patient will be measured for appropriate daytime compression garments to prevent worsening of swelling over time. Patient able to be measured for compression last visit and the items arrived today to include: Perico Hiss size 3X, Solaris Ready Wrap Calf (one large and one extra large both Tall length) and a Compreboot (both small regular). The fit was assessed in the clinic and appears good for all garments. Patient to work with the products and return in one week for follow up. At that time will assess if she is doing well with garments and if she needs any other products. 4.  Patient will receive a pneumatic compression device demonstration (preferably an advanced device with a trunk component) to incorporate this tool into patient's self management routine and improve her swelling over time. Patient tried the Leblanc pump last visit on the L LE with no significant improvement in girths (see measurement section of the not for pre/post circumferences).  Her hip and waist measurements also increased which is of concerned due to nature of her medical history and need preferably for a device that will have trunk component. Patient also had some discomfort with the Leblanc even on low setting as the pressure is not segmented with gentle gradient pressure, which would be more beneficial for patient. Will continue to monitor how she does with her treatments over the next few weeks to see if she would benefit from a pump for home use.      Long term goals  Time frame: 11/17/17  1. Pt will show improvement in Lymphedema Life Impact Scale by decreasing impairment percentage to under 40 and thus allow improvement in patient's quality of life. 2.  Patient will be independent with don/doff of compression system and use in order to prevent reaccumulation of fluid at discharge. Educated patient on the wear and care of the Solaris Lower leg velcro system, Jefeedison [place]ows for foot and ankle and the Sofie Compression Capris for full coverage of B LEs and lower trunk. Patient able to don/doff items after demonstration independently with increased time and use of reacher. Patient's  can also assist her as needed in the home with compression needs. Patient to work with products and return in one week for follow up. 3.  Pt will be independent in self-MLD and show stable limb volumes showing decongestion and pt. ready for transition to independent restorative phase of lymphedema therapy. Full volumes deferred today, but will be taken next visit so that we can assess the effectiveness of the garments and patient's home program.        SUBJECTIVE REPORT: Patient arrived late for her appointment because she was at her PCP for an appointment that was in [place]. Patient did have enough time today to go over her products and assess the fit. Patient reports that she is scheduled for chemo tomorrow, but that at the PCP she was told some labs were being assessed and they would call Dr. Hayden Chilel to update him. Patient reports that she has also been suffering with a cold. Pain: 2/10 R LE  Gait: Independent  ADLs: Modified Independent to Independent  Treatment Response: Patient has continued to work on her home program and now has received her compression garments. Patient reports that she will be going on a long flight at the end of the month so it was important to fit her with appropriate garments as soon as possible. Patient able to be fitted today for all of her products and also was given information about lymphedema and air travel so that she would be prepared for her trip. Function:Patient is independent with her mobility.  drove her to her appointment today. They enjoy traveling. TREATMENT AND OBJECTIVE DATA SUMMARY:   Patient/Family Education:      Educated in skin care: Demonstrated skin care principles     Educated in exercise: Instructed in active range of motion exercise routine      Instructed in self MLD: Written sequence given and reviewed with patient as well as demonstration and instruction during MLD portion of the session. Instructed in don/doff of compression system: Patient now with Solaris Lower leg velcro systems, Biacare Compreboots for foot and ankle and the Sofie Compression Capris for full coverage of B LEs and lower trunk. Education was provided on the garments today. Therapeutic Activity 0 minutes   Treatment time: 0  Functional Mobility: Modified Jackson to Jackson   Fall risk: Low     Therapeutic Exercise/Procedure 0 minutes   Treatment time: 0  Randa ball exercise program: Deferred   Free exercises/ROM: Patient performing  active range of motion exercise routine at home daily. Home program: Patient to perform daily to BID skin care, deep abdominal breathing, exercise routine, and begin wearing her compression garments daily to assess how she will manage them and return in one week for follow up. Rationale: Exercise will increase the lymph angiomotoricity and tissue pressure of the skin and thus decrease swelling. Modalities 0 minutes    Treatment time:0  Vasopneumatic pump: Patient would benefit from a vaso-pneumatic pump for home use. Had an Eldred Pump trial last visit with minimal benefit. Patient would benefit from a vasopneumatic pump with a truncal component so will continue to look into this option for patient. Manual Lymphatic Drainage (MLD) 50 minutes    Treatment time: 12:55pm-13:45pm  Area to decongest: B LEs (L >R) and Trunk   Sequence used and effectiveness: Secondary Sequence Self MLD packet for B LEs and Trunk daily. Full MLD deferred today as patient was late for her appointment and focus was on her garment fitting. Skin/wound care/debridement: Intact. Dermal Status:  Tenous L LE. Scars R ankle lateral malleolar region secondary to orthopedic surgery. Texture:  Pitting Edema bilaterally on the dorsal surface  L LE: Fibrotic/Woody lower leg  R LE: Brawny  Pigmentation/Color Changes:  Hyperpigmented L LE anterior surface  Anomalies:  Pebbly skin texture changes are noted on the anterior lower legs with significantly worse on the R LE. Upper/Lower extremity compression: Patient reviewed Solaris Lower leg velcro systems, Biacare Compreboots for foot and ankle and the Sofie Compression Capris for full coverage of B LEs and lower trunk. Education was provided on the garments today regarding wear/care and donning and doffing. Patient reports that the products are comfortable, but could not wear them out of the clinic because she had worn flip flops today. The initial fit is good so patient to work with the products and return in one week for follow up. Patient may benefit from liner sock with foot ankle compression if she wants to have these for use for going outside of the home as it may make it easier for her at times. Kinesiotaping: Deferred. Girth/Volume measurement: Deferred. Will assess next visit after she has worked with her compression.        TOTAL TREATMENT 50 mins Circumferential Limb Measurements:  Affected Side: B LE (L>R)  Deferred today. ASSESSMENT:   Treatment effectiveness and tolerance: Patient received her garments and was able to be fitted today with them in the clinic. Patient will work with her new products and return next week for assessment. Progress toward goals: See goal section for details. PLAN OF CARE:   Changes to the plan of care: Begin using compression garments daily as instructed. Frequency: Patient to follow up next week to assess her garments.      Other:        Robson Dang, PTA, CLT

## 2017-09-12 NOTE — MR AVS SNAPSHOT
Visit Information Date & Time Provider Department Dept. Phone Encounter #  
 9/12/2017 10:30 AM Armando Holcomb MD 40 [facility] [phone] [id number] Follow-up Instructions Return if symptoms worsen or fail to improve. Your Appointments 9/26/2017  9:00 AM  
CHEMOTHERAPY with Erika Garcia MD  
[address] [place] [address]) Appt Note: had labs on 9/25. .for c6 taxol/cisplatin/avastin  9/27 at [address] at [address] [facility] [phone]  
Monica Marcie Dill [phone]  
  
    
 10/19/2017  9:00 AM  
LAB with LAB [facility] [address]) Appt Note: NON-fasting lab  
 [address] [facility] [address] [phone]  
  
   
 [facility]  
  
    
 10/26/2017  8:45 AM  
ROUTINE CARE with Armando Holcomb MD  
[address]) Appt Note: 6mth f/u;  HTN, chol  NON-fasting lab one week prior [address] [facility] [address] [phone]  
  
   
 [address] [facility] [address] Upcoming Health Maintenance Date Due DTaP/Tdap/Td series (1 - Tdap) 7/7/1971 INFLUENZA AGE 9 TO ADULT 8/1/2017 COLONOSCOPY 11/16/2017 GLAUCOMA SCREENING Q2Y 11/16/2017 MEDICARE YEARLY EXAM 3/18/2018 BREAST CANCER SCRN MAMMOGRAM 11/30/2018 Allergies as of 9/12/2017  Review Complete On: 9/12/2017 By: Armando Holcomb MD  
  
 Severity Noted Reaction Type Reactions Amlodipine  04/24/2017   Side Effect Other (comments) Edema in LE's Current Immunizations  Reviewed on 9/12/2017 Name Date Influenza High Dose Vaccine PF 12/1/2016 Influenza Vaccine 10/15/2015 Pneumococcal Conjugate (PCV-13) 9/4/2015 Pneumococcal Vaccine (Unspecified Type) 10/22/2015 Zoster Vaccine, Live 10/15/2015 Reviewed by Aracelis Limon LPN on 8/42/3604 at 91:91 AM  
You Were Diagnosed With   
  
 Codes Comments CKD (chronic kidney disease) stage 4, GFR 15-29 ml/min ([facility])    -  Primary ICD-10-CM: N18.4 ICD-9-CM: 722. 4 Acute non-recurrent sinusitis, unspecified location     ICD-10-CM: J01.90 ICD-9-CM: 461.9 Hypertension, essential     ICD-10-CM: I10 
ICD-9-CM: 401.9 Bilateral lower extremity edema     ICD-10-CM: R60.0 ICD-9-CM: 295. 3 Vitals BP Pulse Temp Resp Height(growth percentile) Weight(growth percentile) 143/81 (BP 1 Location: Left arm, BP Patient Position: Sitting) 82 98.3 °F (36.8 °C) (Oral) 20 5' 2\" (1.575 m) 239 lb (108.4 kg) SpO2 BMI OB Status Smoking Status 97% 43.71 kg/m2 Hysterectomy Former Smoker Vitals History BMI and BSA Data Body Mass Index Body Surface Area 43.71 kg/m 2 2.18 m 2 Preferred Pharmacy Pharmacy Name Phone [facility], [address] [facility] [place] [phone] Your Updated Medication List  
  
   
This list is accurate as of: 9/12/17 11:51 AM.  Always use your most recent med list.  
  
  
  
  
 amoxicillin-clavulanate 875-125 mg per tablet Commonly known as:  AUGMENTIN Take 1 Tab by mouth every twelve (12) hours. atorvastatin 80 mg tablet Commonly known as:  LIPITOR Take 1 Tab by mouth daily. carvedilol 3.125 mg tablet Commonly known as:  Andrew Dole Take 1 Tab by mouth two (2) times daily (with meals). dexamethasone 4 mg tablet Commonly known as:  DECADRON Take 2 tablets with breakfast the day before chemo and for 2 days after chemo  
  
 furosemide 20 mg tablet Commonly known as:  LASIX Take 1 Tab by mouth daily as needed. lidocaine-prilocaine topical cream  
Commonly known as:  EMLA Apply small amount over port area one hour before chemo treatment and cover with a Band-Aid  
  
 ondansetron 4 mg disintegrating tablet Commonly known as:  ZOFRAN ODT Take 1 Tab by mouth every eight (8) hours as needed for Nausea. Indications: CANCER CHEMOTHERAPY-INDUCED NAUSEA AND VOMITING  
  
 oxyCODONE-acetaminophen 5-325 mg per tablet Commonly known as:  PERCOCET Take 1-2 Tabs by mouth every four (4) hours as needed for Pain. Max Daily Amount: 12 Tabs. * STOOL SOFTENER 100 mg capsule Generic drug:  docusate sodium Take 100 mg by mouth daily. * COLACE 100 mg capsule Generic drug:  docusate sodium  
  
 triamterene-hydroCHLOROthiazide 37.5-25 mg per tablet Commonly known as:  Deacon Martinez Take 1 Tab by mouth daily. TYLENOL 325 mg tablet Generic drug:  acetaminophen Take 650 mg by mouth every four (4) hours as needed for Pain. VITAMIN D3 1,000 unit Cap Generic drug:  cholecalciferol Take 1 Cap by mouth. * Notice: This list has 2 medication(s) that are the same as other medications prescribed for you. Read the directions carefully, and ask your doctor or other care provider to review them with you. Prescriptions Sent to Pharmacy Refills  
 amoxicillin-clavulanate (AUGMENTIN) 875-125 mg per tablet 0 Sig: Take 1 Tab by mouth every twelve (12) hours. Class: Normal  
 Pharmacy: [address] #: [phone] Route: Oral  
  
Follow-up Instructions Return if symptoms worsen or fail to improve. To-Do List   
 09/12/2017 12:00 PM  
  Appointment with Mamadou Rhoades PTA at [address] ([phone])  
  
 09/13/2017  8:00 AM  
  Appointment with [address] [place] [address] ([phone])  
  
 09/19/2017 11:00 AM  
  Appointment with Mamadou Rhoades PTA at [address] ([phone]) Patient Instructions Medicines to Avoid With Kidney Disease: Care Instructions Your Care Instructions Kidney disease means that your kidneys are not able to get rid of waste from the blood. So they can't keep your body's fluids and chemicals in balance. Usually, the kidneys get rid of waste from the blood through the urine. And they balance the fluids in the body. When your kidneys don't work as they should, you have to be careful about some medicines. They may harm your kidneys. Your doctor may tell you not to take them. Or he or she may change the dose. Medicines for pain and swelling, such as ibuprofen (Advil or Motrin) or naproxen (Aleve), can cause harm. So can some antibiotics and antacids. And you need to be careful about some drugs that treat cancer, lower blood pressure, or get rid of water from the body. Some herbal products could cause harm too. Follow-up care is a key part of your treatment and safety. Be sure to make and go to all appointments, and call your doctor if you are having problems. It's also a good idea to know your test results and keep a list of the medicines you take. How can you care for yourself at home? · Tell your doctor all the prescription, herbal, or over-the-counter medicines you take. Do not take any new ones unless you talk to your doctor first. 
· Do not take anti-inflammatory medicines. These include ibuprofen (Advil, Motrin) and naproxen (Aleve). You can use acetaminophen (Tylenol) for pain. · Do not take two or more pain medicines at the same time unless the doctor told you to. Many pain medicines have acetaminophen, which is Tylenol. Too much acetaminophen (Tylenol) can be harmful. · Tell all doctors and others who work with your health care that you have kidney disease. · Wear medical alert jewelry that lists your health problem. You can buy this at most [employer]. Where can you learn more? Go to http://cele-alyssa.info/. Enter H030 in the search box to learn more about \"Medicines to Avoid With Kidney Disease: Care Instructions. \" Current as of: April 3, 2017 Content Version: 11.3 © 2694-0994 [employer]. Care instructions adapted under license by "[employer] (which disclaims liability or warranty for this information). If you have questions about a medical condition or this instruction, always ask your healthcare professional. [address] any warranty or liability for your use of this information. Introducing \[facility]\"" & HEALTH SERVICES! Dear Danika Lucero: Thank you for requesting a [employer] account. Our records indicate that you already have an active [employer] account. You can access your account anytime at [email or web address] Did you know that you can access your hospital and ER discharge instructions at any time in [employer]? You can also review all of your test results from your hospital stay or ER visit. Additional Information If you have questions, please visit the Frequently Asked Questions section of the [employer] website at [email or web address] Remember, [employer] is NOT to be used for urgent needs. For medical emergencies, dial 911. Now available from your iPhone and Android! Please provide this summary of care documentation to your next provider. Your primary care clinician is listed as Valencia Abernathy. If you have any questions after today's visit, please call [phone].

## 2017-09-12 NOTE — Clinical Note
In addition to sending chart to Dr Krzysztof Schmid, I need to see her again regarding blood sugars. A1c is in diabetes range.

## 2017-09-12 NOTE — PROGRESS NOTES
HISTORY OF PRESENT ILLNESS  Aliza Evans is a 75 y.o. female. HPI  She presents for follow up of chronic kidney disease. She has hypertension, hyperlipidemia and diabetes (based on Hgb A1c of 7.2 in August 2017.)     She is being treated for vaginal cancer. One of her chemotherapeutic medications is cisplatin. Treatment began in January 2016. She has had 7 doses in January and February, along with radiation therapy. Creatinine prior to therapy was 1.2 and remained so at end of therapy. She received the recommended iv fluids with every treatment. She responded well to treatment; there was no evidence of disease until she complained of back pain in May of this year. A CT scan showed extensive metastatic disease outside of the treated radiation field. Kidneys appeared normal.  Chemotherapy was resumed in June. Creatinine gradually pamella to peak of 1.75 earlier this month. After discontinuation of ARB it was 1.56. She has an appointment with Dr Jerry Marinelli (nephrologist) on 9/18. Diet and Lifestyle: generally follows a low fat low cholesterol diet, generally follows a low sodium diet, does not rigorously follow a diabetic diet, sedentary, nonsmoker  Medication compliance: compliant all of the time  Medication side effects: none  Home BP Monitoring:  is not measured at home  Cardiovascular ROS:  She complains of fatigue, lower extremity edema. She denies palpitations, orthopnea, exertional chest pressure/discomfort, claudication, dyspnea on exertion, dizziness     She complains of 2 weeks of congestion, post nasal drip and productive cough with  yellow colored sputum. Other symptoms include shortness of breath (climbing stairs) and wheezing no fever, chills, or night sweats. She denies ear pressure/pain, facial pain, sinus pressure and sore throat. Clinical course has been improved and then worse again since that time.  She has tried [employer] plus with some relief, but it made blood pressure rise, has also taken Advil. Past history is significant for no chronic respiratory illness. Patient is former smoker. Patient Active Problem List   Diagnosis Code    LAP-BAND surgery status Z98.84    Vaginal cancer ([facility] C52    Hypertension, essential I10    Hypercholesterolemia E78.00    GERD without esophagitis K21.9    Lymph edema I89.0    Bilateral lower extremity edema R60.0    Elevated glucose R73.09    Elevated serum creatinine R79.89     Past Medical History:   Diagnosis Date    Abnormal mammogram of left breast 12/5/2016    Cancer ([facility] 06/2017    LYMPH NODES IN BACK    Cancer ([facility] 2016    VAGINAL CANCER    GERD (gastroesophageal reflux disease) 6/11/06    Hypercholesterolemia 10/11/06    Hypertension 10/11/06    Morbid obesity ([facility] 10/11/06     Past Surgical History:   Procedure Laterality Date    HX APPENDECTOMY      HX GASTRIC BYPASS  2009    LAP BAND    HX GYN  12/4/15    EUA/Bx of pelvic mass and vagina/cystoscopy/proctoscopy    HX HYSTERECTOMY  1978    HX ORTHOPAEDIC  2009    right ankle    HX OTHER SURGICAL  2001?     benign mass removed from lower back    LAP, PLACE ADJUST GASTR BAND  1/30/08    EB: AP Std lap band, liver bx     Social History     Social History    Marital status:      Spouse name: N/A    Number of children: N/A    Years of education: N/A     Social History Main Topics    Smoking status: Former Smoker     Packs/day: 0.50     Years: 15.00     Types: Cigarettes     Quit date: 1/1/1993    Smokeless tobacco: Never Used    Alcohol use Yes      Comment: socially-WINE ONCE A WEEK    Drug use: No    Sexual activity: Not Currently     Other Topics Concern    None     Social History Narrative     Family History   Problem Relation Age of Onset    Cancer Mother      colon WITH METS TO LIVER    Diabetes Father     Heart Disease Father     Cancer Sister      breast & bone    Cancer Maternal Aunt      BREAST    Cancer Paternal Aunt      BREAST    Anesth Problems Neg Hx      Allergies   Allergen Reactions    Amlodipine Other (comments)     Edema in LE's     Current Outpatient Prescriptions   Medication Sig Dispense Refill    carvedilol (COREG) 3.125 mg tablet Take 1 Tab by mouth two (2) times daily (with meals). 60 Tab 2    COLACE 100 mg capsule       oxyCODONE-acetaminophen (PERCOCET) 5-325 mg per tablet Take 1-2 Tabs by mouth every four (4) hours as needed for Pain. Max Daily Amount: 12 Tabs. 50 Tab 0    dexamethasone (DECADRON) 4 mg tablet Take 2 tablets with breakfast the day before chemo and for 2 days after chemo 36 Tab 2    lidocaine-prilocaine (EMLA) topical cream Apply small amount over port area one hour before chemo treatment and cover with a Band-Aid 30 g 2    ondansetron (ZOFRAN ODT) 4 mg disintegrating tablet Take 1 Tab by mouth every eight (8) hours as needed for Nausea. Indications: CANCER CHEMOTHERAPY-INDUCED NAUSEA AND VOMITING 30 Tab 2    acetaminophen (TYLENOL) 325 mg tablet Take 650 mg by mouth every four (4) hours as needed for Pain.  docusate sodium (STOOL SOFTENER) 100 mg capsule Take 100 mg by mouth daily.  atorvastatin (LIPITOR) 80 mg tablet Take 1 Tab by mouth daily. 90 Tab 3    triamterene-hydroCHLOROthiazide (MAXZIDE) 37.5-25 mg per tablet Take 1 Tab by mouth daily. 90 Tab 3    Cholecalciferol, Vitamin D3, (VITAMIN D3) 1,000 unit cap Take 1 Cap by mouth.  furosemide (LASIX) 20 mg tablet Take 1 Tab by mouth daily as needed.  30 Tab 3     Facility-Administered Medications Ordered in Other Visits   Medication Dose Route Frequency Provider Last Rate Last Dose    [START ON 9/13/2017] 0.9% sodium chloride 1,000 mL with potassium chloride 20 mEq, magnesium sulfate 2 g infusion   IntraVENous CONTINUOUS Rachel Parra MD        [START ON 9/13/2017] diphenhydrAMINE (BENADRYL) injection 50 mg  50 mg IntraVENous Roque Egan MD        [facility] ON 9/13/2017] famotidine (PF) (PEPCID) 20 mg in sodium chloride 0.9 % 10 mL injection  20 mg IntraVENous ONCE Carlis Merlin., MD        [START ON 9/13/2017] granisetron (KYTRIL) injection 1 mg  1 mg IntraVENous ONCE Carlis Merlin., MD        [START ON 9/13/2017] fosaprepitant (EMEND) 150 mg in 0.9% sodium chloride 150 mL IVPB  150 mg IntraVENous Jennifer Coyne MD        [START ON 9/13/2017] PACLitaxel (TAXOL) 304 mg in 0.9% sodium chloride 250 mL, overfill volume 25 mL chemo infusion  304 mg IntraVENous ONCE Carlis Merlin., MD       [address] [place] [address] ON 9/13/2017] CISplatin (PLATINOL) 112 mg in 0.9% sodium chloride 500 mL, overfill volume 50 mL chemo infusion  112 mg IntraVENous Jennifer Coyne MD       [address] [place] [address] ON 9/13/2017] dexamethasone (DECADRON) 4 mg/mL injection 12 mg  12 mg IntraVENous ONCE Carlis Merlin., MD                 Review of Systems   Constitutional: Positive for malaise/fatigue. Negative for weight loss. HENT: Positive for congestion. Eyes: Negative for blurred vision and double vision. Respiratory: Positive for cough. Negative for shortness of breath. Cardiovascular: Positive for leg swelling. Negative for chest pain, palpitations and orthopnea. Gastrointestinal: Negative for constipation, diarrhea and heartburn. Genitourinary: Negative for frequency and urgency. Musculoskeletal: Negative for back pain and joint pain. Skin: Positive for rash (legs (stasis dermatitis)). Negative for itching. Neurological: Negative for dizziness, tingling and focal weakness. Endo/Heme/Allergies: Does not bruise/bleed easily. Visit Vitals    /81 (BP 1 Location: Left arm, BP Patient Position: Sitting)    Pulse 82    Temp 98.3 °F (36.8 °C) (Oral)    Resp 20    Ht 5' 2\" (1.575 m)    Wt 239 lb (108.4 kg)    SpO2 97%    BMI 43.71 kg/m2     Physical Exam   Constitutional: She is oriented to person, place, and time. She appears well-developed and well-nourished. HENT:   Head: Normocephalic and atraumatic.    Eyes: Conjunctivae are normal. Pupils are equal, round, and reactive to light. Neck: Neck supple. Carotid bruit is not present. No thyromegaly present. Cardiovascular: Normal rate, regular rhythm and normal heart sounds. PMI is not displaced. Exam reveals no gallop. No murmur heard. Pulmonary/Chest: Effort normal. She has no wheezes. She has no rhonchi. She has no rales. Abdominal: Soft. Normal appearance. She exhibits no abdominal bruit and no mass. There is no hepatosplenomegaly. There is no tenderness. Musculoskeletal: She exhibits edema (1-2+). Lymphadenopathy:     She has no cervical adenopathy. Right: No supraclavicular adenopathy present. Left: No supraclavicular adenopathy present. Neurological: She is alert and oriented to person, place, and time. No sensory deficit. Skin: Skin is warm, dry and intact. No rash noted. Psychiatric: She has a normal mood and affect. Her behavior is normal.   Nursing note and vitals reviewed.     Results for orders placed or performed in visit on 09/07/17   VITAMIN D, 25 HYDROXY   Result Value Ref Range    VITAMIN D, 25-HYDROXY 22.0 (L) 30.0 - 100.0 ng/mL   URINALYSIS W/ RFLX MICROSCOPIC   Result Value Ref Range    Specific Gravity 1.009 1.005 - 1.030    pH (UA) 6.0 5.0 - 7.5    Color Yellow Yellow    Appearance Clear Clear    Leukocyte Esterase Negative Negative    Protein Negative Negative/Trace    Glucose Negative Negative    Ketone Negative Negative    Blood Negative Negative    Bilirubin Negative Negative    Urobilinogen 0.2 0.2 - 1.0 mg/dL    Nitrites Negative Negative    Microscopic Examination Comment    URIC ACID   Result Value Ref Range    Uric acid 8.6 (H) 2.5 - 7.1 mg/dL   [place] W/REFLEX   Result Value Ref Range    Antinuclear Antibodies Direct Negative Negative   FREE LIGHT CHAINS, KAPPA/LAMBDA, UR, QT(BENCE-DARBY)   Result Value Ref Range    Free Kappa Lt Chains, urine 6.91 1.35 - 24.19 mg/L    Free Lambda Lt Chains, urine 6.63 0.24 - 6.66 mg/L    Kappa/Lambda Ratio, urine 1.04 (L) 2.04 - 10.37   PROTEIN ELECTROPHORESIS   Result Value Ref Range    Protein, total 6.6 6.0 - 8.5 g/dL    Albumin 3.7 2.9 - 4.4 g/dL    Alpha-1-globulin 0.3 0.0 - 0.4 g/dL    ALPHA-2 GLOBULIN 0.9 0.4 - 1.0 g/dL    Beta globulin 0.9 0.7 - 1.3 g/dL    Gamma globulin 0.9 0.4 - 1.8 g/dL    M-spike Not Observed Not Observed g/dL    Globulin, total 2.9 2.2 - 3.9 g/dL    A/G ratio 1.3 0.7 - 1.7    Please note Comment    PTH INTACT   Result Value Ref Range    PTH, Intact 95 (H) 15 - 65 pg/mL   CKD REPORT   Result Value Ref Range    Interpretation Note     PDF Image Not applicable      Lab Results   Component Value Date/Time    Sodium 137 09/13/2017 08:17 AM    Potassium 4.1 09/13/2017 08:17 AM    Chloride 102 09/13/2017 08:17 AM    CO2 27 09/13/2017 08:17 AM    Anion gap 8 09/13/2017 08:17 AM    Glucose 116 09/13/2017 08:17 AM    BUN 29 09/13/2017 08:17 AM    Creatinine 1.56 09/13/2017 08:17 AM    BUN/Creatinine ratio 19 09/13/2017 08:17 AM    GFR est AA 40 09/13/2017 08:17 AM    GFR est non-AA 33 09/13/2017 08:17 AM    Calcium 8.9 09/13/2017 08:17 AM    Bilirubin, total 0.3 09/13/2017 08:17 AM    AST (SGOT) 11 09/13/2017 08:17 AM    Alk. phosphatase 99 09/13/2017 08:17 AM    Protein, total 7.6 09/13/2017 08:17 AM    Albumin 3.4 09/13/2017 08:17 AM    Globulin 4.2 09/13/2017 08:17 AM    A-G Ratio 0.8 09/13/2017 08:17 AM    ALT (SGPT) 15 09/13/2017 08:17 AM     Lab Results   Component Value Date/Time    WBC 8.5 09/13/2017 08:17 AM    HGB 9.6 09/13/2017 08:17 AM    HCT 29.2 09/13/2017 08:17 AM    PLATELET 694 18/31/0499 08:17 AM    MCV 85.1 09/13/2017 08:17 AM     Lab Results   Component Value Date/Time    Hemoglobin A1c 7.2 08/16/2017 05:25 PM         Results from Hospital Encounter encounter on 08/14/17   CT CHEST W CONT   Narrative EXAM: CT CHEST ABDOMEN PELVIS WITH CONTRAST  INDICATION: Vaginal carcinoma  COMPARISON: CT abdomen pelvis 5/16/2017, PET/CT 6/5/2017.   CONTRAST: 80 mL of Isovue-370. TECHNIQUE:   Multislice helical CT was performed from the thoracic inlet to the symphysis  pubis during uneventful rapid bolus intravenous contrast administration. Oral  contrast was also administered. Delayed images of the abdomen were acquired. Contiguous 5 mm axial images were reconstructed and lung and soft tissue windows  were generated. Coronal and sagittal reformations were generated. CT dose  reduction was achieved through use of a standardized protocol tailored for this  examination and automatic exposure control for dose modulation. Adaptive  statistical iterative reconstruction (ASIR) was utilized for this examination. FINDINGS:  There is a left jugular Port-A-Cath with tip in the superior vena cava. LOWER NECK:The visualized portions of the thyroid and structures of the lower  neck are within normal limits. CHEST: There is a 1.4 cm left supraclavicular lymph node. Lungs: There are multiple nodules throughout the lungs, some with cavitation  which are unchanged. Pleura: There is no pleural effusion or pneumothorax. Aorta: The aorta enhances normally with no evidence of aneurysm or dissection. Mediastinum: There is no mediastinal or hilar adenopathy or mass. Bones and soft tissues: The bones and soft tissues of the chest wall are normal.  ABDOMEN:  Liver: The liver is normal in size and contour with no focal abnormality. Gallbladder and bile ducts: There are small stones in the gallbladder. The  gallbladder wall is not thickened and there is no pericholecystic fluid. Spleen: No abnormality. There are tiny calcified granulomas in the spleen. Pancreas: No abnormality. Adrenal glands: No abnormality. Kidneys: No abnormality. PELVIS:  Reproductive organs: The uterus is absent. Bladder: No abnormality. BOWEL AND MESENTERY: There is a laparoscopic gastric band in expected position  in the upper abdomen. The small bowel is not obstructed.   There is no mesenteric  mass or adenopathy. The appendix is absent. There are diverticula of the  sigmoid colon. PERITONEUM: There is no ascites or free intraperitoneal air. RETROPERITONEUM: The aorta is atherosclerotic and tapers without aneurysm. There  is retroperitoneal adenopathy. There is a 2.7 x 2.1 x 3.3 cm left para-aortic  lymph node and just anterior to this there is a 1.9 x 1.9 x 2.6 cm preaortic  lymph node. These have decreased minimally in size and previously measured 2 .9  x 2.5 and 2 x 2 centimeters. There is no pelvic mass or adenopathy. BONES AND SOFT TISSUES: There are degenerative changes of the spine and there  are sclerotic lesions in the L3 and L4 vertebral bodies. There is a sclerotic  and lytic lesion in the sternum. Impression IMPRESSION:   1. Status post hysterectomy and bilateral oophorectomy. 2. Bilateral pulmonary nodules, some with cavitation unchanged consistent with  metastatic disease. 3 Retroperitoneal adenopathy unchanged or minimally decreased in size and  enlarged left supraclavicular lymph node consistent with metastatic disease. 4. Small left pelvic mass or scar unchanged. 5. Lytic and sclerotic sternal lesion consistent with metastasis. 6. L3 and L4 bone lesions more sclerotic suggesting healing. 7. Atherosclerotic  abdominal aorta without aneurysm. 8. Gallstones. 9. Splenic granulomas indicative of old granulomatous disease. ASSESSMENT and PLAN    ICD-10-CM ICD-9-CM    1. CKD (chronic kidney disease) stage 4, GFR 15-29 ml/min ([facility]) N18.4 585.4    2. Acute non-recurrent sinusitis, unspecified location J01.90 461.9 amoxicillin-clavulanate (AUGMENTIN) 875-125 mg per tablet   3. Hypertension, essential I10 401.9    4. Uncontrolled type 2 diabetes mellitus without complication, without long-term current use of insulin ([facility]) E11.65 250.02    5. Vaginal cancer ([facility] C52 184.0    6. Bilateral lower extremity edema R60.0 782.3      Diagnoses and all orders for this visit:    1.  CKD (chronic kidney disease) stage 4, GFR 15-29 ml/min ([facility])  Most likely reason for recent worsening is cisplatin. She did have an abnormal creat at start of therapy increasing risk of problems. However when I discussed with Dr Nixon Gonsalez, he indicated therapeutic choices are few and insurance constraints further limited options. He will see what their take on chemotherapy coverage might now be in light of current rise in creatinine. 2. Acute non-recurrent sinusitis, unspecified location  Double sickening pattern warrants antibiotic therapy. -     amoxicillin-clavulanate (AUGMENTIN) 875-125 mg per tablet; Take 1 Tab by mouth every twelve (12) hours. 3. Hypertension, essential  Hypertension is uncontrolled - medication changes/orders    4. Uncontrolled type 2 diabetes mellitus without complication, without long-term current use of insulin ([facility]  New diagnosis. Hgb A1 is slightly high. In light of other problems will delay addressing this. 5. Vaginal cancer ([facility]    6. Bilateral lower extremity edema      Follow-up Disposition:  Return if symptoms worsen or fail to improve.  lab results and schedule of future lab studies reviewed with patient  reviewed diet, exercise and weight control  Discussed the patient's BMI with her. The BMI follow up plan is as follows: I have counseled this patient on diet and exercise regimens  I have discussed the diagnosis with the patient and the intended plan as seen in the above orders. Patient is in agreement. The patient has received an after-visit summary and questions were answered concerning future plans. I have discussed medication side effects and warnings with the patient as well.

## 2017-09-12 NOTE — Clinical Note
Jennifer Camacho send copy of my note to Dr Bertrand Mantilla, She has appointment with him today (9/18)

## 2017-09-13 ENCOUNTER — APPOINTMENT (OUTPATIENT)
Dept: PHYSICAL THERAPY | Age: 67
End: 2017-09-13
Payer: MEDICARE

## 2017-09-13 ENCOUNTER — HOSPITAL ENCOUNTER (OUTPATIENT)
Dept: INFUSION THERAPY | Age: 67
Discharge: HOME | End: 2017-09-13
Payer: MEDICARE

## 2017-09-13 VITALS
HEIGHT: 62 IN | OXYGEN SATURATION: 99 % | SYSTOLIC BLOOD PRESSURE: 141 MMHG | HEART RATE: 76 BPM | WEIGHT: 238 LBS | DIASTOLIC BLOOD PRESSURE: 86 MMHG | TEMPERATURE: 97.9 F | BODY MASS INDEX: 43.79 KG/M2 | RESPIRATION RATE: 18 BRPM

## 2017-09-13 PROBLEM — N17.9 ARF (ACUTE RENAL FAILURE) (HCC): Status: ACTIVE | Noted: 2017-09-13

## 2017-09-13 LAB
ALBUMIN SERPL-MCNC: 3.4 G/DL (ref 3.5–5)
ALBUMIN/GLOB SERPL: 0.8 {RATIO} (ref 1.1–2.2)
ALP SERPL-CCNC: 99 U/L (ref 45–117)
ALT SERPL-CCNC: 15 U/L (ref 12–78)
ANION GAP SERPL CALC-SCNC: 8 MMOL/L (ref 5–15)
AST SERPL-CCNC: 11 U/L (ref 15–37)
BASOPHILS # BLD: 0 K/UL (ref 0–0.1)
BASOPHILS NFR BLD: 0 % (ref 0–1)
BILIRUB SERPL-MCNC: 0.3 MG/DL (ref 0.2–1)
BUN SERPL-MCNC: 29 MG/DL (ref 6–20)
BUN/CREAT SERPL: 19 (ref 12–20)
CALCIUM SERPL-MCNC: 8.9 MG/DL (ref 8.5–10.1)
CANCER AG125 SERPL-ACNC: 62 U/ML (ref 0–30)
CHLORIDE SERPL-SCNC: 102 MMOL/L (ref 97–108)
CO2 SERPL-SCNC: 27 MMOL/L (ref 21–32)
CREAT SERPL-MCNC: 1.56 MG/DL (ref 0.55–1.02)
DIFFERENTIAL METHOD BLD: ABNORMAL
EOSINOPHIL # BLD: 0 K/UL (ref 0–0.4)
EOSINOPHIL NFR BLD: 0 % (ref 0–7)
ERYTHROCYTE [DISTWIDTH] IN BLOOD BY AUTOMATED COUNT: 18 % (ref 11.5–14.5)
GLOBULIN SER CALC-MCNC: 4.2 G/DL (ref 2–4)
GLUCOSE SERPL-MCNC: 116 MG/DL (ref 65–100)
HCT VFR BLD AUTO: 29.2 % (ref 35–47)
HGB BLD-MCNC: 9.6 G/DL (ref 11.5–16)
LYMPHOCYTES # BLD: 0.9 K/UL (ref 0.8–3.5)
LYMPHOCYTES NFR BLD: 10 % (ref 12–49)
MAGNESIUM SERPL-MCNC: 1.8 MG/DL (ref 1.6–2.4)
MCH RBC QN AUTO: 28 PG (ref 26–34)
MCHC RBC AUTO-ENTMCNC: 32.9 G/DL (ref 30–36.5)
MCV RBC AUTO: 85.1 FL (ref 80–99)
METAMYELOCYTES NFR BLD MANUAL: 1 %
MONOCYTES # BLD: 0.5 K/UL (ref 0–1)
MONOCYTES NFR BLD: 6 % (ref 5–13)
NEUTS BAND NFR BLD MANUAL: 1 % (ref 0–6)
NEUTS SEG # BLD: 7.1 K/UL (ref 1.8–8)
NEUTS SEG NFR BLD: 82 % (ref 32–75)
PLATELET # BLD AUTO: 189 K/UL (ref 150–400)
POTASSIUM SERPL-SCNC: 4.1 MMOL/L (ref 3.5–5.1)
PROT SERPL-MCNC: 7.6 G/DL (ref 6.4–8.2)
RBC # BLD AUTO: 3.43 M/UL (ref 3.8–5.2)
RBC MORPH BLD: ABNORMAL
SODIUM SERPL-SCNC: 137 MMOL/L (ref 136–145)
WBC # BLD AUTO: 8.5 K/UL (ref 3.6–11)

## 2017-09-13 PROCEDURE — 96365 THER/PROPH/DIAG IV INF INIT: CPT

## 2017-09-13 PROCEDURE — 96368 THER/DIAG CONCURRENT INF: CPT

## 2017-09-13 PROCEDURE — 74011250636 HC RX REV CODE- 250/636: Performed by: OBSTETRICS & GYNECOLOGY

## 2017-09-13 PROCEDURE — 74011250636 HC RX REV CODE- 250/636: Performed by: NURSE PRACTITIONER

## 2017-09-13 PROCEDURE — 96417 CHEMO IV INFUS EACH ADDL SEQ: CPT

## 2017-09-13 PROCEDURE — 96413 CHEMO IV INFUSION 1 HR: CPT

## 2017-09-13 PROCEDURE — 96367 TX/PROPH/DG ADDL SEQ IV INF: CPT

## 2017-09-13 PROCEDURE — 74011000258 HC RX REV CODE- 258: Performed by: OBSTETRICS & GYNECOLOGY

## 2017-09-13 PROCEDURE — 96360 HYDRATION IV INFUSION INIT: CPT

## 2017-09-13 PROCEDURE — 96415 CHEMO IV INFUSION ADDL HR: CPT

## 2017-09-13 PROCEDURE — 80053 COMPREHEN METABOLIC PANEL: CPT | Performed by: OBSTETRICS & GYNECOLOGY

## 2017-09-13 PROCEDURE — 85025 COMPLETE CBC W/AUTO DIFF WBC: CPT | Performed by: OBSTETRICS & GYNECOLOGY

## 2017-09-13 PROCEDURE — 83735 ASSAY OF MAGNESIUM: CPT | Performed by: OBSTETRICS & GYNECOLOGY

## 2017-09-13 PROCEDURE — 96375 TX/PRO/DX INJ NEW DRUG ADDON: CPT

## 2017-09-13 PROCEDURE — 36415 COLL VENOUS BLD VENIPUNCTURE: CPT | Performed by: OBSTETRICS & GYNECOLOGY

## 2017-09-13 PROCEDURE — 74011000250 HC RX REV CODE- 250: Performed by: OBSTETRICS & GYNECOLOGY

## 2017-09-13 PROCEDURE — 96361 HYDRATE IV INFUSION ADD-ON: CPT

## 2017-09-13 PROCEDURE — 77030012965 HC NDL HUBR BBMI -A

## 2017-09-13 PROCEDURE — 86304 IMMUNOASSAY TUMOR CA 125: CPT | Performed by: OBSTETRICS & GYNECOLOGY

## 2017-09-13 RX ORDER — SODIUM CHLORIDE 9 MG/ML
10 INJECTION INTRAMUSCULAR; INTRAVENOUS; SUBCUTANEOUS AS NEEDED
Status: ACTIVE | OUTPATIENT
Start: 2017-09-13 | End: 2017-09-14

## 2017-09-13 RX ORDER — HEPARIN 100 UNIT/ML
500 SYRINGE INTRAVENOUS AS NEEDED
Status: ACTIVE | OUTPATIENT
Start: 2017-09-13 | End: 2017-09-14

## 2017-09-13 RX ORDER — SODIUM CHLORIDE 0.9 % (FLUSH) 0.9 %
10-40 SYRINGE (ML) INJECTION AS NEEDED
Status: ACTIVE | OUTPATIENT
Start: 2017-09-13 | End: 2017-09-14

## 2017-09-13 RX ADMIN — SODIUM CHLORIDE 1000 ML: 900 INJECTION, SOLUTION INTRAVENOUS at 10:34

## 2017-09-13 RX ADMIN — SODIUM CHLORIDE 150 MG: 900 INJECTION, SOLUTION INTRAVENOUS at 14:01

## 2017-09-13 RX ADMIN — Medication 10 ML: at 08:23

## 2017-09-13 RX ADMIN — Medication 10 ML: at 19:02

## 2017-09-13 RX ADMIN — MAGNESIUM SULFATE HEPTAHYDRATE: 500 INJECTION, SOLUTION INTRAMUSCULAR; INTRAVENOUS at 11:34

## 2017-09-13 RX ADMIN — DEXAMETHASONE SODIUM PHOSPHATE 12 MG: 4 INJECTION, SOLUTION INTRA-ARTICULAR; INTRALESIONAL; INTRAMUSCULAR; INTRAVENOUS; SOFT TISSUE at 13:16

## 2017-09-13 RX ADMIN — SODIUM CHLORIDE 10 ML: 9 INJECTION INTRAMUSCULAR; INTRAVENOUS; SUBCUTANEOUS at 08:23

## 2017-09-13 RX ADMIN — SODIUM CHLORIDE 20 MG: 9 INJECTION INTRAMUSCULAR; INTRAVENOUS; SUBCUTANEOUS at 12:54

## 2017-09-13 RX ADMIN — Medication 500 UNITS: at 19:02

## 2017-09-13 RX ADMIN — PACLITAXEL 304 MG: 6 INJECTION, SOLUTION INTRAVENOUS at 14:33

## 2017-09-13 RX ADMIN — DIPHENHYDRAMINE HYDROCHLORIDE 50 MG: 50 INJECTION INTRAMUSCULAR; INTRAVENOUS at 13:55

## 2017-09-13 RX ADMIN — GRANISETRON HYDROCHLORIDE 1 MG: 1 INJECTION INTRAVENOUS at 13:41

## 2017-09-13 RX ADMIN — CISPLATIN 112 MG: 100 INJECTION, SOLUTION INTRAVENOUS at 17:35

## 2017-09-13 NOTE — PROGRESS NOTES
Accepted care of patient. Taxol currently infusing. Patient tolerating well, will continue to monitor for the remaining time while in the infusion center. 1910:  Treatment complete, port flushed and de accessed, patient discharged to home. Next appointment 9/20/17 @ 0830.       Discharge vitals:   Visit Vitals    /86 (BP 1 Location: Left arm, BP Patient Position: Sitting)    Pulse 76    Temp 97.9 °F (36.6 °C)    Resp 18    Ht 5' 2\" (1.575 m)    Wt 108 kg (238 lb)    SpO2 99%    BMI 43.53 kg/m2

## 2017-09-13 NOTE — PROGRESS NOTES
Outpatient Infusion Center - Chemotherapy Progress Note    0815 Pt admit to [facility] for Taxol/Cisplatin ambulatory in stable condition. Assessment completed. No new concerns voiced. Port with positive blood return. Labs drawn and sent for processing. New orders for more hydration and treatment started. Chemotherapy Flowsheet 9/13/2017   Cycle C6   Date 9/13/2017   Drug / Regimen Taxol/Cisplatin   Dosage -   Pre Hydration given   Post Hydration given   Pre Meds given   Notes given         Visit Vitals    /88    Pulse 82    Temp 97.1 °F (36.2 °C)    Resp 20    Ht 5' 2\" (1.575 m)    Wt 108 kg (238 lb)    SpO2 99%    BMI 43.53 kg/m2       Medications:  NS hydration 1000ml  Pepcid  Kytril  Benadryl  Decadron  Emend  Taxol  Cisplatin  Hydration 1000ml with 1gm Mag and 10mEq potassium    Turning care over to [facility]- [facility]ARMEN SORIANO to finish treatment. Patient has tolerated treatment this far with no signs of distress. Recent Results (from the past 12 hour(s))   CBC WITH AUTOMATED DIFF    Collection Time: 09/13/17  8:17 AM   Result Value Ref Range    WBC 8.5 3.6 - 11.0 K/uL    RBC 3.43 (L) 3.80 - 5.20 M/uL    HGB 9.6 (L) 11.5 - 16.0 g/dL    HCT 29.2 (L) 35.0 - 47.0 %    MCV 85.1 80.0 - 99.0 FL    MCH 28.0 26.0 - 34.0 PG    MCHC 32.9 30.0 - 36.5 g/dL    RDW 18.0 (H) 11.5 - 14.5 %    PLATELET 850 830 - 607 K/uL    NEUTROPHILS 82 (H) 32 - 75 %    BAND NEUTROPHILS 1 0 - 6 %    LYMPHOCYTES 10 (L) 12 - 49 %    MONOCYTES 6 5 - 13 %    EOSINOPHILS 0 0 - 7 %    BASOPHILS 0 0 - 1 %    METAMYELOCYTES 1 (H) 0 %    ABS. NEUTROPHILS 7.1 1.8 - 8.0 K/UL    ABS. LYMPHOCYTES 0.9 0.8 - 3.5 K/UL    ABS. MONOCYTES 0.5 0.0 - 1.0 K/UL    ABS. EOSINOPHILS 0.0 0.0 - 0.4 K/UL    ABS.  BASOPHILS 0.0 0.0 - 0.1 K/UL    DF MANUAL      RBC COMMENTS ANISOCYTOSIS  1+       METABOLIC PANEL, COMPREHENSIVE    Collection Time: 09/13/17  8:17 AM   Result Value Ref Range    Sodium 137 136 - 145 mmol/L    Potassium 4.1 3.5 - 5.1 mmol/L    Chloride 102 97 - 108 mmol/L    CO2 27 21 - 32 mmol/L    Anion gap 8 5 - 15 mmol/L    Glucose 116 (H) 65 - 100 mg/dL    BUN 29 (H) 6 - 20 MG/DL    Creatinine 1.56 (H) 0.55 - 1.02 MG/DL    BUN/Creatinine ratio 19 12 - 20      GFR est AA 40 (L) >60 ml/min/1.73m2    GFR est non-AA 33 (L) >60 ml/min/1.73m2    Calcium 8.9 8.5 - 10.1 MG/DL    Bilirubin, total 0.3 0.2 - 1.0 MG/DL    ALT (SGPT) 15 12 - 78 U/L    AST (SGOT) 11 (L) 15 - 37 U/L    Alk.  phosphatase 99 45 - 117 U/L    Protein, total 7.6 6.4 - 8.2 g/dL    Albumin 3.4 (L) 3.5 - 5.0 g/dL    Globulin 4.2 (H) 2.0 - 4.0 g/dL    A-G Ratio 0.8 (L) 1.1 - 2.2     MAGNESIUM    Collection Time: 09/13/17  8:17 AM   Result Value Ref Range    Magnesium 1.8 1.6 - 2.4 mg/dL   CANCER ANTIGEN 125    Collection Time: 09/13/17  8:17 AM   Result Value Ref Range    CA-125 62 (H) 0 - 30 U/mL

## 2017-09-13 NOTE — PROGRESS NOTES
[address], [address], [address]  [phone]  MD Bailey Grady MD Dianne E. Wall, NP  Patient ID:  Name:  Amanda Schaefer  MRN:  [id number]  :  1950/67 y.o. Date:  2017      HISTORY OF PRESENT ILLNESS:  Amanda Schaefer is a 75 y.o.  postmenopausal female who was referred to Dr. Leilani Christine in 2015 for a pelvic mass by Dr. Naida Whyte, who noted the mass on routine gynecologic exam.  He sent her for a CT of the pelvis, which revealed a mass at the vaginal apex, that appeared to be invading into the soft tissues of the left pelvis. There were several enlarged lymph nodes on the left. There was also questionable bladder invasion, and possibly even involvement of the rectosigmoid colon. She reported a history of having a mass removed from one of her ovaries in 1 at [facility], which required a second surgery to remove her uterus and contralateral ovary. She said she did not have to take any chemotherapy, but she did have to take some kind of pill for a year. She has not had any gynecologic issues since that time. Presumably her hysterectomy was a total, rather than a supracervical, but she is unsure and there are no records. She denied any vaginal bleeding, except for after her examination with Dr. Winsome Matthews. She reported minimal pain in the pelvis. Dr. Leilani Christine took her the OR for and exam under anesthesia with cystoscopy and proctoscopy. Pathology revealed a squamous cell carcinoma, consistent with a vaginal primary. Examination revealed a fixed mass involving the left upper vagina with extension to the left pelvic sidewall. There also appeared to be involvement of the posterior bladder wall, but no mucosal involvement. The mass did push against the rectal wall, but there did not appear to be any direct involvement. Dr. Leilani Christine referred her to radiation oncology and she saw Dr. Melonie Medina.   She completed pelvic radiation along with concurrent cisplatin chemotherapy in February 2016 and had a great response. When Dr. Ale Boogie saw her in the office in March 2017 for follow up she was doing well and without complaints. Her pelvic exam and pap smear at that time were both negative. Subsequent to that visit she began having back pain that had progressively worsened. She saw Javier Gino in May and she ordered a CT. This unfortunately demonstrated extensive metastatic disease outside of the prior radiated field. Dr. Liz Self then sent her for a PET/CT which demonstrated widely metastatic disease. Dr. Ale Boogie recommended systemic chemotherapy with Taxol/Cisplatin/Avastin, which is the standard for metastatic cervical cancer, since vaginal cancer behaves similar to cervical cancer. Unfortunately, the Avastin was denied by her insurance company. He chose to proceed with Taxol/Cisplatin. She has completed 4 cycles so far. She has done well with the treatment thus far. She denies nausea/vomiting, change in bowel habits (she takes stool softener daily). She continues to complain of persistent lower extremity edema with her left leg being worse than her left. Her CT  Of chest/abd/pelvis on 8/14 demonstrates stable disease to mild improvement, but not progression. Today, she presents for her 5th cycle again after holding last week due to elevated creat. She has followed up with PCP regarding changing blood pressure medication from diuretic with Ace-Inhibitor/HCTZ to Coreg and Losartan. She has contacted Dr. Violet Richey office and has had renal studies done and has an appointment with him this coming Monday. Her creat today is 1.56 with BUN of 30. On her last visit it was 1.75 with a BUN of 30. We previously treated her on 8/16 when her creat was 1.59. She is trying to drink enough water, but says it is hard to keep up, but is really trying.   She continues to deny changes in her urination and denies any flank pain. Says she has been eating well and denies wt loss     ROS:    A comprehensive review of systems was negative except for that written in the History of Present Illness. , 10 point ROS        Problem List:  Patient Active Problem List    Diagnosis Date Noted    ARF (acute renal failure) ([facility] 09/13/2017    Elevated glucose 08/16/2017    Elevated serum creatinine 08/16/2017    Lymph edema 06/14/2017    Bilateral lower extremity edema 06/14/2017    Hypertension, essential 01/18/2017    Hypercholesterolemia 01/18/2017    GERD without esophagitis 01/18/2017    [facility] 12/10/2015    LAP-BAND surgery status 05/17/2012        Medications: (reviewed)  Current Outpatient Prescriptions   Medication Sig    amoxicillin-clavulanate (AUGMENTIN) 875-125 mg per tablet Take 1 Tab by mouth every twelve (12) hours.  carvedilol (COREG) 3.125 mg tablet Take 1 Tab by mouth two (2) times daily (with meals).  COLACE 100 mg capsule     oxyCODONE-acetaminophen (PERCOCET) 5-325 mg per tablet Take 1-2 Tabs by mouth every four (4) hours as needed for Pain. Max Daily Amount: 12 Tabs.  furosemide (LASIX) 20 mg tablet Take 1 Tab by mouth daily as needed.  dexamethasone (DECADRON) 4 mg tablet Take 2 tablets with breakfast the day before chemo and for 2 days after chemo    lidocaine-prilocaine (EMLA) topical cream Apply small amount over port area one hour before chemo treatment and cover with a Band-Aid    ondansetron (ZOFRAN ODT) 4 mg disintegrating tablet Take 1 Tab by mouth every eight (8) hours as needed for Nausea. Indications: CANCER CHEMOTHERAPY-INDUCED NAUSEA AND VOMITING    acetaminophen (TYLENOL) 325 mg tablet Take 650 mg by mouth every four (4) hours as needed for Pain.  docusate sodium (STOOL SOFTENER) 100 mg capsule Take 100 mg by mouth daily.  atorvastatin (LIPITOR) 80 mg tablet Take 1 Tab by mouth daily.     triamterene-hydroCHLOROthiazide (MAXZIDE) 37.5-25 mg per tablet Take 1 Tab by mouth daily.  Cholecalciferol, Vitamin D3, (VITAMIN D3) 1,000 unit cap Take 1 Cap by mouth. Current Facility-Administered Medications   Medication Dose Route Frequency    sodium chloride 0.9 % injection 10 mL  10 mL IntraVENous PRN    heparin (porcine) pf 500 Units  500 Units IntraVENous PRN    sodium chloride (NS) flush 10-40 mL  10-40 mL IntraVENous PRN    fosaprepitant (EMEND) 150 mg in 0.9% sodium chloride 150 mL IVPB  150 mg IntraVENous ONCE    PACLitaxel (TAXOL) 304 mg in 0.9% sodium chloride 250 mL, overfill volume 25 mL chemo infusion  304 mg IntraVENous ONCE    CISplatin (PLATINOL) 112 mg in 0.9% sodium chloride 500 mL, overfill volume 50 mL chemo infusion  112 mg IntraVENous ONCE     Allergies: (reviewed)  Allergies   Allergen Reactions    Amlodipine Other (comments)     Edema in LE's          OBJECTIVE:    Physical Exam:  VITAL SIGNS: Vitals:    09/13/17 0815   BP: 180/88   Pulse: 82   Resp: 20   Temp: 97.1 °F (36.2 °C)   SpO2: 99%   Weight: 238 lb (108 kg)   Height: 5' 2\" (1.575 m)     Body mass index is 43.53 kg/(m^2). GENERAL MIGUELANGEL: A&O X3 in NAD with pleasant affect   HEENT: Continues without JVD or adenopathy  Neck: Supple without restrictions. Port site: Without redness, tenderness or swelling. Accessed without difficulty again today   RESPIRATORY: CTA bilat without wheezing or rales   CARDIOVASC: Regular without M/R/G.   GASTROINT: soft, obese, NT/ND, with + BS throughout. No CVA tenderness noted   EXTREMITIES: Marked edema of bilateral lower extremities persist, L>R. Unchanged form previous visit. Skin noted with vascular skin changes on the left. + pedal pulses with toes remain warm to touch   PELVIC: Deferred   RECTAL: Deferred   NEURO: Grossly intact. No acute deficit. CT of abdomen/pelvis (8/14/17)  LUNG BASES: Multiple bilateral pulmonary nodules. INCIDENTALLY IMAGED HEART AND MEDIASTINUM: Unremarkable.   LIVER: No mass or biliary dilatation. GALLBLADDER: Cholelithiasis. SPLEEN: Calcified granulomata. PANCREAS: No mass or ductal dilatation. ADRENALS: Unremarkable. KIDNEYS: Right renal cyst.  STOMACH: Unremarkable. SMALL BOWEL: No dilatation or wall thickening. COLON: No dilatation or wall thickening. APPENDIX: Unremarkable. PERITONEUM: No ascites or pneumoperitoneum. RETROPERITONEUM: Significant retroperitoneal lymphadenopathy, largest dimension  surrounding the aorta 6.5 x 4.2 cm. REPRODUCTIVE ORGANS: There is 4.6 x 2.4 cm soft tissue in the region of  previously identified left pelvic sidewall mass. URINARY BLADDER: No mass or calculus. BONES: No destructive bone lesion. Degenerative changes of L4-L5 and L5-S1. ADDITIONAL COMMENTS: N/A     IMPRESSION:  1. Bilateral pulmonary nodules and retroperitoneal lymphadenopathy compatible  with metastatic disease. 2. Much smaller mass in the left pelvis, or scar. 3. Cholelithiasis. PET/CT (6/6/17)  HEAD/NECK: No apparent foci of abnormal hypermetabolism. Cerebral evaluation is  limited by normal intense activity.     CHEST: There are hypermetabolic left supraclavicular, superior mediastinal, and  paraesophageal lymph nodes. The small pulmonary nodules are minimally  hypermetabolic, maximum SUV of the lesion in the right middle lobe is 1.9.     ABDOMEN/PELVIS: Para-aortic and aortocaval lymphadenopathy is hypermetabolic,  maximum SUV 8.0. Small but hypermetabolic bilateral external iliac lymph nodes  are noted.     SKELETON: There is a hypermetabolic lytic lesion in the sternum, maximum SUV is  9.7. There is a small sclerotic lesion at L3 which is new compared to the prior  PET/CT. Given the tracer activity corresponding to the adjacent retroperitoneal  lymphadenopathy that is difficult to determine if this small lesion is  hypermetabolic.     IMPRESSION:   1. Mildly hypermetabolic pulmonary nodules are concerning for metastatic  disease.   2. Hypermetabolic left supraclavicular, superior mediastinal, and paraesophageal  lymph nodes as well as retroperitoneal and bilateral external iliac lymph nodes. 3. Hypermetabolic lytic lesion in the sternum is concerning for metastatic  disease. Small sclerotic lesion at L3 is new compared to the prior PET/CT but  too small for accurate characterization by PET CT.      CT of abdomen/pelvis (8/14/17)  There is a left jugular Port-A-Cath with tip in the superior vena cava. LOWER NECK:The visualized portions of the thyroid and structures of the lower  neck are within normal limits. CHEST: There is a 1.4 cm left supraclavicular lymph node. Lungs: There are multiple nodules throughout the lungs, some with cavitation  which are unchanged. Pleura: There is no pleural effusion or pneumothorax. Aorta: The aorta enhances normally with no evidence of aneurysm or dissection. Mediastinum: There is no mediastinal or hilar adenopathy or mass. Bones and soft tissues: The bones and soft tissues of the chest wall are normal.  ABDOMEN:  Liver: The liver is normal in size and contour with no focal abnormality. Gallbladder and bile ducts: There are small stones in the gallbladder. The  gallbladder wall is not thickened and there is no pericholecystic fluid. Spleen: No abnormality. There are tiny calcified granulomas in the spleen. Pancreas: No abnormality. Adrenal glands: No abnormality. Kidneys: No abnormality. PELVIS:  Reproductive organs: The uterus is absent. Bladder: No abnormality. BOWEL AND MESENTERY: There is a laparoscopic gastric band in expected position  in the upper abdomen. The small bowel is not obstructed. There is no mesenteric  mass or adenopathy. The appendix is absent. There are diverticula of the  sigmoid colon. PERITONEUM: There is no ascites or free intraperitoneal air. RETROPERITONEUM: The aorta is atherosclerotic and tapers without aneurysm. There  is retroperitoneal adenopathy.  There is a 2.7 x 2.1 x 3.3 cm left para-aortic  lymph node and just anterior to this there is a 1.9 x 1.9 x 2.6 cm preaortic  lymph node. These have decreased minimally in size and previously measured 2 .9  x 2.5 and 2 x 2 centimeters. There is no pelvic mass or adenopathy. BONES AND SOFT TISSUES: There are degenerative changes of the spine and there  are sclerotic lesions in the L3 and L4 vertebral bodies. There is a sclerotic  and lytic lesion in the sternum.     IMPRESSION:   1. Status post hysterectomy and bilateral oophorectomy. 2. Bilateral pulmonary nodules, some with cavitation unchanged consistent with  metastatic disease. 3 Retroperitoneal adenopathy unchanged or minimally decreased in size and  enlarged left supraclavicular lymph node consistent with metastatic disease. 4. Small left pelvic mass or scar unchanged. 5. Lytic and sclerotic sternal lesion consistent with metastasis. 6. L3 and L4 bone lesions more sclerotic suggesting healing. 7. Atherosclerotic  abdominal aorta without aneurysm. 8. Gallstones. 9. Splenic granulomas indicative of old granulomatous disease. Assessment:      Patient Active Problem List   Diagnosis Code    LAP-BAND surgery status Z98.84    [facility]aginal cancer ([facility] C52    Hypertension, essential I10    Hypercholesterolemia E78.00    GERD without esophagitis K21.9    Lymph edema I89.0    Bilateral lower extremity edema R60.0    Elevated glucose R73.09    Elevated serum creatinine R79.89    ARF (acute renal failure) ([facility] N17.9           Plan: Will proceed with C 5 today and add hydration of 1 extra liter. (Discussed with Dr. Mitchel Stiles and he agrees)  Pt has apt with Dr. Jeanine Shabazz (nephrology) on this coming Monday, Sept 18th. She has already had urine studies performed. Will have pt return next Wed at 8:30 to check CMP/labs.   If creat still elevated or worse, will consider lowering dose or changing drugs   Pt is to call if swelling increases to her lower ext, she has any SOB, or changes in urination   Continue to elevate lower ext as much as possible and encouraged with able, at or above the level of the heart  She will call with any concerns or questions.      Signed By: Karissa Wan NP     9/13/2017/4:31 PM

## 2017-09-17 PROBLEM — N18.4 STAGE 4 CHRONIC KIDNEY DISEASE (HCC): Status: ACTIVE | Noted: 2017-08-16

## 2017-09-19 ENCOUNTER — HOSPITAL ENCOUNTER (OUTPATIENT)
Dept: PHYSICAL THERAPY | Age: 67
Discharge: HOME | End: 2017-09-19
Payer: MEDICARE

## 2017-09-19 PROCEDURE — 97140 MANUAL THERAPY 1/> REGIONS: CPT

## 2017-09-19 PROCEDURE — G8991 OTHER PT/OT GOAL STATUS: HCPCS

## 2017-09-19 PROCEDURE — 97016 VASOPNEUMATIC DEVICE THERAPY: CPT

## 2017-09-19 PROCEDURE — G8990 OTHER PT/OT CURRENT STATUS: HCPCS

## 2017-09-19 NOTE — PROGRESS NOTES
[facility]  [address], [address], [place]  [address].    LYmphedema Therapy  Visit:4    []                  Daily note               [x]                 30 day/Progress note with updated G-Codes    NAME: Richard Chowdhury  DATE: 9/19/2017   GOALS  Short term goals  Time frame: 09/20/17  1. Patient will demonstrate knowledge of signs/symptoms of infections/cellulitis and be independent in skin care to prevent cellulitis. Continued education on cellulitis/infection prevention. Patient also able to verbalize the correct skin care routine and products to use daily. Extend to 11/17/17. 2.  Patient will demonstrate independence in lymphedema home program of therapeutic exercises to improve circulation and decongest limb to improve ADLs. Patient has been performing active range of motion remedial exercises daily at home. Extend to 11/17/17. 3.  Patient will be measured for appropriate daytime compression garments to prevent worsening of swelling over time. Patient able to wear her compression since last visit to consist of Sofie Marguerite size 3X, Solaris Ready Wrap Calf (one large and one extra large both Tall length) and a Compreboot (both small regular). Patient reports that she would like to order liner socks with compression in the foot so this was done today. Patient has been measured for day time compression. Goal Met 9/19/17  4. Patient will receive a pneumatic compression device demonstration (preferably an advanced device with a trunk component) to incorporate this tool into patient's self management routine and improve her swelling over time. Patient tried the Leblanc pump on a previous on the L LE with no significant improvement in girths. Her hip and waist measurements also increased which is of concern due to nature of her medical history and need preferably for a device that will have trunk component.  Patient also had some discomfort with the Leblanc even on low setting as the pressure is not segmented with gentle gradient pressure, which would be more beneficial for patient. Patient able to have a Flexitouch Trial today with good results see modalities section of the note. Updated information will be sent to [employer] for processing. Goal met as patient has had demonstrate of 2 pumps in the clinic to see how they can assist with self management. Goal Met 9/19/17      Long term goals  Time frame: 11/17/17  1. Pt will show improvement in Lymphedema Life Impact Scale by decreasing impairment percentage to under 40 and thus allow improvement in patient's quality of life. Patient scored a 34 on the LLIS with a percent impairment of 50%. This is down from evaluation, but will need to continue to monitor to work towards goal of under 40%. 2.  Patient will be independent with don/doff of compression system and use in order to prevent reaccumulation of fluid at discharge. Patient has been working with her [facility] [employer] Compreboots for foot and ankle and the [employer] Compression Capris for full coverage of B LEs and lower trunk. Patient able to don/doff items after demonstration independently with increased time and use of reacher. Patient's  has been assisting her in the home to improve the donning and to also help with energy conservation. Patient did want to pursue ordering liner socks with compression in the foot so this was done today. 3.  Pt will be independent in self-MLD and show stable limb volumes showing decongestion and pt. ready for transition to independent restorative phase of lymphedema therapy. Full volumes taken today in supine to reveal that patient has lost 2106 ml in the L LE and 2810 ml in the R LE since evaluation on 8/22/17. Will continue to monitor progress with reductions in volumes.         SUBJECTIVE REPORT: Patient arrived today reporting that she has been working on her home program and wearing her compression daily. Patient did report that at times she struggles and needs her  to assist so she asked if she could try using the products in the evenings when he is able to assist more with donning. Pain: 2/10 R LE  Gait: Independent  ADLs: Modified Independent to Independent  Treatment Response: Patient has been working on her home program and using her compression, but was asking for tips on developing a better plan for her compression wear schedule since her  is working and putting items on in the am does not always work well for her schedule as she is usually sleeping and not up for the day when he leaves. Worked on problem solving these issues and patient is going to have her  work on donning the [address] in the pm.  Function: Patient is independent with her mobility.  drove her to her appointment today. They enjoy traveling. Weight: 232.2 lbs (down from evaluation weight of 236.0 lbs)  Lymphedema Life Impact Scale: Patient scored a 34 on the LLIS with a percent impairment of 50%. This is down from evaluation, but will need to continue to monitor to work towards goal of under 40%. TREATMENT AND OBJECTIVE DATA SUMMARY:   Patient/Family Education:      Educated in skin care: Demonstrated skin care principles     Educated in exercise: Instructed in active range of motion exercise routine      Instructed in self MLD: Written sequence given and reviewed with patient as well as demonstration and instruction during MLD portion of the session. Instructed in don/doff of compression system: Patient using Solaris Lower leg velcro systems, [employer] Compreboots for foot and ankle and the Sofie Compression Capris for full coverage of B LEs and lower trunk. Education on garments has been given.       Therapeutic Activity 0 minutes   Treatment time: 0  Functional Mobility: Modified [place]   Fall risk: Low     Therapeutic Exercise/Procedure 0 minutes   Treatment time: 0  Randa ball exercise program: Deferred   Free exercises/ROM: Patient performing  active range of motion exercise routine at home daily. Home program: Patient to perform daily to BID skin care, deep abdominal breathing, exercise routine, and wear her compression garments daily as tolerated. Rationale: Exercise will increase the lymph angiomotoricity and tissue pressure of the skin and thus decrease swelling. Modalities 60 minutes    Treatment time:11:10am-12:10pm  Vasopneumatic pump: Patient would benefit from a vaso-pneumatic pump for home use. Patient had an [place] Pump trial previously with minimal benefit. Patient would benefit from a vasopneumatic pump with a truncal component so today she was able to receive a Flexitouch trial on the L LE for L1 sequence with the trunk/Full short leg garments with good success today. Patient was comfortable in the product and was able to tolerate the entire treatment in supine. Patient had reduction in 5 out of the 5 areas measured pre and post Flexitouch treatment. See girths section of note for details on measurements pre and post treatment. Will send updated information to [facility] [employer] for processing. Manual Lymphatic Drainage (MLD) 40 minutes    Treatment time: 10:50am-11:10am and 12:10pm-12:30pm   Area to decongest: B LEs (L >R) and Trunk   Sequence used and effectiveness: Secondary Sequence Self MLD packet for B LEs and Trunk daily. Skin/wound care/debridement: Intact. Dermal Status:  Tenous L LE. Scars R ankle lateral malleolar region secondary to orthopedic surgery. Texture:  Pitting Edema bilaterally on the dorsal surface  L LE: Fibrotic/Woody lower leg  R LE: Brawny  Pigmentation/Color Changes:  Hyperpigmented L LE anterior surface  Anomalies:  Pebbly skin texture changes are noted on the anterior lower legs with significantly worse on the R LE.    Upper/Lower extremity compression: Patient reports that she has been working with [employer] Lower leg velcro systems daily, but needs the assist of her  for donning. The [employer] Compreboots for foot and ankle are working well, but she is interested in ordering liner socks with compression in the foot for when she needs to go out to appointments and wear the CompreBoot more at home. Sofie Compression [place] patient reports fit well, but needs to wear them over top of her velcro as recommended, because they tend to bother her if she wears them underneath. Patient now has full coverage of B LEs and lower trunk. Education was provided on the garments regarding wear/care and donning and doffing. Patient to continue to work with her products and return next week to assess how she is doing before she is scheduled to fly across the country. Continued education on maintaining compression with airline travel. Kinesiotaping: Deferred. Girth/Volume measurement: Full volumes taken today to reveal that patient has lost 2106 ml in the L LE and 2810 ml in the R LE since evaluation on 8/22/17. Will continue to monitor progress with reductions in volumes. See scanned documents. TOTAL TREATMENT 100 mins     Circumferential Limb Measurements:  Affected Side: B LE (L>R)     Left  8/29/17 Left (cm)  9/19/17 Left (cm) [facility] (DP[facility])  9/19/17   Ankle 29.2     30.0     28.5   Calf 51.8     52.2     51.8   Knee 51.4     54.4     53.9   Thigh 69.8     70.2     68.5   Hips  126.5     127.5     126.2   Waist 108.5     109     107.5     ASSESSMENT:   Treatment effectiveness and tolerance: Patient continues to make gains towards her goals and has met STG 3+4 to date. Patient will benefit from continued interventions including an advanced vasopneumatic device and CDT to work toward remaining goals and to learn how to manage her condition so she can be discharged to the restorative phase of care.   Patient's trunk measurements have been increasing despite compression use, but they demonstrated decreases post Flexitouch use today. Will continue to focus on Full MLD treatment next visit and assessment of her garments with exercise. Patient plans to fly at the end of the month so continued education on precautions. Patient to follow up next week for treatment. Progress toward goals: See goal section for details. STG 3+4 to date. Will extend STG 1+2 and work on remaining LTGs to prepare for restorative phase of care. PLAN OF CARE:   Changes to the plan of care: Continue with current plan of care. Frequency: Once a week    Other: Will submit updated notes for patient to [facility] to assist in getting the proper vaso-pneumatic pump for home use. In compliance with CMSs Claims Based Outcome Reporting, the following G-code set was chosen for this patient based on their primary functional limitation being treated: The outcome measure chosen to determine the severity of the functional limitation was the Lymphedema Life Impact Scale with a score of 34  which was correlated with an impairment scale of 50%. This score is down from evaluation. ?  Other PT/OT Primary Functional Limitations:     - CURRENT STATUS: CK - 40%-59% impaired, limited or restricted    - GOAL STATUS: CJ - 20%-39% impaired, limited or restricted    - D/C STATUS:  ---------------To be determined---------------       Jennifer Dang, PTA, CLT    Negra Weaver MSPT, CLTJUAN DAVID

## 2017-09-20 ENCOUNTER — HOSPITAL ENCOUNTER (OUTPATIENT)
Dept: INFUSION THERAPY | Age: 67
Discharge: HOME | End: 2017-09-20
Payer: MEDICARE

## 2017-09-20 VITALS
TEMPERATURE: 99 F | HEART RATE: 86 BPM | RESPIRATION RATE: 18 BRPM | DIASTOLIC BLOOD PRESSURE: 76 MMHG | SYSTOLIC BLOOD PRESSURE: 125 MMHG

## 2017-09-20 VITALS — WEIGHT: 232.2 LBS | HEIGHT: 62 IN | BODY MASS INDEX: 42.73 KG/M2

## 2017-09-20 LAB
ALBUMIN SERPL-MCNC: 3.5 G/DL (ref 3.5–5)
ALBUMIN/GLOB SERPL: 0.9 {RATIO} (ref 1.1–2.2)
ALP SERPL-CCNC: 103 U/L (ref 45–117)
ALT SERPL-CCNC: 17 U/L (ref 12–78)
ANION GAP SERPL CALC-SCNC: 7 MMOL/L (ref 5–15)
AST SERPL-CCNC: 13 U/L (ref 15–37)
BASOPHILS # BLD: 0 K/UL (ref 0–0.1)
BASOPHILS NFR BLD: 0 % (ref 0–1)
BILIRUB SERPL-MCNC: 0.4 MG/DL (ref 0.2–1)
BUN SERPL-MCNC: 29 MG/DL (ref 6–20)
BUN/CREAT SERPL: 19 (ref 12–20)
CALCIUM SERPL-MCNC: 8.9 MG/DL (ref 8.5–10.1)
CHLORIDE SERPL-SCNC: 99 MMOL/L (ref 97–108)
CO2 SERPL-SCNC: 30 MMOL/L (ref 21–32)
CREAT SERPL-MCNC: 1.56 MG/DL (ref 0.55–1.02)
DIFFERENTIAL METHOD BLD: ABNORMAL
EOSINOPHIL # BLD: 0 K/UL (ref 0–0.4)
EOSINOPHIL NFR BLD: 1 % (ref 0–7)
ERYTHROCYTE [DISTWIDTH] IN BLOOD BY AUTOMATED COUNT: 18 % (ref 11.5–14.5)
GLOBULIN SER CALC-MCNC: 3.9 G/DL (ref 2–4)
GLUCOSE SERPL-MCNC: 131 MG/DL (ref 65–100)
HCT VFR BLD AUTO: 29.5 % (ref 35–47)
HGB BLD-MCNC: 9.6 G/DL (ref 11.5–16)
LYMPHOCYTES # BLD: 0.6 K/UL (ref 0.8–3.5)
LYMPHOCYTES NFR BLD: 15 % (ref 12–49)
MAGNESIUM SERPL-MCNC: 1.3 MG/DL (ref 1.6–2.4)
MCH RBC QN AUTO: 28.1 PG (ref 26–34)
MCHC RBC AUTO-ENTMCNC: 32.5 G/DL (ref 30–36.5)
MCV RBC AUTO: 86.3 FL (ref 80–99)
MONOCYTES # BLD: 0 K/UL (ref 0–1)
MONOCYTES NFR BLD: 0 % (ref 5–13)
NEUTS SEG # BLD: 3.2 K/UL (ref 1.8–8)
NEUTS SEG NFR BLD: 84 % (ref 32–75)
PLATELET # BLD AUTO: 152 K/UL (ref 150–400)
POTASSIUM SERPL-SCNC: 3.6 MMOL/L (ref 3.5–5.1)
PROT SERPL-MCNC: 7.4 G/DL (ref 6.4–8.2)
RBC # BLD AUTO: 3.42 M/UL (ref 3.8–5.2)
RBC MORPH BLD: ABNORMAL
SODIUM SERPL-SCNC: 136 MMOL/L (ref 136–145)
WBC # BLD AUTO: 3.8 K/UL (ref 3.6–11)

## 2017-09-20 PROCEDURE — 74011250636 HC RX REV CODE- 250/636: Performed by: NURSE PRACTITIONER

## 2017-09-20 PROCEDURE — 85025 COMPLETE CBC W/AUTO DIFF WBC: CPT | Performed by: NURSE PRACTITIONER

## 2017-09-20 PROCEDURE — 80053 COMPREHEN METABOLIC PANEL: CPT | Performed by: NURSE PRACTITIONER

## 2017-09-20 PROCEDURE — 74011000250 HC RX REV CODE- 250: Performed by: NURSE PRACTITIONER

## 2017-09-20 PROCEDURE — 83735 ASSAY OF MAGNESIUM: CPT | Performed by: NURSE PRACTITIONER

## 2017-09-20 PROCEDURE — 36415 COLL VENOUS BLD VENIPUNCTURE: CPT | Performed by: NURSE PRACTITIONER

## 2017-09-20 PROCEDURE — 77030012965 HC NDL HUBR BBMI -A

## 2017-09-20 PROCEDURE — 36591 DRAW BLOOD OFF VENOUS DEVICE: CPT

## 2017-09-20 RX ORDER — HEPARIN 100 UNIT/ML
500 SYRINGE INTRAVENOUS AS NEEDED
Status: ACTIVE | OUTPATIENT
Start: 2017-09-20 | End: 2017-09-21

## 2017-09-20 RX ORDER — SODIUM CHLORIDE 9 MG/ML
10 INJECTION INTRAMUSCULAR; INTRAVENOUS; SUBCUTANEOUS AS NEEDED
Status: ACTIVE | OUTPATIENT
Start: 2017-09-20 | End: 2017-09-21

## 2017-09-20 RX ORDER — SODIUM CHLORIDE 0.9 % (FLUSH) 0.9 %
10-40 SYRINGE (ML) INJECTION AS NEEDED
Status: ACTIVE | OUTPATIENT
Start: 2017-09-20 | End: 2017-09-21

## 2017-09-20 RX ADMIN — Medication 500 UNITS: at 08:50

## 2017-09-20 RX ADMIN — Medication 10 ML: at 08:50

## 2017-09-20 RX ADMIN — SODIUM CHLORIDE 10 ML: 9 INJECTION INTRAMUSCULAR; INTRAVENOUS; SUBCUTANEOUS at 08:50

## 2017-09-26 ENCOUNTER — HOSPITAL ENCOUNTER (OUTPATIENT)
Dept: PHYSICAL THERAPY | Age: 67
Discharge: HOME | End: 2017-09-26
Payer: MEDICARE

## 2017-09-26 VITALS — BODY MASS INDEX: 42.65 KG/M2 | WEIGHT: 231.8 LBS | HEIGHT: 62 IN

## 2017-09-26 PROCEDURE — 97140 MANUAL THERAPY 1/> REGIONS: CPT

## 2017-09-26 NOTE — PROGRESS NOTES
[facility]  Physical Therapy Lymphedema [facility]  [address] [facility], [address] [place] [address].    LYmphedema Therapy  Visit:5    [x]                  Daily note               []                 30 day/Progress note with updated G-Codes    NAME: Jorge Lima  DATE: 9/26/2017   GOALS  Short term goals  Time frame: 09/20/17  3. Patient will be measured for appropriate daytime compression garments to prevent worsening of swelling over time. Patient able to wear her compression since last visit to consist of Sofie Marugerite size 3X, Solaris Ready Wrap Calf (one large and one extra large both Tall length) and a Compreboot (both small regular). Patient reports that she would like to order liner socks with compression in the foot so this was done today. Patient has been measured for day time compression. Goal Met 9/19/17      4. Patient will receive a pneumatic compression device demonstration (preferably an advanced device with a trunk component) to incorporate this tool into patient's self management routine and improve her swelling over time. Patient tried the Leblanc pump on a previous on the L LE with no significant improvement in girths. Her hip and waist measurements also increased which is of concern due to nature of her medical history and need preferably for a device that will have trunk component. Patient also had some discomfort with the Leblanc even on low setting as the pressure is not segmented with gentle gradient pressure, which would be more beneficial for patient. Patient able to have a Flexitouch Trial today with good results see modalities section of the note. Updated information will be sent to [facility] for processing. Goal met as patient has had demonstrate of 2 pumps in the clinic to see how they can assist with self management. Goal Met 9/19/17     Short term goals extended to 11/17/17:  1.   Patient will demonstrate knowledge of signs/symptoms of infections/cellulitis and be independent in skin care to prevent cellulitis. Continued education on cellulitis/infection prevention. Patient also able to verbalize the correct skin care routine and products to use daily. Skin improving with color improving an anterior lower legs. 2.  Patient will demonstrate independence in lymphedema home program of therapeutic exercises to improve circulation and decongest limb to improve ADLs. Patient has been performing active range of motion remedial exercises daily at home. Patient encouraged to continue to exercise daily. Long term goals  Time frame: 11/17/17  1. Pt will show improvement in Lymphedema Life Impact Scale by decreasing impairment percentage to under 40 and thus allow improvement in patient's quality of life. Patient scored a 34 on the LLIS with a percent impairment of 50% last visit. This is down from evaluation, but will need to continue to monitor to work towards goal of under 40%. 2.  Patient will be independent with don/doff of compression system and use in order to prevent reaccumulation of fluid at discharge. Patient has been working with her [address] [place], [place] [address] Com[facility] for foot and ankle and the Sofie Compression Capris for full coverage of B LEs and lower trunk. Patient able to don/doff items after demonstration independently with increased time. Patient's  has been assisting her in the home to improve the donning and to also help with energy conservation as needed. Patient has ordered liner socks with compression in the foot as well and shown how to don them for when they arrive. 3.  Pt will be independent in self-MLD and show stable limb volumes showing decongestion and pt. ready for transition to independent restorative phase of lymphedema therapy. Full volumes taken today in supine to reveal that patient has lost 2582ml in the L LE and 2660 ml in the R LE since evaluation on 8/22/17.  Patient has lost 25 [id number] ml in the L LE since last visit and 151 ml on the R LE since last visit. Will continue to monitor volumes. Patient planning to fly across the country this week, so we will re-assess volumes when she returns. SUBJECTIVE REPORT: Patient arrived wearing all of her compression items and the fit continues to be good. Patient stated that she has not received her liner socks with compression in the foot to date so vendor ([employer]) was called as she is flying out of town this Thursday. Pain: 0/10 (reports heaviness/no pain)  Gait: Independent  ADLs: Modified Independent to Independent  Treatment Response: Patient has been working on her home program and improving with her ability to don the items independently with decreased time needed. Function: Patient is independent with her mobility and was able to drive herself to her appointment today. She enjoys traveling. Weight: 231. 8.lbs (down from evaluation weight of 236.0 lbs)  TREATMENT AND OBJECTIVE DATA SUMMARY:   Patient/Family Education:      Educated in skin care: Demonstrated skin care principles     Educated in exercise: Instructed in active range of motion and randa [employer] routine exercise routines to date. Instructed in self MLD: Written sequence given and reviewed with patient as well as demonstration and instruction during MLD portion of the session. Instructed in don/doff of compression system: Patient using Solaris Lower leg velcro systems, [employer] Compreboots for foot and ankle and the Sofie Compression Capris for full coverage of B LEs and lower trunk. Education on garments has been given.  Patient has also ordered [place] compression sock liners      Therapeutic Activity 0 minutes   Treatment time: 0  Functional Mobility: Modified [place]   Fall risk: Low     Therapeutic Exercise/Procedure 0 minutes   Treatment time: 0  Randa ball exercise program: Deferred   Free exercises/ROM: Patient performing active range of motion exercise routine at home daily. Home program: Patient to perform daily to BID skin care, deep abdominal breathing, exercise routine, Self MLD, rest in supine up to one hour at at time throughout the day to improve swelling and wear her compression garments daily as tolerated. Rationale: Exercise will increase the lymph angiomotoricity and tissue pressure of the skin and thus decrease swelling. Modalities 0 minutes    Treatment time:0  Vasopneumatic pump: Patient would benefit from a vaso-pneumatic pump for home use. Patient had an [place] Pump trial previously with minimal benefit. Patient would benefit from a vasopneumatic pump with a truncal component. [facility] has patient's information and is in the process of reviewing her status for vasopneumatic pump. Manual Lymphatic Drainage (MLD) 90 minutes    Treatment time: 88:10KH-18:[id number]  Area to decongest: B LEs (L >R) and Trunk   Sequence used and effectiveness: Secondary Sequence in supine with omission of lateral neck for trunk and full L LE today. Patient tolerates MLD well. Skin/wound care/debridement: Intact. Dermal Status:  Tenous L LE. Scars R ankle lateral malleolar region secondary to orthopedic surgery. Texture:  Pitting Edema bilaterally on the dorsal surface  L LE: Fibrotic/Woody lower leg  R LE: Brawny  Pigmentation/Color Changes:  Hyperpigmented L LE anterior surface (improving with skin care routine and use of compression)  Anomalies:  Pebbly skin texture changes are noted on the anterior lower legs with significantly worse on the R LE. Upper/Lower extremity compression: Patient working with her Solaris Lower leg velcro systems daily for B LEs. The [employer] Compreboots for foot and ankle are working well, but she ordered liner socks with compression in the foot for when she needs to go out to appointments and wear the CompreBoot more at home.  Sofie Compression Capris fit well and has been wearing them over top of her velcro as recommended. The [facility] product tends to bother her if she wears them underneath her [employer]. Patient now has full coverage of B LEs and lower trunk. Education provided on the garments regarding wear/care and donning and doffing. Patient to continue to wear her products and return next week after her trip to assess how she did. Continued education on maintaining compression with airline travel. Kinesiotaping: Deferred. Girth/Volume measurement: Full volumes taken today in supine to reveal that patient has lost 2582ml in the L LE and 2660 ml in the R LE since evaluation on 8/22/17. Patient has lost 477 ml in the L LE since last visit and 151 ml on the R LE since last visit. Will continue to monitor volumes. See scanned documents. TOTAL TREATMENT 90 mins     Circumferential Limb Measurements:  Affected Side: B LE (L>R)  Full volumes taken today see above and the scanned documents. ASSESSMENT:   Treatment effectiveness and tolerance: Patient continues to make gains and is becoming more comfortable in her products with daily use. Patient plans to fly to [place] on Thursday and will back early next week. She is to return to our clinic on Tuesday for follow up and also will have an appointment with Dr. Andree Tracy that day as well. Progress toward goals: See goal section for details. STG 3+4 to date. Will extend STG 1+2 and work on remaining LTGs to prepare for restorative phase of care. PLAN OF CARE:   Changes to the plan of care: Continue with current plan of care.     Frequency: Once a week    Other: Will continue to be in contact with [facility].        Arvella Holstein Patch, PTA, CLT

## 2017-09-27 ENCOUNTER — APPOINTMENT (OUTPATIENT)
Dept: INFUSION THERAPY | Age: 67
End: 2017-09-27
Payer: MEDICARE

## 2017-10-02 DIAGNOSIS — C52 VAGINAL CANCER (HCC): Primary | ICD-10-CM

## 2017-10-03 ENCOUNTER — OFFICE VISIT (OUTPATIENT)
Dept: GYNECOLOGY | Age: 67
End: 2017-10-03

## 2017-10-03 ENCOUNTER — APPOINTMENT (OUTPATIENT)
Dept: PHYSICAL THERAPY | Age: 67
End: 2017-10-03

## 2017-10-03 VITALS
DIASTOLIC BLOOD PRESSURE: 83 MMHG | BODY MASS INDEX: 43.24 KG/M2 | HEIGHT: 62 IN | WEIGHT: 235 LBS | SYSTOLIC BLOOD PRESSURE: 158 MMHG | HEART RATE: 65 BPM

## 2017-10-03 DIAGNOSIS — C52 VAGINAL CANCER (HCC): Primary | ICD-10-CM

## 2017-10-03 RX ORDER — DIPHENHYDRAMINE HYDROCHLORIDE 50 MG/ML
50 INJECTION, SOLUTION INTRAMUSCULAR; INTRAVENOUS ONCE
Status: COMPLETED | OUTPATIENT
Start: 2017-10-04 | End: 2017-10-04

## 2017-10-03 RX ORDER — GRANISETRON HYDROCHLORIDE 1 MG/ML
1 INJECTION INTRAVENOUS ONCE
Status: COMPLETED | OUTPATIENT
Start: 2017-10-04 | End: 2017-10-04

## 2017-10-03 NOTE — PROGRESS NOTES
[address], [address]  [phone]  Pama Prader, MD Connie Speak, MD  Office Note  Patient ID:  Name:  Monika Cartagena  MRN:  [id number]  :  67 y.o. Date:  10/3/2017      HISTORY OF PRESENT ILLNESS:  Monika Cartagena is a 75 y.o.  postmenopausal female who was referred to me in 2015 for a pelvic mass by Dr. Greg Ochoa, who noted the mass on routine gynecologic exam.  He sent her for a CT of the pelvis, which revealed a mass at the vaginal apex, that appeared to be invading into the soft tissues of the left pelvis. There were several enlarged lymph nodes on the left. There was also questionable bladder invasion, and possibly even involvement of the rectosigmoid colon. She reported a history of having a mass removed from one of her ovaries in 1 at [facility], which required a second surgery to remove her uterus and contralateral ovary. She said she did not have to take any chemotherapy, but she did have to take some kind of pill for a year. She has not had any gynecologic issues since that time. Presumably her hysterectomy was a total, rather than a supracervical, but she is unsure and there are no records. She denied any vaginal bleeding, except for after her examination with Dr. Hunter Hummel. She reported minimal pain in the pelvis. I took her the OR for and exam under anesthesia with cystoscopy and proctoscopy. Pathology revealed a squamous cell carcinoma, consistent with a vaginal primary. Examination revealed a fixed mass involving the left upper vagina with extension to the left pelvic sidewall. There also appeared to be involvement of the posterior bladder wall, but no mucosal involvement. The mass did push against the rectal wall, but there did not appear to be any direct involvement. I referred her to radiation oncology and she saw Dr. Mera Lorenz.   She completed pelvic radiation along with concurrent cisplatin chemotherapy in February 2016 and had a great response. When I saw her in the office in March 2017 for follow up she was doing well and without complaints. Her pelvic exam and pap smear at that time were both negative. Subsequent to that visit she began having back pain that had progressively worsened. She saw Summerezekiel Ayers in May and she ordered a CT. This unfortunately demonstrated extensive metastatic disease outside of the prior radiated field. Dr. Ofelia eGrber then sent her for a PET/CT which demonstrated widely metastatic disease. I recommended systemic chemotherapy with Taxol/Cisplatin/Avastin, which is the standard for metastatic cervical cancer, since vaginal cancer behaves similar to cervical cancer. Unfortunately, the Avastin was denied by her insurance company. We chose to proceed with Taxol/Cisplatin. She has completed 5 cycles so far. She has done well so far with her treatment. She denies nausea. She reports some persistent lower extremity edema, but that has overall improved. Her recent CT demonstrated stable disease to mild improvement, but not progression. ROS:   and GI review: Negative  Cardiopulmonary review:Negative   Musculoskeletal:  Negative    A comprehensive review of systems was negative except for that written in the History of Present Illness.  , 10 point ROS        Problem List:  Patient Active Problem List    Diagnosis Date Noted    Uncontrolled type 2 diabetes mellitus without complication, without long-term current use of insulin ([address] 08/16/2017    Stage 4 chronic kidney disease ([address] 08/16/2017    Lymph edema 06/14/2017    Bilateral lower extremity edema 06/14/2017    Hypertension, essential 01/18/2017    Hypercholesterolemia 01/18/2017    GERD without esophagitis 01/18/2017    Vaginal cancer ([address] 12/10/2015    LAP-BAND surgery status 05/17/2012     PMH:  Past Medical History:   Diagnosis Date    Abnormal mammogram of left breast 12/5/2016    Cancer ([facility] 06/2017    LYMPH NODES IN BACK    Cancer ([facility] 2016    VAGINAL CANCER    GERD (gastroesophageal reflux disease) 6/11/06    Hypercholesterolemia 10/11/06    Hypertension 10/11/06    Morbid obesity ([facility] 10/11/06      PSH:  Past Surgical History:   Procedure Laterality Date    HX APPENDECTOMY      HX GASTRIC BYPASS  2009    LAP BAND    HX GYN  12/4/15    EUA/Bx of pelvic mass and vagina/cystoscopy/proctoscopy    HX HYSTERECTOMY  1978    HX ORTHOPAEDIC  2009    right ankle    HX OTHER SURGICAL  2001? benign mass removed from lower back    LAP, PLACE ADJUST GASTR BAND  1/30/08    EB: AP Std lap band, liver bx      Social History:  Social History   Substance Use Topics    Smoking status: Former Smoker     Packs/day: 0.50     Years: 15.00     Types: Cigarettes     Quit date: 1/1/1993    Smokeless tobacco: Never Used    Alcohol use Yes      Comment: socially-WINE ONCE A WEEK      Family History:  Family History   Problem Relation Age of Onset    Cancer Mother      colon WITH METS TO LIVER    Diabetes Father     Heart Disease Father     Cancer Sister      breast & bone    Cancer Maternal Aunt      BREAST    Cancer Paternal Aunt      BREAST    Anesth Problems Neg Hx       Medications: (reviewed)  Current Outpatient Prescriptions   Medication Sig    carvedilol (COREG) 3.125 mg tablet Take 1 Tab by mouth two (2) times daily (with meals).  COLACE 100 mg capsule     oxyCODONE-acetaminophen (PERCOCET) 5-325 mg per tablet Take 1-2 Tabs by mouth every four (4) hours as needed for Pain. Max Daily Amount: 12 Tabs.  furosemide (LASIX) 20 mg tablet Take 1 Tab by mouth daily as needed.     dexamethasone (DECADRON) 4 mg tablet Take 2 tablets with breakfast the day before chemo and for 2 days after chemo    lidocaine-prilocaine (EMLA) topical cream Apply small amount over port area one hour before chemo treatment and cover with a Band-Aid    ondansetron (ZOFRAN ODT) 4 mg disintegrating tablet Take 1 Tab by mouth every eight (8) hours as needed for Nausea. Indications: CANCER CHEMOTHERAPY-INDUCED NAUSEA AND VOMITING    acetaminophen (TYLENOL) 325 mg tablet Take 650 mg by mouth every four (4) hours as needed for Pain.  docusate sodium (STOOL SOFTENER) 100 mg capsule Take 100 mg by mouth daily.  atorvastatin (LIPITOR) 80 mg tablet Take 1 Tab by mouth daily.  triamterene-hydroCHLOROthiazide (MAXZIDE) 37.5-25 mg per tablet Take 1 Tab by mouth daily.  Cholecalciferol, Vitamin D3, (VITAMIN D3) 1,000 unit cap Take 1 Cap by mouth. No current facility-administered medications for this visit. Allergies: (reviewed)  Allergies   Allergen Reactions    Amlodipine Other (comments)     Edema in LE's          OBJECTIVE:    Physical Exam:  VITAL SIGNS: Vitals:    10/03/17 0908   BP: 158/83   Pulse: 65   Weight: 235 lb (106.6 kg)   Height: 5' 2.01\" (1.575 m)     Body mass index is 42.97 kg/(m^2). GENERAL MIGUELANGEL: Conversant, alert, oriented. No acute distress. HEENT: HEENT. No thyroid enlargement. No JVD. Neck: Supple without restrictions. RESPIRATORY: Clear to auscultation and percussion to the bases. No CVAT. CARDIOVASC: RRR without murmur/rub. GASTROINT: soft, non-tender, without masses or organomegaly   MUSCULOSKEL: no joint tenderness, deformity or swelling   EXTREMITIES: Marked edema of bilateral lower extremities, L>R. Skin noted with vascular changes on the left. PELVIC: Deferred   RECTAL: Deferred   PATRICIO SURVEY: No suspicious lymphadenopathy. NEURO: Grossly intact. No acute deficit. CT of abdomen/pelvis (5/16/17)  LUNG BASES: Multiple bilateral pulmonary nodules. INCIDENTALLY IMAGED HEART AND MEDIASTINUM: Unremarkable. LIVER: No mass or biliary dilatation. GALLBLADDER: Cholelithiasis. SPLEEN: Calcified granulomata. PANCREAS: No mass or ductal dilatation. ADRENALS: Unremarkable. KIDNEYS: Right renal cyst.  STOMACH: Unremarkable.   SMALL BOWEL: No dilatation or wall thickening. COLON: No dilatation or wall thickening. APPENDIX: Unremarkable. PERITONEUM: No ascites or pneumoperitoneum. RETROPERITONEUM: Significant retroperitoneal lymphadenopathy, largest dimension  surrounding the aorta 6.5 x 4.2 cm. REPRODUCTIVE ORGANS: There is 4.6 x 2.4 cm soft tissue in the region of  previously identified left pelvic sidewall mass. URINARY BLADDER: No mass or calculus. BONES: No destructive bone lesion. Degenerative changes of L4-L5 and L5-S1. ADDITIONAL COMMENTS: N/A     IMPRESSION:  1. Bilateral pulmonary nodules and retroperitoneal lymphadenopathy compatible  with metastatic disease. 2. Much smaller mass in the left pelvis, or scar. 3. Cholelithiasis. PET/CT (6/6/17)  HEAD/NECK: No apparent foci of abnormal hypermetabolism. Cerebral evaluation is  limited by normal intense activity.     CHEST: There are hypermetabolic left supraclavicular, superior mediastinal, and  paraesophageal lymph nodes. The small pulmonary nodules are minimally  hypermetabolic, maximum SUV of the lesion in the right middle lobe is 1.9.     ABDOMEN/PELVIS: Para-aortic and aortocaval lymphadenopathy is hypermetabolic,  maximum SUV 8.0. Small but hypermetabolic bilateral external iliac lymph nodes  are noted.     SKELETON: There is a hypermetabolic lytic lesion in the sternum, maximum SUV is  9.7. There is a small sclerotic lesion at L3 which is new compared to the prior  PET/CT. Given the tracer activity corresponding to the adjacent retroperitoneal  lymphadenopathy that is difficult to determine if this small lesion is  hypermetabolic.     IMPRESSION:   1. Mildly hypermetabolic pulmonary nodules are concerning for metastatic  disease. 2. Hypermetabolic left supraclavicular, superior mediastinal, and paraesophageal  lymph nodes as well as retroperitoneal and bilateral external iliac lymph nodes.   3. Hypermetabolic lytic lesion in the sternum is concerning for metastatic  disease. Small sclerotic lesion at L3 is new compared to the prior PET/CT but  too small for accurate characterization by PET CT.      CT of abdomen/pelvis (8/14/17)  There is a left jugular Port-A-Cath with tip in the superior vena cava. LOWER NECK:The visualized portions of the thyroid and structures of the lower  neck are within normal limits. CHEST: There is a 1.4 cm left supraclavicular lymph node. Lungs: There are multiple nodules throughout the lungs, some with cavitation  which are unchanged. Pleura: There is no pleural effusion or pneumothorax. Aorta: The aorta enhances normally with no evidence of aneurysm or dissection. Mediastinum: There is no mediastinal or hilar adenopathy or mass. Bones and soft tissues: The bones and soft tissues of the chest wall are normal.  ABDOMEN:  Liver: The liver is normal in size and contour with no focal abnormality. Gallbladder and bile ducts: There are small stones in the gallbladder. The  gallbladder wall is not thickened and there is no pericholecystic fluid. Spleen: No abnormality. There are tiny calcified granulomas in the spleen. Pancreas: No abnormality. Adrenal glands: No abnormality. Kidneys: No abnormality. PELVIS:  Reproductive organs: The uterus is absent. Bladder: No abnormality. BOWEL AND MESENTERY: There is a laparoscopic gastric band in expected position  in the upper abdomen. The small bowel is not obstructed. There is no mesenteric  mass or adenopathy. The appendix is absent. There are diverticula of the  sigmoid colon. PERITONEUM: There is no ascites or free intraperitoneal air. RETROPERITONEUM: The aorta is atherosclerotic and tapers without aneurysm. There  is retroperitoneal adenopathy. There is a 2.7 x 2.1 x 3.3 cm left para-aortic  lymph node and just anterior to this there is a 1.9 x 1.9 x 2.6 cm preaortic  lymph node. These have decreased minimally in size and previously measured 2 .9  x 2.5 and 2 x 2 centimeters. There is no pelvic mass or adenopathy. BONES AND SOFT TISSUES: There are degenerative changes of the spine and there  are sclerotic lesions in the L3 and L4 vertebral bodies. There is a sclerotic  and lytic lesion in the sternum.     IMPRESSION:   1. Status post hysterectomy and bilateral oophorectomy. 2. Bilateral pulmonary nodules, some with cavitation unchanged consistent with  metastatic disease. 3 Retroperitoneal adenopathy unchanged or minimally decreased in size and  enlarged left supraclavicular lymph node consistent with metastatic disease. 4. Small left pelvic mass or scar unchanged. 5. Lytic and sclerotic sternal lesion consistent with metastasis. 6. L3 and L4 bone lesions more sclerotic suggesting healing. 7. Atherosclerotic  abdominal aorta without aneurysm. 8. Gallstones. 9. Splenic granulomas indicative of old granulomatous disease. IMPRESSION/PLAN:  Gray Moritz is a 75 y.o. female with a history of stage III vaginal carcinoma. She was treated with pelvic radiation therapy and concurrent cisplatin chemotherapy. She now has recurrent disease with multiple sites of metastases. I had a long discussion with the patient and her  discussing the best course of management. Surgery is not an option due to the widespread nature of her disease. The same is true for radiation therapy. Her only option is systemic chemotherapy. Since vaginal cancer behaves like cervical carcinoma, I recommended using the standard regimen for metastatic cervical carcinoma, Taxol/Cisplatin/Avastin. Unfortunately, the Avastin was denied by her insurance company. We chose to proceed with Taxol/Cisplatin. She has completed 5 cycles so far. On her recent CT her pulmonary nodules appeared stable and her retroperitoneal lymphadenopathy appeared slightly improved. We will continue with the current regimen. We will repeat a CT after the next cycle.   We will then decide whether to press on or to reevaluate therapy.       Signed By: Stephanie Coronado MD     10/3/2017/4:31 PM

## 2017-10-04 ENCOUNTER — HOSPITAL ENCOUNTER (OUTPATIENT)
Dept: INFUSION THERAPY | Age: 67
Discharge: HOME | End: 2017-10-04
Payer: MEDICARE

## 2017-10-04 ENCOUNTER — APPOINTMENT (OUTPATIENT)
Dept: PHYSICAL THERAPY | Age: 67
End: 2017-10-04

## 2017-10-04 VITALS
WEIGHT: 237.4 LBS | BODY MASS INDEX: 43.69 KG/M2 | HEIGHT: 62 IN | SYSTOLIC BLOOD PRESSURE: 167 MMHG | TEMPERATURE: 98.4 F | HEART RATE: 72 BPM | RESPIRATION RATE: 16 BRPM | DIASTOLIC BLOOD PRESSURE: 77 MMHG

## 2017-10-04 LAB
ALBUMIN SERPL-MCNC: 3.6 G/DL (ref 3.5–5)
ALBUMIN/GLOB SERPL: 1 {RATIO} (ref 1.1–2.2)
ALP SERPL-CCNC: 113 U/L (ref 45–117)
ALT SERPL-CCNC: 18 U/L (ref 12–78)
ANION GAP SERPL CALC-SCNC: 12 MMOL/L (ref 5–15)
AST SERPL-CCNC: 16 U/L (ref 15–37)
BASOPHILS # BLD: 0 K/UL (ref 0–0.1)
BASOPHILS NFR BLD: 0 % (ref 0–1)
BILIRUB SERPL-MCNC: 0.3 MG/DL (ref 0.2–1)
BUN SERPL-MCNC: 26 MG/DL (ref 6–20)
BUN/CREAT SERPL: 14 (ref 12–20)
CALCIUM SERPL-MCNC: 8.7 MG/DL (ref 8.5–10.1)
CANCER AG125 SERPL-ACNC: 94 U/ML (ref 0–30)
CHLORIDE SERPL-SCNC: 102 MMOL/L (ref 97–108)
CO2 SERPL-SCNC: 24 MMOL/L (ref 21–32)
CREAT SERPL-MCNC: 1.81 MG/DL (ref 0.55–1.02)
DIFFERENTIAL METHOD BLD: ABNORMAL
EOSINOPHIL # BLD: 0 K/UL (ref 0–0.4)
EOSINOPHIL NFR BLD: 0 % (ref 0–7)
ERYTHROCYTE [DISTWIDTH] IN BLOOD BY AUTOMATED COUNT: 17.9 % (ref 11.5–14.5)
GLOBULIN SER CALC-MCNC: 3.5 G/DL (ref 2–4)
GLUCOSE SERPL-MCNC: 152 MG/DL (ref 65–100)
HCT VFR BLD AUTO: 28.7 % (ref 35–47)
HGB BLD-MCNC: 9.4 G/DL (ref 11.5–16)
LYMPHOCYTES # BLD: 0.5 K/UL (ref 0.8–3.5)
LYMPHOCYTES NFR BLD: 9 % (ref 12–49)
MAGNESIUM SERPL-MCNC: 1.7 MG/DL (ref 1.6–2.4)
MCH RBC QN AUTO: 28.7 PG (ref 26–34)
MCHC RBC AUTO-ENTMCNC: 32.8 G/DL (ref 30–36.5)
MCV RBC AUTO: 87.8 FL (ref 80–99)
MONOCYTES # BLD: 0.1 K/UL (ref 0–1)
MONOCYTES NFR BLD: 2 % (ref 5–13)
NEUTS SEG # BLD: 4.5 K/UL (ref 1.8–8)
NEUTS SEG NFR BLD: 89 % (ref 32–75)
PLATELET # BLD AUTO: 195 K/UL (ref 150–400)
POTASSIUM SERPL-SCNC: 4 MMOL/L (ref 3.5–5.1)
PROT SERPL-MCNC: 7.1 G/DL (ref 6.4–8.2)
RBC # BLD AUTO: 3.27 M/UL (ref 3.8–5.2)
RBC MORPH BLD: ABNORMAL
SODIUM SERPL-SCNC: 138 MMOL/L (ref 136–145)
WBC # BLD AUTO: 5.1 K/UL (ref 3.6–11)

## 2017-10-04 PROCEDURE — 36415 COLL VENOUS BLD VENIPUNCTURE: CPT | Performed by: OBSTETRICS & GYNECOLOGY

## 2017-10-04 PROCEDURE — 74011000250 HC RX REV CODE- 250: Performed by: OBSTETRICS & GYNECOLOGY

## 2017-10-04 PROCEDURE — 77030012965 HC NDL HUBR BBMI -A

## 2017-10-04 PROCEDURE — 74011250636 HC RX REV CODE- 250/636

## 2017-10-04 PROCEDURE — 96361 HYDRATE IV INFUSION ADD-ON: CPT

## 2017-10-04 PROCEDURE — 80053 COMPREHEN METABOLIC PANEL: CPT | Performed by: OBSTETRICS & GYNECOLOGY

## 2017-10-04 PROCEDURE — 96413 CHEMO IV INFUSION 1 HR: CPT

## 2017-10-04 PROCEDURE — 74011250636 HC RX REV CODE- 250/636: Performed by: OBSTETRICS & GYNECOLOGY

## 2017-10-04 PROCEDURE — 85025 COMPLETE CBC W/AUTO DIFF WBC: CPT | Performed by: OBSTETRICS & GYNECOLOGY

## 2017-10-04 PROCEDURE — 96375 TX/PRO/DX INJ NEW DRUG ADDON: CPT

## 2017-10-04 PROCEDURE — 96367 TX/PROPH/DG ADDL SEQ IV INF: CPT

## 2017-10-04 PROCEDURE — 96417 CHEMO IV INFUS EACH ADDL SEQ: CPT

## 2017-10-04 PROCEDURE — 83735 ASSAY OF MAGNESIUM: CPT | Performed by: OBSTETRICS & GYNECOLOGY

## 2017-10-04 PROCEDURE — 86304 IMMUNOASSAY TUMOR CA 125: CPT | Performed by: OBSTETRICS & GYNECOLOGY

## 2017-10-04 PROCEDURE — 96415 CHEMO IV INFUSION ADDL HR: CPT

## 2017-10-04 PROCEDURE — 74011000258 HC RX REV CODE- 258: Performed by: OBSTETRICS & GYNECOLOGY

## 2017-10-04 RX ORDER — SODIUM CHLORIDE 9 MG/ML
INJECTION INTRAMUSCULAR; INTRAVENOUS; SUBCUTANEOUS
Status: DISPENSED
Start: 2017-10-04 | End: 2017-10-04

## 2017-10-04 RX ORDER — HEPARIN 100 UNIT/ML
500 SYRINGE INTRAVENOUS AS NEEDED
Status: ACTIVE | OUTPATIENT
Start: 2017-10-04 | End: 2017-10-05

## 2017-10-04 RX ORDER — SODIUM CHLORIDE 0.9 % (FLUSH) 0.9 %
5-10 SYRINGE (ML) INJECTION AS NEEDED
Status: ACTIVE | OUTPATIENT
Start: 2017-10-04 | End: 2017-10-05

## 2017-10-04 RX ORDER — SODIUM CHLORIDE 9 MG/ML
10 INJECTION INTRAMUSCULAR; INTRAVENOUS; SUBCUTANEOUS AS NEEDED
Status: ACTIVE | OUTPATIENT
Start: 2017-10-04 | End: 2017-10-05

## 2017-10-04 RX ADMIN — GRANISETRON HYDROCHLORIDE 1 MG: 1 INJECTION INTRAVENOUS at 13:00

## 2017-10-04 RX ADMIN — SODIUM CHLORIDE 10 ML: 9 INJECTION, SOLUTION INTRAMUSCULAR; INTRAVENOUS; SUBCUTANEOUS at 08:33

## 2017-10-04 RX ADMIN — POTASSIUM CHLORIDE: 2 INJECTION, SOLUTION, CONCENTRATE INTRAVENOUS at 08:55

## 2017-10-04 RX ADMIN — SODIUM CHLORIDE 150 MG: 900 INJECTION, SOLUTION INTRAVENOUS at 13:06

## 2017-10-04 RX ADMIN — CISPLATIN 112 MG: 100 INJECTION, SOLUTION INTRAVENOUS at 17:00

## 2017-10-04 RX ADMIN — DIPHENHYDRAMINE HYDROCHLORIDE 50 MG: 50 INJECTION INTRAMUSCULAR; INTRAVENOUS at 13:02

## 2017-10-04 RX ADMIN — Medication 10 ML: at 08:33

## 2017-10-04 RX ADMIN — FAMOTIDINE 20 MG: 10 INJECTION INTRAVENOUS at 12:59

## 2017-10-04 RX ADMIN — DEXAMETHASONE SODIUM PHOSPHATE 12 MG: 4 INJECTION, SOLUTION INTRA-ARTICULAR; INTRALESIONAL; INTRAMUSCULAR; INTRAVENOUS; SOFT TISSUE at 12:36

## 2017-10-04 NOTE — PROGRESS NOTES
[facility] - Chemotherapy Progress Note    0805 Pt admit to [facility] for Taxol/Cisplatin C6 ambulatory in stable condition. Assessment completed. No new concerns voiced. Port accessed with positive blood return using a 1.5 inch alejo. Labs drawn per order and sent. Line flushed, hydration infusing. 1015 Labs resulted, Creatinine, 1.81, called and notified Sherwin Severino, currently attending a meeting, will return call once available    1120 Called and s/w CHRISTIE Oneill/Dr. Chuy Jacobs, well relay lab results to [place], NP and return call    1140 S/w CHRISTIE Oneill/Dr. Chuy Jacobs, orders to administer 1L saline for hydration and continue today's treatment; discussed w/ pt, verbalized understanding         Visit Vitals    /83    Pulse 71    Temp 98.4 °F (36.9 °C)    Resp 16    Ht 5' 2\" (1.575 m)    Wt 107.7 kg (237 lb 6.4 oz)    Breastfeeding No    BMI 43.42 kg/m2       Medications:  Pre-hydration  Benadryl 50 mg  Pepcid 20 mg  Kytril 1 mg  Decadron 12 mg IVPB  Emend 150 mg IVPB  Paclitaxel  Cisplatin  Post hydration    1815 Pt tolerated treatment well. Port maintained positive blood return throughout treatment, flushed with positive blood return at conclusion, heparinized and de-accessed. D/c home ambulatory in no distress. Pt to follow up with MD for further [facility] appointments. Recent Results (from the past 12 hour(s))   CBC WITH AUTOMATED DIFF    Collection Time: 10/04/17  8:14 AM   Result Value Ref Range    WBC 5.1 3.6 - 11.0 K/uL    RBC 3.27 (L) 3.80 - 5.20 M/uL    HGB 9.4 (L) 11.5 - 16.0 g/dL    HCT 28.7 (L) 35.0 - 47.0 %    MCV 87.8 80.0 - 99.0 FL    MCH 28.7 26.0 - 34.0 PG    MCHC 32.8 30.0 - 36.5 g/dL    RDW 17.9 (H) 11.5 - 14.5 %    PLATELET 907 372 - 243 K/uL    NEUTROPHILS 89 (H) 32 - 75 %    LYMPHOCYTES 9 (L) 12 - 49 %    MONOCYTES 2 (L) 5 - 13 %    EOSINOPHILS 0 0 - 7 %    BASOPHILS 0 0 - 1 %    ABS. NEUTROPHILS 4.5 1.8 - 8.0 K/UL    ABS. LYMPHOCYTES 0.5 (L) 0.8 - 3.5 K/UL    ABS.  MONOCYTES 0.1 0.0 - 1.0 K/UL    ABS. EOSINOPHILS 0.0 0.0 - 0.4 K/UL    ABS. BASOPHILS 0.0 0.0 - 0.1 K/UL    DF SMEAR SCANNED      RBC COMMENTS ANISOCYTOSIS  1+       METABOLIC PANEL, COMPREHENSIVE    Collection Time: 10/04/17  8:14 AM   Result Value Ref Range    Sodium 138 136 - 145 mmol/L    Potassium 4.0 3.5 - 5.1 mmol/L    Chloride 102 97 - 108 mmol/L    CO2 24 21 - 32 mmol/L    Anion gap 12 5 - 15 mmol/L    Glucose 152 (H) 65 - 100 mg/dL    BUN 26 (H) 6 - 20 MG/DL    Creatinine 1.81 (H) 0.55 - 1.02 MG/DL    BUN/Creatinine ratio 14 12 - 20      GFR est AA 34 (L) >60 ml/min/1.73m2    GFR est non-AA 28 (L) >60 ml/min/1.73m2    Calcium 8.7 8.5 - 10.1 MG/DL    Bilirubin, total 0.3 0.2 - 1.0 MG/DL    ALT (SGPT) 18 12 - 78 U/L    AST (SGOT) 16 15 - 37 U/L    Alk.  phosphatase 113 45 - 117 U/L    Protein, total 7.1 6.4 - 8.2 g/dL    Albumin 3.6 3.5 - 5.0 g/dL    Globulin 3.5 2.0 - 4.0 g/dL    A-G Ratio 1.0 (L) 1.1 - 2.2     MAGNESIUM    Collection Time: 10/04/17  8:14 AM   Result Value Ref Range    Magnesium 1.7 1.6 - 2.4 mg/dL

## 2017-10-06 ENCOUNTER — TELEPHONE (OUTPATIENT)
Dept: GYNECOLOGY | Age: 67
End: 2017-10-06

## 2017-10-11 NOTE — TELEPHONE ENCOUNTER
Mary Jane called to advise her Lymphadema pump has been denied and will require a peer to peer. She advised to call back if  would like to do that.

## 2017-10-11 NOTE — TELEPHONE ENCOUNTER
I spoke with Lorrie Murphy NP since  is out of the office and she will initiate the peer to peer. I called Kaylin back and left a message to call me so we can set this up.

## 2017-10-23 ENCOUNTER — HOSPITAL ENCOUNTER (OUTPATIENT)
Dept: CT IMAGING | Age: 67
Discharge: HOME | End: 2017-10-23
Attending: OBSTETRICS & GYNECOLOGY
Payer: MEDICARE

## 2017-10-23 DIAGNOSIS — C52 VAGINAL CANCER (HCC): ICD-10-CM

## 2017-10-23 PROCEDURE — 74011636320 HC RX REV CODE- 636/320: Performed by: OBSTETRICS & GYNECOLOGY

## 2017-10-23 PROCEDURE — 74176 CT ABD & PELVIS W/O CONTRAST: CPT

## 2017-10-23 PROCEDURE — 71250 CT THORAX DX C-: CPT

## 2017-10-23 PROCEDURE — 71260 CT THORAX DX C+: CPT

## 2017-10-23 PROCEDURE — 74177 CT ABD & PELVIS W/CONTRAST: CPT

## 2017-10-23 RX ADMIN — IOHEXOL 6 ML: 240 INJECTION, SOLUTION INTRATHECAL; INTRAVASCULAR; INTRAVENOUS; ORAL at 10:00

## 2017-10-26 ENCOUNTER — OFFICE VISIT (OUTPATIENT)
Dept: GYNECOLOGY | Age: 67
End: 2017-10-26

## 2017-10-26 VITALS
BODY MASS INDEX: 44.42 KG/M2 | HEART RATE: 76 BPM | WEIGHT: 241.4 LBS | HEIGHT: 62 IN | DIASTOLIC BLOOD PRESSURE: 88 MMHG | SYSTOLIC BLOOD PRESSURE: 160 MMHG

## 2017-10-26 DIAGNOSIS — C52 VAGINAL CANCER (HCC): Primary | ICD-10-CM

## 2017-10-26 RX ORDER — DEXAMETHASONE 4 MG/1
TABLET ORAL
Qty: 36 TAB | Refills: 2 | Status: SHIPPED | OUTPATIENT
Start: 2017-10-26 | End: 2018-01-01

## 2017-10-26 RX ORDER — ONDANSETRON 4 MG/1
4 TABLET, ORALLY DISINTEGRATING ORAL
Qty: 30 TAB | Refills: 2 | Status: SHIPPED | OUTPATIENT
Start: 2017-10-26 | End: 2018-01-01 | Stop reason: SDUPTHER

## 2017-10-26 NOTE — TELEPHONE ENCOUNTER
Requested Prescriptions     Signed Prescriptions Disp Refills    ondansetron (ZOFRAN ODT) 4 mg disintegrating tablet 30 Tab 2     Sig: Take 1 Tab by mouth every eight (8) hours as needed for Nausea. Indications: CANCER CHEMOTHERAPY-INDUCED NAUSEA AND VOMITING     Authorizing Provider: Royal Nuñez     Ordering User: ROBBY Beltran    dexamethasone (DECADRON) 4 mg tablet 36 Tab 2     Sig: Take 2 tablets with breakfast the day before chemo and for 2 days after chemo     Authorizing Provider: Royal Nuñez     Ordering User: Ainsley Loredo     Rx sent to pharmacy to be used during chemotherapy per yasmany Das.

## 2017-10-26 NOTE — PROGRESS NOTES
[address] [place] [address] Mathias Moritz [address], [address] [facility]  [phone]  MD Mason Llanos MD  Office Note  Patient ID:  Name:  Ja Heller  MRN:  [id number]  :  67 y.o. Date:  10/26/2017      HISTORY OF PRESENT ILLNESS:  Ja Heller is a 75 y.o.  postmenopausal female who was referred to me in 2015 for a pelvic mass by Dr. Arun Lindsey, who noted the mass on routine gynecologic exam.  He sent her for a CT of the pelvis, which revealed a mass at the vaginal apex, that appeared to be invading into the soft tissues of the left pelvis. There were several enlarged lymph nodes on the left. There was also questionable bladder invasion, and possibly even involvement of the rectosigmoid colon. She reported a history of having a mass removed from one of her ovaries in 1 at [facility], which required a second surgery to remove her uterus and contralateral ovary. She said she did not have to take any chemotherapy, but she did have to take some kind of pill for a year. She has not had any gynecologic issues since that time. Presumably her hysterectomy was a total, rather than a supracervical, but she is unsure and there are no records. She denied any vaginal bleeding, except for after her examination with Dr. Tremaine Ramirez. She reported minimal pain in the pelvis. I took her the OR for and exam under anesthesia with cystoscopy and proctoscopy. Pathology revealed a squamous cell carcinoma, consistent with a vaginal primary. Examination revealed a fixed mass involving the left upper vagina with extension to the left pelvic sidewall. There also appeared to be involvement of the posterior bladder wall, but no mucosal involvement. The mass did push against the rectal wall, but there did not appear to be any direct involvement. I referred her to radiation oncology and she saw Dr. Adrian Castellanos.   She completed pelvic radiation along with concurrent cisplatin chemotherapy in February 2016 and had a great response. When I saw her in the office in March 2017 for follow up she was doing well and without complaints. Her pelvic exam and pap smear at that time were both negative. Subsequent to that visit she began having back pain that had progressively worsened. She saw Fercho Toure in May and she ordered a CT. This unfortunately demonstrated extensive metastatic disease outside of the prior radiated field. Dr. Hazel Dimas then sent her for a PET/CT which demonstrated widely metastatic disease. I recommended systemic chemotherapy with Taxol/Cisplatin/Avastin, which is the standard for metastatic cervical cancer, since vaginal cancer behaves similar to cervical cancer. Unfortunately, the Avastin was denied by her insurance company. We chose to proceed with Taxol/Cisplatin. She has now completed 6 cycles. She has done well so far with her treatment. She denies nausea. She reports some persistent lower extremity edema, but that has overall improved. She presents today to review the results of her recent CT scan. ECOG score: 1       ROS:   and GI review: Negative  Cardiopulmonary review:Negative   Musculoskeletal:  Negative    A comprehensive review of systems was negative except for that written in the History of Present Illness.  , 10 point ROS        Problem List:  Patient Active Problem List    Diagnosis Date Noted    Uncontrolled type 2 diabetes mellitus without complication, without long-term current use of insulin ([address] 08/16/2017    Stage 4 chronic kidney disease ([address] 08/16/2017    Lymph edema 06/14/2017    Bilateral lower extremity edema 06/14/2017    Hypertension, essential 01/18/2017    Hypercholesterolemia 01/18/2017    GERD without esophagitis 01/18/2017    Vaginal cancer ([address] 12/10/2015    LAP-BAND surgery status 05/17/2012     PMH:  Past Medical History:   Diagnosis Date    Abnormal mammogram of left breast 12/5/2016    Cancer ([facility] 06/2017    LYMPH NODES IN BACK    Cancer ([facility] 2016    VAGINAL CANCER    GERD (gastroesophageal reflux disease) 6/11/06    Hypercholesterolemia 10/11/06    Hypertension 10/11/06    Morbid obesity ([facility] 10/11/06      PSH:  Past Surgical History:   Procedure Laterality Date    HX APPENDECTOMY      HX GASTRIC BYPASS  2009    LAP BAND    HX GYN  12/4/15    EUA/Bx of pelvic mass and vagina/cystoscopy/proctoscopy    HX HYSTERECTOMY  1978    HX ORTHOPAEDIC  2009    right ankle    HX OTHER SURGICAL  2001? benign mass removed from lower back    LAP, PLACE ADJUST GASTR BAND  1/30/08    EB: AP Std lap band, liver bx      Social History:  Social History   Substance Use Topics    Smoking status: Former Smoker     Packs/day: 0.50     Years: 15.00     Types: Cigarettes     Quit date: 1/1/1993    Smokeless tobacco: Never Used    Alcohol use Yes      Comment: socially-WINE ONCE A WEEK      Family History:  Family History   Problem Relation Age of Onset    Cancer Mother      colon WITH METS TO LIVER    Diabetes Father     Heart Disease Father     Cancer Sister      breast & bone    Cancer Maternal Aunt      BREAST    Cancer Paternal Aunt      BREAST    Anesth Problems Neg Hx       Medications: (reviewed)  Current Outpatient Prescriptions   Medication Sig    carvedilol (COREG) 3.125 mg tablet Take 1 Tab by mouth two (2) times daily (with meals).  COLACE 100 mg capsule     oxyCODONE-acetaminophen (PERCOCET) 5-325 mg per tablet Take 1-2 Tabs by mouth every four (4) hours as needed for Pain. Max Daily Amount: 12 Tabs.  furosemide (LASIX) 20 mg tablet Take 1 Tab by mouth daily as needed.     dexamethasone (DECADRON) 4 mg tablet Take 2 tablets with breakfast the day before chemo and for 2 days after chemo    lidocaine-prilocaine (EMLA) topical cream Apply small amount over port area one hour before chemo treatment and cover with a Band-Aid    ondansetron (ZOFRAN ODT) 4 mg disintegrating tablet Take 1 Tab by mouth every eight (8) hours as needed for Nausea. Indications: CANCER CHEMOTHERAPY-INDUCED NAUSEA AND VOMITING    acetaminophen (TYLENOL) 325 mg tablet Take 650 mg by mouth every four (4) hours as needed for Pain.  docusate sodium (STOOL SOFTENER) 100 mg capsule Take 100 mg by mouth daily.  atorvastatin (LIPITOR) 80 mg tablet Take 1 Tab by mouth daily.  triamterene-hydroCHLOROthiazide (MAXZIDE) 37.5-25 mg per tablet Take 1 Tab by mouth daily.  Cholecalciferol, Vitamin D3, (VITAMIN D3) 1,000 unit cap Take 1 Cap by mouth. No current facility-administered medications for this visit. Allergies: (reviewed)  Allergies   Allergen Reactions    Amlodipine Other (comments)     Edema in LE's          OBJECTIVE:    Physical Exam:  VITAL SIGNS: There were no vitals filed for this visit. There is no height or weight on file to calculate BMI. GENERAL MIGUELANGEL: Conversant, alert, oriented. No acute distress. HEENT: HEENT. No thyroid enlargement. No JVD. Neck: Supple without restrictions. RESPIRATORY: Clear to auscultation and percussion to the bases. No CVAT. CARDIOVASC: RRR without murmur/rub. GASTROINT: soft, non-tender, without masses or organomegaly   MUSCULOSKEL: no joint tenderness, deformity or swelling   EXTREMITIES: Marked edema of bilateral lower extremities, L>R. Skin noted with vascular changes on the left. PELVIC: Deferred   RECTAL: Deferred   PATRICIO SURVEY: No suspicious lymphadenopathy. NEURO: Grossly intact. No acute deficit. CT of abdomen/pelvis (5/16/17)  LUNG BASES: Multiple bilateral pulmonary nodules. INCIDENTALLY IMAGED HEART AND MEDIASTINUM: Unremarkable. LIVER: No mass or biliary dilatation. GALLBLADDER: Cholelithiasis. SPLEEN: Calcified granulomata. PANCREAS: No mass or ductal dilatation. ADRENALS: Unremarkable. KIDNEYS: Right renal cyst.  STOMACH: Unremarkable. SMALL BOWEL: No dilatation or wall thickening.   COLON: No dilatation or wall thickening. APPENDIX: Unremarkable. PERITONEUM: No ascites or pneumoperitoneum. RETROPERITONEUM: Significant retroperitoneal lymphadenopathy, largest dimension  surrounding the aorta 6.5 x 4.2 cm. REPRODUCTIVE ORGANS: There is 4.6 x 2.4 cm soft tissue in the region of  previously identified left pelvic sidewall mass. URINARY BLADDER: No mass or calculus. BONES: No destructive bone lesion. Degenerative changes of L4-L5 and L5-S1. ADDITIONAL COMMENTS: N/A     IMPRESSION:  1. Bilateral pulmonary nodules and retroperitoneal lymphadenopathy compatible  with metastatic disease. 2. Much smaller mass in the left pelvis, or scar. 3. Cholelithiasis. PET/CT (6/6/17)  HEAD/NECK: No apparent foci of abnormal hypermetabolism. Cerebral evaluation is  limited by normal intense activity.     CHEST: There are hypermetabolic left supraclavicular, superior mediastinal, and  paraesophageal lymph nodes. The small pulmonary nodules are minimally  hypermetabolic, maximum SUV of the lesion in the right middle lobe is 1.9.     ABDOMEN/PELVIS: Para-aortic and aortocaval lymphadenopathy is hypermetabolic,  maximum SUV 8.0. Small but hypermetabolic bilateral external iliac lymph nodes  are noted.     SKELETON: There is a hypermetabolic lytic lesion in the sternum, maximum SUV is  9.7. There is a small sclerotic lesion at L3 which is new compared to the prior  PET/CT. Given the tracer activity corresponding to the adjacent retroperitoneal  lymphadenopathy that is difficult to determine if this small lesion is  hypermetabolic.     IMPRESSION:   1. Mildly hypermetabolic pulmonary nodules are concerning for metastatic  disease. 2. Hypermetabolic left supraclavicular, superior mediastinal, and paraesophageal  lymph nodes as well as retroperitoneal and bilateral external iliac lymph nodes. 3. Hypermetabolic lytic lesion in the sternum is concerning for metastatic  disease.  Small sclerotic lesion at L3 is new compared to the prior PET/CT but  too small for accurate characterization by PET CT.      CT of abdomen/pelvis (8/14/17)  There is a left jugular Port-A-Cath with tip in the superior vena cava. LOWER NECK:The visualized portions of the thyroid and structures of the lower  neck are within normal limits. CHEST: There is a 1.4 cm left supraclavicular lymph node. Lungs: There are multiple nodules throughout the lungs, some with cavitation  which are unchanged. Pleura: There is no pleural effusion or pneumothorax. Aorta: The aorta enhances normally with no evidence of aneurysm or dissection. Mediastinum: There is no mediastinal or hilar adenopathy or mass. Bones and soft tissues: The bones and soft tissues of the chest wall are normal.  ABDOMEN:  Liver: The liver is normal in size and contour with no focal abnormality. Gallbladder and bile ducts: There are small stones in the gallbladder. The  gallbladder wall is not thickened and there is no pericholecystic fluid. Spleen: No abnormality. There are tiny calcified granulomas in the spleen. Pancreas: No abnormality. Adrenal glands: No abnormality. Kidneys: No abnormality. PELVIS:  Reproductive organs: The uterus is absent. Bladder: No abnormality. BOWEL AND MESENTERY: There is a laparoscopic gastric band in expected position  in the upper abdomen. The small bowel is not obstructed. There is no mesenteric  mass or adenopathy. The appendix is absent. There are diverticula of the  sigmoid colon. PERITONEUM: There is no ascites or free intraperitoneal air. RETROPERITONEUM: The aorta is atherosclerotic and tapers without aneurysm. There  is retroperitoneal adenopathy. There is a 2.7 x 2.1 x 3.3 cm left para-aortic  lymph node and just anterior to this there is a 1.9 x 1.9 x 2.6 cm preaortic  lymph node. These have decreased minimally in size and previously measured 2 .9  x 2.5 and 2 x 2 centimeters. There is no pelvic mass or adenopathy.   BONES AND SOFT TISSUES: There are degenerative changes of the spine and there  are sclerotic lesions in the L3 and L4 vertebral bodies. There is a sclerotic  and lytic lesion in the sternum.     IMPRESSION:   1. Status post hysterectomy and bilateral oophorectomy. 2. Bilateral pulmonary nodules, some with cavitation unchanged consistent with  metastatic disease. 3 Retroperitoneal adenopathy unchanged or minimally decreased in size and  enlarged left supraclavicular lymph node consistent with metastatic disease. 4. Small left pelvic mass or scar unchanged. 5. Lytic and sclerotic sternal lesion consistent with metastasis. 6. L3 and L4 bone lesions more sclerotic suggesting healing. 7. Atherosclerotic  abdominal aorta without aneurysm. 8. Gallstones. 9. Splenic granulomas indicative of old granulomatous disease. CT of chest/abdomen/pelvis (10/23/17)  THYROID: No nodule. MEDIASTINUM: No mass or lymphadenopathy. PATRICIA: No mass or enlarged lymphadenopathy. Calcified right hilar lymph nodes. THORACIC AORTA: Atherosclerotic calcifications without aneurysm. MAIN PULMONARY ARTERY: Normal in caliber. TRACHEA/BRONCHI: Patent. ESOPHAGUS: No wall thickening or dilatation. HEART:  The visualized heart is normal in size without pericardial effusion. PLEURA: No effusion or pneumothorax. LUNGS: : There are multiple nodules throughout the lungs, some with cavitation  which are unchanged. Calcified granulomata right lung. LIVER: Calcified granulomata. Otherwise unremarkable. GALLBLADDER: Partially distended. Calcified stones within the lumen at the  fundus. Otherwise unremarkable. SPLEEN: Outside granuloma. Otherwise unremarkable. PANCREAS: No mass or duct dilation. ADRENALS: Unremarkable. KIDNEYS: No mass, calculus, or hydronephrosis. STOMACH: Gastric lap band in position. Otherwise unremarkable. SMALL BOWEL: No dilatation or wall thickening. COLON: No dilation or wall thickening. APPENDIX: Unremarkable.   PERITONEUM: No ascites or pneumoperitoneum. RETROPERITONEUM: The aorta is atherosclerotic and tapers without aneurysm. There  is retroperitoneal adenopathy. There is a 2.5 x 2.7 cm left para-aortic lymph  node, previously 2.4 x 2.5 cm and a preaortic lymph node just anterior to this  measuring 1.7 x 2.1 cm, previously 1.9 x 2.3 cm. Overall confluent periaortic  retroperitoneal adenopathy extending to the bifurcation appears progressed from  prior. There is no pelvic mass or adenopathy. REPRODUCTIVE ORGANS: Uterus and ovaries are surgically absent. URINARY BLADDER: No mass or calculus. BONES: Sclerotic manubrial and vertebral body lesions appear stable. ADDITIONAL COMMENTS: Left-sided Port-A-Cath in position with catheter extending  to the upper superior vena cava.       IMPRESSION:  Slight progression of retroperitoneal adenopathy with stable  appearing pulmonary and osseous metastases as above. Additional incidental  findings as above. IMPRESSION/PLAN:  Odalis Godinez is a 75 y.o. female with a history of stage III vaginal carcinoma. She was treated with pelvic radiation therapy and concurrent cisplatin chemotherapy. She now has recurrent disease with multiple sites of metastases. I had a long discussion with the patient and her  discussing the best course of management. Surgery is not an option due to the widespread nature of her disease. The same is true for radiation therapy. Her only viable option is systemic chemotherapy. Since vaginal cancer behaves like cervical carcinoma, I recommended using the standard regimen for metastatic cervical carcinoma, Taxol/Cisplatin/Avastin. Unfortunately, the Avastin was denied by her insurance company. We chose to proceed with Taxol/Cisplatin. She has completed 6 cycles so far. On her CT in July 2017 her pulmonary nodules appeared stable and her retroperitoneal lymphadenopathy appeared slightly improved.   Her most recent scan in October 2017 demonstrated slight progression of the lymphadenopathy, while the pulmonary and bony disease appeared stable. On my review, the changes didn't meet RECIST criteria for progression. I recommend continuing with therapy, but I suggested that we exchange Carboplatin for Cisplatin due to nephrotoxicity. Her creatinine has steadily climbed with the Cisplatin.        Signed By: Gissel White MD     10/26/2017/4:31 PM

## 2017-10-27 ENCOUNTER — TELEPHONE (OUTPATIENT)
Dept: GYNECOLOGY | Age: 67
End: 2017-10-27

## 2017-10-27 DIAGNOSIS — C52 VAGINAL CANCER (HCC): Primary | ICD-10-CM

## 2017-10-27 RX ORDER — GRANISETRON HYDROCHLORIDE 1 MG/ML
1 INJECTION INTRAVENOUS ONCE
Status: COMPLETED | OUTPATIENT
Start: 2017-11-01 | End: 2017-11-01

## 2017-10-27 RX ORDER — DIPHENHYDRAMINE HYDROCHLORIDE 50 MG/ML
50 INJECTION, SOLUTION INTRAMUSCULAR; INTRAVENOUS ONCE
Status: COMPLETED | OUTPATIENT
Start: 2017-11-01 | End: 2017-11-01

## 2017-10-27 NOTE — PROGRESS NOTES
Lab orders faxed to [facility] lab [facility] to be drawn on 10/30/17 for C1 chemo on 11/1/17 per vo Dr. Stacy Green.

## 2017-10-30 ENCOUNTER — TELEPHONE (OUTPATIENT)
Dept: GYNECOLOGY | Age: 67
End: 2017-10-30

## 2017-10-30 NOTE — TELEPHONE ENCOUNTER
1st 3 MD/lab/chemo appts given to pt. She will get a copy on next ov.   Will have labs drawn today at [facility].

## 2017-10-31 LAB
ALBUMIN SERPL-MCNC: 4.1 G/DL (ref 3.6–4.8)
ALBUMIN/GLOB SERPL: 1.5 {RATIO} (ref 1.2–2.2)
ALP SERPL-CCNC: 105 IU/L (ref 39–117)
ALT SERPL-CCNC: 7 IU/L (ref 0–32)
AST SERPL-CCNC: 9 IU/L (ref 0–40)
BASOPHILS # BLD AUTO: 0 X10E3/UL (ref 0–0.2)
BASOPHILS NFR BLD AUTO: 0 %
BILIRUB SERPL-MCNC: 0.2 MG/DL (ref 0–1.2)
BUN SERPL-MCNC: 27 MG/DL (ref 8–27)
BUN/CREAT SERPL: 15 (ref 12–28)
CALCIUM SERPL-MCNC: 9 MG/DL (ref 8.7–10.3)
CANCER AG125 SERPL-ACNC: 135.2 U/ML (ref 0–38.1)
CHLORIDE SERPL-SCNC: 98 MMOL/L (ref 96–106)
CO2 SERPL-SCNC: 28 MMOL/L (ref 18–29)
CREAT SERPL-MCNC: 1.77 MG/DL (ref 0.57–1)
EOSINOPHIL # BLD AUTO: 0.1 X10E3/UL (ref 0–0.4)
EOSINOPHIL NFR BLD AUTO: 2 %
ERYTHROCYTE [DISTWIDTH] IN BLOOD BY AUTOMATED COUNT: 17.3 % (ref 12.3–15.4)
GFR SERPLBLD CREATININE-BSD FMLA CKD-EPI: 29 ML/MIN/1.73
GFR SERPLBLD CREATININE-BSD FMLA CKD-EPI: 34 ML/MIN/1.73
GLOBULIN SER CALC-MCNC: 2.7 G/DL (ref 1.5–4.5)
GLUCOSE SERPL-MCNC: 97 MG/DL (ref 65–99)
HCT VFR BLD AUTO: 30.3 % (ref 34–46.6)
HGB BLD-MCNC: 9.7 G/DL (ref 11.1–15.9)
IMM GRANULOCYTES # BLD: 0.1 X10E3/UL (ref 0–0.1)
IMM GRANULOCYTES NFR BLD: 1 %
LYMPHOCYTES # BLD AUTO: 1 X10E3/UL (ref 0.7–3.1)
LYMPHOCYTES NFR BLD AUTO: 17 %
MAGNESIUM SERPL-MCNC: 1.8 MG/DL (ref 1.6–2.3)
MCH RBC QN AUTO: 29.4 PG (ref 26.6–33)
MCHC RBC AUTO-ENTMCNC: 32 G/DL (ref 31.5–35.7)
MCV RBC AUTO: 92 FL (ref 79–97)
MONOCYTES # BLD AUTO: 0.5 X10E3/UL (ref 0.1–0.9)
MONOCYTES NFR BLD AUTO: 8 %
NEUTROPHILS # BLD AUTO: 4.4 X10E3/UL (ref 1.4–7)
NEUTROPHILS NFR BLD AUTO: 72 %
PLATELET # BLD AUTO: 180 X10E3/UL (ref 150–379)
POTASSIUM SERPL-SCNC: 4.1 MMOL/L (ref 3.5–5.2)
PROT SERPL-MCNC: 6.8 G/DL (ref 6–8.5)
RBC # BLD AUTO: 3.3 X10E6/UL (ref 3.77–5.28)
SODIUM SERPL-SCNC: 142 MMOL/L (ref 134–144)
WBC # BLD AUTO: 6.2 X10E3/UL (ref 3.4–10.8)

## 2017-11-01 ENCOUNTER — HOSPITAL ENCOUNTER (OUTPATIENT)
Dept: INFUSION THERAPY | Age: 67
Discharge: HOME | End: 2017-11-01
Payer: MEDICARE

## 2017-11-01 VITALS
HEART RATE: 70 BPM | HEIGHT: 62 IN | DIASTOLIC BLOOD PRESSURE: 73 MMHG | WEIGHT: 243 LBS | TEMPERATURE: 97.6 F | SYSTOLIC BLOOD PRESSURE: 125 MMHG | OXYGEN SATURATION: 99 % | BODY MASS INDEX: 44.72 KG/M2 | RESPIRATION RATE: 18 BRPM

## 2017-11-01 PROCEDURE — 74011000250 HC RX REV CODE- 250: Performed by: OBSTETRICS & GYNECOLOGY

## 2017-11-01 PROCEDURE — 74011250636 HC RX REV CODE- 250/636: Performed by: OBSTETRICS & GYNECOLOGY

## 2017-11-01 PROCEDURE — 77030012965 HC NDL HUBR BBMI -A

## 2017-11-01 PROCEDURE — 96415 CHEMO IV INFUSION ADDL HR: CPT

## 2017-11-01 PROCEDURE — 96417 CHEMO IV INFUS EACH ADDL SEQ: CPT

## 2017-11-01 PROCEDURE — 96413 CHEMO IV INFUSION 1 HR: CPT

## 2017-11-01 PROCEDURE — 74011000258 HC RX REV CODE- 258: Performed by: OBSTETRICS & GYNECOLOGY

## 2017-11-01 PROCEDURE — 96375 TX/PRO/DX INJ NEW DRUG ADDON: CPT

## 2017-11-01 RX ORDER — HEPARIN 100 UNIT/ML
500 SYRINGE INTRAVENOUS AS NEEDED
Status: ACTIVE | OUTPATIENT
Start: 2017-11-01 | End: 2017-11-02

## 2017-11-01 RX ORDER — SODIUM CHLORIDE 9 MG/ML
25 INJECTION, SOLUTION INTRAVENOUS AS NEEDED
Status: DISPENSED | OUTPATIENT
Start: 2017-11-01 | End: 2017-11-02

## 2017-11-01 RX ORDER — SODIUM CHLORIDE 0.9 % (FLUSH) 0.9 %
10-40 SYRINGE (ML) INJECTION AS NEEDED
Status: ACTIVE | OUTPATIENT
Start: 2017-11-01 | End: 2017-11-02

## 2017-11-01 RX ADMIN — SODIUM CHLORIDE, PRESERVATIVE FREE 500 UNITS: 5 INJECTION INTRAVENOUS at 15:37

## 2017-11-01 RX ADMIN — Medication 10 ML: at 15:37

## 2017-11-01 RX ADMIN — DEXAMETHASONE SODIUM PHOSPHATE 20 MG: 4 INJECTION, SOLUTION INTRA-ARTICULAR; INTRALESIONAL; INTRAMUSCULAR; INTRAVENOUS; SOFT TISSUE at 10:48

## 2017-11-01 RX ADMIN — DIPHENHYDRAMINE HYDROCHLORIDE 50 MG: 50 INJECTION INTRAMUSCULAR; INTRAVENOUS at 10:14

## 2017-11-01 RX ADMIN — Medication 10 ML: at 10:10

## 2017-11-01 RX ADMIN — GRANISETRON HYDROCHLORIDE 1 MG: 1 INJECTION INTRAVENOUS at 10:14

## 2017-11-01 RX ADMIN — SODIUM CHLORIDE 25 ML/HR: 900 INJECTION, SOLUTION INTRAVENOUS at 10:14

## 2017-11-01 RX ADMIN — CARBOPLATIN 388 MG: 10 INJECTION, SOLUTION INTRAVENOUS at 14:27

## 2017-11-01 RX ADMIN — SODIUM CHLORIDE 20 MG: 9 INJECTION INTRAMUSCULAR; INTRAVENOUS; SUBCUTANEOUS at 10:14

## 2017-11-01 RX ADMIN — PACLITAXEL 296 MG: 6 INJECTION, SOLUTION INTRAVENOUS at 11:20

## 2017-11-01 NOTE — PROGRESS NOTES
Pt arrived to [facility] ambulatory in no acute distress at 1000 for Taxol/Carbo C1.  Assessment unremarkable. L chest port accessed with 1.5 inch Garcia needle without issue and positive blood return noted.  Labs obtained, 10/30/17. Visit Vitals    /72 (BP 1 Location: Left arm, BP Patient Position: Sitting)    Pulse 90    Temp 97.6 °F (36.4 °C)    Resp 20    Ht 5' 2\" (1.575 m)    Wt 110.2 kg (243 lb)    SpO2 99%    BMI 44.45 kg/m2     The following medications administered:  Sharri@[employer]  Benadryl 50mg IVP  Kytril 1mg IVP  Pepcid 20mg IVP  Decadron 20mg IV over 15 minutes  Taxol 296mg IV over 3 hours  Carboplatin 388mg IV over 1 hour    Visit Vitals    /73    Pulse 70    Temp 97.6 °F (36.4 °C)    Resp 18    Ht 5' 2\" (1.575 m)    Wt 110.2 kg (243 lb)    SpO2 99%    BMI 44.45 kg/m2     Pt tolerated treatment well. Pt observed for 30 minutes post infusion. Pt educated regarding new chemo, handouts given. Discharge instructions reviewed. Port flushed per policy and de-accessed, 2x2 and tape placed.  Pt discharged ambulatory in no acute distress at 1600, accompanied by 2 guests. Next appointment 11/22/17 at 0900 at [facility].

## 2017-11-02 ENCOUNTER — HOSPITAL ENCOUNTER (OUTPATIENT)
Dept: INFUSION THERAPY | Age: 67
End: 2017-11-02
Payer: MEDICARE

## 2017-11-06 DIAGNOSIS — G89.3 CANCER RELATED PAIN: ICD-10-CM

## 2017-11-06 DIAGNOSIS — C52 VAGINAL CANCER (HCC): Primary | ICD-10-CM

## 2017-11-06 DIAGNOSIS — C79.9 METASTATIC NEOPLASTIC DISEASE (HCC): ICD-10-CM

## 2017-11-06 RX ORDER — OXYCODONE AND ACETAMINOPHEN 5; 325 MG/1; MG/1
1-2 TABLET ORAL
Qty: 30 TAB | Refills: 0 | Status: SHIPPED | OUTPATIENT
Start: 2017-11-06 | End: 2017-11-20 | Stop reason: SDUPTHER

## 2017-11-10 ENCOUNTER — HOSPITAL ENCOUNTER (OUTPATIENT)
Dept: GENERAL RADIOLOGY | Age: 67
Discharge: HOME | End: 2017-11-10
Payer: MEDICARE

## 2017-11-10 ENCOUNTER — OFFICE VISIT (OUTPATIENT)
Dept: GYNECOLOGY | Age: 67
End: 2017-11-10

## 2017-11-10 ENCOUNTER — TELEPHONE (OUTPATIENT)
Dept: GYNECOLOGY | Age: 67
End: 2017-11-10

## 2017-11-10 VITALS
HEIGHT: 62 IN | SYSTOLIC BLOOD PRESSURE: 151 MMHG | HEART RATE: 93 BPM | BODY MASS INDEX: 43.43 KG/M2 | WEIGHT: 236 LBS | DIASTOLIC BLOOD PRESSURE: 106 MMHG

## 2017-11-10 DIAGNOSIS — C79.51 METASTASIS TO BONE (HCC): ICD-10-CM

## 2017-11-10 DIAGNOSIS — C78.00 MALIGNANT NEOPLASM METASTATIC TO LUNG, UNSPECIFIED LATERALITY (HCC): ICD-10-CM

## 2017-11-10 DIAGNOSIS — S46.912A MUSCLE STRAIN OF LEFT SCAPULAR REGION, INITIAL ENCOUNTER: Primary | ICD-10-CM

## 2017-11-10 DIAGNOSIS — C52 VAGINAL CANCER (HCC): Primary | ICD-10-CM

## 2017-11-10 DIAGNOSIS — C52 VAGINAL CANCER (HCC): ICD-10-CM

## 2017-11-10 PROBLEM — S46.812A STRAIN OF LEFT TRAPEZIUS MUSCLE: Status: ACTIVE | Noted: 2017-11-10

## 2017-11-10 PROCEDURE — 73030 X-RAY EXAM OF SHOULDER: CPT

## 2017-11-10 RX ORDER — CYCLOBENZAPRINE HCL 10 MG
10 TABLET ORAL
Qty: 20 TAB | Refills: 0 | Status: ON HOLD | OUTPATIENT
Start: 2017-11-10 | End: 2018-01-01

## 2017-11-10 NOTE — PROGRESS NOTES
Problem visit, the patient states her shoulder pain that started Saturday, pain is sharp but can also be dull and achy, pain is constant, pt states her should pain is making it \"hard to function, it is unbearable\",  Initial blood pressure reading 142/93, repeat blood pressure reading 151/106

## 2017-11-10 NOTE — PROGRESS NOTES
Mynor Sparkill  Dr. Barry Meza NP      Date of visit: 11/10/2017       Subjective:   Pt called this morning saying last Saturday, she was in her RV which she has to use a hand rail every time she comes in and out to pull her self up and down. She said she was in and out a lot. On Sunday, she woke up with left shoulder pain and some upper back aching. By Monday, she said the back pain had resolved, but her left shoulder pain has persisted all week and she is having trouble lifting her left arm. I had her obtain a left shoulder X-Ray earlier today and it showed no abnormality and no fracture. (pt also has bone mets, but no lytic lesions were seen on this X-ray)  I then had her present to the office for further evaluation. Her  is present with her    Current Outpatient Prescriptions   Medication Sig    cyclobenzaprine (FLEXERIL) 10 mg tablet Take 1 Tab by mouth three (3) times daily as needed for Muscle Spasm(s).  oxyCODONE-acetaminophen (PERCOCET) 5-325 mg per tablet Take 1-2 Tabs by mouth every four (4) hours as needed for Pain. Max Daily Amount: 12 Tabs.  carvedilol (COREG) 3.125 mg tablet Take 1 Tab by mouth two (2) times daily (with meals).  COLACE 100 mg capsule     furosemide (LASIX) 20 mg tablet Take 1 Tab by mouth daily as needed.  docusate sodium (STOOL SOFTENER) 100 mg capsule Take 100 mg by mouth daily.  atorvastatin (LIPITOR) 80 mg tablet Take 1 Tab by mouth daily.  triamterene-hydroCHLOROthiazide (MAXZIDE) 37.5-25 mg per tablet Take 1 Tab by mouth daily.  Cholecalciferol, Vitamin D3, (VITAMIN D3) 1,000 unit cap Take 1 Cap by mouth.  ondansetron (ZOFRAN ODT) 4 mg disintegrating tablet Take 1 Tab by mouth every eight (8) hours as needed for Nausea.  Indications: CANCER CHEMOTHERAPY-INDUCED NAUSEA AND VOMITING    dexamethasone (DECADRON) 4 mg tablet Take 2 tablets with breakfast the day before chemo and for 2 days after chemo    lidocaine-prilocaine (EMLA) topical cream Apply small amount over port area one hour before chemo treatment and cover with a Band-Aid     No current facility-administered medications for this visit. Review of Systems:  General: Denies wt loss, fatigue  HEENT: Denies visual changes, dysphagia or headache  Resp: Denies SOB, BLACKBURN, wheezing or cough  CV: Denies CP, palpitations  GI/: Denies N/V, Diarrhea, constipation or dysuria  MuskSkel: Complains of \"muscle tension\" pointing from her neck base on her left to scapular notch area. Denies any numbness, tingling coolness to rest of extremity. Denies any other muscular discomfort   Neuro: Denies dizzyness or syncope    Objective:     Visit Vitals    BP (!) 151/106 (BP 1 Location: Right arm, BP Patient Position: Sitting)    Pulse 93    Ht 5' 2\" (1.575 m)    Wt 236 lb (107 kg)    BMI 43.16 kg/m2         Physical Exam:  General: A&O X3 in NAD  HEENT: Sclera anicteric, Mucosa pink, moist   Neck: No JVD or adenopathy appreciated. Left neck/shoulder: trapezius muscle noted tender with palpation from base of neck to scapular notch. Immediately distal to the notch, there is no tenderness or discomfort on palpation. No obvious swelling noted. Strong radial pulse to left and fingers warm to touch with good blanching of nails. Pt does have limited upward mobility of the are, none on right. Able to rotate head from side to side without difficulty.    Heart: Regular without M/R/G  Lungs: CTA Bilat without wheezing or rales  Abd: Soft, NT/ND with + BS throughout  Lower Ext: Without edema and + pedal pulses bilat  Neuro: grossly intact        Assessment:     Patient Active Problem List   Diagnosis Code    LAP-BAND surgery status Z98.84    Vaginal cancer ([facility] C52    Hypertension, essential I10    Hypercholesterolemia E78.00    GERD without esophagitis K21.9    Lymph edema I89.0    Bilateral lower extremity edema R60.0  Uncontrolled type 2 diabetes mellitus without complication, without long-term current use of insulin ([facility]) E11.65    Stage 4 chronic kidney disease ([facility]) N18.4         Plan:   Flexeril 5-10 mg Q12hr for 2 days then prn. Decadron 8 mg today and tomorrow and 4 mg on Sunday  Ibuprofen prn with food  She was encouraged to NOT hold arm still, but encouraged to gently move it hourly while awake and if not painful, to gently walk are up wall for minor stretching of muscle. If it begins to be painful, to hold off doing it and just make sure she moves arm frequently. She will call on Monday to let us know how she is doing and if she has any worsening of symptoms, coolness or tingling to fingers, she is to proceed to the ER.    Continue other medications   Hydration encouraged  Encouraged to call for any concerns or questions  Bob Mosqueda NP

## 2017-11-10 NOTE — TELEPHONE ENCOUNTER
Pt called and said there she has persistent left shoulder pain since last Saturday after \"pulling it\". She says it is located in left shoulder only. Denies CP or any change in breathing. Will obtain left shoulder X-Ray today since pt has noted bone metastasis and monitor for pathological fx or other abnormalities.

## 2017-11-10 NOTE — PATIENT INSTRUCTIONS
Muscle Strain: Care Instructions  Your Care Instructions    A muscle strain happens when you overstretch, or pull, a muscle. It can happen when you exercise or lift something or when you have an accident. Rest and other home care can help the muscle heal.  Follow-up care is a key part of your treatment and safety. Be sure to make and go to all appointments, and call your doctor if you are having problems. It's also a good idea to know your test results and keep a list of the medicines you take. How can you care for yourself at home? · Rest the strained muscle. Do not put weight on it for a day or two. If your doctor advises you to, use crutches or a sling to rest a sore limb. · Put ice or a cold pack on the sore muscle for 10 to 20 minutes at a time to stop swelling. Put a thin cloth between the ice pack and your skin. · Prop up the sore arm or leg on a pillow when you ice it or anytime you sit or lie down during the next 3 days. Try to keep it above the level of your heart. This will help reduce swelling. · Take pain medicines exactly as directed. ¨ If the doctor gave you a prescription medicine for pain, take it as prescribed. ¨ If you are not taking a prescription pain medicine, ask your doctor if you can take an over-the-counter medicine. · Do not do anything that makes the pain worse. Return to exercise gradually as you feel better. When should you call for help? Call your doctor now or seek immediate medical care if:  ? · You have new severe pain. ? · Your injured limb is cool or pale or changes color. ? · You have tingling, weakness, or numbness in your injured limb. ? · You cannot move the injured area. ? Watch closely for changes in your health, and be sure to contact your doctor if:  ? · You cannot put weight on a joint, or it feels unsteady when you walk. ? · Pain and swelling get worse or do not start to get better after 2 days of home treatment. Where can you learn more?   Go to http://cele-alyssa.info/. Enter S260 in the search box to learn more about \"Muscle Strain: Care Instructions. \"  Current as of: March 21, 2017  Content Version: 11.4  © 3903-7972 [employer], [employer]. Care instructions adapted under license by [employer] (which disclaims liability or warranty for this information). If you have questions about a medical condition or this instruction, always ask your healthcare professional. [place] [address] any warranty or liability for your use of this information.

## 2017-11-16 DIAGNOSIS — C52 VAGINAL CANCER (HCC): Primary | ICD-10-CM

## 2017-11-20 ENCOUNTER — OFFICE VISIT (OUTPATIENT)
Dept: GYNECOLOGY | Age: 67
End: 2017-11-20

## 2017-11-20 VITALS
WEIGHT: 230.4 LBS | DIASTOLIC BLOOD PRESSURE: 83 MMHG | HEART RATE: 75 BPM | HEIGHT: 62 IN | SYSTOLIC BLOOD PRESSURE: 139 MMHG | BODY MASS INDEX: 42.4 KG/M2

## 2017-11-20 DIAGNOSIS — C52 VAGINAL CANCER (HCC): ICD-10-CM

## 2017-11-20 DIAGNOSIS — G89.3 CANCER RELATED PAIN: ICD-10-CM

## 2017-11-20 DIAGNOSIS — C79.9 METASTATIC NEOPLASTIC DISEASE (HCC): ICD-10-CM

## 2017-11-20 RX ORDER — OXYCODONE AND ACETAMINOPHEN 5; 325 MG/1; MG/1
1-2 TABLET ORAL
Qty: 30 TAB | Refills: 0 | Status: SHIPPED | OUTPATIENT
Start: 2017-11-20 | End: 2018-01-01 | Stop reason: SDUPTHER

## 2017-11-20 NOTE — PROGRESS NOTES
Pre chemo, labs today and C2 Taxol/Carbo 11/22/17. Pt states the pain in her left shoulder is improving. Pt would like a refill of Oxycodone.

## 2017-11-20 NOTE — PROGRESS NOTES
Adeel Jordan. Derrick, NP      Date of visit: 11/20/2017   Gyn Onc: Dr. Johnathan Cabrera Dx: Vaginal cancer    Mrs. Estela Santacruz presents today for her pre-chemo scheduled on 11/15/17  She began a new regimen of carbo/taxol on 11/1/17. She has been monitored closely due to rising creatine, which was actually slightly improved on her last labs. Oncology Treatment History:  12/4/2015: Dx with Vaginal cancer after an EUA and biopsies of pelvic mass and vagina                     Vagina, biopsy: Invasive squamous cell carcinoma, poorly differentiated. No loss of mismatch repair proteins   1/15/16-2/12/16: Weekly Cisplatin with XRT with good response. 5/16/17: CT scan showed Bilateral pulmonary nodules and retroperitoneal lymphadenopathy compatible with metastatic disease. 6/14/17-10/4/17: 6 cycles of Cisplatin/Taxol. Much difficulty with neuropathy  10/23/17: CT scan (without contrast) after 6 cycles showed  Slight progression of retroperitoneal adenopathy with stable appearing pulmonary and osseous metastases as above. 10/26/17: saw after above CT results. Dr. Rajeev Delvalle felt the changes didn't meet RECIST criteria for progression. He recommend continuing with therapy, but suggested that we exchange Carboplatin for Cisplatin due to nephrotoxicity. Her creatinine has steadily climbed                   with the Cisplatin. 11/1/17: Began Carbo/Taxol        Current Outpatient Prescriptions   Medication Sig    oxyCODONE-acetaminophen (PERCOCET) 5-325 mg per tablet Take 1-2 Tabs by mouth every four (4) hours as needed for Pain. Max Daily Amount: 12 Tabs.  cyclobenzaprine (FLEXERIL) 10 mg tablet Take 1 Tab by mouth three (3) times daily as needed for Muscle Spasm(s).  ondansetron (ZOFRAN ODT) 4 mg disintegrating tablet Take 1 Tab by mouth every eight (8) hours as needed for Nausea.  Indications: CANCER CHEMOTHERAPY-INDUCED NAUSEA AND VOMITING    dexamethasone (DECADRON) 4 mg tablet Take 2 tablets with breakfast the day before chemo and for 2 days after chemo    carvedilol (COREG) 3.125 mg tablet Take 1 Tab by mouth two (2) times daily (with meals).  COLACE 100 mg capsule     furosemide (LASIX) 20 mg tablet Take 1 Tab by mouth daily as needed.  lidocaine-prilocaine (EMLA) topical cream Apply small amount over port area one hour before chemo treatment and cover with a Band-Aid    docusate sodium (STOOL SOFTENER) 100 mg capsule Take 100 mg by mouth daily.  atorvastatin (LIPITOR) 80 mg tablet Take 1 Tab by mouth daily.  triamterene-hydroCHLOROthiazide (MAXZIDE) 37.5-25 mg per tablet Take 1 Tab by mouth daily.  Cholecalciferol, Vitamin D3, (VITAMIN D3) 1,000 unit cap Take 1 Cap by mouth. No current facility-administered medications for this visit. Review of Systems:  General: Denies wt loss. Says fatigue was her biggest complaint after her first round of this treatment  HEENT: Denies visual changes, dysphagia or headache  Resp: Denies SOB, BLACKBURN, wheezing or cough  CV: Denies CP, palpitations  GI/:Continues with Miralax for occasional constipation. Denies N/V, diarrhea or dysuria  MuskSkel: Says left shoulder is slowly improving and she is trying to stretch it a little more very day. Continues with lymphedema clinic for left leg lymphedema  Neuro: Denies dizzyness or syncope    Objective:     Visit Vitals    /83 (BP 1 Location: Right arm, BP Patient Position: Sitting)    Pulse 75    Ht 5' 2.01\" (1.575 m)    Wt 230 lb 6.4 oz (104.5 kg)    BMI 42.13 kg/m2         Physical Exam:  General: A&O X3 in NAD with pleasant affect  HEENT: Sclera anicteric, Mucosa pink, moist   Neck: No JVD or adenopathy appreciated.     Port Site: without redness, tenderness or swelling  Heart: Regular without M/R/G  Lungs: CTA Bilat   Abd: Soft, obese without tenderness, fluid wave or distention and with + BS throughout  Ext:Left shoulder less sore than previously examined. Improved upward movement noted. + radial pulses and fingers warm to touch. Extensive left leg edema with vascular skin changes noted. + pedal pulses bilat  Neuro: grossly intact        Assessment:     Patient Active Problem List   Diagnosis Code    LAP-BAND surgery status Z98.84    Vaginal cancer ([facility] [address] C52    Hypertension, essential I10    Hypercholesterolemia E78.00    GERD without esophagitis K21.9    Lymph edema I89.0    Bilateral lower extremity edema R60.0    Uncontrolled type 2 diabetes mellitus without complication, without long-term current use of insulin (HCC) E11.65    Stage 4 chronic kidney disease (HCC) N18.4    Strain of left trapezius muscle S46.812A         Plan: Will plan on proceeding with 2nd cycle of Carbo/Taxol 11/22/17 if all alright with labs to be drawn today  Monitor creat closely and hydrate as needed. Discussed that if pt feels dehydrated, we can bring her in for supplemental hydration if needed  She will increase her water intake as much as possible. Follow up with lymphedema clinic  If shoulder does not continue to improve, or worsens, she will follow up with PCP and seek referral to orthopedic  Continue with anti-hypertensive and if creatine continues to rise, will discuss diuretics with PCP.   If it continues to decrease, will stay the course for now   Hydration encouraged  Encouraged to call for any concerns or questions  Adina Castro NP

## 2017-11-21 LAB
ALBUMIN SERPL-MCNC: 3.6 G/DL (ref 3.6–4.8)
ALBUMIN/GLOB SERPL: 1.2 {RATIO} (ref 1.2–2.2)
ALP SERPL-CCNC: 121 IU/L (ref 39–117)
ALT SERPL-CCNC: 9 IU/L (ref 0–32)
AST SERPL-CCNC: 8 IU/L (ref 0–40)
BASOPHILS # BLD AUTO: 0 X10E3/UL (ref 0–0.2)
BASOPHILS NFR BLD AUTO: 0 %
BILIRUB SERPL-MCNC: 0.3 MG/DL (ref 0–1.2)
BUN SERPL-MCNC: 24 MG/DL (ref 8–27)
BUN/CREAT SERPL: 14 (ref 12–28)
CALCIUM SERPL-MCNC: 8.8 MG/DL (ref 8.7–10.3)
CANCER AG125 SERPL-ACNC: 111.2 U/ML (ref 0–38.1)
CHLORIDE SERPL-SCNC: 94 MMOL/L (ref 96–106)
CO2 SERPL-SCNC: 28 MMOL/L (ref 18–29)
CREAT SERPL-MCNC: 1.75 MG/DL (ref 0.57–1)
EOSINOPHIL # BLD AUTO: 0.1 X10E3/UL (ref 0–0.4)
EOSINOPHIL NFR BLD AUTO: 1 %
ERYTHROCYTE [DISTWIDTH] IN BLOOD BY AUTOMATED COUNT: 15.1 % (ref 12.3–15.4)
GFR SERPLBLD CREATININE-BSD FMLA CKD-EPI: 29 ML/MIN/1.73
GFR SERPLBLD CREATININE-BSD FMLA CKD-EPI: 34 ML/MIN/1.73
GLOBULIN SER CALC-MCNC: 3 G/DL (ref 1.5–4.5)
GLUCOSE SERPL-MCNC: 109 MG/DL (ref 65–99)
HCT VFR BLD AUTO: 27.5 % (ref 34–46.6)
HGB BLD-MCNC: 9.2 G/DL (ref 11.1–15.9)
IMM GRANULOCYTES # BLD: 0.1 X10E3/UL (ref 0–0.1)
IMM GRANULOCYTES NFR BLD: 1 %
LYMPHOCYTES # BLD AUTO: 0.5 X10E3/UL (ref 0.7–3.1)
LYMPHOCYTES NFR BLD AUTO: 9 %
MAGNESIUM SERPL-MCNC: 1.7 MG/DL (ref 1.6–2.3)
MCH RBC QN AUTO: 30.1 PG (ref 26.6–33)
MCHC RBC AUTO-ENTMCNC: 33.5 G/DL (ref 31.5–35.7)
MCV RBC AUTO: 90 FL (ref 79–97)
MONOCYTES # BLD AUTO: 0.3 X10E3/UL (ref 0.1–0.9)
MONOCYTES NFR BLD AUTO: 6 %
NEUTROPHILS # BLD AUTO: 4.6 X10E3/UL (ref 1.4–7)
NEUTROPHILS NFR BLD AUTO: 83 %
PLATELET # BLD AUTO: 111 X10E3/UL (ref 150–379)
POTASSIUM SERPL-SCNC: 4.1 MMOL/L (ref 3.5–5.2)
PROT SERPL-MCNC: 6.6 G/DL (ref 6–8.5)
RBC # BLD AUTO: 3.06 X10E6/UL (ref 3.77–5.28)
SODIUM SERPL-SCNC: 140 MMOL/L (ref 134–144)
WBC # BLD AUTO: 5.5 X10E3/UL (ref 3.4–10.8)

## 2017-11-21 RX ORDER — GRANISETRON HYDROCHLORIDE 1 MG/ML
1 INJECTION INTRAVENOUS ONCE
Status: COMPLETED | OUTPATIENT
Start: 2017-11-22 | End: 2017-11-22

## 2017-11-21 RX ORDER — DIPHENHYDRAMINE HYDROCHLORIDE 50 MG/ML
50 INJECTION, SOLUTION INTRAMUSCULAR; INTRAVENOUS ONCE
Status: COMPLETED | OUTPATIENT
Start: 2017-11-22 | End: 2017-11-22

## 2017-11-22 ENCOUNTER — HOSPITAL ENCOUNTER (OUTPATIENT)
Dept: INFUSION THERAPY | Age: 67
Discharge: HOME | End: 2017-11-22
Payer: MEDICARE

## 2017-11-22 VITALS
RESPIRATION RATE: 18 BRPM | HEIGHT: 62 IN | TEMPERATURE: 98 F | HEART RATE: 77 BPM | BODY MASS INDEX: 42.84 KG/M2 | WEIGHT: 232.8 LBS | DIASTOLIC BLOOD PRESSURE: 80 MMHG | OXYGEN SATURATION: 95 % | SYSTOLIC BLOOD PRESSURE: 160 MMHG

## 2017-11-22 PROCEDURE — 77030012965 HC NDL HUBR BBMI -A

## 2017-11-22 PROCEDURE — 96413 CHEMO IV INFUSION 1 HR: CPT

## 2017-11-22 PROCEDURE — 74011250636 HC RX REV CODE- 250/636: Performed by: OBSTETRICS & GYNECOLOGY

## 2017-11-22 PROCEDURE — 74011000250 HC RX REV CODE- 250: Performed by: OBSTETRICS & GYNECOLOGY

## 2017-11-22 PROCEDURE — 96415 CHEMO IV INFUSION ADDL HR: CPT

## 2017-11-22 PROCEDURE — 96417 CHEMO IV INFUS EACH ADDL SEQ: CPT

## 2017-11-22 PROCEDURE — 96375 TX/PRO/DX INJ NEW DRUG ADDON: CPT

## 2017-11-22 PROCEDURE — 74011000258 HC RX REV CODE- 258: Performed by: OBSTETRICS & GYNECOLOGY

## 2017-11-22 RX ORDER — SODIUM CHLORIDE 0.9 % (FLUSH) 0.9 %
10-40 SYRINGE (ML) INJECTION AS NEEDED
Status: ACTIVE | OUTPATIENT
Start: 2017-11-22 | End: 2017-11-23

## 2017-11-22 RX ORDER — HEPARIN 100 UNIT/ML
500 SYRINGE INTRAVENOUS AS NEEDED
Status: ACTIVE | OUTPATIENT
Start: 2017-11-22 | End: 2017-11-23

## 2017-11-22 RX ORDER — SODIUM CHLORIDE 9 MG/ML
10 INJECTION INTRAMUSCULAR; INTRAVENOUS; SUBCUTANEOUS AS NEEDED
Status: ACTIVE | OUTPATIENT
Start: 2017-11-22 | End: 2017-11-23

## 2017-11-22 RX ADMIN — SODIUM CHLORIDE, PRESERVATIVE FREE 500 UNITS: 5 INJECTION INTRAVENOUS at 15:24

## 2017-11-22 RX ADMIN — Medication 10 ML: at 15:24

## 2017-11-22 RX ADMIN — GRANISETRON HYDROCHLORIDE 1 MG: 1 INJECTION INTRAVENOUS at 10:19

## 2017-11-22 RX ADMIN — DEXAMETHASONE SODIUM PHOSPHATE 20 MG: 4 INJECTION, SOLUTION INTRAMUSCULAR; INTRAVENOUS at 10:22

## 2017-11-22 RX ADMIN — DIPHENHYDRAMINE HYDROCHLORIDE 50 MG: 50 INJECTION INTRAMUSCULAR; INTRAVENOUS at 10:18

## 2017-11-22 RX ADMIN — PACLITAXEL 296 MG: 6 INJECTION, SOLUTION INTRAVENOUS at 10:59

## 2017-11-22 RX ADMIN — SODIUM CHLORIDE 10 ML: 9 INJECTION INTRAMUSCULAR; INTRAVENOUS; SUBCUTANEOUS at 09:16

## 2017-11-22 RX ADMIN — CARBOPLATIN 388 MG: 10 INJECTION, SOLUTION INTRAVENOUS at 14:16

## 2017-11-22 RX ADMIN — SODIUM CHLORIDE 20 MG: 9 INJECTION INTRAMUSCULAR; INTRAVENOUS; SUBCUTANEOUS at 10:13

## 2017-11-22 NOTE — PROGRESS NOTES
[facility] - Chemotherapy Progress Note    0915 Pt admit to [facility] for Taxol/Carbo. Chemotherapy Flowsheet 11/22/2017   Cycle C2   Date 11/22/2017   Drug / Regimen Taxol/Carbo   Dosage -   Pre Hydration -   Post Hydration -   Pre Meds Given   Notes Given     Pt ambulatory in stable condition. Assessment completed. No new concerns voiced. Port accessed with positive blood return. Labs drawn at previous appointment. Line connected to NS at [facility]. Visit Vitals    /80    Pulse 77    Temp 98 °F (36.7 °C)    Resp 18    Ht 5' 2\" (1.575 m)    Wt 105.6 kg (232 lb 12.8 oz)    SpO2 95%    Breastfeeding No    BMI 42.58 kg/m2     Medications:  NS at [facility]  Kytril  Pepcid  Benadryl  Decadron  Taxol  Carbo    1525 Pt tolerated treatment well. Port maintained positive blood return throughout treatment, flushed with positive blood return at conclusion and de-accessed. D/c home ambulatory in no distress. Pt aware of next [facility] appointment scheduled for 12/13/17 at 0900.

## 2017-12-11 DIAGNOSIS — C52 VAGINAL CANCER (HCC): Primary | ICD-10-CM

## 2017-12-11 NOTE — PROGRESS NOTES
Lymphedema Discharge Summary Documented 17    Patient cancelled her last scheduled appointment as she was stuck out of town and she was unable to return to [place] in time for her appointment secondary to flight issues. She had stated that she would call to reschedule when she was able to return to [place]. She did not call to to reschedule and her plan of care has  as of 17. She had received her compression garments and was able to don and doff her products as of her last treatment session. She was seen for a total of 5 visits. Please refer to daily note on 17 for details on goal attainment at her last visit. G codes could not be reassessed for discharge, but the last G-codes established were documented as follows on 17: In compliance with CMSs Claims Based Outcome Reporting, the following G-code set was chosen for this patient based on their primary functional limitation being treated:     The outcome measure chosen to determine the severity of the functional limitation was the Lymphedema Life Impact Scale with a score of 34  which was correlated with an impairment scale of 50%.  This score is down from evaluation.      · Other PT/OT Primary Functional Limitations:                           - CURRENT STATUS:               CK - 40%-59% impaired, limited or restricted                          - GOAL STATUS:                      CJ - 20%-39% impaired, limited or restricted                          - D/C STATUS:                                    ---------------To be determined---------------    CHEPE Yadav, SHANE

## 2017-12-12 ENCOUNTER — OFFICE VISIT (OUTPATIENT)
Dept: GYNECOLOGY | Age: 67
End: 2017-12-12

## 2017-12-12 VITALS
HEIGHT: 62 IN | HEART RATE: 76 BPM | BODY MASS INDEX: 44.16 KG/M2 | DIASTOLIC BLOOD PRESSURE: 64 MMHG | SYSTOLIC BLOOD PRESSURE: 112 MMHG | WEIGHT: 240 LBS

## 2017-12-12 DIAGNOSIS — C52 VAGINAL CANCER (HCC): Primary | ICD-10-CM

## 2017-12-12 PROBLEM — E66.01 OBESITY, MORBID (HCC): Status: ACTIVE | Noted: 2017-12-12

## 2017-12-12 NOTE — PROGRESS NOTES
Pre chemo, labs today and C3 Taxol/Carbo 12/13. Pt has increasing swelling in her left leg with a 8 lb weight gain since 11/22.

## 2017-12-12 NOTE — PROGRESS NOTES
[address] [place] [address] [address]ias [address], [address] [facility]  [phone]  MD Mahad Baumann MD  Office Note  Patient ID:  Name:  Gurpreet Sequeira  MRN:  [id number]  :  67 y.o. Date:  2017      HISTORY OF PRESENT ILLNESS:  Gurpreet Sequeira is a 75 y.o.  postmenopausal female who was referred to me in 2015 for a pelvic mass by Dr. Roberto Shepard, who noted the mass on routine gynecologic exam.  He sent her for a CT of the pelvis, which revealed a mass at the vaginal apex, that appeared to be invading into the soft tissues of the left pelvis. There were several enlarged lymph nodes on the left. There was also questionable bladder invasion, and possibly even involvement of the rectosigmoid colon. She reported a history of having a mass removed from one of her ovaries in 1 at [facility] [place], which required a second surgery to remove her uterus and contralateral ovary. She said she did not have to take any chemotherapy, but she did have to take some kind of pill for a year. She has not had any gynecologic issues since that time. Presumably her hysterectomy was a total, rather than a supracervical, but she is unsure and there are no records. She denied any vaginal bleeding, except for after her examination with Dr. Ame Prado. She reported minimal pain in the pelvis. I took her the OR for and exam under anesthesia with cystoscopy and proctoscopy. Pathology revealed a squamous cell carcinoma, consistent with a vaginal primary. Examination revealed a fixed mass involving the left upper vagina with extension to the left pelvic sidewall. There also appeared to be involvement of the posterior bladder wall, but no mucosal involvement. The mass did push against the rectal wall, but there did not appear to be any direct involvement. I referred her to radiation oncology and she saw Dr. Cecile Trujillo.   She completed pelvic radiation along with concurrent cisplatin chemotherapy in February 2016 and had a great response. When I saw her in the office in March 2017 for follow up she was doing well and without complaints. Her pelvic exam and pap smear at that time were both negative. Subsequent to that visit she began having back pain that had progressively worsened. She saw Praveena Hummel in May and she ordered a CT. This unfortunately demonstrated extensive metastatic disease outside of the prior radiated field. Dr. Felicita Echavarria then sent her for a PET/CT which demonstrated widely metastatic disease. I recommended systemic chemotherapy with Taxol/Cisplatin/Avastin, which is the standard for metastatic cervical cancer, since vaginal cancer behaves similar to cervical cancer. Unfortunately, the Avastin was denied by her insurance company. We chose to proceed with Taxol/Cisplatin. She completed 6 cycles. She has done well so far with her treatment. She denies nausea. She reports some persistent lower extremity edema, but that has overall improved. Her scan in October 2017 demonstrated slight progression of the lymphadenopathy, while the pulmonary and bony disease appeared stable. On my review, the changes didn't meet RECIST criteria for progression. I recommended continuing with therapy, but I suggested that we exchange Carboplatin for Cisplatin due to nephrotoxicity. Her creatinine had steadily climbed with the Cisplatin. She has completed 2 cycles of Taxol/Carboplatin since the switch. She feels more tired with the Carboplatin rather than the Cisplatin, but otherwise not much different. ECOG score: 1       ROS:   and GI review: Negative  Cardiopulmonary review:Negative   Musculoskeletal:  Negative    A comprehensive review of systems was negative except for that written in the History of Present Illness.  , 10 point ROS        Problem List:  Patient Active Problem List    Diagnosis Date Noted    Obesity, morbid ([facility] [address] 12/12/2017  Strain of left trapezius muscle 11/10/2017    Uncontrolled type 2 diabetes mellitus without complication, without long-term current use of insulin ([address] 08/16/2017    Stage 4 chronic kidney disease ([address] 08/16/2017    Lymph edema 06/14/2017    Bilateral lower extremity edema 06/14/2017    Hypertension, essential 01/18/2017    Hypercholesterolemia 01/18/2017    GERD without esophagitis 01/18/2017    Vaginal cancer ([address] 12/10/2015    LAP-BAND surgery status 05/17/2012     PMH:  Past Medical History:   Diagnosis Date    Abnormal mammogram of left breast 12/5/2016    Cancer ([address] 06/2017    LYMPH NODES IN BACK    Cancer ([address] 2016    VAGINAL CANCER    GERD (gastroesophageal reflux disease) 6/11/06    Hypercholesterolemia 10/11/06    Hypertension 10/11/06    Morbid obesity ([address] 10/11/06      PSH:  Past Surgical History:   Procedure Laterality Date    HX APPENDECTOMY      HX GASTRIC BYPASS  2009    LAP BAND    HX GYN  12/4/15    EUA/Bx of pelvic mass and vagina/cystoscopy/proctoscopy    HX HYSTERECTOMY  1978    HX ORTHOPAEDIC  2009    right ankle    HX OTHER SURGICAL  2001?     benign mass removed from lower back    LAP, PLACE ADJUST GASTR BAND  1/30/08    EB: AP Std lap band, liver bx      Social History:  Social History   Substance Use Topics    Smoking status: Former Smoker     Packs/day: 0.50     Years: 15.00     Types: Cigarettes     Quit date: 1/1/1993    Smokeless tobacco: Never Used    Alcohol use Yes      Comment: socially-WINE ONCE A WEEK      Family History:  Family History   Problem Relation Age of Onset    Cancer Mother      colon WITH METS TO LIVER    Diabetes Father     Heart Disease Father     Cancer Sister      breast & bone    Cancer Maternal Aunt      BREAST    Cancer Paternal Aunt      BREAST    Anesth Problems Neg Hx       Medications: (reviewed)  Current Outpatient Prescriptions   Medication Sig    oxyCODONE-acetaminophen (PERCOCET) 5-325 mg per tablet Take 1-2 Tabs by mouth every four (4) hours as needed for Pain. Max Daily Amount: 12 Tabs.  cyclobenzaprine (FLEXERIL) 10 mg tablet Take 1 Tab by mouth three (3) times daily as needed for Muscle Spasm(s).  ondansetron (ZOFRAN ODT) 4 mg disintegrating tablet Take 1 Tab by mouth every eight (8) hours as needed for Nausea. Indications: CANCER CHEMOTHERAPY-INDUCED NAUSEA AND VOMITING    dexamethasone (DECADRON) 4 mg tablet Take 2 tablets with breakfast the day before chemo and for 2 days after chemo    carvedilol (COREG) 3.125 mg tablet Take 1 Tab by mouth two (2) times daily (with meals).  COLACE 100 mg capsule     lidocaine-prilocaine (EMLA) topical cream Apply small amount over port area one hour before chemo treatment and cover with a Band-Aid    docusate sodium (STOOL SOFTENER) 100 mg capsule Take 100 mg by mouth daily.  atorvastatin (LIPITOR) 80 mg tablet Take 1 Tab by mouth daily.  triamterene-hydroCHLOROthiazide (MAXZIDE) 37.5-25 mg per tablet Take 1 Tab by mouth daily.  Cholecalciferol, Vitamin D3, (VITAMIN D3) 1,000 unit cap Take 1 Cap by mouth.  furosemide (LASIX) 20 mg tablet Take 1 Tab by mouth daily as needed. No current facility-administered medications for this visit. Allergies: (reviewed)  Allergies   Allergen Reactions    Amlodipine Other (comments)     Edema in LE's          OBJECTIVE:    Physical Exam:  VITAL SIGNS: Vitals:    12/12/17 0900 12/12/17 0905   BP: 152/81 112/64   Pulse: 77 76   Weight: 240 lb (108.9 kg)    Height: 5' 2.01\" (1.575 m)      Body mass index is 43.89 kg/(m^2). GENERAL MIGUELANGEL: Conversant, alert, oriented. No acute distress. HEENT: HEENT. No thyroid enlargement. No JVD. Neck: Supple without restrictions. RESPIRATORY: Clear to auscultation and percussion to the bases. No CVAT. CARDIOVASC: RRR without murmur/rub.    GASTROINT: soft, non-tender, without masses or organomegaly   MUSCULOSKEL: no joint tenderness, deformity or swelling   EXTREMITIES: Marked edema of bilateral lower extremities, L>R. Skin noted with vascular changes on the left. PELVIC: Deferred   RECTAL: Deferred   PATRICIO SURVEY: No suspicious lymphadenopathy. NEURO: Grossly intact. No acute deficit. CT of abdomen/pelvis (5/16/17)  LUNG BASES: Multiple bilateral pulmonary nodules. INCIDENTALLY IMAGED HEART AND MEDIASTINUM: Unremarkable. LIVER: No mass or biliary dilatation. GALLBLADDER: Cholelithiasis. SPLEEN: Calcified granulomata. PANCREAS: No mass or ductal dilatation. ADRENALS: Unremarkable. KIDNEYS: Right renal cyst.  STOMACH: Unremarkable. SMALL BOWEL: No dilatation or wall thickening. COLON: No dilatation or wall thickening. APPENDIX: Unremarkable. PERITONEUM: No ascites or pneumoperitoneum. RETROPERITONEUM: Significant retroperitoneal lymphadenopathy, largest dimension  surrounding the aorta 6.5 x 4.2 cm. REPRODUCTIVE ORGANS: There is 4.6 x 2.4 cm soft tissue in the region of  previously identified left pelvic sidewall mass. URINARY BLADDER: No mass or calculus. BONES: No destructive bone lesion. Degenerative changes of L4-L5 and L5-S1. ADDITIONAL COMMENTS: N/A     IMPRESSION:  1. Bilateral pulmonary nodules and retroperitoneal lymphadenopathy compatible  with metastatic disease. 2. Much smaller mass in the left pelvis, or scar. 3. Cholelithiasis. PET/CT (6/6/17)  HEAD/NECK: No apparent foci of abnormal hypermetabolism. Cerebral evaluation is  limited by normal intense activity.     CHEST: There are hypermetabolic left supraclavicular, superior mediastinal, and  paraesophageal lymph nodes. The small pulmonary nodules are minimally  hypermetabolic, maximum SUV of the lesion in the right middle lobe is 1.9.     ABDOMEN/PELVIS: Para-aortic and aortocaval lymphadenopathy is hypermetabolic,  maximum SUV 8.0.  Small but hypermetabolic bilateral external iliac lymph nodes  are noted.     SKELETON: There is a hypermetabolic lytic lesion in the sternum, maximum position  in the upper abdomen. The small bowel is not obstructed. There is no mesenteric  mass or adenopathy. The appendix is absent. There are diverticula of the  sigmoid colon. PERITONEUM: There is no ascites or free intraperitoneal air. RETROPERITONEUM: The aorta is atherosclerotic and tapers without aneurysm. There  is retroperitoneal adenopathy. There is a 2.7 x 2.1 x 3.3 cm left para-aortic  lymph node and just anterior to this there is a 1.9 x 1.9 x 2.6 cm preaortic  lymph node. These have decreased minimally in size and previously measured 2 .9  x 2.5 and 2 x 2 centimeters. There is no pelvic mass or adenopathy. BONES AND SOFT TISSUES: There are degenerative changes of the spine and there  are sclerotic lesions in the L3 and L4 vertebral bodies. There is a sclerotic  and lytic lesion in the sternum.     IMPRESSION:   1. Status post hysterectomy and bilateral oophorectomy. 2. Bilateral pulmonary nodules, some with cavitation unchanged consistent with  metastatic disease. 3 Retroperitoneal adenopathy unchanged or minimally decreased in size and  enlarged left supraclavicular lymph node consistent with metastatic disease. 4. Small left pelvic mass or scar unchanged. 5. Lytic and sclerotic sternal lesion consistent with metastasis. 6. L3 and L4 bone lesions more sclerotic suggesting healing. 7. Atherosclerotic  abdominal aorta without aneurysm. 8. Gallstones. 9. Splenic granulomas indicative of old granulomatous disease. CT of chest/abdomen/pelvis (10/23/17)  THYROID: No nodule. MEDIASTINUM: No mass or lymphadenopathy. PATRICIA: No mass or enlarged lymphadenopathy. Calcified right hilar lymph nodes. THORACIC AORTA: Atherosclerotic calcifications without aneurysm. MAIN PULMONARY ARTERY: Normal in caliber. TRACHEA/BRONCHI: Patent. ESOPHAGUS: No wall thickening or dilatation. HEART:  The visualized heart is normal in size without pericardial effusion.   PLEURA: No effusion or pneumothorax. LUNGS: : There are multiple nodules throughout the lungs, some with cavitation  which are unchanged. Calcified granulomata right lung. LIVER: Calcified granulomata. Otherwise unremarkable. GALLBLADDER: Partially distended. Calcified stones within the lumen at the  fundus. Otherwise unremarkable. SPLEEN: Outside granuloma. Otherwise unremarkable. PANCREAS: No mass or duct dilation. ADRENALS: Unremarkable. KIDNEYS: No mass, calculus, or hydronephrosis. STOMACH: Gastric lap band in position. Otherwise unremarkable. SMALL BOWEL: No dilatation or wall thickening. COLON: No dilation or wall thickening. APPENDIX: Unremarkable. PERITONEUM: No ascites or pneumoperitoneum. RETROPERITONEUM: The aorta is atherosclerotic and tapers without aneurysm. There  is retroperitoneal adenopathy. There is a 2.5 x 2.7 cm left para-aortic lymph  node, previously 2.4 x 2.5 cm and a preaortic lymph node just anterior to this  measuring 1.7 x 2.1 cm, previously 1.9 x 2.3 cm. Overall confluent periaortic  retroperitoneal adenopathy extending to the bifurcation appears progressed from  prior. There is no pelvic mass or adenopathy. REPRODUCTIVE ORGANS: Uterus and ovaries are surgically absent. URINARY BLADDER: No mass or calculus. BONES: Sclerotic manubrial and vertebral body lesions appear stable. ADDITIONAL COMMENTS: Left-sided Port-A-Cath in position with catheter extending  to the upper superior vena cava.       IMPRESSION:  Slight progression of retroperitoneal adenopathy with stable  appearing pulmonary and osseous metastases as above. Additional incidental  findings as above. IMPRESSION/PLAN:  Rai Evans is a 75 y.o. female with a history of stage III vaginal carcinoma. She was treated with pelvic radiation therapy and concurrent cisplatin chemotherapy. She now has recurrent disease with multiple sites of metastases.   I had a long discussion with the patient and her  discussing the best course of management. Surgery is not an option due to the widespread nature of her disease. The same is true for radiation therapy. Her only viable option is systemic chemotherapy. Since vaginal cancer behaves like cervical carcinoma, I recommended using the standard regimen for metastatic cervical carcinoma, Taxol/Cisplatin/Avastin. Unfortunately, the Avastin was denied by her insurance company. We chose to proceed with Taxol/Cisplatin. She completed 6 cycles. On her CT in July 2017 her pulmonary nodules appeared stable and her retroperitoneal lymphadenopathy appeared slightly improved. Her most recent scan in October 2017 demonstrated slight progression of the lymphadenopathy, while the pulmonary and bony disease appeared stable. On my review, the changes didn't meet RECIST criteria for progression. I recommended continuing with therapy, but I suggested that we exchange Carboplatin for Cisplatin due to nephrotoxicity. Her creatinine had steadily climbed with the Cisplatin. She has completed 2 cycles of Taxol/Carboplatin since the switch. We will plan on a CT after the third cycle to reevaluate her disease.         Signed By: Rosemary Dia MD     12/12/2017/4:31 PM

## 2017-12-13 ENCOUNTER — HOSPITAL ENCOUNTER (OUTPATIENT)
Dept: INFUSION THERAPY | Age: 67
Discharge: HOME | End: 2017-12-13
Payer: MEDICARE

## 2017-12-13 VITALS
DIASTOLIC BLOOD PRESSURE: 83 MMHG | SYSTOLIC BLOOD PRESSURE: 147 MMHG | TEMPERATURE: 97.8 F | OXYGEN SATURATION: 99 % | RESPIRATION RATE: 18 BRPM | BODY MASS INDEX: 44.31 KG/M2 | HEIGHT: 62 IN | HEART RATE: 72 BPM | WEIGHT: 240.8 LBS

## 2017-12-13 LAB
ALBUMIN SERPL-MCNC: 3.8 G/DL (ref 3.6–4.8)
ALBUMIN/GLOB SERPL: 1.4 {RATIO} (ref 1.2–2.2)
ALP SERPL-CCNC: 118 IU/L (ref 39–117)
ALT SERPL-CCNC: 13 IU/L (ref 0–32)
APPEARANCE UR: CLEAR
AST SERPL-CCNC: 10 IU/L (ref 0–40)
BACTERIA URNS QL MICRO: NEGATIVE /HPF
BASOPHILS # BLD AUTO: 0 X10E3/UL (ref 0–0.2)
BASOPHILS # BLD: 0 K/UL (ref 0–0.1)
BASOPHILS NFR BLD AUTO: 0 %
BASOPHILS NFR BLD: 0 % (ref 0–1)
BILIRUB SERPL-MCNC: 0.3 MG/DL (ref 0–1.2)
BILIRUB UR QL: NEGATIVE
BUN SERPL-MCNC: 26 MG/DL (ref 8–27)
BUN/CREAT SERPL: 16 (ref 12–28)
CALCIUM SERPL-MCNC: 9 MG/DL (ref 8.7–10.3)
CANCER AG125 SERPL-ACNC: 81.2 U/ML (ref 0–38.1)
CHLORIDE SERPL-SCNC: 97 MMOL/L (ref 96–106)
CO2 SERPL-SCNC: 27 MMOL/L (ref 18–29)
COLOR UR: NORMAL
CREAT SERPL-MCNC: 1.62 MG/DL (ref 0.57–1)
DIFFERENTIAL METHOD BLD: ABNORMAL
EOSINOPHIL # BLD AUTO: 0 X10E3/UL (ref 0–0.4)
EOSINOPHIL # BLD: 0 K/UL (ref 0–0.4)
EOSINOPHIL NFR BLD AUTO: 1 %
EOSINOPHIL NFR BLD: 0 % (ref 0–7)
EPITH CASTS URNS QL MICRO: NORMAL /LPF
ERYTHROCYTE [DISTWIDTH] IN BLOOD BY AUTOMATED COUNT: 16.2 % (ref 11.5–14.5)
ERYTHROCYTE [DISTWIDTH] IN BLOOD BY AUTOMATED COUNT: 17.4 % (ref 12.3–15.4)
GFR SERPLBLD CREATININE-BSD FMLA CKD-EPI: 32 ML/MIN/1.73
GFR SERPLBLD CREATININE-BSD FMLA CKD-EPI: 37 ML/MIN/1.73
GLOBULIN SER CALC-MCNC: 2.7 G/DL (ref 1.5–4.5)
GLUCOSE SERPL-MCNC: 121 MG/DL (ref 65–99)
GLUCOSE UR STRIP.AUTO-MCNC: NEGATIVE MG/DL
HCT VFR BLD AUTO: 23.5 % (ref 35–47)
HCT VFR BLD AUTO: 24.7 % (ref 34–46.6)
HGB BLD-MCNC: 7.9 G/DL (ref 11.5–16)
HGB BLD-MCNC: 8.1 G/DL (ref 11.1–15.9)
HGB UR QL STRIP: NEGATIVE
HYALINE CASTS URNS QL MICRO: NORMAL /LPF (ref 0–5)
IMM GRANULOCYTES # BLD: 0.1 X10E3/UL (ref 0–0.1)
IMM GRANULOCYTES NFR BLD: 2 %
KETONES UR QL STRIP.AUTO: NEGATIVE MG/DL
LEUKOCYTE ESTERASE UR QL STRIP.AUTO: NEGATIVE
LYMPHOCYTES # BLD AUTO: 0.4 X10E3/UL (ref 0.7–3.1)
LYMPHOCYTES # BLD: 0.7 K/UL (ref 0.8–3.5)
LYMPHOCYTES NFR BLD AUTO: 11 %
LYMPHOCYTES NFR BLD: 12 % (ref 12–49)
MAGNESIUM SERPL-MCNC: 1.8 MG/DL (ref 1.6–2.3)
MCH RBC QN AUTO: 30.6 PG (ref 26.6–33)
MCH RBC QN AUTO: 30.7 PG (ref 26–34)
MCHC RBC AUTO-ENTMCNC: 32.8 G/DL (ref 31.5–35.7)
MCHC RBC AUTO-ENTMCNC: 33.6 G/DL (ref 30–36.5)
MCV RBC AUTO: 91.4 FL (ref 80–99)
MCV RBC AUTO: 93 FL (ref 79–97)
MONOCYTES # BLD AUTO: 0.2 X10E3/UL (ref 0.1–0.9)
MONOCYTES # BLD: 0.4 K/UL (ref 0–1)
MONOCYTES NFR BLD AUTO: 4 %
MONOCYTES NFR BLD: 7 % (ref 5–13)
MORPHOLOGY BLD-IMP: ABNORMAL
NEUTROPHILS # BLD AUTO: 3.2 X10E3/UL (ref 1.4–7)
NEUTROPHILS NFR BLD AUTO: 82 %
NEUTS SEG # BLD: 4.8 K/UL (ref 1.8–8)
NEUTS SEG NFR BLD: 81 % (ref 32–75)
NITRITE UR QL STRIP.AUTO: NEGATIVE
PH UR STRIP: 6.5 [PH] (ref 5–8)
PLATELET # BLD AUTO: 129 K/UL (ref 150–400)
PLATELET # BLD AUTO: 88 X10E3/UL (ref 150–379)
POTASSIUM SERPL-SCNC: 3.8 MMOL/L (ref 3.5–5.2)
PROT SERPL-MCNC: 6.5 G/DL (ref 6–8.5)
PROT UR STRIP-MCNC: NEGATIVE MG/DL
RBC # BLD AUTO: 2.57 M/UL (ref 3.8–5.2)
RBC # BLD AUTO: 2.65 X10E6/UL (ref 3.77–5.28)
RBC #/AREA URNS HPF: NORMAL /HPF (ref 0–5)
RBC MORPH BLD: ABNORMAL
SODIUM SERPL-SCNC: 141 MMOL/L (ref 134–144)
SP GR UR REFRACTOMETRY: 1.01 (ref 1–1.03)
UA: UC IF INDICATED,UAUC: NORMAL
UROBILINOGEN UR QL STRIP.AUTO: 0.2 EU/DL (ref 0.2–1)
WBC # BLD AUTO: 3.9 X10E3/UL (ref 3.4–10.8)
WBC # BLD AUTO: 5.9 K/UL (ref 3.6–11)
WBC URNS QL MICRO: NORMAL /HPF (ref 0–4)

## 2017-12-13 PROCEDURE — 96375 TX/PRO/DX INJ NEW DRUG ADDON: CPT

## 2017-12-13 PROCEDURE — 96417 CHEMO IV INFUS EACH ADDL SEQ: CPT

## 2017-12-13 PROCEDURE — 74011250636 HC RX REV CODE- 250/636: Performed by: NURSE PRACTITIONER

## 2017-12-13 PROCEDURE — 86900 BLOOD TYPING SEROLOGIC ABO: CPT | Performed by: NURSE PRACTITIONER

## 2017-12-13 PROCEDURE — 74011250636 HC RX REV CODE- 250/636: Performed by: OBSTETRICS & GYNECOLOGY

## 2017-12-13 PROCEDURE — 85025 COMPLETE CBC W/AUTO DIFF WBC: CPT | Performed by: OBSTETRICS & GYNECOLOGY

## 2017-12-13 PROCEDURE — 99214 OFFICE O/P EST MOD 30 MIN: CPT

## 2017-12-13 PROCEDURE — 36415 COLL VENOUS BLD VENIPUNCTURE: CPT | Performed by: OBSTETRICS & GYNECOLOGY

## 2017-12-13 PROCEDURE — 81001 URINALYSIS AUTO W/SCOPE: CPT | Performed by: OBSTETRICS & GYNECOLOGY

## 2017-12-13 PROCEDURE — 74011000250 HC RX REV CODE- 250: Performed by: OBSTETRICS & GYNECOLOGY

## 2017-12-13 PROCEDURE — 96415 CHEMO IV INFUSION ADDL HR: CPT

## 2017-12-13 PROCEDURE — 77030012965 HC NDL HUBR BBMI -A

## 2017-12-13 PROCEDURE — 86920 COMPATIBILITY TEST SPIN: CPT | Performed by: NURSE PRACTITIONER

## 2017-12-13 PROCEDURE — 74011000258 HC RX REV CODE- 258: Performed by: OBSTETRICS & GYNECOLOGY

## 2017-12-13 PROCEDURE — 96413 CHEMO IV INFUSION 1 HR: CPT

## 2017-12-13 RX ORDER — SODIUM CHLORIDE 0.9 % (FLUSH) 0.9 %
10-40 SYRINGE (ML) INJECTION AS NEEDED
Status: ACTIVE | OUTPATIENT
Start: 2017-12-13 | End: 2017-12-14

## 2017-12-13 RX ORDER — SODIUM CHLORIDE 9 MG/ML
10 INJECTION INTRAMUSCULAR; INTRAVENOUS; SUBCUTANEOUS AS NEEDED
Status: ACTIVE | OUTPATIENT
Start: 2017-12-13 | End: 2017-12-14

## 2017-12-13 RX ORDER — DIPHENHYDRAMINE HYDROCHLORIDE 50 MG/ML
50 INJECTION, SOLUTION INTRAMUSCULAR; INTRAVENOUS ONCE
Status: COMPLETED | OUTPATIENT
Start: 2017-12-13 | End: 2017-12-13

## 2017-12-13 RX ORDER — GRANISETRON HYDROCHLORIDE 1 MG/ML
1 INJECTION INTRAVENOUS ONCE
Status: COMPLETED | OUTPATIENT
Start: 2017-12-13 | End: 2017-12-13

## 2017-12-13 RX ORDER — ACETAMINOPHEN 325 MG/1
650 TABLET ORAL ONCE
Status: COMPLETED | OUTPATIENT
Start: 2017-12-14 | End: 2017-12-14

## 2017-12-13 RX ORDER — DIPHENHYDRAMINE HCL 25 MG
25 CAPSULE ORAL ONCE
Status: COMPLETED | OUTPATIENT
Start: 2017-12-14 | End: 2017-12-14

## 2017-12-13 RX ORDER — HEPARIN 100 UNIT/ML
500 SYRINGE INTRAVENOUS AS NEEDED
Status: ACTIVE | OUTPATIENT
Start: 2017-12-13 | End: 2017-12-14

## 2017-12-13 RX ADMIN — SODIUM CHLORIDE, PRESERVATIVE FREE 500 UNITS: 5 INJECTION INTRAVENOUS at 18:48

## 2017-12-13 RX ADMIN — SODIUM CHLORIDE 20 MG: 9 INJECTION INTRAMUSCULAR; INTRAVENOUS; SUBCUTANEOUS at 12:59

## 2017-12-13 RX ADMIN — DEXAMETHASONE SODIUM PHOSPHATE 20 MG: 4 INJECTION, SOLUTION INTRA-ARTICULAR; INTRALESIONAL; INTRAMUSCULAR; INTRAVENOUS; SOFT TISSUE at 13:23

## 2017-12-13 RX ADMIN — Medication 10 ML: at 18:48

## 2017-12-13 RX ADMIN — SODIUM CHLORIDE 1000 ML: 900 INJECTION, SOLUTION INTRAVENOUS at 12:57

## 2017-12-13 RX ADMIN — CARBOPLATIN 388 MG: 10 INJECTION, SOLUTION INTRAVENOUS at 17:44

## 2017-12-13 RX ADMIN — PACLITAXEL 296 MG: 6 INJECTION, SOLUTION INTRAVENOUS at 14:36

## 2017-12-13 RX ADMIN — SODIUM CHLORIDE 10 ML: 9 INJECTION, SOLUTION INTRAMUSCULAR; INTRAVENOUS; SUBCUTANEOUS at 09:18

## 2017-12-13 RX ADMIN — DIPHENHYDRAMINE HYDROCHLORIDE 50 MG: 50 INJECTION INTRAMUSCULAR; INTRAVENOUS at 13:02

## 2017-12-13 RX ADMIN — GRANISETRON HYDROCHLORIDE 1 MG: 1 INJECTION INTRAVENOUS at 12:00

## 2017-12-13 NOTE — PROGRESS NOTES
[facility] - Chemotherapy Progress Note    1963 Pt admit to [facility] for Taxol/Carbo. Chemotherapy Flowsheet 12/13/2017   Cycle C3   Date 12/13/2017   Drug / Regimen Taxol/Carbo   Dosage -   Pre Hydration -   Post Hydration -   Pre Meds given   Notes given     Pt ambulatory in stable condition. Assessment completed. No new concerns voiced. Port accessed with positive blood return. Labs drawn at previous appointment but had not yet resulted at time of pt admission. Contacted office and spoke with Khadijah Altman RN who contacted Matt Gomez connected to Sell[facility] at [facility]. Labs resulted but isak more blood for repeat CBC due to low platelets and HGB. Jackelyn Best NP at patient's beside writing additional orders for type and cross for 2 units of PRBCs and hydration. Pt started on hydration. Type and Cross drawn per order. Visit Vitals    /88 (BP 1 Location: Right arm, BP Patient Position: Sitting)    Pulse 79    Temp 97.8 °F (36.6 °C)    Resp 18    Ht 5' 2\" (1.575 m)    Wt 109.2 kg (240 lb 12.8 oz)    SpO2 99%    BMI 44.04 kg/m2     Medications:  NS  Benadryl 50 MG IV  Pepcid 20 MG IV  Kytril 1 MG IV  Decadron 20 MG IVP  Paclitaxel   Carbo    1850 Pt tolerated treatment well. Port maintained positive blood return throughout treatment, flushed with positive blood return at conclusion and de-accessed at patient's request. D/c home ambulatory in no distress. Pt aware of next [facility] appointment scheduled for 12/14/18 at for blood transfusion.

## 2017-12-14 ENCOUNTER — HOSPITAL ENCOUNTER (OUTPATIENT)
Dept: INFUSION THERAPY | Age: 67
Discharge: HOME | End: 2017-12-14
Payer: MEDICARE

## 2017-12-14 VITALS
SYSTOLIC BLOOD PRESSURE: 115 MMHG | TEMPERATURE: 97 F | DIASTOLIC BLOOD PRESSURE: 78 MMHG | HEART RATE: 67 BPM | RESPIRATION RATE: 16 BRPM

## 2017-12-14 PROCEDURE — 36430 TRANSFUSION BLD/BLD COMPNT: CPT

## 2017-12-14 PROCEDURE — 77030013169 SET IV BLD ICUM -A

## 2017-12-14 PROCEDURE — 77030012965 HC NDL HUBR BBMI -A

## 2017-12-14 PROCEDURE — P9016 RBC LEUKOCYTES REDUCED: HCPCS | Performed by: NURSE PRACTITIONER

## 2017-12-14 PROCEDURE — 74011250637 HC RX REV CODE- 250/637: Performed by: NURSE PRACTITIONER

## 2017-12-14 RX ORDER — SODIUM CHLORIDE 9 MG/ML
250 INJECTION, SOLUTION INTRAVENOUS AS NEEDED
Status: DISCONTINUED | OUTPATIENT
Start: 2017-12-14 | End: 2017-12-18 | Stop reason: HOSPADM

## 2017-12-14 RX ADMIN — DIPHENHYDRAMINE HYDROCHLORIDE 25 MG: 25 CAPSULE ORAL at 11:50

## 2017-12-14 RX ADMIN — ACETAMINOPHEN 650 MG: 325 TABLET ORAL at 11:50

## 2017-12-14 NOTE — PROGRESS NOTES
Problem: Chemotherapy Treatment  Goal: *Tolerating diet  Outcome: Progressing Towards Goal  Hydrating orally

## 2017-12-14 NOTE — PROGRESS NOTES
1100 Pt arrived at [facility] in no distress for transfusion of 2 units PRBCs. Assessment completed, no new complaints voiced. Port accessed with positive blood return. Signs/symptoms of adverse blood reaction discussed with pt, voiced understanding. Medications received:  NS @ KVO  Tylenol 650 mg po  Benadryl 25 mg po    1220:  1st unit PRBCs started and infusing without difficulty, observed x 15 minutes  1430:  1st unit completed without adverse reaction noted, NS flushing line. 1500:  2nd unit PRBCs started and infusing without difficulty, observed x 15 minutes  1715:  2nd unit completed without adverse reaction noted, NS flushing line. Patient Vitals for the past 12 hrs:   Temp Pulse Resp BP   12/14/17 1715 97 °F (36.1 °C) 67 16 115/78   12/14/17 1700 97.1 °F (36.2 °C) 65 16 120/79   12/14/17 1600 97.1 °F (36.2 °C) 65 16 126/82   12/14/17 1530 97.1 °F (36.2 °C) 65 16 118/85   12/14/17 1515 97.1 °F (36.2 °C) 67 16 114/86   12/14/17 1450 97.1 °F (36.2 °C) 67 16 129/89   12/14/17 1420 97.5 °F (36.4 °C) 65 16 131/72   12/14/17 1320 97.5 °F (36.4 °C) 67 16 136/75   12/14/17 1250 97.8 °F (36.6 °C) 61 16 133/74   12/14/17 1235 97.9 °F (36.6 °C) 64 16 125/71   12/14/17 1210 98 °F (36.7 °C) 75 16 121/77   12/14/17 1115 97.5 °F (36.4 °C) 75 18 147/84     1720 Tolerated transfusion well, no adverse reaction noted. D/C instructions reviewed, copy to pt, voiced understanding. Patient declined 1 hour post transfusion observation. D/Cd from [facility] in no distress. Next appt 01/03/18.

## 2017-12-15 LAB
ABO + RH BLD: NORMAL
BLD PROD TYP BPU: NORMAL
BLD PROD TYP BPU: NORMAL
BLOOD GROUP ANTIBODIES SERPL: NORMAL
BPU ID: NORMAL
BPU ID: NORMAL
CROSSMATCH RESULT,%XM: NORMAL
CROSSMATCH RESULT,%XM: NORMAL
SPECIMEN EXP DATE BLD: NORMAL
STATUS OF UNIT,%ST: NORMAL
STATUS OF UNIT,%ST: NORMAL
UNIT DIVISION, %UDIV: 0
UNIT DIVISION, %UDIV: 0

## 2018-01-01 ENCOUNTER — OFFICE VISIT (OUTPATIENT)
Dept: GYNECOLOGY | Age: 68
End: 2018-01-01

## 2018-01-01 ENCOUNTER — TELEPHONE (OUTPATIENT)
Dept: GYNECOLOGY | Age: 68
End: 2018-01-01

## 2018-01-01 ENCOUNTER — HOSPITAL ENCOUNTER (OUTPATIENT)
Dept: INFUSION THERAPY | Age: 68
Discharge: HOME | End: 2018-01-03
Payer: MEDICARE

## 2018-01-01 ENCOUNTER — HOSPITAL ENCOUNTER (OUTPATIENT)
Dept: INFUSION THERAPY | Age: 68
Discharge: HOME | End: 2018-04-19
Payer: MEDICARE

## 2018-01-01 ENCOUNTER — HOSPITAL ENCOUNTER (OUTPATIENT)
Dept: CT IMAGING | Age: 68
Discharge: HOME | End: 2018-01-19
Attending: OBSTETRICS & GYNECOLOGY

## 2018-01-01 ENCOUNTER — HOSPITAL ENCOUNTER (OUTPATIENT)
Dept: CT IMAGING | Age: 68
Discharge: HOME | End: 2018-06-26
Attending: OBSTETRICS & GYNECOLOGY
Payer: MEDICARE

## 2018-01-01 ENCOUNTER — HOSPITAL ENCOUNTER (OUTPATIENT)
Dept: CT IMAGING | Age: 68
Discharge: HOME | End: 2018-08-27
Attending: OBSTETRICS & GYNECOLOGY
Payer: MEDICARE

## 2018-01-01 ENCOUNTER — OFFICE VISIT (OUTPATIENT)
Dept: INTERNAL MEDICINE CLINIC | Age: 68
End: 2018-01-01

## 2018-01-01 ENCOUNTER — HOSPITAL ENCOUNTER (OUTPATIENT)
Dept: INFUSION THERAPY | Age: 68
Discharge: HOME | End: 2018-04-18
Payer: MEDICARE

## 2018-01-01 ENCOUNTER — HOME CARE VISIT (OUTPATIENT)
Dept: SCHEDULING | Facility: HOME HEALTH | Age: 68
End: 2018-01-01
Payer: MEDICARE

## 2018-01-01 ENCOUNTER — HOSPITAL ENCOUNTER (OUTPATIENT)
Dept: PHYSICAL THERAPY | Age: 68
Discharge: HOME | End: 2018-04-16

## 2018-01-01 ENCOUNTER — HOSPITAL ENCOUNTER (OUTPATIENT)
Dept: INFUSION THERAPY | Age: 68
Discharge: HOME | End: 2018-02-15
Payer: MEDICARE

## 2018-01-01 ENCOUNTER — HOSPITAL ENCOUNTER (OUTPATIENT)
Dept: INFUSION THERAPY | Age: 68
End: 2018-01-01
Payer: MEDICARE

## 2018-01-01 ENCOUNTER — HOME CARE VISIT (OUTPATIENT)
Dept: HOSPICE | Facility: HOSPICE | Age: 68
End: 2018-01-01
Payer: MEDICARE

## 2018-01-01 ENCOUNTER — APPOINTMENT (OUTPATIENT)
Dept: INFUSION THERAPY | Age: 68
End: 2018-01-01
Payer: MEDICARE

## 2018-01-01 ENCOUNTER — HOSPITAL ENCOUNTER (OUTPATIENT)
Dept: INFUSION THERAPY | Age: 68
Discharge: HOME | End: 2018-03-28
Payer: MEDICARE

## 2018-01-01 ENCOUNTER — HOSPITAL ENCOUNTER (OUTPATIENT)
Dept: CT IMAGING | Age: 68
Discharge: HOME | End: 2018-04-16
Attending: NURSE PRACTITIONER
Payer: MEDICARE

## 2018-01-01 ENCOUNTER — HOSPITAL ENCOUNTER (OUTPATIENT)
Dept: INFUSION THERAPY | Age: 68
Discharge: HOME | End: 2018-07-05
Payer: MEDICARE

## 2018-01-01 ENCOUNTER — APPOINTMENT (OUTPATIENT)
Dept: GENERAL RADIOLOGY | Age: 68
DRG: 392 | End: 2018-01-01
Attending: STUDENT IN AN ORGANIZED HEALTH CARE EDUCATION/TRAINING PROGRAM
Payer: MEDICARE

## 2018-01-01 ENCOUNTER — DOCUMENTATION ONLY (OUTPATIENT)
Dept: GYNECOLOGY | Age: 68
End: 2018-01-01

## 2018-01-01 ENCOUNTER — APPOINTMENT (OUTPATIENT)
Dept: INFUSION THERAPY | Age: 68
End: 2018-01-01

## 2018-01-01 ENCOUNTER — HOSPITAL ENCOUNTER (OUTPATIENT)
Dept: INFUSION THERAPY | Age: 68
End: 2018-01-01

## 2018-01-01 ENCOUNTER — HOSPITAL ENCOUNTER (INPATIENT)
Age: 68
LOS: 1 days | Discharge: HOME | DRG: 392 | End: 2018-01-05
Attending: STUDENT IN AN ORGANIZED HEALTH CARE EDUCATION/TRAINING PROGRAM | Admitting: FAMILY MEDICINE
Payer: MEDICARE

## 2018-01-01 ENCOUNTER — HOSPITAL ENCOUNTER (OUTPATIENT)
Dept: INFUSION THERAPY | Age: 68
Discharge: HOME | End: 2018-01-24
Payer: MEDICARE

## 2018-01-01 ENCOUNTER — TELEPHONE (OUTPATIENT)
Dept: INTERNAL MEDICINE CLINIC | Facility: CLINIC | Age: 68
End: 2018-01-01

## 2018-01-01 ENCOUNTER — HOSPITAL ENCOUNTER (OUTPATIENT)
Dept: INFUSION THERAPY | Age: 68
Discharge: HOME | End: 2018-06-14
Payer: MEDICARE

## 2018-01-01 ENCOUNTER — HOSPITAL ENCOUNTER (OUTPATIENT)
Dept: CT IMAGING | Age: 68
Discharge: HOME | End: 2018-02-13
Attending: OBSTETRICS & GYNECOLOGY
Payer: MEDICARE

## 2018-01-01 ENCOUNTER — HOSPITAL ENCOUNTER (OUTPATIENT)
Dept: INFUSION THERAPY | Age: 68
Discharge: HOME | End: 2018-05-16
Payer: MEDICARE

## 2018-01-01 ENCOUNTER — APPOINTMENT (OUTPATIENT)
Dept: NUCLEAR MEDICINE | Age: 68
DRG: 392 | End: 2018-01-01
Attending: FAMILY MEDICINE
Payer: MEDICARE

## 2018-01-01 ENCOUNTER — HOSPICE ADMISSION (OUTPATIENT)
Dept: HOSPICE | Facility: HOSPICE | Age: 68
End: 2018-01-01
Payer: MEDICARE

## 2018-01-01 ENCOUNTER — HOSPITAL ENCOUNTER (OUTPATIENT)
Dept: INFUSION THERAPY | Age: 68
Discharge: HOME | End: 2018-10-24
Payer: MEDICARE

## 2018-01-01 ENCOUNTER — HOSPITAL ENCOUNTER (OUTPATIENT)
Dept: GENERAL RADIOLOGY | Age: 68
Discharge: HOME | End: 2018-03-27
Payer: MEDICARE

## 2018-01-01 ENCOUNTER — HOSPITAL ENCOUNTER (OUTPATIENT)
Dept: INFUSION THERAPY | Age: 68
Discharge: HOME | End: 2018-06-13
Payer: MEDICARE

## 2018-01-01 ENCOUNTER — APPOINTMENT (OUTPATIENT)
Dept: NUCLEAR MEDICINE | Age: 68
DRG: 392 | End: 2018-01-01
Attending: INTERNAL MEDICINE
Payer: MEDICARE

## 2018-01-01 ENCOUNTER — HOSPITAL ENCOUNTER (OUTPATIENT)
Dept: INFUSION THERAPY | Age: 68
Discharge: HOME | End: 2018-03-07
Payer: MEDICARE

## 2018-01-01 ENCOUNTER — HOSPITAL ENCOUNTER (OUTPATIENT)
Dept: CT IMAGING | Age: 68
Discharge: HOME | End: 2018-11-07
Attending: OBSTETRICS & GYNECOLOGY
Payer: MEDICARE

## 2018-01-01 ENCOUNTER — HOSPITAL ENCOUNTER (OUTPATIENT)
Dept: INFUSION THERAPY | Age: 68
Discharge: HOME | End: 2018-02-16
Payer: MEDICARE

## 2018-01-01 ENCOUNTER — HOSPITAL ENCOUNTER (OUTPATIENT)
Dept: INFUSION THERAPY | Age: 68
Discharge: HOME | End: 2018-09-12
Payer: MEDICARE

## 2018-01-01 ENCOUNTER — HOSPITAL ENCOUNTER (OUTPATIENT)
Dept: INFUSION THERAPY | Age: 68
Discharge: HOME | End: 2018-05-23
Payer: MEDICARE

## 2018-01-01 ENCOUNTER — HOSPITAL ENCOUNTER (OUTPATIENT)
Dept: INFUSION THERAPY | Age: 68
Discharge: HOME | End: 2018-02-14
Payer: MEDICARE

## 2018-01-01 ENCOUNTER — HOSPITAL ENCOUNTER (OUTPATIENT)
Dept: INFUSION THERAPY | Age: 68
Discharge: HOME | End: 2018-10-03
Payer: MEDICARE

## 2018-01-01 ENCOUNTER — APPOINTMENT (OUTPATIENT)
Dept: NUCLEAR MEDICINE | Age: 68
DRG: 392 | End: 2018-01-01
Attending: STUDENT IN AN ORGANIZED HEALTH CARE EDUCATION/TRAINING PROGRAM
Payer: MEDICARE

## 2018-01-01 ENCOUNTER — HOSPITAL ENCOUNTER (OUTPATIENT)
Dept: INFUSION THERAPY | Age: 68
Discharge: HOME | End: 2018-08-01
Payer: MEDICARE

## 2018-01-01 VITALS
HEART RATE: 113 BPM | OXYGEN SATURATION: 92 % | DIASTOLIC BLOOD PRESSURE: 70 MMHG | SYSTOLIC BLOOD PRESSURE: 140 MMHG | RESPIRATION RATE: 16 BRPM

## 2018-01-01 VITALS
DIASTOLIC BLOOD PRESSURE: 75 MMHG | WEIGHT: 225 LBS | HEIGHT: 62 IN | SYSTOLIC BLOOD PRESSURE: 129 MMHG | BODY MASS INDEX: 41.41 KG/M2 | HEART RATE: 59 BPM | TEMPERATURE: 97.1 F | RESPIRATION RATE: 18 BRPM

## 2018-01-01 VITALS
HEIGHT: 62 IN | OXYGEN SATURATION: 97 % | TEMPERATURE: 97.2 F | SYSTOLIC BLOOD PRESSURE: 143 MMHG | DIASTOLIC BLOOD PRESSURE: 82 MMHG | HEART RATE: 67 BPM | BODY MASS INDEX: 44.35 KG/M2 | RESPIRATION RATE: 20 BRPM | WEIGHT: 241 LBS

## 2018-01-01 VITALS
OXYGEN SATURATION: 96 % | WEIGHT: 247 LBS | HEIGHT: 62 IN | BODY MASS INDEX: 45.45 KG/M2 | SYSTOLIC BLOOD PRESSURE: 136 MMHG | TEMPERATURE: 97.8 F | DIASTOLIC BLOOD PRESSURE: 71 MMHG | RESPIRATION RATE: 16 BRPM | HEART RATE: 69 BPM

## 2018-01-01 VITALS
DIASTOLIC BLOOD PRESSURE: 70 MMHG | SYSTOLIC BLOOD PRESSURE: 120 MMHG | OXYGEN SATURATION: 98 % | HEART RATE: 62 BPM | RESPIRATION RATE: 12 BRPM

## 2018-01-01 VITALS
WEIGHT: 239 LBS | BODY MASS INDEX: 43.98 KG/M2 | RESPIRATION RATE: 18 BRPM | SYSTOLIC BLOOD PRESSURE: 130 MMHG | HEIGHT: 62 IN | HEART RATE: 78 BPM | DIASTOLIC BLOOD PRESSURE: 72 MMHG | TEMPERATURE: 97.2 F

## 2018-01-01 VITALS
SYSTOLIC BLOOD PRESSURE: 127 MMHG | HEIGHT: 62 IN | RESPIRATION RATE: 18 BRPM | DIASTOLIC BLOOD PRESSURE: 73 MMHG | TEMPERATURE: 97.9 F | WEIGHT: 239 LBS | BODY MASS INDEX: 43.98 KG/M2 | HEART RATE: 69 BPM

## 2018-01-01 VITALS
WEIGHT: 255 LBS | SYSTOLIC BLOOD PRESSURE: 128 MMHG | RESPIRATION RATE: 18 BRPM | BODY MASS INDEX: 46.93 KG/M2 | DIASTOLIC BLOOD PRESSURE: 71 MMHG | HEIGHT: 62 IN | TEMPERATURE: 98.2 F | HEART RATE: 68 BPM

## 2018-01-01 VITALS
WEIGHT: 255.8 LBS | HEART RATE: 68 BPM | DIASTOLIC BLOOD PRESSURE: 73 MMHG | SYSTOLIC BLOOD PRESSURE: 141 MMHG | HEIGHT: 62 IN | BODY MASS INDEX: 47.07 KG/M2

## 2018-01-01 VITALS
HEIGHT: 62 IN | BODY MASS INDEX: 45.97 KG/M2 | DIASTOLIC BLOOD PRESSURE: 69 MMHG | WEIGHT: 249.8 LBS | OXYGEN SATURATION: 96 % | HEART RATE: 63 BPM | RESPIRATION RATE: 18 BRPM | TEMPERATURE: 97 F | SYSTOLIC BLOOD PRESSURE: 135 MMHG

## 2018-01-01 VITALS
BODY MASS INDEX: 45.93 KG/M2 | WEIGHT: 249.6 LBS | DIASTOLIC BLOOD PRESSURE: 83 MMHG | HEART RATE: 77 BPM | SYSTOLIC BLOOD PRESSURE: 139 MMHG | HEIGHT: 62 IN

## 2018-01-01 VITALS
DIASTOLIC BLOOD PRESSURE: 55 MMHG | WEIGHT: 232.8 LBS | SYSTOLIC BLOOD PRESSURE: 135 MMHG | BODY MASS INDEX: 42.84 KG/M2 | HEART RATE: 77 BPM | HEIGHT: 62 IN

## 2018-01-01 VITALS
RESPIRATION RATE: 14 BRPM | DIASTOLIC BLOOD PRESSURE: 88 MMHG | SYSTOLIC BLOOD PRESSURE: 149 MMHG | TEMPERATURE: 97 F | HEART RATE: 53 BPM | OXYGEN SATURATION: 98 %

## 2018-01-01 VITALS
DIASTOLIC BLOOD PRESSURE: 83 MMHG | WEIGHT: 276 LBS | HEIGHT: 62 IN | SYSTOLIC BLOOD PRESSURE: 159 MMHG | HEART RATE: 86 BPM | BODY MASS INDEX: 50.79 KG/M2

## 2018-01-01 VITALS — DIASTOLIC BLOOD PRESSURE: 98 MMHG | HEART RATE: 64 BPM | RESPIRATION RATE: 20 BRPM | SYSTOLIC BLOOD PRESSURE: 151 MMHG

## 2018-01-01 VITALS
HEART RATE: 72 BPM | DIASTOLIC BLOOD PRESSURE: 72 MMHG | WEIGHT: 230.2 LBS | BODY MASS INDEX: 42.36 KG/M2 | SYSTOLIC BLOOD PRESSURE: 138 MMHG | HEIGHT: 62 IN

## 2018-01-01 VITALS
WEIGHT: 230 LBS | BODY MASS INDEX: 42.33 KG/M2 | SYSTOLIC BLOOD PRESSURE: 135 MMHG | HEIGHT: 62 IN | HEART RATE: 67 BPM | DIASTOLIC BLOOD PRESSURE: 88 MMHG

## 2018-01-01 VITALS
HEART RATE: 94 BPM | SYSTOLIC BLOOD PRESSURE: 115 MMHG | DIASTOLIC BLOOD PRESSURE: 69 MMHG | BODY MASS INDEX: 43.98 KG/M2 | RESPIRATION RATE: 18 BRPM | WEIGHT: 239 LBS | TEMPERATURE: 96.8 F | OXYGEN SATURATION: 96 % | HEIGHT: 62 IN

## 2018-01-01 VITALS
DIASTOLIC BLOOD PRESSURE: 74 MMHG | TEMPERATURE: 97.4 F | HEART RATE: 63 BPM | RESPIRATION RATE: 18 BRPM | SYSTOLIC BLOOD PRESSURE: 140 MMHG

## 2018-01-01 VITALS
RESPIRATION RATE: 18 BRPM | SYSTOLIC BLOOD PRESSURE: 120 MMHG | DIASTOLIC BLOOD PRESSURE: 70 MMHG | HEART RATE: 115 BPM | TEMPERATURE: 100 F | OXYGEN SATURATION: 75 %

## 2018-01-01 VITALS
SYSTOLIC BLOOD PRESSURE: 130 MMHG | DIASTOLIC BLOOD PRESSURE: 80 MMHG | OXYGEN SATURATION: 96 % | RESPIRATION RATE: 16 BRPM | HEART RATE: 75 BPM

## 2018-01-01 VITALS
RESPIRATION RATE: 18 BRPM | TEMPERATURE: 97.7 F | HEIGHT: 62 IN | OXYGEN SATURATION: 98 % | BODY MASS INDEX: 44.5 KG/M2 | DIASTOLIC BLOOD PRESSURE: 69 MMHG | WEIGHT: 241.84 LBS | SYSTOLIC BLOOD PRESSURE: 125 MMHG | HEART RATE: 64 BPM

## 2018-01-01 VITALS
HEIGHT: 62 IN | TEMPERATURE: 97.3 F | SYSTOLIC BLOOD PRESSURE: 114 MMHG | BODY MASS INDEX: 45.99 KG/M2 | RESPIRATION RATE: 18 BRPM | HEART RATE: 57 BPM | DIASTOLIC BLOOD PRESSURE: 62 MMHG | WEIGHT: 249.9 LBS

## 2018-01-01 VITALS
SYSTOLIC BLOOD PRESSURE: 133 MMHG | HEIGHT: 62 IN | DIASTOLIC BLOOD PRESSURE: 64 MMHG | WEIGHT: 256.8 LBS | HEART RATE: 70 BPM | BODY MASS INDEX: 47.26 KG/M2

## 2018-01-01 VITALS
SYSTOLIC BLOOD PRESSURE: 160 MMHG | HEART RATE: 78 BPM | HEIGHT: 62 IN | BODY MASS INDEX: 49.69 KG/M2 | WEIGHT: 270 LBS | DIASTOLIC BLOOD PRESSURE: 93 MMHG

## 2018-01-01 VITALS
DIASTOLIC BLOOD PRESSURE: 58 MMHG | WEIGHT: 238.8 LBS | SYSTOLIC BLOOD PRESSURE: 89 MMHG | HEIGHT: 62 IN | BODY MASS INDEX: 43.94 KG/M2 | HEART RATE: 90 BPM

## 2018-01-01 VITALS
DIASTOLIC BLOOD PRESSURE: 70 MMHG | RESPIRATION RATE: 18 BRPM | HEART RATE: 43 BPM | TEMPERATURE: 97.9 F | SYSTOLIC BLOOD PRESSURE: 139 MMHG

## 2018-01-01 VITALS
HEIGHT: 62 IN | DIASTOLIC BLOOD PRESSURE: 77 MMHG | BODY MASS INDEX: 46.15 KG/M2 | HEART RATE: 65 BPM | WEIGHT: 250.8 LBS | SYSTOLIC BLOOD PRESSURE: 142 MMHG

## 2018-01-01 VITALS
HEIGHT: 62 IN | HEART RATE: 73 BPM | WEIGHT: 233.4 LBS | BODY MASS INDEX: 42.95 KG/M2 | SYSTOLIC BLOOD PRESSURE: 138 MMHG | DIASTOLIC BLOOD PRESSURE: 87 MMHG

## 2018-01-01 VITALS
BODY MASS INDEX: 45.27 KG/M2 | HEART RATE: 98 BPM | HEIGHT: 62 IN | RESPIRATION RATE: 18 BRPM | DIASTOLIC BLOOD PRESSURE: 81 MMHG | SYSTOLIC BLOOD PRESSURE: 156 MMHG | TEMPERATURE: 98 F | WEIGHT: 246 LBS | OXYGEN SATURATION: 98 %

## 2018-01-01 VITALS
BODY MASS INDEX: 44.31 KG/M2 | DIASTOLIC BLOOD PRESSURE: 73 MMHG | HEIGHT: 62 IN | WEIGHT: 240.8 LBS | HEART RATE: 58 BPM | RESPIRATION RATE: 18 BRPM | TEMPERATURE: 97 F | SYSTOLIC BLOOD PRESSURE: 149 MMHG

## 2018-01-01 VITALS
HEART RATE: 76 BPM | BODY MASS INDEX: 42.62 KG/M2 | HEIGHT: 62 IN | WEIGHT: 231.6 LBS | SYSTOLIC BLOOD PRESSURE: 158 MMHG | DIASTOLIC BLOOD PRESSURE: 89 MMHG

## 2018-01-01 VITALS
DIASTOLIC BLOOD PRESSURE: 80 MMHG | HEART RATE: 85 BPM | OXYGEN SATURATION: 93 % | SYSTOLIC BLOOD PRESSURE: 130 MMHG | RESPIRATION RATE: 16 BRPM

## 2018-01-01 VITALS
HEIGHT: 62 IN | HEART RATE: 83 BPM | SYSTOLIC BLOOD PRESSURE: 115 MMHG | DIASTOLIC BLOOD PRESSURE: 54 MMHG | BODY MASS INDEX: 45.27 KG/M2 | WEIGHT: 246 LBS

## 2018-01-01 VITALS
TEMPERATURE: 97 F | SYSTOLIC BLOOD PRESSURE: 126 MMHG | HEIGHT: 62 IN | DIASTOLIC BLOOD PRESSURE: 73 MMHG | OXYGEN SATURATION: 98 % | WEIGHT: 277.4 LBS | BODY MASS INDEX: 51.05 KG/M2 | RESPIRATION RATE: 18 BRPM | HEART RATE: 64 BPM

## 2018-01-01 VITALS
DIASTOLIC BLOOD PRESSURE: 91 MMHG | TEMPERATURE: 97.1 F | HEIGHT: 62 IN | WEIGHT: 250.88 LBS | BODY MASS INDEX: 46.17 KG/M2 | RESPIRATION RATE: 18 BRPM | OXYGEN SATURATION: 99 % | HEART RATE: 83 BPM | SYSTOLIC BLOOD PRESSURE: 146 MMHG

## 2018-01-01 VITALS
RESPIRATION RATE: 18 BRPM | HEART RATE: 86 BPM | OXYGEN SATURATION: 95 % | SYSTOLIC BLOOD PRESSURE: 112 MMHG | DIASTOLIC BLOOD PRESSURE: 68 MMHG

## 2018-01-01 VITALS
RESPIRATION RATE: 18 BRPM | HEIGHT: 62 IN | WEIGHT: 232 LBS | DIASTOLIC BLOOD PRESSURE: 69 MMHG | TEMPERATURE: 97.7 F | HEART RATE: 77 BPM | BODY MASS INDEX: 42.69 KG/M2 | SYSTOLIC BLOOD PRESSURE: 146 MMHG

## 2018-01-01 VITALS
RESPIRATION RATE: 18 BRPM | TEMPERATURE: 97.9 F | HEART RATE: 79 BPM | SYSTOLIC BLOOD PRESSURE: 134 MMHG | DIASTOLIC BLOOD PRESSURE: 64 MMHG

## 2018-01-01 VITALS
SYSTOLIC BLOOD PRESSURE: 167 MMHG | BODY MASS INDEX: 44.87 KG/M2 | HEIGHT: 62 IN | DIASTOLIC BLOOD PRESSURE: 80 MMHG | WEIGHT: 243.8 LBS

## 2018-01-01 VITALS
DIASTOLIC BLOOD PRESSURE: 78 MMHG | SYSTOLIC BLOOD PRESSURE: 128 MMHG | HEART RATE: 78 BPM | OXYGEN SATURATION: 97 % | RESPIRATION RATE: 16 BRPM

## 2018-01-01 VITALS
BODY MASS INDEX: 43.54 KG/M2 | SYSTOLIC BLOOD PRESSURE: 127 MMHG | DIASTOLIC BLOOD PRESSURE: 93 MMHG | WEIGHT: 236.6 LBS | HEART RATE: 86 BPM | HEIGHT: 62 IN

## 2018-01-01 VITALS
BODY MASS INDEX: 42.4 KG/M2 | HEIGHT: 62 IN | SYSTOLIC BLOOD PRESSURE: 108 MMHG | WEIGHT: 230.4 LBS | TEMPERATURE: 97.1 F | DIASTOLIC BLOOD PRESSURE: 72 MMHG | RESPIRATION RATE: 18 BRPM | HEART RATE: 80 BPM

## 2018-01-01 VITALS — OXYGEN SATURATION: 57 % | HEART RATE: 152 BPM | RESPIRATION RATE: 40 BRPM

## 2018-01-01 DIAGNOSIS — C52 VAGINAL CANCER (HCC): Primary | ICD-10-CM

## 2018-01-01 DIAGNOSIS — G63 NEUROPATHY ASSOCIATED WITH CANCER (HCC): ICD-10-CM

## 2018-01-01 DIAGNOSIS — G89.3 CANCER RELATED PAIN: ICD-10-CM

## 2018-01-01 DIAGNOSIS — C79.9 METASTATIC NEOPLASTIC DISEASE (HCC): ICD-10-CM

## 2018-01-01 DIAGNOSIS — I89.0 LYMPHEDEMA OF BOTH LOWER EXTREMITIES: ICD-10-CM

## 2018-01-01 DIAGNOSIS — C52 VAGINAL CANCER (HCC): ICD-10-CM

## 2018-01-01 DIAGNOSIS — E66.01 MORBID OBESITY WITH BMI OF 40.0-44.9, ADULT (HCC): ICD-10-CM

## 2018-01-01 DIAGNOSIS — N18.30 CKD (CHRONIC KIDNEY DISEASE) STAGE 3, GFR 30-59 ML/MIN (HCC): ICD-10-CM

## 2018-01-01 DIAGNOSIS — C80.1 NEUROPATHY ASSOCIATED WITH CANCER (HCC): ICD-10-CM

## 2018-01-01 DIAGNOSIS — C79.9 METASTATIC NEOPLASTIC DISEASE (HCC): Primary | ICD-10-CM

## 2018-01-01 DIAGNOSIS — E11.21 TYPE 2 DIABETES MELLITUS WITH DIABETIC NEPHROPATHY, WITHOUT LONG-TERM CURRENT USE OF INSULIN (HCC): ICD-10-CM

## 2018-01-01 DIAGNOSIS — I10 HYPERTENSION, ESSENTIAL: Primary | ICD-10-CM

## 2018-01-01 DIAGNOSIS — Z79.899 ON ANTINEOPLASTIC CHEMOTHERAPY: ICD-10-CM

## 2018-01-01 DIAGNOSIS — G89.3 CANCER RELATED PAIN: Primary | ICD-10-CM

## 2018-01-01 DIAGNOSIS — E78.00 HYPERCHOLESTEROLEMIA: ICD-10-CM

## 2018-01-01 DIAGNOSIS — N93.9 VAGINAL BLEEDING: ICD-10-CM

## 2018-01-01 DIAGNOSIS — J40 BRONCHITIS: ICD-10-CM

## 2018-01-01 DIAGNOSIS — N30.00 ACUTE CYSTITIS WITHOUT HEMATURIA: ICD-10-CM

## 2018-01-01 DIAGNOSIS — R07.9 CHEST PAIN, UNSPECIFIED TYPE: Primary | ICD-10-CM

## 2018-01-01 DIAGNOSIS — C79.51 BONE METASTASES (HCC): ICD-10-CM

## 2018-01-01 DIAGNOSIS — N18.4 CKD (CHRONIC KIDNEY DISEASE) STAGE 4, GFR 15-29 ML/MIN (HCC): ICD-10-CM

## 2018-01-01 DIAGNOSIS — D64.81 ANEMIA DUE TO ANTINEOPLASTIC CHEMOTHERAPY: Primary | ICD-10-CM

## 2018-01-01 DIAGNOSIS — M54.5 ACUTE LOW BACK PAIN, UNSPECIFIED BACK PAIN LATERALITY, WITH SCIATICA PRESENCE UNSPECIFIED: ICD-10-CM

## 2018-01-01 DIAGNOSIS — R73.02 GLUCOSE INTOLERANCE (IMPAIRED GLUCOSE TOLERANCE): ICD-10-CM

## 2018-01-01 DIAGNOSIS — Z13.31 SCREENING FOR DEPRESSION: ICD-10-CM

## 2018-01-01 DIAGNOSIS — Z79.899 ON ANTINEOPLASTIC CHEMOTHERAPY: Primary | ICD-10-CM

## 2018-01-01 DIAGNOSIS — R31.0 GROSS HEMATURIA: ICD-10-CM

## 2018-01-01 DIAGNOSIS — T45.1X5A ANEMIA DUE TO ANTINEOPLASTIC CHEMOTHERAPY: Primary | ICD-10-CM

## 2018-01-01 DIAGNOSIS — E11.21 TYPE 2 DIABETES MELLITUS WITH DIABETIC NEPHROPATHY, WITHOUT LONG-TERM CURRENT USE OF INSULIN (HCC): Primary | ICD-10-CM

## 2018-01-01 DIAGNOSIS — J20.9 ACUTE BRONCHITIS, UNSPECIFIED ORGANISM: Primary | ICD-10-CM

## 2018-01-01 LAB
ABO + RH BLD: NORMAL
ALBUMIN SERPL-MCNC: 2.8 G/DL (ref 3.5–5)
ALBUMIN SERPL-MCNC: 3.1 G/DL (ref 3.5–5)
ALBUMIN SERPL-MCNC: 3.1 G/DL (ref 3.6–4.8)
ALBUMIN SERPL-MCNC: 3.3 G/DL (ref 3.6–4.8)
ALBUMIN SERPL-MCNC: 3.5 G/DL (ref 3.6–4.8)
ALBUMIN SERPL-MCNC: 3.5 G/DL (ref 3.6–4.8)
ALBUMIN SERPL-MCNC: 3.6 G/DL (ref 3.5–5)
ALBUMIN SERPL-MCNC: 3.7 G/DL (ref 3.6–4.8)
ALBUMIN SERPL-MCNC: 3.7 G/DL (ref 3.6–4.8)
ALBUMIN SERPL-MCNC: 3.8 G/DL (ref 3.6–4.8)
ALBUMIN SERPL-MCNC: 3.9 G/DL (ref 3.6–4.8)
ALBUMIN SERPL-MCNC: 4 G/DL (ref 3.5–5)
ALBUMIN SERPL-MCNC: 4 G/DL (ref 3.6–4.8)
ALBUMIN SERPL-MCNC: 4 G/DL (ref 3.6–4.8)
ALBUMIN SERPL-MCNC: 4.1 G/DL (ref 3.5–5)
ALBUMIN SERPL-MCNC: 4.2 G/DL (ref 3.6–4.8)
ALBUMIN/GLOB SERPL: 0.9 {RATIO} (ref 1.1–2.2)
ALBUMIN/GLOB SERPL: 0.9 {RATIO} (ref 1.1–2.2)
ALBUMIN/GLOB SERPL: 1 {RATIO} (ref 1.1–2.2)
ALBUMIN/GLOB SERPL: 1.1 {RATIO} (ref 1.2–2.2)
ALBUMIN/GLOB SERPL: 1.2 {RATIO} (ref 1.1–2.2)
ALBUMIN/GLOB SERPL: 1.2 {RATIO} (ref 1.2–2.2)
ALBUMIN/GLOB SERPL: 1.2 {RATIO} (ref 1.2–2.2)
ALBUMIN/GLOB SERPL: 1.3 {RATIO} (ref 1.2–2.2)
ALBUMIN/GLOB SERPL: 1.4 {RATIO} (ref 1.2–2.2)
ALBUMIN/GLOB SERPL: 1.5 {RATIO} (ref 1.2–2.2)
ALP SERPL-CCNC: 100 U/L (ref 45–117)
ALP SERPL-CCNC: 104 IU/L (ref 39–117)
ALP SERPL-CCNC: 107 IU/L (ref 39–117)
ALP SERPL-CCNC: 117 IU/L (ref 39–117)
ALP SERPL-CCNC: 121 U/L (ref 45–117)
ALP SERPL-CCNC: 124 U/L (ref 45–117)
ALP SERPL-CCNC: 132 U/L (ref 45–117)
ALP SERPL-CCNC: 193 IU/L (ref 39–117)
ALP SERPL-CCNC: 203 IU/L (ref 39–117)
ALP SERPL-CCNC: 84 IU/L (ref 39–117)
ALP SERPL-CCNC: 93 IU/L (ref 39–117)
ALP SERPL-CCNC: 94 IU/L (ref 39–117)
ALP SERPL-CCNC: 97 IU/L (ref 39–117)
ALT SERPL-CCNC: 10 IU/L (ref 0–32)
ALT SERPL-CCNC: 11 IU/L (ref 0–32)
ALT SERPL-CCNC: 12 U/L (ref 12–78)
ALT SERPL-CCNC: 13 IU/L (ref 0–32)
ALT SERPL-CCNC: 17 U/L (ref 12–78)
ALT SERPL-CCNC: 21 U/L (ref 12–78)
ALT SERPL-CCNC: 26 U/L (ref 12–78)
ALT SERPL-CCNC: 7 IU/L (ref 0–32)
ALT SERPL-CCNC: 7 IU/L (ref 0–32)
ALT SERPL-CCNC: 8 IU/L (ref 0–32)
ALT SERPL-CCNC: 8 IU/L (ref 0–32)
ALT SERPL-CCNC: 9 IU/L (ref 0–32)
ALT SERPL-CCNC: 9 IU/L (ref 0–32)
ANION GAP SERPL CALC-SCNC: 10 MMOL/L (ref 5–15)
ANION GAP SERPL CALC-SCNC: 10 MMOL/L (ref 5–15)
ANION GAP SERPL CALC-SCNC: 13 MMOL/L (ref 5–15)
ANION GAP SERPL CALC-SCNC: 15 MMOL/L (ref 5–15)
ANION GAP SERPL CALC-SCNC: 16 MMOL/L (ref 5–15)
ANTI-COMPLEMENT (C3B,C3D): NORMAL
ANTIGENS PRESENT BLD: NORMAL
ANTIGENS PRESENT RBC DONR: NORMAL
ANTIGENS PRESENT RBC DONR: NORMAL
APPEARANCE UR: ABNORMAL
APPEARANCE UR: CLEAR
AST SERPL-CCNC: 10 IU/L (ref 0–40)
AST SERPL-CCNC: 10 IU/L (ref 0–40)
AST SERPL-CCNC: 12 IU/L (ref 0–40)
AST SERPL-CCNC: 12 IU/L (ref 0–40)
AST SERPL-CCNC: 13 IU/L (ref 0–40)
AST SERPL-CCNC: 13 U/L (ref 15–37)
AST SERPL-CCNC: 15 IU/L (ref 0–40)
AST SERPL-CCNC: 15 IU/L (ref 0–40)
AST SERPL-CCNC: 16 IU/L (ref 0–40)
AST SERPL-CCNC: 16 U/L (ref 15–37)
AST SERPL-CCNC: 17 IU/L (ref 0–40)
AST SERPL-CCNC: 17 U/L (ref 15–37)
AST SERPL-CCNC: 20 U/L (ref 15–37)
ATRIAL RATE: 90 BPM
ATTENDING PHYSICIAN, CST07: NORMAL
BACTERIA #/AREA URNS HPF: ABNORMAL /[HPF]
BACTERIA #/AREA URNS HPF: ABNORMAL /[HPF]
BACTERIA SPEC CULT: ABNORMAL
BACTERIA URNS QL MICRO: ABNORMAL /HPF
BACTERIA URNS QL MICRO: NEGATIVE /HPF
BASOPHILS # BLD AUTO: 0 X10E3/UL (ref 0–0.2)
BASOPHILS # BLD: 0 K/UL (ref 0–0.1)
BASOPHILS NFR BLD AUTO: 0 %
BASOPHILS NFR BLD: 0 % (ref 0–1)
BILIRUB SERPL-MCNC: 0.2 MG/DL (ref 0–1.2)
BILIRUB SERPL-MCNC: 0.3 MG/DL (ref 0–1.2)
BILIRUB SERPL-MCNC: 0.4 MG/DL (ref 0.2–1)
BILIRUB SERPL-MCNC: 0.4 MG/DL (ref 0–1.2)
BILIRUB SERPL-MCNC: 0.5 MG/DL (ref 0.2–1)
BILIRUB SERPL-MCNC: 0.5 MG/DL (ref 0–1.2)
BILIRUB SERPL-MCNC: 0.8 MG/DL (ref 0.2–1)
BILIRUB SERPL-MCNC: 1 MG/DL (ref 0.2–1)
BILIRUB UR QL STRIP: NEGATIVE
BILIRUB UR QL STRIP: NEGATIVE
BILIRUB UR QL: NEGATIVE
BLD GP AB SCN SERPL QL ELUTION: NORMAL
BLD PROD TYP BPU: NORMAL
BLOOD BANK CMNT PATIENT-IMP: NORMAL
BLOOD BANK CMNT PATIENT-IMP: NORMAL
BLOOD GROUP ANTIBODIES SERPL: NORMAL
BNP SERPL-MCNC: 576 PG/ML (ref 0–125)
BPU ID: NORMAL
BUN SERPL-MCNC: 19 MG/DL (ref 8–27)
BUN SERPL-MCNC: 21 MG/DL (ref 8–27)
BUN SERPL-MCNC: 21 MG/DL (ref 8–27)
BUN SERPL-MCNC: 24 MG/DL (ref 8–27)
BUN SERPL-MCNC: 25 MG/DL (ref 8–27)
BUN SERPL-MCNC: 26 MG/DL (ref 8–27)
BUN SERPL-MCNC: 29 MG/DL (ref 8–27)
BUN SERPL-MCNC: 32 MG/DL (ref 6–20)
BUN SERPL-MCNC: 32 MG/DL (ref 8–27)
BUN SERPL-MCNC: 40 MG/DL (ref 6–20)
BUN SERPL-MCNC: 46 MG/DL (ref 6–20)
BUN SERPL-MCNC: 50 MG/DL (ref 8–27)
BUN SERPL-MCNC: 50 MG/DL (ref 8–27)
BUN SERPL-MCNC: 53 MG/DL (ref 8–27)
BUN SERPL-MCNC: 59 MG/DL (ref 6–20)
BUN SERPL-MCNC: 75 MG/DL (ref 6–20)
BUN/CREAT SERPL: 11 (ref 12–28)
BUN/CREAT SERPL: 12 (ref 12–28)
BUN/CREAT SERPL: 12 (ref 12–28)
BUN/CREAT SERPL: 13 (ref 12–28)
BUN/CREAT SERPL: 14 (ref 12–28)
BUN/CREAT SERPL: 16 (ref 12–28)
BUN/CREAT SERPL: 17 (ref 12–20)
BUN/CREAT SERPL: 18 (ref 12–20)
BUN/CREAT SERPL: 21 (ref 12–28)
BUN/CREAT SERPL: 22 (ref 12–20)
BUN/CREAT SERPL: 23 (ref 12–20)
BUN/CREAT SERPL: 24 (ref 12–28)
BUN/CREAT SERPL: 25 (ref 12–28)
BUN/CREAT SERPL: 29 (ref 12–20)
CALCIUM SERPL-MCNC: 7.9 MG/DL (ref 8.5–10.1)
CALCIUM SERPL-MCNC: 8.2 MG/DL (ref 8.5–10.1)
CALCIUM SERPL-MCNC: 8.5 MG/DL (ref 8.7–10.3)
CALCIUM SERPL-MCNC: 8.6 MG/DL (ref 8.7–10.3)
CALCIUM SERPL-MCNC: 8.7 MG/DL (ref 8.7–10.3)
CALCIUM SERPL-MCNC: 8.9 MG/DL (ref 8.5–10.1)
CALCIUM SERPL-MCNC: 8.9 MG/DL (ref 8.7–10.3)
CALCIUM SERPL-MCNC: 9 MG/DL (ref 8.7–10.3)
CALCIUM SERPL-MCNC: 9.1 MG/DL (ref 8.7–10.3)
CALCIUM SERPL-MCNC: 9.1 MG/DL (ref 8.7–10.3)
CALCIUM SERPL-MCNC: 9.3 MG/DL (ref 8.5–10.1)
CALCIUM SERPL-MCNC: 9.4 MG/DL (ref 8.7–10.3)
CALCIUM SERPL-MCNC: 9.9 MG/DL (ref 8.5–10.1)
CALCULATED P AXIS, ECG09: 29 DEGREES
CALCULATED R AXIS, ECG10: -20 DEGREES
CALCULATED T AXIS, ECG11: 6 DEGREES
CANCER AG125 SERPL-ACNC: 104.8 U/ML (ref 0–38.1)
CANCER AG125 SERPL-ACNC: 106.2 U/ML (ref 0–38.1)
CANCER AG125 SERPL-ACNC: 111.6 U/ML (ref 0–38.1)
CANCER AG125 SERPL-ACNC: 116.4 U/ML (ref 0–38.1)
CANCER AG125 SERPL-ACNC: 120 U/ML (ref 1.5–35)
CANCER AG125 SERPL-ACNC: 126.9 U/ML (ref 0–38.1)
CANCER AG125 SERPL-ACNC: 191 U/ML (ref 1.5–35)
CANCER AG125 SERPL-ACNC: 293.8 U/ML (ref 0–38.1)
CANCER AG125 SERPL-ACNC: 356.1 U/ML (ref 0–38.1)
CANCER AG125 SERPL-ACNC: 76.1 U/ML (ref 0–38.1)
CANCER AG125 SERPL-ACNC: 95.5 U/ML (ref 0–38.1)
CASTS URNS QL MICRO: ABNORMAL /LPF
CASTS URNS QL MICRO: ABNORMAL /LPF
CC UR VC: ABNORMAL
CHLORIDE SERPL-SCNC: 102 MMOL/L (ref 96–106)
CHLORIDE SERPL-SCNC: 102 MMOL/L (ref 97–108)
CHLORIDE SERPL-SCNC: 103 MMOL/L (ref 96–106)
CHLORIDE SERPL-SCNC: 105 MMOL/L (ref 97–108)
CHLORIDE SERPL-SCNC: 81 MMOL/L (ref 96–106)
CHLORIDE SERPL-SCNC: 83 MMOL/L (ref 97–108)
CHLORIDE SERPL-SCNC: 84 MMOL/L (ref 96–106)
CHLORIDE SERPL-SCNC: 86 MMOL/L (ref 96–106)
CHLORIDE SERPL-SCNC: 91 MMOL/L (ref 96–106)
CHLORIDE SERPL-SCNC: 93 MMOL/L (ref 96–106)
CHLORIDE SERPL-SCNC: 95 MMOL/L (ref 96–106)
CHLORIDE SERPL-SCNC: 96 MMOL/L (ref 96–106)
CHLORIDE SERPL-SCNC: 96 MMOL/L (ref 97–108)
CHLORIDE SERPL-SCNC: 96 MMOL/L (ref 97–108)
CHLORIDE SERPL-SCNC: 97 MMOL/L (ref 96–106)
CHLORIDE SERPL-SCNC: 97 MMOL/L (ref 96–106)
CK MB CFR SERPL CALC: NORMAL % (ref 0–2.5)
CK MB SERPL-MCNC: <1 NG/ML (ref 5–25)
CK SERPL-CCNC: 41 U/L (ref 26–192)
CO2 SERPL-SCNC: 23 MMOL/L (ref 18–29)
CO2 SERPL-SCNC: 25 MMOL/L (ref 18–29)
CO2 SERPL-SCNC: 25 MMOL/L (ref 18–29)
CO2 SERPL-SCNC: 25 MMOL/L (ref 21–32)
CO2 SERPL-SCNC: 26 MMOL/L (ref 18–29)
CO2 SERPL-SCNC: 27 MMOL/L (ref 20–29)
CO2 SERPL-SCNC: 27 MMOL/L (ref 21–32)
CO2 SERPL-SCNC: 28 MMOL/L (ref 21–32)
CO2 SERPL-SCNC: 29 MMOL/L (ref 21–32)
CO2 SERPL-SCNC: 30 MMOL/L (ref 18–29)
CO2 SERPL-SCNC: 30 MMOL/L (ref 20–29)
CO2 SERPL-SCNC: 31 MMOL/L (ref 20–29)
CO2 SERPL-SCNC: 32 MMOL/L (ref 18–29)
CO2 SERPL-SCNC: 35 MMOL/L (ref 20–29)
CO2 SERPL-SCNC: 35 MMOL/L (ref 20–29)
CO2 SERPL-SCNC: 36 MMOL/L (ref 21–32)
COLOR UR: ABNORMAL
COLOR UR: NORMAL
CREAT SERPL-MCNC: 1.63 MG/DL (ref 0.57–1)
CREAT SERPL-MCNC: 1.67 MG/DL (ref 0.57–1)
CREAT SERPL-MCNC: 1.77 MG/DL (ref 0.57–1)
CREAT SERPL-MCNC: 1.78 MG/DL (ref 0.55–1.02)
CREAT SERPL-MCNC: 1.8 MG/DL (ref 0.55–1.02)
CREAT SERPL-MCNC: 1.8 MG/DL (ref 0.57–1)
CREAT SERPL-MCNC: 1.81 MG/DL (ref 0.57–1)
CREAT SERPL-MCNC: 1.84 MG/DL (ref 0.57–1)
CREAT SERPL-MCNC: 1.88 MG/DL (ref 0.57–1)
CREAT SERPL-MCNC: 1.95 MG/DL (ref 0.57–1)
CREAT SERPL-MCNC: 1.98 MG/DL (ref 0.57–1)
CREAT SERPL-MCNC: 2.25 MG/DL (ref 0.57–1)
CREAT SERPL-MCNC: 2.35 MG/DL (ref 0.57–1)
CREAT SERPL-MCNC: 2.54 MG/DL (ref 0.55–1.02)
CREAT SERPL-MCNC: 2.63 MG/DL (ref 0.55–1.02)
CREAT SERPL-MCNC: 2.7 MG/DL (ref 0.55–1.02)
CREAT UR-MCNC: 28.6 MG/DL
CROSSMATCH RESULT,%XM: NORMAL
D DIMER PPP FEU-MCNC: 2.39 MG/L FEU (ref 0–0.65)
DAT IGG-SP REAG RBC QL: NORMAL
DAT POLY-SP REAG RBC QL: NORMAL
DIAGNOSIS, 93000: NORMAL
DIAGNOSIS, 93000: NORMAL
DIFFERENTIAL METHOD BLD: ABNORMAL
DUKE TM SCORE RESULT, CST14: NORMAL
DUKE TREADMILL SCORE, CST13: NORMAL
ECG INTERP BEFORE EX, CST11: NORMAL
ECG INTERP DURING EX, CST12: NORMAL
EOSINOPHIL # BLD AUTO: 0 X10E3/UL (ref 0–0.4)
EOSINOPHIL # BLD AUTO: 0.1 X10E3/UL (ref 0–0.4)
EOSINOPHIL # BLD: 0 K/UL (ref 0–0.4)
EOSINOPHIL # BLD: 0.1 K/UL (ref 0–0.4)
EOSINOPHIL # BLD: 0.1 K/UL (ref 0–0.4)
EOSINOPHIL NFR BLD AUTO: 0 %
EOSINOPHIL NFR BLD AUTO: 1 %
EOSINOPHIL NFR BLD AUTO: 2 %
EOSINOPHIL NFR BLD: 0 % (ref 0–7)
EOSINOPHIL NFR BLD: 1 % (ref 0–7)
EOSINOPHIL NFR BLD: 1 % (ref 0–7)
EPI CELLS #/AREA URNS HPF: ABNORMAL /HPF
EPI CELLS #/AREA URNS HPF: ABNORMAL /HPF
EPITH CASTS URNS QL MICRO: ABNORMAL /LPF
EPITH CASTS URNS QL MICRO: NORMAL /LPF
ERYTHROCYTE [DISTWIDTH] IN BLOOD BY AUTOMATED COUNT: 15.9 % (ref 11.5–14.5)
ERYTHROCYTE [DISTWIDTH] IN BLOOD BY AUTOMATED COUNT: 16.2 % (ref 12.3–15.4)
ERYTHROCYTE [DISTWIDTH] IN BLOOD BY AUTOMATED COUNT: 17.3 % (ref 11.5–14.5)
ERYTHROCYTE [DISTWIDTH] IN BLOOD BY AUTOMATED COUNT: 17.3 % (ref 12.3–15.4)
ERYTHROCYTE [DISTWIDTH] IN BLOOD BY AUTOMATED COUNT: 17.3 % (ref 12.3–15.4)
ERYTHROCYTE [DISTWIDTH] IN BLOOD BY AUTOMATED COUNT: 17.4 % (ref 11.5–14.5)
ERYTHROCYTE [DISTWIDTH] IN BLOOD BY AUTOMATED COUNT: 17.7 % (ref 11.5–14.5)
ERYTHROCYTE [DISTWIDTH] IN BLOOD BY AUTOMATED COUNT: 18.3 % (ref 12.3–15.4)
ERYTHROCYTE [DISTWIDTH] IN BLOOD BY AUTOMATED COUNT: 18.4 % (ref 11.5–14.5)
ERYTHROCYTE [DISTWIDTH] IN BLOOD BY AUTOMATED COUNT: 18.6 % (ref 11.5–14.5)
ERYTHROCYTE [DISTWIDTH] IN BLOOD BY AUTOMATED COUNT: 18.7 % (ref 12.3–15.4)
ERYTHROCYTE [DISTWIDTH] IN BLOOD BY AUTOMATED COUNT: 18.8 % (ref 11.5–14.5)
ERYTHROCYTE [DISTWIDTH] IN BLOOD BY AUTOMATED COUNT: 19.1 % (ref 12.3–15.4)
ERYTHROCYTE [DISTWIDTH] IN BLOOD BY AUTOMATED COUNT: 19.5 % (ref 12.3–15.4)
ERYTHROCYTE [DISTWIDTH] IN BLOOD BY AUTOMATED COUNT: 19.6 % (ref 12.3–15.4)
ERYTHROCYTE [DISTWIDTH] IN BLOOD BY AUTOMATED COUNT: 20.2 % (ref 12.3–15.4)
ERYTHROCYTE [DISTWIDTH] IN BLOOD BY AUTOMATED COUNT: 20.8 % (ref 12.3–15.4)
EST. AVERAGE GLUCOSE BLD GHB EST-MCNC: 108 MG/DL
EST. AVERAGE GLUCOSE BLD GHB EST-MCNC: 117 MG/DL
FUNCTIONAL CAPACITY, CST17: NORMAL
GFR SERPLBLD CREATININE-BSD FMLA CKD-EPI: 22 ML/MIN/1.73
GFR SERPLBLD CREATININE-BSD FMLA CKD-EPI: 25 ML/MIN/1.73
GFR SERPLBLD CREATININE-BSD FMLA CKD-EPI: 27 ML/MIN/1.73
GFR SERPLBLD CREATININE-BSD FMLA CKD-EPI: 28 ML/MIN/1.73
GFR SERPLBLD CREATININE-BSD FMLA CKD-EPI: 31 ML/MIN/1.73
GFR SERPLBLD CREATININE-BSD FMLA CKD-EPI: 31 ML/MIN/1.73
GFR SERPLBLD CREATININE-BSD FMLA CKD-EPI: 32 ML/MIN/1.73
GFR SERPLBLD CREATININE-BSD FMLA CKD-EPI: 33 ML/MIN/1.73
GFR SERPLBLD CREATININE-BSD FMLA CKD-EPI: 36 ML/MIN/1.73
GFR SERPLBLD CREATININE-BSD FMLA CKD-EPI: 37 ML/MIN/1.73
GLOBULIN SER CALC-MCNC: 2.6 G/DL (ref 1.5–4.5)
GLOBULIN SER CALC-MCNC: 2.6 G/DL (ref 1.5–4.5)
GLOBULIN SER CALC-MCNC: 2.7 G/DL (ref 1.5–4.5)
GLOBULIN SER CALC-MCNC: 2.8 G/DL (ref 1.5–4.5)
GLOBULIN SER CALC-MCNC: 2.9 G/DL (ref 1.5–4.5)
GLOBULIN SER CALC-MCNC: 3 G/DL (ref 1.5–4.5)
GLOBULIN SER CALC-MCNC: 3.1 G/DL (ref 1.5–4.5)
GLOBULIN SER CALC-MCNC: 3.3 G/DL (ref 2–4)
GLOBULIN SER CALC-MCNC: 3.5 G/DL (ref 2–4)
GLOBULIN SER CALC-MCNC: 4 G/DL (ref 2–4)
GLOBULIN SER CALC-MCNC: 4.2 G/DL (ref 2–4)
GLUCOSE SERPL-MCNC: 103 MG/DL (ref 65–99)
GLUCOSE SERPL-MCNC: 104 MG/DL (ref 65–99)
GLUCOSE SERPL-MCNC: 106 MG/DL (ref 65–100)
GLUCOSE SERPL-MCNC: 111 MG/DL (ref 65–100)
GLUCOSE SERPL-MCNC: 112 MG/DL (ref 65–99)
GLUCOSE SERPL-MCNC: 113 MG/DL (ref 65–100)
GLUCOSE SERPL-MCNC: 113 MG/DL (ref 65–99)
GLUCOSE SERPL-MCNC: 117 MG/DL (ref 65–99)
GLUCOSE SERPL-MCNC: 122 MG/DL (ref 65–99)
GLUCOSE SERPL-MCNC: 122 MG/DL (ref 65–99)
GLUCOSE SERPL-MCNC: 136 MG/DL (ref 65–100)
GLUCOSE SERPL-MCNC: 182 MG/DL (ref 65–100)
GLUCOSE SERPL-MCNC: 217 MG/DL (ref 65–99)
GLUCOSE SERPL-MCNC: 89 MG/DL (ref 65–99)
GLUCOSE SERPL-MCNC: 91 MG/DL (ref 65–99)
GLUCOSE SERPL-MCNC: 95 MG/DL (ref 65–99)
GLUCOSE UR QL: NEGATIVE
GLUCOSE UR QL: NEGATIVE
GLUCOSE UR QL: NORMAL
GLUCOSE UR STRIP.AUTO-MCNC: NEGATIVE MG/DL
HBA1C MFR BLD: 5.4 % (ref 4.2–6.3)
HBA1C MFR BLD: 5.7 % (ref 4.8–5.6)
HCT VFR BLD AUTO: 24 % (ref 34–46.6)
HCT VFR BLD AUTO: 24.3 % (ref 34–46.6)
HCT VFR BLD AUTO: 24.7 % (ref 34–46.6)
HCT VFR BLD AUTO: 25.3 % (ref 35–47)
HCT VFR BLD AUTO: 26.2 % (ref 34–46.6)
HCT VFR BLD AUTO: 26.3 % (ref 35–47)
HCT VFR BLD AUTO: 26.8 % (ref 34–46.6)
HCT VFR BLD AUTO: 27.4 % (ref 35–47)
HCT VFR BLD AUTO: 27.9 % (ref 34–46.6)
HCT VFR BLD AUTO: 28 % (ref 34–46.6)
HCT VFR BLD AUTO: 28.4 % (ref 34–46.6)
HCT VFR BLD AUTO: 29.3 % (ref 34–46.6)
HCT VFR BLD AUTO: 30.5 % (ref 34–46.6)
HCT VFR BLD AUTO: 30.6 % (ref 35–47)
HCT VFR BLD AUTO: 31.4 % (ref 35–47)
HCT VFR BLD AUTO: 31.7 % (ref 35–47)
HCT VFR BLD AUTO: 32.2 % (ref 35–47)
HGB BLD-MCNC: 10.3 G/DL (ref 11.5–16)
HGB BLD-MCNC: 7.6 G/DL (ref 11.1–15.9)
HGB BLD-MCNC: 7.7 G/DL (ref 11.1–15.9)
HGB BLD-MCNC: 7.7 G/DL (ref 11.1–15.9)
HGB BLD-MCNC: 8.2 G/DL (ref 11.5–16)
HGB BLD-MCNC: 8.4 G/DL (ref 11.1–15.9)
HGB BLD-MCNC: 8.8 G/DL (ref 11.5–16)
HGB BLD-MCNC: 9 G/DL (ref 11.1–15.9)
HGB BLD-MCNC: 9.1 G/DL (ref 11.1–15.9)
HGB BLD-MCNC: 9.1 G/DL (ref 11.1–15.9)
HGB BLD-MCNC: 9.1 G/DL (ref 11.5–16)
HGB BLD-MCNC: 9.5 G/DL (ref 11.5–16)
HGB BLD-MCNC: 9.6 G/DL (ref 11.5–16)
HGB BLD-MCNC: 9.9 G/DL (ref 11.5–16)
HGB UR QL STRIP: ABNORMAL
HGB UR QL STRIP: NEGATIVE
HYALINE CASTS URNS QL MICRO: ABNORMAL /LPF (ref 0–5)
HYALINE CASTS URNS QL MICRO: NORMAL /LPF (ref 0–5)
HYALINE CASTS URNS QL MICRO: NORMAL /LPF (ref 0–5)
IMM GRANULOCYTES # BLD: 0 K/UL
IMM GRANULOCYTES # BLD: 0.1 X10E3/UL (ref 0–0.1)
IMM GRANULOCYTES # BLD: 0.2 K/UL (ref 0–0.04)
IMM GRANULOCYTES # BLD: 0.2 X10E3/UL (ref 0–0.1)
IMM GRANULOCYTES NFR BLD AUTO: 0 %
IMM GRANULOCYTES NFR BLD AUTO: 2 % (ref 0–0.5)
IMM GRANULOCYTES NFR BLD: 1 %
IMM GRANULOCYTES NFR BLD: 2 %
IMM GRANULOCYTES NFR BLD: 2 %
IMM GRANULOCYTES NFR BLD: 3 %
IMMATURE CELLS, 115398: ABNORMAL
KETONES UR QL STRIP.AUTO: NEGATIVE MG/DL
KETONES UR QL STRIP: NEGATIVE
KETONES UR QL STRIP: NEGATIVE
KETONES UR QL STRIP: NORMAL
KNOWN CARDIAC CONDITION, CST08: NORMAL
LEUKOCYTE ESTERASE UR QL STRIP.AUTO: ABNORMAL
LEUKOCYTE ESTERASE UR QL STRIP.AUTO: NEGATIVE
LEUKOCYTE ESTERASE UR QL STRIP: ABNORMAL
LEUKOCYTE ESTERASE UR QL STRIP: ABNORMAL
LYMPHOCYTES # BLD AUTO: 0.3 X10E3/UL (ref 0.7–3.1)
LYMPHOCYTES # BLD AUTO: 0.6 X10E3/UL (ref 0.7–3.1)
LYMPHOCYTES # BLD AUTO: 0.6 X10E3/UL (ref 0.7–3.1)
LYMPHOCYTES # BLD AUTO: 0.7 X10E3/UL (ref 0.7–3.1)
LYMPHOCYTES # BLD AUTO: 0.8 X10E3/UL (ref 0.7–3.1)
LYMPHOCYTES # BLD AUTO: 0.8 X10E3/UL (ref 0.7–3.1)
LYMPHOCYTES # BLD AUTO: 0.9 X10E3/UL (ref 0.7–3.1)
LYMPHOCYTES # BLD: 0.5 K/UL (ref 0.8–3.5)
LYMPHOCYTES # BLD: 0.6 K/UL (ref 0.8–3.5)
LYMPHOCYTES # BLD: 0.7 K/UL (ref 0.8–3.5)
LYMPHOCYTES # BLD: 0.7 K/UL (ref 0.8–3.5)
LYMPHOCYTES # BLD: 0.8 K/UL (ref 0.8–3.5)
LYMPHOCYTES # BLD: 1.5 K/UL (ref 0.8–3.5)
LYMPHOCYTES NFR BLD AUTO: 11 %
LYMPHOCYTES NFR BLD AUTO: 12 %
LYMPHOCYTES NFR BLD AUTO: 13 %
LYMPHOCYTES NFR BLD AUTO: 13 %
LYMPHOCYTES NFR BLD AUTO: 14 %
LYMPHOCYTES NFR BLD AUTO: 18 %
LYMPHOCYTES NFR BLD AUTO: 3 %
LYMPHOCYTES NFR BLD AUTO: 7 %
LYMPHOCYTES NFR BLD AUTO: 9 %
LYMPHOCYTES NFR BLD AUTO: 9 %
LYMPHOCYTES NFR BLD: 10 % (ref 12–49)
LYMPHOCYTES NFR BLD: 11 % (ref 12–49)
LYMPHOCYTES NFR BLD: 16 % (ref 12–49)
LYMPHOCYTES NFR BLD: 4 % (ref 12–49)
LYMPHOCYTES NFR BLD: 7 % (ref 12–49)
LYMPHOCYTES NFR BLD: 9 % (ref 12–49)
MAGNESIUM SERPL-MCNC: 1.6 MG/DL (ref 1.6–2.3)
MAGNESIUM SERPL-MCNC: 1.6 MG/DL (ref 1.6–2.3)
MAGNESIUM SERPL-MCNC: 1.7 MG/DL (ref 1.6–2.3)
MAGNESIUM SERPL-MCNC: 1.8 MG/DL (ref 1.6–2.3)
MAGNESIUM SERPL-MCNC: 1.8 MG/DL (ref 1.6–2.4)
MAGNESIUM SERPL-MCNC: 1.9 MG/DL (ref 1.6–2.3)
MAGNESIUM SERPL-MCNC: 1.9 MG/DL (ref 1.6–2.3)
MAGNESIUM SERPL-MCNC: 1.9 MG/DL (ref 1.6–2.4)
MAGNESIUM SERPL-MCNC: 2 MG/DL (ref 1.6–2.3)
MAGNESIUM SERPL-MCNC: 2 MG/DL (ref 1.6–2.3)
MAGNESIUM SERPL-MCNC: 2 MG/DL (ref 1.6–2.4)
MAX. DIASTOLIC BP, CST04: 64 MMHG
MAX. HEART RATE, CST05: 85 BPM
MAX. SYSTOLIC BP, CST03: 139 MMHG
MCH RBC QN AUTO: 27.8 PG (ref 26.6–33)
MCH RBC QN AUTO: 28 PG (ref 26.6–33)
MCH RBC QN AUTO: 28.6 PG (ref 26.6–33)
MCH RBC QN AUTO: 28.8 PG (ref 26–34)
MCH RBC QN AUTO: 29 PG (ref 26.6–33)
MCH RBC QN AUTO: 29.5 PG (ref 26–34)
MCH RBC QN AUTO: 30.1 PG (ref 26–34)
MCH RBC QN AUTO: 30.2 PG (ref 26–34)
MCH RBC QN AUTO: 30.4 PG (ref 26.6–33)
MCH RBC QN AUTO: 30.4 PG (ref 26–34)
MCH RBC QN AUTO: 30.8 PG (ref 26–34)
MCH RBC QN AUTO: 30.9 PG (ref 26.6–33)
MCH RBC QN AUTO: 31 PG (ref 26.6–33)
MCH RBC QN AUTO: 31 PG (ref 26.6–33)
MCH RBC QN AUTO: 31 PG (ref 26–34)
MCH RBC QN AUTO: 31.5 PG (ref 26.6–33)
MCH RBC QN AUTO: 31.6 PG (ref 26.6–33)
MCHC RBC AUTO-ENTMCNC: 29.8 G/DL (ref 31.5–35.7)
MCHC RBC AUTO-ENTMCNC: 30.6 G/DL (ref 30–36.5)
MCHC RBC AUTO-ENTMCNC: 30.7 G/DL (ref 30–36.5)
MCHC RBC AUTO-ENTMCNC: 30.7 G/DL (ref 31.5–35.7)
MCHC RBC AUTO-ENTMCNC: 31 G/DL (ref 30–36.5)
MCHC RBC AUTO-ENTMCNC: 31.2 G/DL (ref 31.5–35.7)
MCHC RBC AUTO-ENTMCNC: 31.3 G/DL (ref 31.5–35.7)
MCHC RBC AUTO-ENTMCNC: 31.7 G/DL (ref 31.5–35.7)
MCHC RBC AUTO-ENTMCNC: 32.1 G/DL (ref 31.5–35.7)
MCHC RBC AUTO-ENTMCNC: 32.3 G/DL (ref 31.5–35.7)
MCHC RBC AUTO-ENTMCNC: 32.4 G/DL (ref 30–36.5)
MCHC RBC AUTO-ENTMCNC: 32.5 G/DL (ref 30–36.5)
MCHC RBC AUTO-ENTMCNC: 33.2 G/DL (ref 30–36.5)
MCHC RBC AUTO-ENTMCNC: 33.5 G/DL (ref 30–36.5)
MCHC RBC AUTO-ENTMCNC: 34 G/DL (ref 31.5–35.7)
MCV RBC AUTO: 100 FL (ref 79–97)
MCV RBC AUTO: 90 FL (ref 79–97)
MCV RBC AUTO: 91 FL (ref 79–97)
MCV RBC AUTO: 91 FL (ref 79–97)
MCV RBC AUTO: 92.6 FL (ref 80–99)
MCV RBC AUTO: 92.7 FL (ref 80–99)
MCV RBC AUTO: 92.9 FL (ref 80–99)
MCV RBC AUTO: 93 FL (ref 79–97)
MCV RBC AUTO: 93 FL (ref 79–97)
MCV RBC AUTO: 93.7 FL (ref 80–99)
MCV RBC AUTO: 94.3 FL (ref 80–99)
MCV RBC AUTO: 95 FL (ref 79–97)
MCV RBC AUTO: 95.8 FL (ref 80–99)
MCV RBC AUTO: 96 FL (ref 79–97)
MCV RBC AUTO: 97.1 FL (ref 80–99)
MCV RBC AUTO: 98 FL (ref 79–97)
MCV RBC AUTO: 98 FL (ref 79–97)
METAMYELOCYTES NFR BLD MANUAL: 1 %
METAMYELOCYTES NFR BLD MANUAL: 1 %
METAMYELOCYTES NFR BLD MANUAL: 2 %
METAMYELOCYTES NFR BLD MANUAL: 3 %
METAMYELOCYTES NFR BLD: 7 %
MICRO URNS: ABNORMAL
MICRO URNS: ABNORMAL
MONOCYTES # BLD AUTO: 0.3 X10E3/UL (ref 0.1–0.9)
MONOCYTES # BLD AUTO: 0.3 X10E3/UL (ref 0.1–0.9)
MONOCYTES # BLD AUTO: 0.4 X10E3/UL (ref 0.1–0.9)
MONOCYTES # BLD AUTO: 0.5 X10E3/UL (ref 0.1–0.9)
MONOCYTES # BLD AUTO: 0.5 X10E3/UL (ref 0.1–0.9)
MONOCYTES # BLD AUTO: 0.6 X10E3/UL (ref 0.1–0.9)
MONOCYTES # BLD: 0.1 K/UL (ref 0–1)
MONOCYTES # BLD: 0.2 K/UL (ref 0–1)
MONOCYTES # BLD: 0.3 K/UL (ref 0–1)
MONOCYTES # BLD: 0.5 K/UL (ref 0–1)
MONOCYTES # BLD: 0.6 K/UL (ref 0–1)
MONOCYTES # BLD: 0.7 K/UL (ref 0–1)
MONOCYTES NFR BLD AUTO: 12 %
MONOCYTES NFR BLD AUTO: 4 %
MONOCYTES NFR BLD AUTO: 5 %
MONOCYTES NFR BLD AUTO: 6 %
MONOCYTES NFR BLD AUTO: 7 %
MONOCYTES NFR BLD AUTO: 8 %
MONOCYTES NFR BLD AUTO: 8 %
MONOCYTES NFR BLD: 2 % (ref 5–13)
MONOCYTES NFR BLD: 3 % (ref 5–13)
MONOCYTES NFR BLD: 3 % (ref 5–13)
MONOCYTES NFR BLD: 4 % (ref 5–13)
MONOCYTES NFR BLD: 6 % (ref 5–13)
MONOCYTES NFR BLD: 8 % (ref 5–13)
MORPHOLOGY BLD-IMP: ABNORMAL
MORPHOLOGY BLD-IMP: ABNORMAL
MYELOCYTES NFR BLD MANUAL: 1 %
NEUTROPHILS # BLD AUTO: 2.9 X10E3/UL (ref 1.4–7)
NEUTROPHILS # BLD AUTO: 3.3 X10E3/UL (ref 1.4–7)
NEUTROPHILS # BLD AUTO: 3.9 X10E3/UL (ref 1.4–7)
NEUTROPHILS # BLD AUTO: 4.6 X10E3/UL (ref 1.4–7)
NEUTROPHILS # BLD AUTO: 5 X10E3/UL (ref 1.4–7)
NEUTROPHILS # BLD AUTO: 5.1 X10E3/UL (ref 1.4–7)
NEUTROPHILS # BLD AUTO: 6.9 X10E3/UL (ref 1.4–7)
NEUTROPHILS # BLD AUTO: 7 X10E3/UL (ref 1.4–7)
NEUTROPHILS # BLD AUTO: 7.7 X10E3/UL (ref 1.4–7)
NEUTROPHILS # BLD AUTO: 9 X10E3/UL (ref 1.4–7)
NEUTROPHILS NFR BLD AUTO: 72 %
NEUTROPHILS NFR BLD AUTO: 72 %
NEUTROPHILS NFR BLD AUTO: 74 %
NEUTROPHILS NFR BLD AUTO: 77 %
NEUTROPHILS NFR BLD AUTO: 77 %
NEUTROPHILS NFR BLD AUTO: 82 %
NEUTROPHILS NFR BLD AUTO: 83 %
NEUTROPHILS NFR BLD AUTO: 85 %
NEUTROPHILS NFR BLD AUTO: 86 %
NEUTROPHILS NFR BLD AUTO: 92 %
NEUTS BAND NFR BLD MANUAL: 1 % (ref 0–6)
NEUTS BAND NFR BLD MANUAL: 1 % (ref 0–6)
NEUTS BAND NFR BLD MANUAL: 3 % (ref 0–6)
NEUTS SEG # BLD: 11.5 K/UL (ref 1.8–8)
NEUTS SEG # BLD: 4.9 K/UL (ref 1.8–8)
NEUTS SEG # BLD: 6.2 K/UL (ref 1.8–8)
NEUTS SEG # BLD: 7 K/UL (ref 1.8–8)
NEUTS SEG # BLD: 7.2 K/UL (ref 1.8–8)
NEUTS SEG # BLD: 8.2 K/UL (ref 1.8–8)
NEUTS SEG NFR BLD: 74 % (ref 32–75)
NEUTS SEG NFR BLD: 80 % (ref 32–75)
NEUTS SEG NFR BLD: 83 % (ref 32–75)
NEUTS SEG NFR BLD: 84 % (ref 32–75)
NEUTS SEG NFR BLD: 84 % (ref 32–75)
NEUTS SEG NFR BLD: 85 % (ref 32–75)
NITRITE UR QL STRIP.AUTO: NEGATIVE
NITRITE UR QL STRIP: NEGATIVE
NITRITE UR QL STRIP: POSITIVE
NRBC # BLD: 0 K/UL (ref 0–0.01)
NRBC # BLD: 0.02 K/UL (ref 0–0.01)
NRBC BLD-RTO: 0 PER 100 WBC
NRBC BLD-RTO: 0.2 PER 100 WBC
OVERALL BP RESPONSE TO EXERCISE, CST16: NORMAL
OVERALL HR RESPONSE TO EXERCISE, CST15: NORMAL
P-R INTERVAL, ECG05: 140 MS
PEAK EX METS, CST10: 1 METS
PH UR STRIP: 5 [PH] (ref 5–8)
PH UR STRIP: 5.5 [PH] (ref 5–8)
PH UR STRIP: 5.5 [PH] (ref 5–8)
PH UR STRIP: 6 [PH] (ref 5–8)
PH UR STRIP: 7 [PH] (ref 5–7.5)
PH UR STRIP: 7 [PH] (ref 5–8)
PH UR STRIP: 7 [PH] (ref 5–8)
PH UR STRIP: 7.5 [PH] (ref 5–8)
PH UR STRIP: =>9 [PH] (ref 5–7.5)
PH UR STRIP: NORMAL [PH]
PHOSPHATE SERPL-MCNC: 4.1 MG/DL (ref 2.6–4.7)
PLATELET # BLD AUTO: 123 K/UL (ref 150–400)
PLATELET # BLD AUTO: 126 K/UL (ref 150–400)
PLATELET # BLD AUTO: 127 K/UL (ref 150–400)
PLATELET # BLD AUTO: 131 K/UL (ref 150–400)
PLATELET # BLD AUTO: 148 X10E3/UL (ref 150–379)
PLATELET # BLD AUTO: 173 X10E3/UL (ref 150–379)
PLATELET # BLD AUTO: 174 X10E3/UL (ref 150–379)
PLATELET # BLD AUTO: 182 X10E3/UL (ref 150–379)
PLATELET # BLD AUTO: 183 X10E3/UL (ref 150–379)
PLATELET # BLD AUTO: 199 X10E3/UL (ref 150–379)
PLATELET # BLD AUTO: 207 K/UL (ref 150–400)
PLATELET # BLD AUTO: 219 X10E3/UL (ref 150–379)
PLATELET # BLD AUTO: 238 X10E3/UL (ref 150–379)
PLATELET # BLD AUTO: 242 K/UL (ref 150–400)
PLATELET # BLD AUTO: 277 X10E3/UL (ref 150–379)
PLATELET # BLD AUTO: 293 K/UL (ref 150–400)
PLATELET # BLD AUTO: 93 X10E3/UL (ref 150–379)
PMV BLD AUTO: 10 FL (ref 8.9–12.9)
PMV BLD AUTO: 10.1 FL (ref 8.9–12.9)
PMV BLD AUTO: 10.2 FL (ref 8.9–12.9)
PMV BLD AUTO: 9.7 FL (ref 8.9–12.9)
POTASSIUM SERPL-SCNC: 2.6 MMOL/L (ref 3.5–5.1)
POTASSIUM SERPL-SCNC: 2.6 MMOL/L (ref 3.5–5.2)
POTASSIUM SERPL-SCNC: 3 MMOL/L (ref 3.5–5.1)
POTASSIUM SERPL-SCNC: 3.5 MMOL/L (ref 3.5–5.1)
POTASSIUM SERPL-SCNC: 3.6 MMOL/L (ref 3.5–5.2)
POTASSIUM SERPL-SCNC: 3.7 MMOL/L (ref 3.5–5.2)
POTASSIUM SERPL-SCNC: 3.7 MMOL/L (ref 3.5–5.2)
POTASSIUM SERPL-SCNC: 3.9 MMOL/L (ref 3.5–5.1)
POTASSIUM SERPL-SCNC: 4.3 MMOL/L (ref 3.5–5.2)
POTASSIUM SERPL-SCNC: 4.4 MMOL/L (ref 3.5–5.2)
POTASSIUM SERPL-SCNC: 4.7 MMOL/L (ref 3.5–5.1)
POTASSIUM SERPL-SCNC: 4.7 MMOL/L (ref 3.5–5.2)
POTASSIUM SERPL-SCNC: 5.2 MMOL/L (ref 3.5–5.2)
PROT SERPL-MCNC: 5.8 G/DL (ref 6–8.5)
PROT SERPL-MCNC: 6 G/DL (ref 6–8.5)
PROT SERPL-MCNC: 6.1 G/DL (ref 6–8.5)
PROT SERPL-MCNC: 6.3 G/DL (ref 6–8.5)
PROT SERPL-MCNC: 6.6 G/DL (ref 6.4–8.2)
PROT SERPL-MCNC: 6.6 G/DL (ref 6–8.5)
PROT SERPL-MCNC: 6.7 G/DL (ref 6–8.5)
PROT SERPL-MCNC: 6.7 G/DL (ref 6–8.5)
PROT SERPL-MCNC: 7 G/DL (ref 6–8.5)
PROT SERPL-MCNC: 7 G/DL (ref 6–8.5)
PROT SERPL-MCNC: 7.4 G/DL (ref 6.4–8.2)
PROT SERPL-MCNC: 7.8 G/DL (ref 6.4–8.2)
PROT SERPL-MCNC: 8 G/DL (ref 6.4–8.2)
PROT UR QL STRIP: ABNORMAL
PROT UR QL STRIP: ABNORMAL
PROT UR QL STRIP: NORMAL
PROT UR STRIP-MCNC: NEGATIVE MG/DL
PROTOCOL NAME, CST01: NORMAL
Q-T INTERVAL, ECG07: 330 MS
QRS DURATION, ECG06: 86 MS
QTC CALCULATION (BEZET), ECG08: 403 MS
RBC # BLD AUTO: 2.48 X10E6/UL (ref 3.77–5.28)
RBC # BLD AUTO: 2.53 X10E6/UL (ref 3.77–5.28)
RBC # BLD AUTO: 2.62 X10E6/UL (ref 3.77–5.28)
RBC # BLD AUTO: 2.67 X10E6/UL (ref 3.77–5.28)
RBC # BLD AUTO: 2.7 M/UL (ref 3.8–5.2)
RBC # BLD AUTO: 2.84 M/UL (ref 3.8–5.2)
RBC # BLD AUTO: 2.85 X10E6/UL (ref 3.77–5.28)
RBC # BLD AUTO: 2.91 X10E6/UL (ref 3.77–5.28)
RBC # BLD AUTO: 2.94 X10E6/UL (ref 3.77–5.28)
RBC # BLD AUTO: 2.95 M/UL (ref 3.8–5.2)
RBC # BLD AUTO: 3.15 M/UL (ref 3.8–5.2)
RBC # BLD AUTO: 3.15 X10E6/UL (ref 3.77–5.28)
RBC # BLD AUTO: 3.21 X10E6/UL (ref 3.77–5.28)
RBC # BLD AUTO: 3.27 X10E6/UL (ref 3.77–5.28)
RBC # BLD AUTO: 3.33 M/UL (ref 3.8–5.2)
RBC # BLD AUTO: 3.36 M/UL (ref 3.8–5.2)
RBC # BLD AUTO: 3.42 M/UL (ref 3.8–5.2)
RBC #/AREA URNS HPF: >30 /HPF
RBC #/AREA URNS HPF: ABNORMAL /HPF
RBC #/AREA URNS HPF: ABNORMAL /HPF (ref 0–5)
RBC #/AREA URNS HPF: NORMAL /HPF (ref 0–5)
RBC MORPH BLD: ABNORMAL
SERVICE CMNT-IMP: ABNORMAL
SODIUM SERPL-SCNC: 135 MMOL/L (ref 134–144)
SODIUM SERPL-SCNC: 135 MMOL/L (ref 136–145)
SODIUM SERPL-SCNC: 137 MMOL/L (ref 136–145)
SODIUM SERPL-SCNC: 138 MMOL/L (ref 134–144)
SODIUM SERPL-SCNC: 138 MMOL/L (ref 136–145)
SODIUM SERPL-SCNC: 139 MMOL/L (ref 136–145)
SODIUM SERPL-SCNC: 141 MMOL/L (ref 134–144)
SODIUM SERPL-SCNC: 141 MMOL/L (ref 134–144)
SODIUM SERPL-SCNC: 142 MMOL/L (ref 134–144)
SODIUM SERPL-SCNC: 142 MMOL/L (ref 134–144)
SODIUM SERPL-SCNC: 142 MMOL/L (ref 136–145)
SODIUM SERPL-SCNC: 144 MMOL/L (ref 134–144)
SODIUM SERPL-SCNC: 146 MMOL/L (ref 134–144)
SODIUM UR-SCNC: 74 MMOL/L
SP GR UR REFRACTOMETRY: 1.01 (ref 1–1.03)
SP GR UR REFRACTOMETRY: 1.02 (ref 1–1.03)
SP GR UR: 1.01 (ref 1–1.03)
SP GR UR: 1.02 (ref 1–1.03)
SP GR UR: NORMAL
SPECIMEN EXP DATE BLD: NORMAL
STATUS OF UNIT,%ST: NORMAL
TEST INDICATION, CST09: NORMAL
TROPONIN I SERPL-MCNC: <0.04 NG/ML
TROPONIN I SERPL-MCNC: <0.04 NG/ML
UA: UC IF INDICATED,UAUC: ABNORMAL
UA: UC IF INDICATED,UAUC: NORMAL
UNIT DIVISION, %UDIV: 0
UR CULT HOLD, URHOLD: NORMAL
UROBILINOGEN UR QL STRIP.AUTO: 0.2 EU/DL (ref 0.2–1)
UROBILINOGEN UR STRIP-MCNC: 0.2 MG/DL (ref 0.2–1)
UROBILINOGEN UR STRIP-MCNC: 0.2 MG/DL (ref 0.2–1)
VENTRICULAR RATE, ECG03: 90 BPM
WBC # BLD AUTO: 13.1 K/UL (ref 3.6–11)
WBC # BLD AUTO: 3.9 K/UL (ref 3.6–11)
WBC # BLD AUTO: 4.1 X10E3/UL (ref 3.4–10.8)
WBC # BLD AUTO: 4.6 X10E3/UL (ref 3.4–10.8)
WBC # BLD AUTO: 5.3 X10E3/UL (ref 3.4–10.8)
WBC # BLD AUTO: 5.6 X10E3/UL (ref 3.4–10.8)
WBC # BLD AUTO: 5.8 K/UL (ref 3.6–11)
WBC # BLD AUTO: 6.5 X10E3/UL (ref 3.4–10.8)
WBC # BLD AUTO: 6.8 X10E3/UL (ref 3.4–10.8)
WBC # BLD AUTO: 7.2 K/UL (ref 3.6–11)
WBC # BLD AUTO: 8 X10E3/UL (ref 3.4–10.8)
WBC # BLD AUTO: 8.5 X10E3/UL (ref 3.4–10.8)
WBC # BLD AUTO: 8.8 K/UL (ref 3.6–11)
WBC # BLD AUTO: 9.1 X10E3/UL (ref 3.4–10.8)
WBC # BLD AUTO: 9.3 K/UL (ref 3.6–11)
WBC # BLD AUTO: 9.7 K/UL (ref 3.6–11)
WBC # BLD AUTO: 9.7 X10E3/UL (ref 3.4–10.8)
WBC #/AREA URNS HPF: >30 /HPF
WBC #/AREA URNS HPF: ABNORMAL /HPF
WBC MORPH BLD: ABNORMAL
WBC URNS QL MICRO: >100 /HPF (ref 0–4)
WBC URNS QL MICRO: NORMAL /HPF (ref 0–4)

## 2018-01-01 PROCEDURE — 86902 BLOOD TYPE ANTIGEN DONOR EA: CPT | Performed by: NURSE PRACTITIONER

## 2018-01-01 PROCEDURE — 0651 HSPC ROUTINE HOME CARE

## 2018-01-01 PROCEDURE — 74011250636 HC RX REV CODE- 250/636

## 2018-01-01 PROCEDURE — 77030012965 HC NDL HUBR BBMI -A

## 2018-01-01 PROCEDURE — 86920 COMPATIBILITY TEST SPIN: CPT | Performed by: OBSTETRICS & GYNECOLOGY

## 2018-01-01 PROCEDURE — 99285 EMERGENCY DEPT VISIT HI MDM: CPT

## 2018-01-01 PROCEDURE — 74011636320 HC RX REV CODE- 636/320: Performed by: OBSTETRICS & GYNECOLOGY

## 2018-01-01 PROCEDURE — 74011250637 HC RX REV CODE- 250/637: Performed by: NURSE PRACTITIONER

## 2018-01-01 PROCEDURE — 82570 ASSAY OF URINE CREATININE: CPT | Performed by: PHYSICIAN ASSISTANT

## 2018-01-01 PROCEDURE — 74011000258 HC RX REV CODE- 258: Performed by: INTERNAL MEDICINE

## 2018-01-01 PROCEDURE — 74011250636 HC RX REV CODE- 250/636: Performed by: OBSTETRICS & GYNECOLOGY

## 2018-01-01 PROCEDURE — 96375 TX/PRO/DX INJ NEW DRUG ADDON: CPT

## 2018-01-01 PROCEDURE — 83880 ASSAY OF NATRIURETIC PEPTIDE: CPT | Performed by: FAMILY MEDICINE

## 2018-01-01 PROCEDURE — 96413 CHEMO IV INFUSION 1 HR: CPT

## 2018-01-01 PROCEDURE — 86970 RBC PRETX INCUBATJ W/CHEMICL: CPT

## 2018-01-01 PROCEDURE — 74011000258 HC RX REV CODE- 258: Performed by: NURSE PRACTITIONER

## 2018-01-01 PROCEDURE — 71045 X-RAY EXAM CHEST 1 VIEW: CPT

## 2018-01-01 PROCEDURE — 74011000258 HC RX REV CODE- 258: Performed by: OBSTETRICS & GYNECOLOGY

## 2018-01-01 PROCEDURE — 86905 BLOOD TYPING RBC ANTIGENS: CPT | Performed by: NURSE PRACTITIONER

## 2018-01-01 PROCEDURE — 36591 DRAW BLOOD OFF VENOUS DEVICE: CPT

## 2018-01-01 PROCEDURE — 93005 ELECTROCARDIOGRAM TRACING: CPT

## 2018-01-01 PROCEDURE — 81001 URINALYSIS AUTO W/SCOPE: CPT | Performed by: INTERNAL MEDICINE

## 2018-01-01 PROCEDURE — 83036 HEMOGLOBIN GLYCOSYLATED A1C: CPT | Performed by: NURSE PRACTITIONER

## 2018-01-01 PROCEDURE — 36430 TRANSFUSION BLD/BLD COMPNT: CPT

## 2018-01-01 PROCEDURE — 74011250636 HC RX REV CODE- 250/636: Performed by: NURSE PRACTITIONER

## 2018-01-01 PROCEDURE — 71250 CT THORAX DX C-: CPT

## 2018-01-01 PROCEDURE — 74011250636 HC RX REV CODE- 250/636: Performed by: FAMILY MEDICINE

## 2018-01-01 PROCEDURE — 74176 CT ABD & PELVIS W/O CONTRAST: CPT

## 2018-01-01 PROCEDURE — 85025 COMPLETE CBC W/AUTO DIFF WBC: CPT | Performed by: EMERGENCY MEDICINE

## 2018-01-01 PROCEDURE — 86885 COOMBS TEST INDIRECT QUAL: CPT

## 2018-01-01 PROCEDURE — 86920 COMPATIBILITY TEST SPIN: CPT | Performed by: NURSE PRACTITIONER

## 2018-01-01 PROCEDURE — 96415 CHEMO IV INFUSION ADDL HR: CPT

## 2018-01-01 PROCEDURE — 86906 BLD TYPING SEROLOGIC RH PHNT: CPT

## 2018-01-01 PROCEDURE — 96417 CHEMO IV INFUS EACH ADDL SEQ: CPT

## 2018-01-01 PROCEDURE — 74011000250 HC RX REV CODE- 250: Performed by: OBSTETRICS & GYNECOLOGY

## 2018-01-01 PROCEDURE — G0299 HHS/HOSPICE OF RN EA 15 MIN: HCPCS

## 2018-01-01 PROCEDURE — 36415 COLL VENOUS BLD VENIPUNCTURE: CPT | Performed by: OBSTETRICS & GYNECOLOGY

## 2018-01-01 PROCEDURE — HOSPICE MEDICATION HC HH HOSPICE MEDICATION

## 2018-01-01 PROCEDURE — 84300 ASSAY OF URINE SODIUM: CPT | Performed by: PHYSICIAN ASSISTANT

## 2018-01-01 PROCEDURE — 74011250637 HC RX REV CODE- 250/637: Performed by: FAMILY MEDICINE

## 2018-01-01 PROCEDURE — 99213 OFFICE O/P EST LOW 20 MIN: CPT

## 2018-01-01 PROCEDURE — 77030013169 SET IV BLD ICUM -A

## 2018-01-01 PROCEDURE — 96361 HYDRATE IV INFUSION ADD-ON: CPT

## 2018-01-01 PROCEDURE — 83735 ASSAY OF MAGNESIUM: CPT | Performed by: OBSTETRICS & GYNECOLOGY

## 2018-01-01 PROCEDURE — 74011000250 HC RX REV CODE- 250: Performed by: NURSE PRACTITIONER

## 2018-01-01 PROCEDURE — 85025 COMPLETE CBC W/AUTO DIFF WBC: CPT | Performed by: OBSTETRICS & GYNECOLOGY

## 2018-01-01 PROCEDURE — G0155 HHCP-SVS OF CSW,EA 15 MIN: HCPCS

## 2018-01-01 PROCEDURE — 99212 OFFICE O/P EST SF 10 MIN: CPT

## 2018-01-01 PROCEDURE — 87186 SC STD MICRODIL/AGAR DIL: CPT | Performed by: OBSTETRICS & GYNECOLOGY

## 2018-01-01 PROCEDURE — 74011250636 HC RX REV CODE- 250/636: Performed by: INTERNAL MEDICINE

## 2018-01-01 PROCEDURE — 84484 ASSAY OF TROPONIN QUANT: CPT | Performed by: EMERGENCY MEDICINE

## 2018-01-01 PROCEDURE — 86860 RBC ANTIBODY ELUTION: CPT

## 2018-01-01 PROCEDURE — 86900 BLOOD TYPING SEROLOGIC ABO: CPT | Performed by: OBSTETRICS & GYNECOLOGY

## 2018-01-01 PROCEDURE — 86921 COMPATIBILITY TEST INCUBATE: CPT | Performed by: NURSE PRACTITIONER

## 2018-01-01 PROCEDURE — 80069 RENAL FUNCTION PANEL: CPT | Performed by: INTERNAL MEDICINE

## 2018-01-01 PROCEDURE — 93306 TTE W/DOPPLER COMPLETE: CPT

## 2018-01-01 PROCEDURE — 86921 COMPATIBILITY TEST INCUBATE: CPT | Performed by: OBSTETRICS & GYNECOLOGY

## 2018-01-01 PROCEDURE — P9016 RBC LEUKOCYTES REDUCED: HCPCS | Performed by: OBSTETRICS & GYNECOLOGY

## 2018-01-01 PROCEDURE — 78452 HT MUSCLE IMAGE SPECT MULT: CPT

## 2018-01-01 PROCEDURE — P9016 RBC LEUKOCYTES REDUCED: HCPCS | Performed by: NURSE PRACTITIONER

## 2018-01-01 PROCEDURE — 93970 EXTREMITY STUDY: CPT

## 2018-01-01 PROCEDURE — 86922 COMPATIBILITY TEST ANTIGLOB: CPT | Performed by: NURSE PRACTITIONER

## 2018-01-01 PROCEDURE — 77030003560 HC NDL HUBR BARD -A

## 2018-01-01 PROCEDURE — 86860 RBC ANTIBODY ELUTION: CPT | Performed by: NURSE PRACTITIONER

## 2018-01-01 PROCEDURE — G0156 HHCP-SVS OF AIDE,EA 15 MIN: HCPCS

## 2018-01-01 PROCEDURE — 83735 ASSAY OF MAGNESIUM: CPT | Performed by: INTERNAL MEDICINE

## 2018-01-01 PROCEDURE — 87077 CULTURE AEROBIC IDENTIFY: CPT | Performed by: OBSTETRICS & GYNECOLOGY

## 2018-01-01 PROCEDURE — 86900 BLOOD TYPING SEROLOGIC ABO: CPT | Performed by: NURSE PRACTITIONER

## 2018-01-01 PROCEDURE — 65660000000 HC RM CCU STEPDOWN

## 2018-01-01 PROCEDURE — 71046 X-RAY EXAM CHEST 2 VIEWS: CPT

## 2018-01-01 PROCEDURE — 74011250636 HC RX REV CODE- 250/636: Performed by: PHYSICIAN ASSISTANT

## 2018-01-01 PROCEDURE — 93017 CV STRESS TEST TRACING ONLY: CPT

## 2018-01-01 PROCEDURE — 87086 URINE CULTURE/COLONY COUNT: CPT | Performed by: OBSTETRICS & GYNECOLOGY

## 2018-01-01 PROCEDURE — 80053 COMPREHEN METABOLIC PANEL: CPT | Performed by: OBSTETRICS & GYNECOLOGY

## 2018-01-01 PROCEDURE — 3336500001 HSPC ELECTION

## 2018-01-01 PROCEDURE — 86880 COOMBS TEST DIRECT: CPT | Performed by: NURSE PRACTITIONER

## 2018-01-01 PROCEDURE — 74011636320 HC RX REV CODE- 636/320: Performed by: NURSE PRACTITIONER

## 2018-01-01 PROCEDURE — 74011250637 HC RX REV CODE- 250/637: Performed by: INTERNAL MEDICINE

## 2018-01-01 PROCEDURE — 81003 URINALYSIS AUTO W/O SCOPE: CPT | Performed by: OBSTETRICS & GYNECOLOGY

## 2018-01-01 PROCEDURE — A4223 INFUSION SUPPLIES W/O PUMP: HCPCS

## 2018-01-01 PROCEDURE — 36415 COLL VENOUS BLD VENIPUNCTURE: CPT | Performed by: NURSE PRACTITIONER

## 2018-01-01 PROCEDURE — A4314 CATH W/DRAINAGE 2-WAY LATEX: HCPCS

## 2018-01-01 PROCEDURE — 81001 URINALYSIS AUTO W/SCOPE: CPT | Performed by: NURSE PRACTITIONER

## 2018-01-01 PROCEDURE — A6260 WOUND CLEANSER ANY TYPE/SIZE: HCPCS

## 2018-01-01 PROCEDURE — 86304 IMMUNOASSAY TUMOR CA 125: CPT | Performed by: OBSTETRICS & GYNECOLOGY

## 2018-01-01 PROCEDURE — 86870 RBC ANTIBODY IDENTIFICATION: CPT | Performed by: NURSE PRACTITIONER

## 2018-01-01 PROCEDURE — A9558 XE133 XENON 10MCI: HCPCS

## 2018-01-01 PROCEDURE — 77010026065 HC OXYGEN MINIMUM MEDICAL AIR

## 2018-01-01 PROCEDURE — 86850 RBC ANTIBODY SCREEN: CPT

## 2018-01-01 PROCEDURE — 81001 URINALYSIS AUTO W/SCOPE: CPT | Performed by: FAMILY MEDICINE

## 2018-01-01 PROCEDURE — 74011000250 HC RX REV CODE- 250: Performed by: INTERNAL MEDICINE

## 2018-01-01 PROCEDURE — 36415 COLL VENOUS BLD VENIPUNCTURE: CPT | Performed by: INTERNAL MEDICINE

## 2018-01-01 PROCEDURE — A6216 NON-STERILE GAUZE<=16 SQ IN: HCPCS

## 2018-01-01 PROCEDURE — 85027 COMPLETE CBC AUTOMATED: CPT | Performed by: INTERNAL MEDICINE

## 2018-01-01 PROCEDURE — 86922 COMPATIBILITY TEST ANTIGLOB: CPT | Performed by: OBSTETRICS & GYNECOLOGY

## 2018-01-01 PROCEDURE — 82550 ASSAY OF CK (CPK): CPT | Performed by: EMERGENCY MEDICINE

## 2018-01-01 PROCEDURE — 81001 URINALYSIS AUTO W/SCOPE: CPT | Performed by: OBSTETRICS & GYNECOLOGY

## 2018-01-01 PROCEDURE — A4215 STERILE NEEDLE: HCPCS

## 2018-01-01 PROCEDURE — 86972 RBC PRETX INCUBATJ W/DENSITY: CPT

## 2018-01-01 PROCEDURE — 86905 BLOOD TYPING RBC ANTIGENS: CPT

## 2018-01-01 PROCEDURE — 74011000250 HC RX REV CODE- 250

## 2018-01-01 PROCEDURE — 96374 THER/PROPH/DIAG INJ IV PUSH: CPT

## 2018-01-01 PROCEDURE — 85379 FIBRIN DEGRADATION QUANT: CPT | Performed by: STUDENT IN AN ORGANIZED HEALTH CARE EDUCATION/TRAINING PROGRAM

## 2018-01-01 PROCEDURE — 86904 BLOOD TYPING PATIENT SERUM: CPT

## 2018-01-01 PROCEDURE — 86870 RBC ANTIBODY IDENTIFICATION: CPT

## 2018-01-01 PROCEDURE — 86880 COOMBS TEST DIRECT: CPT

## 2018-01-01 PROCEDURE — 80053 COMPREHEN METABOLIC PANEL: CPT | Performed by: EMERGENCY MEDICINE

## 2018-01-01 PROCEDURE — 36415 COLL VENOUS BLD VENIPUNCTURE: CPT | Performed by: STUDENT IN AN ORGANIZED HEALTH CARE EDUCATION/TRAINING PROGRAM

## 2018-01-01 PROCEDURE — A6252 ABSORPT DRG >16 <=48 W/O BDR: HCPCS

## 2018-01-01 RX ORDER — SODIUM CHLORIDE 9 MG/ML
50 INJECTION, SOLUTION INTRAVENOUS CONTINUOUS
Status: DISPENSED | OUTPATIENT
Start: 2018-01-01 | End: 2018-01-01

## 2018-01-01 RX ORDER — GABAPENTIN 300 MG/1
CAPSULE ORAL
Qty: 30 CAP | Refills: 2 | Status: SHIPPED | OUTPATIENT
Start: 2018-01-01 | End: 2018-01-01 | Stop reason: SDUPTHER

## 2018-01-01 RX ORDER — DIPHENHYDRAMINE HYDROCHLORIDE 50 MG/ML
50 INJECTION, SOLUTION INTRAMUSCULAR; INTRAVENOUS AS NEEDED
Status: CANCELLED
Start: 2018-01-01

## 2018-01-01 RX ORDER — EPINEPHRINE 1 MG/ML
0.3 INJECTION, SOLUTION, CONCENTRATE INTRAVENOUS AS NEEDED
Status: CANCELLED | OUTPATIENT
Start: 2018-01-01

## 2018-01-01 RX ORDER — SODIUM CHLORIDE 9 MG/ML
10 INJECTION INTRAMUSCULAR; INTRAVENOUS; SUBCUTANEOUS AS NEEDED
Status: CANCELLED | OUTPATIENT
Start: 2018-01-01 | End: 2018-01-01

## 2018-01-01 RX ORDER — GABAPENTIN 300 MG/1
CAPSULE ORAL
Qty: 30 CAP | Refills: 2 | Status: SHIPPED | OUTPATIENT
Start: 2018-01-01 | End: 2018-01-01

## 2018-01-01 RX ORDER — SODIUM CHLORIDE 0.9 % (FLUSH) 0.9 %
10 SYRINGE (ML) INJECTION AS NEEDED
Status: ACTIVE | OUTPATIENT
Start: 2018-01-01 | End: 2018-01-01

## 2018-01-01 RX ORDER — ONDANSETRON 2 MG/ML
8 INJECTION INTRAMUSCULAR; INTRAVENOUS AS NEEDED
Status: CANCELLED | OUTPATIENT
Start: 2018-01-01

## 2018-01-01 RX ORDER — GRANISETRON HYDROCHLORIDE 1 MG/ML
1 INJECTION INTRAVENOUS ONCE
Status: COMPLETED | OUTPATIENT
Start: 2018-01-01 | End: 2018-01-01

## 2018-01-01 RX ORDER — ALBUTEROL SULFATE 0.83 MG/ML
2.5 SOLUTION RESPIRATORY (INHALATION) AS NEEDED
Status: CANCELLED
Start: 2018-01-01

## 2018-01-01 RX ORDER — OXYCODONE AND ACETAMINOPHEN 5; 325 MG/1; MG/1
1 TABLET ORAL
Qty: 30 TAB | Refills: 0 | Status: SHIPPED | OUTPATIENT
Start: 2018-01-01 | End: 2018-01-01 | Stop reason: SDUPTHER

## 2018-01-01 RX ORDER — HEPARIN 100 UNIT/ML
500 SYRINGE INTRAVENOUS AS NEEDED
Status: ACTIVE | OUTPATIENT
Start: 2018-01-01 | End: 2018-01-01

## 2018-01-01 RX ORDER — ALBUTEROL SULFATE 0.83 MG/ML
2.5 SOLUTION RESPIRATORY (INHALATION) AS NEEDED
Status: CANCELLED
Start: 2018-12-26

## 2018-01-01 RX ORDER — HEPARIN 100 UNIT/ML
500 SYRINGE INTRAVENOUS AS NEEDED
Status: CANCELLED | OUTPATIENT
Start: 2018-01-01 | End: 2018-01-01

## 2018-01-01 RX ORDER — SODIUM CHLORIDE 9 MG/ML
10 INJECTION INTRAMUSCULAR; INTRAVENOUS; SUBCUTANEOUS AS NEEDED
Status: ACTIVE | OUTPATIENT
Start: 2018-01-01 | End: 2018-01-01

## 2018-01-01 RX ORDER — GABAPENTIN 300 MG/1
CAPSULE ORAL
Qty: 30 CAP | Refills: 0 | Status: SHIPPED | OUTPATIENT
Start: 2018-01-01 | End: 2018-01-01 | Stop reason: SDUPTHER

## 2018-01-01 RX ORDER — DIPHENHYDRAMINE HYDROCHLORIDE 50 MG/ML
50 INJECTION, SOLUTION INTRAMUSCULAR; INTRAVENOUS ONCE
Status: COMPLETED | OUTPATIENT
Start: 2018-01-01 | End: 2018-01-01

## 2018-01-01 RX ORDER — FAMOTIDINE 20 MG/1
20 TABLET, FILM COATED ORAL DAILY
Status: DISCONTINUED | OUTPATIENT
Start: 2018-01-01 | End: 2018-01-01 | Stop reason: HOSPADM

## 2018-01-01 RX ORDER — SODIUM CHLORIDE 9 MG/ML
25 INJECTION, SOLUTION INTRAVENOUS AS NEEDED
Status: DISPENSED | OUTPATIENT
Start: 2018-01-01 | End: 2018-01-01

## 2018-01-01 RX ORDER — SODIUM CHLORIDE 9 MG/ML
10 INJECTION INTRAMUSCULAR; INTRAVENOUS; SUBCUTANEOUS AS NEEDED
Status: CANCELLED | OUTPATIENT
Start: 2018-01-01

## 2018-01-01 RX ORDER — SODIUM CHLORIDE 9 MG/ML
500 INJECTION, SOLUTION INTRAVENOUS CONTINUOUS
Status: DISPENSED | OUTPATIENT
Start: 2018-01-01 | End: 2018-01-01

## 2018-01-01 RX ORDER — HYDROCORTISONE SODIUM SUCCINATE 100 MG/2ML
100 INJECTION, POWDER, FOR SOLUTION INTRAMUSCULAR; INTRAVENOUS AS NEEDED
Status: CANCELLED | OUTPATIENT
Start: 2018-01-01

## 2018-01-01 RX ORDER — POTASSIUM CHLORIDE 750 MG/1
20 TABLET, FILM COATED, EXTENDED RELEASE ORAL DAILY
COMMUNITY
End: 2018-01-01 | Stop reason: ALTCHOICE

## 2018-01-01 RX ORDER — PROCHLORPERAZINE EDISYLATE 5 MG/ML
10 INJECTION INTRAMUSCULAR; INTRAVENOUS
Status: DISPENSED | OUTPATIENT
Start: 2018-01-01 | End: 2018-01-01

## 2018-01-01 RX ORDER — HEPARIN 100 UNIT/ML
300 SYRINGE INTRAVENOUS AS NEEDED
Status: DISCONTINUED | OUTPATIENT
Start: 2018-01-01 | End: 2018-01-01 | Stop reason: HOSPADM

## 2018-01-01 RX ORDER — ACETAMINOPHEN 325 MG/1
650 TABLET ORAL AS NEEDED
Status: CANCELLED
Start: 2018-01-01

## 2018-01-01 RX ORDER — HEPARIN 100 UNIT/ML
300-500 SYRINGE INTRAVENOUS AS NEEDED
Status: CANCELLED
Start: 2018-01-01

## 2018-01-01 RX ORDER — SODIUM CHLORIDE 9 MG/ML
250 INJECTION, SOLUTION INTRAVENOUS AS NEEDED
Status: DISCONTINUED | OUTPATIENT
Start: 2018-01-01 | End: 2018-01-01 | Stop reason: HOSPADM

## 2018-01-01 RX ORDER — SODIUM CHLORIDE 0.9 % (FLUSH) 0.9 %
10-40 SYRINGE (ML) INJECTION AS NEEDED
Status: CANCELLED | OUTPATIENT
Start: 2018-01-01 | End: 2018-01-01

## 2018-01-01 RX ORDER — SODIUM CHLORIDE 0.9 % (FLUSH) 0.9 %
10-40 SYRINGE (ML) INJECTION AS NEEDED
Status: SHIPPED | OUTPATIENT
Start: 2018-01-01 | End: 2018-01-01

## 2018-01-01 RX ORDER — MELATONIN
1000 DAILY
Status: DISCONTINUED | OUTPATIENT
Start: 2018-01-01 | End: 2018-01-01 | Stop reason: HOSPADM

## 2018-01-01 RX ORDER — GABAPENTIN 300 MG/1
300 CAPSULE ORAL
Qty: 30 CAP | Refills: 0 | Status: SHIPPED | OUTPATIENT
Start: 2018-01-01 | End: 2018-01-01 | Stop reason: SDUPTHER

## 2018-01-01 RX ORDER — LORAZEPAM 1 MG/1
1 TABLET ORAL
Qty: 10 TAB | Refills: 1 | Status: SHIPPED | OUTPATIENT
Start: 2018-01-01 | End: 2018-01-01

## 2018-01-01 RX ORDER — POTASSIUM CHLORIDE 750 MG/1
20 TABLET, FILM COATED, EXTENDED RELEASE ORAL
Status: COMPLETED | OUTPATIENT
Start: 2018-01-01 | End: 2018-01-01

## 2018-01-01 RX ORDER — SODIUM CHLORIDE 0.9 % (FLUSH) 0.9 %
10 SYRINGE (ML) INJECTION AS NEEDED
Status: CANCELLED
Start: 2018-01-01

## 2018-01-01 RX ORDER — SODIUM CHLORIDE 0.9 % (FLUSH) 0.9 %
10-40 SYRINGE (ML) INJECTION AS NEEDED
Status: ACTIVE | OUTPATIENT
Start: 2018-01-01 | End: 2018-01-01

## 2018-01-01 RX ORDER — OXYCODONE AND ACETAMINOPHEN 5; 325 MG/1; MG/1
1 TABLET ORAL
Status: DISCONTINUED | OUTPATIENT
Start: 2018-01-01 | End: 2018-01-01 | Stop reason: HOSPADM

## 2018-01-01 RX ORDER — SODIUM CHLORIDE 9 MG/ML
250 INJECTION, SOLUTION INTRAVENOUS AS NEEDED
Status: DISPENSED | OUTPATIENT
Start: 2018-01-01 | End: 2018-01-01

## 2018-01-01 RX ORDER — SODIUM CHLORIDE 9 MG/ML
500 INJECTION, SOLUTION INTRAVENOUS CONTINUOUS
Status: CANCELLED | OUTPATIENT
Start: 2018-01-01

## 2018-01-01 RX ORDER — MORPHINE SULFATE 2 MG/ML
1 INJECTION, SOLUTION INTRAMUSCULAR; INTRAVENOUS
Status: DISCONTINUED | OUTPATIENT
Start: 2018-01-01 | End: 2018-01-01

## 2018-01-01 RX ORDER — DIPHENHYDRAMINE HYDROCHLORIDE 50 MG/ML
50 INJECTION, SOLUTION INTRAMUSCULAR; INTRAVENOUS ONCE
Status: DISCONTINUED | OUTPATIENT
Start: 2018-01-01 | End: 2018-01-01

## 2018-01-01 RX ORDER — HYDROCORTISONE SODIUM SUCCINATE 100 MG/2ML
100 INJECTION, POWDER, FOR SOLUTION INTRAMUSCULAR; INTRAVENOUS AS NEEDED
Status: CANCELLED | OUTPATIENT
Start: 2018-12-26

## 2018-01-01 RX ORDER — SODIUM CHLORIDE 0.9 % (FLUSH) 0.9 %
10 SYRINGE (ML) INJECTION AS NEEDED
Status: CANCELLED
Start: 2018-12-26

## 2018-01-01 RX ORDER — ONDANSETRON 4 MG/1
4 TABLET, ORALLY DISINTEGRATING ORAL
Qty: 30 TAB | Refills: 2 | Status: SHIPPED | OUTPATIENT
Start: 2018-01-01 | End: 2018-01-01

## 2018-01-01 RX ORDER — ASPIRIN 81 MG/1
81 TABLET ORAL DAILY
Status: DISCONTINUED | OUTPATIENT
Start: 2018-01-01 | End: 2018-01-01 | Stop reason: HOSPADM

## 2018-01-01 RX ORDER — SODIUM CHLORIDE 9 MG/ML
75 INJECTION, SOLUTION INTRAVENOUS CONTINUOUS
Status: DISCONTINUED | OUTPATIENT
Start: 2018-01-01 | End: 2018-01-01

## 2018-01-01 RX ORDER — POTASSIUM CHLORIDE 20 MEQ/1
TABLET, EXTENDED RELEASE ORAL
COMMUNITY
Start: 2018-01-01 | End: 2018-01-01 | Stop reason: SDUPTHER

## 2018-01-01 RX ORDER — CARVEDILOL 3.12 MG/1
3.12 TABLET ORAL 2 TIMES DAILY WITH MEALS
Status: DISCONTINUED | OUTPATIENT
Start: 2018-01-01 | End: 2018-01-01 | Stop reason: HOSPADM

## 2018-01-01 RX ORDER — ASPIRIN 81 MG/1
81 TABLET ORAL DAILY
Qty: 30 TAB | Refills: 0 | Status: SHIPPED | OUTPATIENT
Start: 2018-01-01 | End: 2018-01-01 | Stop reason: ALTCHOICE

## 2018-01-01 RX ORDER — DIPHENHYDRAMINE HYDROCHLORIDE 50 MG/ML
INJECTION, SOLUTION INTRAMUSCULAR; INTRAVENOUS
Status: DISPENSED
Start: 2018-01-01 | End: 2018-01-01

## 2018-01-01 RX ORDER — ONDANSETRON 2 MG/ML
INJECTION INTRAMUSCULAR; INTRAVENOUS
Status: DISPENSED
Start: 2018-01-01 | End: 2018-01-01

## 2018-01-01 RX ORDER — DIPHENHYDRAMINE HYDROCHLORIDE 50 MG/ML
INJECTION, SOLUTION INTRAMUSCULAR; INTRAVENOUS
Status: COMPLETED
Start: 2018-01-01 | End: 2018-01-01

## 2018-01-01 RX ORDER — SODIUM CHLORIDE 0.9 % (FLUSH) 0.9 %
5-10 SYRINGE (ML) INJECTION AS NEEDED
Status: ACTIVE | OUTPATIENT
Start: 2018-01-01 | End: 2018-01-01

## 2018-01-01 RX ORDER — PREDNISONE 10 MG/1
10 TABLET ORAL DAILY
COMMUNITY
End: 2018-01-01 | Stop reason: ALTCHOICE

## 2018-01-01 RX ORDER — DIPHENHYDRAMINE HYDROCHLORIDE 50 MG/ML
50 INJECTION, SOLUTION INTRAMUSCULAR; INTRAVENOUS
Status: COMPLETED | OUTPATIENT
Start: 2018-01-01 | End: 2018-01-01

## 2018-01-01 RX ORDER — ACETAMINOPHEN 325 MG/1
650 TABLET ORAL AS NEEDED
Status: CANCELLED
Start: 2018-12-26

## 2018-01-01 RX ORDER — HEPARIN 100 UNIT/ML
300-500 SYRINGE INTRAVENOUS AS NEEDED
Status: ACTIVE | OUTPATIENT
Start: 2018-01-01 | End: 2018-01-01

## 2018-01-01 RX ORDER — OXYCODONE AND ACETAMINOPHEN 5; 325 MG/1; MG/1
1-2 TABLET ORAL
Qty: 30 TAB | Refills: 0 | Status: SHIPPED | OUTPATIENT
Start: 2018-01-01 | End: 2018-01-01 | Stop reason: SDUPTHER

## 2018-01-01 RX ORDER — OXYCODONE AND ACETAMINOPHEN 5; 325 MG/1; MG/1
1 TABLET ORAL
Qty: 30 TAB | Refills: 0 | Status: SHIPPED | OUTPATIENT
Start: 2018-01-01 | End: 2018-01-01

## 2018-01-01 RX ORDER — HEPARIN 100 UNIT/ML
500 SYRINGE INTRAVENOUS AS NEEDED
Status: SHIPPED | OUTPATIENT
Start: 2018-01-01 | End: 2018-01-01

## 2018-01-01 RX ORDER — SODIUM CHLORIDE 9 MG/ML
10 INJECTION INTRAMUSCULAR; INTRAVENOUS; SUBCUTANEOUS AS NEEDED
Status: SHIPPED | OUTPATIENT
Start: 2018-01-01 | End: 2018-01-01

## 2018-01-01 RX ORDER — TORSEMIDE 100 MG/1
TABLET ORAL
COMMUNITY
Start: 2018-01-01 | End: 2018-01-01 | Stop reason: ALTCHOICE

## 2018-01-01 RX ORDER — ACETAMINOPHEN 325 MG/1
TABLET ORAL
COMMUNITY
End: 2018-01-01 | Stop reason: ALTCHOICE

## 2018-01-01 RX ORDER — ALBUTEROL SULFATE 90 UG/1
2 AEROSOL, METERED RESPIRATORY (INHALATION)
Qty: 1 INHALER | Refills: 1 | Status: SHIPPED | OUTPATIENT
Start: 2018-01-01 | End: 2018-01-01

## 2018-01-01 RX ORDER — DIPHENHYDRAMINE HYDROCHLORIDE 50 MG/ML
50 INJECTION, SOLUTION INTRAMUSCULAR; INTRAVENOUS AS NEEDED
Status: CANCELLED
Start: 2018-12-26

## 2018-01-01 RX ORDER — DIPHENHYDRAMINE HYDROCHLORIDE 50 MG/ML
25 INJECTION, SOLUTION INTRAMUSCULAR; INTRAVENOUS ONCE
Status: COMPLETED | OUTPATIENT
Start: 2018-01-01 | End: 2018-01-01

## 2018-01-01 RX ORDER — POTASSIUM CHLORIDE 20MEQ/15ML
20 LIQUID (ML) ORAL
Status: DISCONTINUED | OUTPATIENT
Start: 2018-01-01 | End: 2018-01-01

## 2018-01-01 RX ORDER — OXYCODONE AND ACETAMINOPHEN 5; 325 MG/1; MG/1
1 TABLET ORAL
Qty: 30 TAB | Refills: 0 | Status: CANCELLED | OUTPATIENT
Start: 2018-01-01

## 2018-01-01 RX ORDER — ATORVASTATIN CALCIUM 80 MG/1
TABLET, FILM COATED ORAL
Qty: 90 TAB | Refills: 3 | Status: SHIPPED | OUTPATIENT
Start: 2018-01-01 | End: 2018-01-01

## 2018-01-01 RX ORDER — SODIUM CHLORIDE 0.9 % (FLUSH) 0.9 %
5-10 SYRINGE (ML) INJECTION AS NEEDED
Status: DISCONTINUED | OUTPATIENT
Start: 2018-01-01 | End: 2018-01-01 | Stop reason: HOSPADM

## 2018-01-01 RX ORDER — LEVOFLOXACIN 250 MG/1
250 TABLET ORAL DAILY
Qty: 7 TAB | Refills: 0 | Status: SHIPPED | OUTPATIENT
Start: 2018-01-01 | End: 2018-01-01 | Stop reason: ALTCHOICE

## 2018-01-01 RX ORDER — FAMOTIDINE 10 MG/ML
INJECTION INTRAVENOUS
Status: COMPLETED
Start: 2018-01-01 | End: 2018-01-01

## 2018-01-01 RX ORDER — EPINEPHRINE 1 MG/ML
0.3 INJECTION, SOLUTION, CONCENTRATE INTRAVENOUS AS NEEDED
Status: CANCELLED | OUTPATIENT
Start: 2018-12-26

## 2018-01-01 RX ORDER — FAMOTIDINE 20 MG/1
20 TABLET, FILM COATED ORAL DAILY
Qty: 30 TAB | Refills: 0 | Status: SHIPPED | OUTPATIENT
Start: 2018-01-01 | End: 2018-01-01

## 2018-01-01 RX ORDER — SODIUM CHLORIDE 9 MG/ML
500 INJECTION, SOLUTION INTRAVENOUS CONTINUOUS
Status: CANCELLED | OUTPATIENT
Start: 2018-12-26

## 2018-01-01 RX ORDER — SODIUM CHLORIDE 9 MG/ML
50 INJECTION, SOLUTION INTRAVENOUS AS NEEDED
Status: DISPENSED | OUTPATIENT
Start: 2018-01-01 | End: 2018-01-01

## 2018-01-01 RX ORDER — SODIUM CHLORIDE 0.9 % (FLUSH) 0.9 %
20 SYRINGE (ML) INJECTION
Status: COMPLETED | OUTPATIENT
Start: 2018-01-01 | End: 2018-01-01

## 2018-01-01 RX ORDER — DOCUSATE SODIUM 100 MG/1
100 CAPSULE, LIQUID FILLED ORAL DAILY
COMMUNITY
End: 2018-01-01 | Stop reason: ALTCHOICE

## 2018-01-01 RX ORDER — ATORVASTATIN CALCIUM 40 MG/1
80 TABLET, FILM COATED ORAL
Status: DISCONTINUED | OUTPATIENT
Start: 2018-01-01 | End: 2018-01-01 | Stop reason: HOSPADM

## 2018-01-01 RX ORDER — HEPARIN 100 UNIT/ML
300-500 SYRINGE INTRAVENOUS AS NEEDED
Status: CANCELLED
Start: 2018-12-26

## 2018-01-01 RX ORDER — SODIUM CHLORIDE 0.9 % (FLUSH) 0.9 %
5-10 SYRINGE (ML) INJECTION EVERY 8 HOURS
Status: DISCONTINUED | OUTPATIENT
Start: 2018-01-01 | End: 2018-01-01 | Stop reason: HOSPADM

## 2018-01-01 RX ORDER — CARVEDILOL 3.12 MG/1
TABLET ORAL
Refills: 2 | COMMUNITY
Start: 2017-12-15 | End: 2018-01-01 | Stop reason: DRUGHIGH

## 2018-01-01 RX ORDER — SODIUM CHLORIDE 0.9 % (FLUSH) 0.9 %
10 SYRINGE (ML) INJECTION
Status: DISCONTINUED | OUTPATIENT
Start: 2018-01-01 | End: 2018-01-01

## 2018-01-01 RX ORDER — ONDANSETRON 2 MG/ML
4 INJECTION INTRAMUSCULAR; INTRAVENOUS
Status: DISCONTINUED | OUTPATIENT
Start: 2018-01-01 | End: 2018-01-01 | Stop reason: HOSPADM

## 2018-01-01 RX ORDER — CARVEDILOL 6.25 MG/1
6.25 TABLET ORAL 2 TIMES DAILY WITH MEALS
Qty: 60 TAB | Refills: 5 | Status: SHIPPED | OUTPATIENT
Start: 2018-01-01 | End: 2018-01-01

## 2018-01-01 RX ORDER — DOCUSATE SODIUM 100 MG/1
100 CAPSULE, LIQUID FILLED ORAL DAILY
Status: DISCONTINUED | OUTPATIENT
Start: 2018-01-01 | End: 2018-01-01 | Stop reason: HOSPADM

## 2018-01-01 RX ORDER — EPINEPHRINE 1 MG/ML
0.3 INJECTION, SOLUTION, CONCENTRATE INTRAVENOUS
Status: DISCONTINUED | OUTPATIENT
Start: 2018-01-01 | End: 2018-01-01 | Stop reason: HOSPADM

## 2018-01-01 RX ORDER — SODIUM CHLORIDE 9 MG/ML
10 INJECTION INTRAMUSCULAR; INTRAVENOUS; SUBCUTANEOUS AS NEEDED
Status: CANCELLED | OUTPATIENT
Start: 2018-12-26

## 2018-01-01 RX ORDER — ONDANSETRON 2 MG/ML
8 INJECTION INTRAMUSCULAR; INTRAVENOUS AS NEEDED
Status: CANCELLED | OUTPATIENT
Start: 2018-12-26

## 2018-01-01 RX ORDER — DIPHENHYDRAMINE HYDROCHLORIDE 50 MG/ML
50 INJECTION, SOLUTION INTRAMUSCULAR; INTRAVENOUS ONCE
Status: CANCELLED | OUTPATIENT
Start: 2018-01-01 | End: 2018-01-01

## 2018-01-01 RX ORDER — TRIAMTERENE/HYDROCHLOROTHIAZID 37.5-25 MG
1 TABLET ORAL DAILY
Status: DISCONTINUED | OUTPATIENT
Start: 2018-01-01 | End: 2018-01-01 | Stop reason: HOSPADM

## 2018-01-01 RX ORDER — AMOXICILLIN 500 MG/1
TABLET, FILM COATED ORAL
COMMUNITY
End: 2018-01-01 | Stop reason: ALTCHOICE

## 2018-01-01 RX ADMIN — DEXAMETHASONE SODIUM PHOSPHATE 20 MG: 4 INJECTION, SOLUTION INTRA-ARTICULAR; INTRALESIONAL; INTRAMUSCULAR; INTRAVENOUS; SOFT TISSUE at 12:02

## 2018-01-01 RX ADMIN — SODIUM CHLORIDE 10 ML: 9 INJECTION INTRAMUSCULAR; INTRAVENOUS; SUBCUTANEOUS at 16:28

## 2018-01-01 RX ADMIN — CARBOPLATIN 388 MG: 10 INJECTION, SOLUTION INTRAVENOUS at 14:56

## 2018-01-01 RX ADMIN — DEXAMETHASONE SODIUM PHOSPHATE 20 MG: 4 INJECTION, SOLUTION INTRA-ARTICULAR; INTRALESIONAL; INTRAMUSCULAR; INTRAVENOUS; SOFT TISSUE at 10:06

## 2018-01-01 RX ADMIN — SODIUM CHLORIDE 10 ML: 9 INJECTION INTRAMUSCULAR; INTRAVENOUS; SUBCUTANEOUS at 08:40

## 2018-01-01 RX ADMIN — SODIUM CHLORIDE, PRESERVATIVE FREE 500 UNITS: 5 INJECTION INTRAVENOUS at 16:40

## 2018-01-01 RX ADMIN — POTASSIUM CHLORIDE 20 MEQ: 750 TABLET, FILM COATED, EXTENDED RELEASE ORAL at 14:13

## 2018-01-01 RX ADMIN — TRIAMTERENE AND HYDROCHLOROTHIAZIDE 1 TABLET: 37.5; 25 TABLET ORAL at 17:00

## 2018-01-01 RX ADMIN — Medication 10 ML: at 17:55

## 2018-01-01 RX ADMIN — SODIUM CHLORIDE 50 ML/HR: 900 INJECTION, SOLUTION INTRAVENOUS at 10:15

## 2018-01-01 RX ADMIN — SODIUM CHLORIDE 10 ML: 9 INJECTION INTRAMUSCULAR; INTRAVENOUS; SUBCUTANEOUS at 09:11

## 2018-01-01 RX ADMIN — SODIUM CHLORIDE 10 ML: 9 INJECTION INTRAMUSCULAR; INTRAVENOUS; SUBCUTANEOUS at 10:36

## 2018-01-01 RX ADMIN — Medication 10 ML: at 15:36

## 2018-01-01 RX ADMIN — FAMOTIDINE 20 MG: 20 TABLET, FILM COATED ORAL at 11:41

## 2018-01-01 RX ADMIN — SODIUM CHLORIDE 250 ML: 900 INJECTION, SOLUTION INTRAVENOUS at 12:02

## 2018-01-01 RX ADMIN — POTASSIUM CHLORIDE 20 MEQ: 750 TABLET, FILM COATED, EXTENDED RELEASE ORAL at 14:12

## 2018-01-01 RX ADMIN — SODIUM CHLORIDE 10 ML: 9 INJECTION INTRAMUSCULAR; INTRAVENOUS; SUBCUTANEOUS at 10:26

## 2018-01-01 RX ADMIN — Medication 10 ML: at 14:58

## 2018-01-01 RX ADMIN — DIPHENHYDRAMINE HYDROCHLORIDE 50 MG: 50 INJECTION INTRAMUSCULAR; INTRAVENOUS at 10:46

## 2018-01-01 RX ADMIN — SODIUM CHLORIDE 10 ML: 9 INJECTION INTRAMUSCULAR; INTRAVENOUS; SUBCUTANEOUS at 11:01

## 2018-01-01 RX ADMIN — BEVACIZUMAB 1577 MG: 400 INJECTION, SOLUTION INTRAVENOUS at 13:25

## 2018-01-01 RX ADMIN — DEXAMETHASONE SODIUM PHOSPHATE 20 MG: 4 INJECTION, SOLUTION INTRA-ARTICULAR; INTRALESIONAL; INTRAMUSCULAR; INTRAVENOUS; SOFT TISSUE at 10:31

## 2018-01-01 RX ADMIN — SODIUM CHLORIDE 1000 ML: 900 INJECTION, SOLUTION INTRAVENOUS at 16:20

## 2018-01-01 RX ADMIN — DEXAMETHASONE SODIUM PHOSPHATE 12 MG: 4 INJECTION, SOLUTION INTRA-ARTICULAR; INTRALESIONAL; INTRAMUSCULAR; INTRAVENOUS; SOFT TISSUE at 10:45

## 2018-01-01 RX ADMIN — FAMOTIDINE 20 MG: 10 INJECTION INTRAVENOUS at 09:42

## 2018-01-01 RX ADMIN — SODIUM CHLORIDE 250 ML: 900 INJECTION, SOLUTION INTRAVENOUS at 08:40

## 2018-01-01 RX ADMIN — PACLITAXEL 316 MG: 6 INJECTION, SOLUTION INTRAVENOUS at 12:34

## 2018-01-01 RX ADMIN — IOHEXOL 50 ML: 240 INJECTION, SOLUTION INTRATHECAL; INTRAVASCULAR; INTRAVENOUS; ORAL at 14:00

## 2018-01-01 RX ADMIN — SODIUM CHLORIDE 1000 ML: 900 INJECTION, SOLUTION INTRAVENOUS at 09:30

## 2018-01-01 RX ADMIN — PACLITAXEL 296 MG: 6 INJECTION, SOLUTION, CONCENTRATE INTRAVENOUS at 12:56

## 2018-01-01 RX ADMIN — FAMOTIDINE 20 MG: 10 INJECTION, SOLUTION INTRAVENOUS at 10:36

## 2018-01-01 RX ADMIN — BEVACIZUMAB 1560 MG: 400 INJECTION, SOLUTION INTRAVENOUS at 14:57

## 2018-01-01 RX ADMIN — FAMOTIDINE 20 MG: 10 INJECTION, SOLUTION INTRAVENOUS at 10:45

## 2018-01-01 RX ADMIN — PACLITAXEL 296 MG: 6 INJECTION, SOLUTION INTRAVENOUS at 10:42

## 2018-01-01 RX ADMIN — SODIUM CHLORIDE, PRESERVATIVE FREE 500 UNITS: 5 INJECTION INTRAVENOUS at 15:35

## 2018-01-01 RX ADMIN — DIPHENHYDRAMINE HYDROCHLORIDE 25 MG: 50 INJECTION INTRAMUSCULAR; INTRAVENOUS at 14:59

## 2018-01-01 RX ADMIN — SODIUM CHLORIDE 10 ML: 9 INJECTION, SOLUTION INTRAMUSCULAR; INTRAVENOUS; SUBCUTANEOUS at 12:50

## 2018-01-01 RX ADMIN — CARVEDILOL 3.12 MG: 3.12 TABLET, FILM COATED ORAL at 08:40

## 2018-01-01 RX ADMIN — SODIUM CHLORIDE 500 ML: 900 INJECTION, SOLUTION INTRAVENOUS at 14:55

## 2018-01-01 RX ADMIN — Medication 10 ML: at 20:24

## 2018-01-01 RX ADMIN — PACLITAXEL 288 MG: 6 INJECTION, SOLUTION INTRAVENOUS at 11:32

## 2018-01-01 RX ADMIN — DIPHENHYDRAMINE HYDROCHLORIDE 25 MG: 50 INJECTION, SOLUTION INTRAMUSCULAR; INTRAVENOUS at 15:29

## 2018-01-01 RX ADMIN — Medication 10 ML: at 17:15

## 2018-01-01 RX ADMIN — GRANISETRON HYDROCHLORIDE 1 MG: 1 INJECTION INTRAVENOUS at 12:02

## 2018-01-01 RX ADMIN — SODIUM CHLORIDE 10 ML: 9 INJECTION, SOLUTION INTRAMUSCULAR; INTRAVENOUS; SUBCUTANEOUS at 15:25

## 2018-01-01 RX ADMIN — GRANISETRON HYDROCHLORIDE 1 MG: 1 INJECTION INTRAVENOUS at 10:49

## 2018-01-01 RX ADMIN — VITAMIN D, TAB 1000IU (100/BT) 1000 UNITS: 25 TAB at 11:40

## 2018-01-01 RX ADMIN — DOCUSATE SODIUM 100 MG: 100 CAPSULE, LIQUID FILLED ORAL at 11:40

## 2018-01-01 RX ADMIN — SODIUM CHLORIDE 20 MG: 9 INJECTION INTRAMUSCULAR; INTRAVENOUS; SUBCUTANEOUS at 12:20

## 2018-01-01 RX ADMIN — PACLITAXEL 288 MG: 6 INJECTION, SOLUTION INTRAVENOUS at 11:48

## 2018-01-01 RX ADMIN — BEVACIZUMAB 1577 MG: 400 INJECTION, SOLUTION INTRAVENOUS at 12:02

## 2018-01-01 RX ADMIN — ASPIRIN 81 MG: 81 TABLET, COATED ORAL at 12:23

## 2018-01-01 RX ADMIN — DIPHENHYDRAMINE HYDROCHLORIDE 50 MG: 50 INJECTION INTRAMUSCULAR; INTRAVENOUS at 16:24

## 2018-01-01 RX ADMIN — BEVACIZUMAB 1560 MG: 400 INJECTION, SOLUTION INTRAVENOUS at 14:49

## 2018-01-01 RX ADMIN — PACLITAXEL 288 MG: 6 INJECTION, SOLUTION INTRAVENOUS at 12:00

## 2018-01-01 RX ADMIN — SODIUM CHLORIDE, PRESERVATIVE FREE 500 UNITS: 5 INJECTION INTRAVENOUS at 15:25

## 2018-01-01 RX ADMIN — METHYLPREDNISOLONE SODIUM SUCCINATE 125 MG: 125 INJECTION, POWDER, FOR SOLUTION INTRAMUSCULAR; INTRAVENOUS at 14:22

## 2018-01-01 RX ADMIN — SODIUM CHLORIDE, PRESERVATIVE FREE 500 UNITS: 5 INJECTION INTRAVENOUS at 17:16

## 2018-01-01 RX ADMIN — PACLITAXEL 296 MG: 6 INJECTION, SOLUTION INTRAVENOUS at 11:25

## 2018-01-01 RX ADMIN — METHYLPREDNISOLONE SODIUM SUCCINATE 125 MG: 125 INJECTION, POWDER, FOR SOLUTION INTRAMUSCULAR; INTRAVENOUS at 14:57

## 2018-01-01 RX ADMIN — IOHEXOL 50 ML: 240 INJECTION, SOLUTION INTRATHECAL; INTRAVASCULAR; INTRAVENOUS; ORAL at 17:00

## 2018-01-01 RX ADMIN — SODIUM CHLORIDE 10 ML: 9 INJECTION INTRAMUSCULAR; INTRAVENOUS; SUBCUTANEOUS at 11:27

## 2018-01-01 RX ADMIN — DIPHENHYDRAMINE HYDROCHLORIDE 50 MG: 50 INJECTION INTRAMUSCULAR; INTRAVENOUS at 10:49

## 2018-01-01 RX ADMIN — SODIUM CHLORIDE, PRESERVATIVE FREE 500 UNITS: 5 INJECTION INTRAVENOUS at 14:10

## 2018-01-01 RX ADMIN — ATORVASTATIN CALCIUM 80 MG: 40 TABLET, FILM COATED ORAL at 23:00

## 2018-01-01 RX ADMIN — DEXAMETHASONE SODIUM PHOSPHATE 12 MG: 4 INJECTION, SOLUTION INTRAMUSCULAR; INTRAVENOUS at 08:55

## 2018-01-01 RX ADMIN — SODIUM CHLORIDE, PRESERVATIVE FREE 500 UNITS: 5 INJECTION INTRAVENOUS at 14:12

## 2018-01-01 RX ADMIN — DIPHENHYDRAMINE HYDROCHLORIDE 50 MG: 50 INJECTION INTRAMUSCULAR; INTRAVENOUS at 11:56

## 2018-01-01 RX ADMIN — TRIAMTERENE AND HYDROCHLOROTHIAZIDE 1 TABLET: 37.5; 25 TABLET ORAL at 11:40

## 2018-01-01 RX ADMIN — SODIUM CHLORIDE 250 ML: 900 INJECTION, SOLUTION INTRAVENOUS at 10:25

## 2018-01-01 RX ADMIN — PACLITAXEL 296 MG: 6 INJECTION, SOLUTION INTRAVENOUS at 11:22

## 2018-01-01 RX ADMIN — Medication 10 ML: at 15:45

## 2018-01-01 RX ADMIN — DEXAMETHASONE SODIUM PHOSPHATE 20 MG: 4 INJECTION, SOLUTION INTRAMUSCULAR; INTRAVENOUS at 10:58

## 2018-01-01 RX ADMIN — METHYLPREDNISOLONE SODIUM SUCCINATE 125 MG: 125 INJECTION, POWDER, FOR SOLUTION INTRAMUSCULAR; INTRAVENOUS at 16:20

## 2018-01-01 RX ADMIN — SODIUM CHLORIDE 10 ML: 9 INJECTION, SOLUTION INTRAMUSCULAR; INTRAVENOUS; SUBCUTANEOUS at 09:20

## 2018-01-01 RX ADMIN — Medication 10 ML: at 15:35

## 2018-01-01 RX ADMIN — SODIUM CHLORIDE 20 MG: 9 INJECTION INTRAMUSCULAR; INTRAVENOUS; SUBCUTANEOUS at 10:01

## 2018-01-01 RX ADMIN — SODIUM CHLORIDE, PRESERVATIVE FREE 500 UNITS: 5 INJECTION INTRAVENOUS at 16:00

## 2018-01-01 RX ADMIN — SODIUM CHLORIDE 10 ML: 9 INJECTION INTRAMUSCULAR; INTRAVENOUS; SUBCUTANEOUS at 10:25

## 2018-01-01 RX ADMIN — DEXAMETHASONE SODIUM PHOSPHATE 20 MG: 4 INJECTION, SOLUTION INTRAMUSCULAR; INTRAVENOUS at 11:23

## 2018-01-01 RX ADMIN — SODIUM CHLORIDE 20 MG: 9 INJECTION INTRAMUSCULAR; INTRAVENOUS; SUBCUTANEOUS at 10:57

## 2018-01-01 RX ADMIN — FAMOTIDINE 20 MG: 20 TABLET, FILM COATED ORAL at 12:22

## 2018-01-01 RX ADMIN — Medication 10 ML: at 08:20

## 2018-01-01 RX ADMIN — Medication 30 ML: at 14:10

## 2018-01-01 RX ADMIN — SODIUM CHLORIDE 20 MG: 9 INJECTION INTRAMUSCULAR; INTRAVENOUS; SUBCUTANEOUS at 10:49

## 2018-01-01 RX ADMIN — FAMOTIDINE 20 MG: 10 INJECTION INTRAVENOUS at 13:52

## 2018-01-01 RX ADMIN — GRANISETRON HYDROCHLORIDE 1 MG: 1 INJECTION INTRAVENOUS at 10:29

## 2018-01-01 RX ADMIN — METHYLPREDNISOLONE SODIUM SUCCINATE 125 MG: 125 INJECTION, POWDER, FOR SOLUTION INTRAMUSCULAR; INTRAVENOUS at 13:49

## 2018-01-01 RX ADMIN — DIPHENHYDRAMINE HYDROCHLORIDE 50 MG: 50 INJECTION INTRAMUSCULAR; INTRAVENOUS at 09:58

## 2018-01-01 RX ADMIN — DOCUSATE SODIUM 100 MG: 100 CAPSULE, LIQUID FILLED ORAL at 17:28

## 2018-01-01 RX ADMIN — POTASSIUM CHLORIDE 20 MEQ: 750 TABLET, FILM COATED, EXTENDED RELEASE ORAL at 14:14

## 2018-01-01 RX ADMIN — SODIUM CHLORIDE 25 ML/HR: 900 INJECTION, SOLUTION INTRAVENOUS at 10:36

## 2018-01-01 RX ADMIN — PACLITAXEL 296 MG: 6 INJECTION, SOLUTION INTRAVENOUS at 12:25

## 2018-01-01 RX ADMIN — SODIUM CHLORIDE, PRESERVATIVE FREE 500 UNITS: 5 INJECTION INTRAVENOUS at 15:45

## 2018-01-01 RX ADMIN — METHYLPREDNISOLONE SODIUM SUCCINATE 125 MG: 125 INJECTION, POWDER, FOR SOLUTION INTRAMUSCULAR; INTRAVENOUS at 15:15

## 2018-01-01 RX ADMIN — DIPHENHYDRAMINE HYDROCHLORIDE 50 MG: 50 INJECTION INTRAMUSCULAR; INTRAVENOUS at 10:40

## 2018-01-01 RX ADMIN — Medication 10 ML: at 12:26

## 2018-01-01 RX ADMIN — DIPHENHYDRAMINE HYDROCHLORIDE 50 MG: 50 INJECTION INTRAMUSCULAR; INTRAVENOUS at 09:23

## 2018-01-01 RX ADMIN — SODIUM CHLORIDE 250 ML: 900 INJECTION, SOLUTION INTRAVENOUS at 12:10

## 2018-01-01 RX ADMIN — FAMOTIDINE: 10 INJECTION INTRAVENOUS at 16:24

## 2018-01-01 RX ADMIN — SODIUM CHLORIDE, PRESERVATIVE FREE 500 UNITS: 5 INJECTION INTRAVENOUS at 15:36

## 2018-01-01 RX ADMIN — Medication 10 ML: at 10:26

## 2018-01-01 RX ADMIN — GRANISETRON HYDROCHLORIDE 1 MG: 1 INJECTION INTRAVENOUS at 10:06

## 2018-01-01 RX ADMIN — SODIUM CHLORIDE 10 ML: 9 INJECTION INTRAMUSCULAR; INTRAVENOUS; SUBCUTANEOUS at 08:39

## 2018-01-01 RX ADMIN — Medication 10 ML: at 15:24

## 2018-01-01 RX ADMIN — CARVEDILOL 3.12 MG: 3.12 TABLET, FILM COATED ORAL at 17:28

## 2018-01-01 RX ADMIN — Medication 10 ML: at 20:25

## 2018-01-01 RX ADMIN — SODIUM CHLORIDE 10 ML: 9 INJECTION INTRAMUSCULAR; INTRAVENOUS; SUBCUTANEOUS at 10:15

## 2018-01-01 RX ADMIN — CARBOPLATIN 388 MG: 10 INJECTION, SOLUTION INTRAVENOUS at 14:36

## 2018-01-01 RX ADMIN — DIPHENHYDRAMINE HYDROCHLORIDE 50 MG: 50 INJECTION INTRAMUSCULAR; INTRAVENOUS at 15:48

## 2018-01-01 RX ADMIN — BEVACIZUMAB 1577 MG: 400 INJECTION, SOLUTION INTRAVENOUS at 11:50

## 2018-01-01 RX ADMIN — SODIUM CHLORIDE, PRESERVATIVE FREE 500 UNITS: 5 INJECTION INTRAVENOUS at 15:24

## 2018-01-01 RX ADMIN — BEVACIZUMAB 1560 MG: 400 INJECTION, SOLUTION INTRAVENOUS at 15:07

## 2018-01-01 RX ADMIN — DEXAMETHASONE SODIUM PHOSPHATE 12 MG: 4 INJECTION, SOLUTION INTRA-ARTICULAR; INTRALESIONAL; INTRAMUSCULAR; INTRAVENOUS; SOFT TISSUE at 11:30

## 2018-01-01 RX ADMIN — DEXAMETHASONE SODIUM PHOSPHATE 12 MG: 4 INJECTION, SOLUTION INTRAMUSCULAR; INTRAVENOUS at 10:49

## 2018-01-01 RX ADMIN — REGADENOSON 0.4 MG: 0.08 INJECTION, SOLUTION INTRAVENOUS at 09:25

## 2018-01-01 RX ADMIN — DEXAMETHASONE SODIUM PHOSPHATE 20 MG: 4 INJECTION, SOLUTION INTRAMUSCULAR; INTRAVENOUS at 11:18

## 2018-01-01 RX ADMIN — DIPHENHYDRAMINE HYDROCHLORIDE 50 MG: 50 INJECTION INTRAMUSCULAR; INTRAVENOUS at 09:35

## 2018-01-01 RX ADMIN — ASPIRIN 81 MG: 81 TABLET, COATED ORAL at 11:40

## 2018-01-01 RX ADMIN — FAMOTIDINE 20 MG: 10 INJECTION, SOLUTION INTRAVENOUS at 11:27

## 2018-01-01 RX ADMIN — SODIUM CHLORIDE 10 ML: 9 INJECTION INTRAMUSCULAR; INTRAVENOUS; SUBCUTANEOUS at 16:00

## 2018-01-01 RX ADMIN — GRANISETRON HYDROCHLORIDE 1 MG: 1 INJECTION INTRAVENOUS at 10:45

## 2018-01-01 RX ADMIN — SODIUM CHLORIDE, PRESERVATIVE FREE 300 UNITS: 5 INJECTION INTRAVENOUS at 14:58

## 2018-01-01 RX ADMIN — PROCHLORPERAZINE EDISYLATE 5 MG: 5 INJECTION INTRAMUSCULAR; INTRAVENOUS at 15:00

## 2018-01-01 RX ADMIN — FAMOTIDINE 20 MG: 10 INJECTION, SOLUTION INTRAVENOUS at 10:41

## 2018-01-01 RX ADMIN — DIPHENHYDRAMINE HYDROCHLORIDE 50 MG: 50 INJECTION INTRAMUSCULAR; INTRAVENOUS at 11:25

## 2018-01-01 RX ADMIN — Medication 20 ML: at 09:26

## 2018-01-01 RX ADMIN — CARBOPLATIN 388 MG: 10 INJECTION, SOLUTION INTRAVENOUS at 16:01

## 2018-01-01 RX ADMIN — SODIUM CHLORIDE 10 ML: 9 INJECTION INTRAMUSCULAR; INTRAVENOUS; SUBCUTANEOUS at 14:11

## 2018-01-01 RX ADMIN — BEVACIZUMAB 1560 MG: 400 INJECTION, SOLUTION INTRAVENOUS at 13:05

## 2018-01-01 RX ADMIN — SODIUM CHLORIDE 75 ML/HR: 900 INJECTION, SOLUTION INTRAVENOUS at 18:00

## 2018-01-01 RX ADMIN — PACLITAXEL 288 MG: 6 INJECTION, SOLUTION INTRAVENOUS at 09:58

## 2018-01-01 RX ADMIN — FAMOTIDINE 20 MG: 10 INJECTION, SOLUTION INTRAVENOUS at 08:44

## 2018-01-01 RX ADMIN — IOHEXOL 50 ML: 240 INJECTION, SOLUTION INTRATHECAL; INTRAVASCULAR; INTRAVENOUS; ORAL at 11:00

## 2018-01-01 RX ADMIN — DIPHENHYDRAMINE HYDROCHLORIDE 50 MG: 50 INJECTION INTRAMUSCULAR; INTRAVENOUS at 10:52

## 2018-01-01 RX ADMIN — SODIUM CHLORIDE, PRESERVATIVE FREE 500 UNITS: 5 INJECTION INTRAVENOUS at 16:39

## 2018-01-01 RX ADMIN — GRANISETRON HYDROCHLORIDE 1 MG: 1 INJECTION INTRAVENOUS at 10:42

## 2018-01-01 RX ADMIN — CARBOPLATIN 388 MG: 10 INJECTION, SOLUTION INTRAVENOUS at 14:34

## 2018-01-01 RX ADMIN — SODIUM CHLORIDE 1000 ML: 900 INJECTION, SOLUTION INTRAVENOUS at 14:16

## 2018-01-01 RX ADMIN — SODIUM CHLORIDE 10 ML: 9 INJECTION INTRAMUSCULAR; INTRAVENOUS; SUBCUTANEOUS at 16:39

## 2018-01-01 RX ADMIN — IOHEXOL 50 ML: 240 INJECTION, SOLUTION INTRATHECAL; INTRAVASCULAR; INTRAVENOUS; ORAL at 13:09

## 2018-01-01 RX ADMIN — DIPHENHYDRAMINE HYDROCHLORIDE 50 MG: 50 INJECTION INTRAMUSCULAR; INTRAVENOUS at 10:43

## 2018-01-01 RX ADMIN — SODIUM CHLORIDE, PRESERVATIVE FREE 500 UNITS: 5 INJECTION INTRAVENOUS at 12:30

## 2018-01-01 RX ADMIN — CARBOPLATIN 388 MG: 10 INJECTION, SOLUTION INTRAVENOUS at 15:30

## 2018-01-02 NOTE — PROGRESS NOTES
[address] [place] [address] [address]ias [address], [address] [facility]  [phone]  MD Robson Pierce MD  Office Note  Patient ID:  Name:  Ra Coleman  MRN:  [id number]  :  67 y.o. Date:  2018      HISTORY OF PRESENT ILLNESS:  Ra Coleman is a 75 y.o.  postmenopausal female who was referred to me in 2015 for a pelvic mass by Dr. Jovanny Valle, who noted the mass on routine gynecologic exam.  He sent her for a CT of the pelvis, which revealed a mass at the vaginal apex, that appeared to be invading into the soft tissues of the left pelvis. There were several enlarged lymph nodes on the left. There was also questionable bladder invasion, and possibly even involvement of the rectosigmoid colon. She reported a history of having a mass removed from one of her ovaries in 1 at [facility], which required a second surgery to remove her uterus and contralateral ovary. She said she did not have to take any chemotherapy, but she did have to take some kind of pill for a year. She has not had any gynecologic issues since that time. Presumably her hysterectomy was a total, rather than a supracervical, but she is unsure and there are no records. She denied any vaginal bleeding, except for after her examination with Dr. Fadumo Aguilar. She reported minimal pain in the pelvis. I took her the OR for and exam under anesthesia with cystoscopy and proctoscopy. Pathology revealed a squamous cell carcinoma, consistent with a vaginal primary. Examination revealed a fixed mass involving the left upper vagina with extension to the left pelvic sidewall. There also appeared to be involvement of the posterior bladder wall, but no mucosal involvement. The mass did push against the rectal wall, but there did not appear to be any direct involvement. I referred her to radiation oncology and she saw Dr. Hodan Lewis.   She completed pelvic radiation along with concurrent cisplatin chemotherapy in February 2016 and had a great response. When I saw her in the office in March 2017 for follow up she was doing well and without complaints. Her pelvic exam and pap smear at that time were both negative. Subsequent to that visit she began having back pain that had progressively worsened. She saw Yasir Collins in May and she ordered a CT. This unfortunately demonstrated extensive metastatic disease outside of the prior radiated field. Dr. Radha Morton then sent her for a PET/CT which demonstrated widely metastatic disease. I recommended systemic chemotherapy with Taxol/Cisplatin/Avastin, which is the standard for metastatic cervical cancer, since vaginal cancer behaves similar to cervical cancer. Unfortunately, the Avastin was denied by her insurance company. We chose to proceed with Taxol/Cisplatin. She completed 6 cycles. She has done well so far with her treatment. She denies nausea. She reports some persistent lower extremity edema, but that has overall improved. Her scan in October 2017 demonstrated slight progression of the lymphadenopathy, while the pulmonary and bony disease appeared stable. On my review, the changes didn't meet RECIST criteria for progression. I recommended continuing with therapy, but I suggested that we exchange Carboplatin for Cisplatin due to nephrotoxicity. Her creatinine had steadily climbed with the Cisplatin. She has completed 3 cycles of Taxol/Carboplatin since the switch. She feels more tired with the Carboplatin rather than the Cisplatin, but otherwise not much different. ECOG score: 1       ROS:   and GI review: Negative  Cardiopulmonary review:Negative   Musculoskeletal:  Negative    A comprehensive review of systems was negative except for that written in the History of Present Illness.  , 10 point ROS        Problem List:  Patient Active Problem List    Diagnosis Date Noted    Obesity, morbid ([facility] 12/12/2017    Strain of left trapezius muscle 11/10/2017    Uncontrolled type 2 diabetes mellitus without complication, without long-term current use of insulin ([address] 08/16/2017    Stage 4 chronic kidney disease ([address] 08/16/2017    Lymph edema 06/14/2017    Bilateral lower extremity edema 06/14/2017    Hypertension, essential 01/18/2017    Hypercholesterolemia 01/18/2017    GERD without esophagitis 01/18/2017    Vaginal cancer ([address] 12/10/2015    LAP-BAND surgery status 05/17/2012     PMH:  Past Medical History:   Diagnosis Date    Abnormal mammogram of left breast 12/5/2016    Cancer ([address] 06/2017    LYMPH NODES IN BACK    Cancer ([address] 2016    VAGINAL CANCER    GERD (gastroesophageal reflux disease) 6/11/06    Hypercholesterolemia 10/11/06    Hypertension 10/11/06    Morbid obesity ([address] 10/11/06      PSH:  Past Surgical History:   Procedure Laterality Date    HX APPENDECTOMY      HX GASTRIC BYPASS  2009    LAP BAND    HX GYN  12/4/15    EUA/Bx of pelvic mass and vagina/cystoscopy/proctoscopy    HX HYSTERECTOMY  1978    HX ORTHOPAEDIC  2009    right ankle    HX OTHER SURGICAL  2001?     benign mass removed from lower back    LAP, PLACE ADJUST GASTR BAND  1/30/08    EB: AP Std lap band, liver bx      Social History:  Social History   Substance Use Topics    Smoking status: Former Smoker     Packs/day: 0.50     Years: 15.00     Types: Cigarettes     Quit date: 1/1/1993    Smokeless tobacco: Never Used    Alcohol use Yes      Comment: socially-WINE ONCE A WEEK      Family History:  Family History   Problem Relation Age of Onset    Cancer Mother      colon WITH METS TO LIVER    Diabetes Father     Heart Disease Father     Cancer Sister      breast & bone    Cancer Maternal Aunt      BREAST    Cancer Paternal Aunt      BREAST    Anesth Problems Neg Hx       Medications: (reviewed)  Current Outpatient Prescriptions   Medication Sig    oxyCODONE-acetaminophen (PERCOCET) 5-325 mg per tablet Take 1-2 Tabs by mouth every four (4) hours as needed for Pain. Max Daily Amount: 12 Tabs.  cyclobenzaprine (FLEXERIL) 10 mg tablet Take 1 Tab by mouth three (3) times daily as needed for Muscle Spasm(s).  ondansetron (ZOFRAN ODT) 4 mg disintegrating tablet Take 1 Tab by mouth every eight (8) hours as needed for Nausea. Indications: CANCER CHEMOTHERAPY-INDUCED NAUSEA AND VOMITING    dexamethasone (DECADRON) 4 mg tablet Take 2 tablets with breakfast the day before chemo and for 2 days after chemo    carvedilol (COREG) 3.125 mg tablet Take 1 Tab by mouth two (2) times daily (with meals).  COLACE 100 mg capsule     lidocaine-prilocaine (EMLA) topical cream Apply small amount over port area one hour before chemo treatment and cover with a Band-Aid    docusate sodium (STOOL SOFTENER) 100 mg capsule Take 100 mg by mouth daily.  atorvastatin (LIPITOR) 80 mg tablet Take 1 Tab by mouth daily.  triamterene-hydroCHLOROthiazide (MAXZIDE) 37.5-25 mg per tablet Take 1 Tab by mouth daily.  Cholecalciferol, Vitamin D3, (VITAMIN D3) 1,000 unit cap Take 1 Cap by mouth.  furosemide (LASIX) 20 mg tablet Take 1 Tab by mouth daily as needed. No current facility-administered medications for this visit. Allergies: (reviewed)  Allergies   Allergen Reactions    Amlodipine Other (comments)     Edema in LE's          OBJECTIVE:    Physical Exam:  VITAL SIGNS: Vitals:    01/02/18 0915 01/02/18 0922   BP: (!) 173/91 167/80   Weight: 243 lb 12.8 oz (110.6 kg)    Height: 5' 2.01\" (1.575 m)      Body mass index is 44.58 kg/(m^2). GENERAL MIGUELANGEL: Conversant, alert, oriented. No acute distress. HEENT: HEENT. No thyroid enlargement. No JVD. Neck: Supple without restrictions. RESPIRATORY: Clear to auscultation and percussion to the bases. No CVAT. CARDIOVASC: RRR without murmur/rub.    GASTROINT: soft, non-tender, without masses or organomegaly   MUSCULOSKEL: no joint tenderness, deformity or swelling   EXTREMITIES: Marked edema of bilateral lower extremities, L>R. Skin noted with vascular changes on the left. PELVIC: Deferred   RECTAL: Deferred   PATRICIO SURVEY: No suspicious lymphadenopathy. NEURO: Grossly intact. No acute deficit. CT of abdomen/pelvis (5/16/17)  LUNG BASES: Multiple bilateral pulmonary nodules. INCIDENTALLY IMAGED HEART AND MEDIASTINUM: Unremarkable. LIVER: No mass or biliary dilatation. GALLBLADDER: Cholelithiasis. SPLEEN: Calcified granulomata. PANCREAS: No mass or ductal dilatation. ADRENALS: Unremarkable. KIDNEYS: Right renal cyst.  STOMACH: Unremarkable. SMALL BOWEL: No dilatation or wall thickening. COLON: No dilatation or wall thickening. APPENDIX: Unremarkable. PERITONEUM: No ascites or pneumoperitoneum. RETROPERITONEUM: Significant retroperitoneal lymphadenopathy, largest dimension  surrounding the aorta 6.5 x 4.2 cm. REPRODUCTIVE ORGANS: There is 4.6 x 2.4 cm soft tissue in the region of  previously identified left pelvic sidewall mass. URINARY BLADDER: No mass or calculus. BONES: No destructive bone lesion. Degenerative changes of L4-L5 and L5-S1. ADDITIONAL COMMENTS: N/A     IMPRESSION:  1. Bilateral pulmonary nodules and retroperitoneal lymphadenopathy compatible  with metastatic disease. 2. Much smaller mass in the left pelvis, or scar. 3. Cholelithiasis. PET/CT (6/6/17)  HEAD/NECK: No apparent foci of abnormal hypermetabolism. Cerebral evaluation is  limited by normal intense activity.     CHEST: There are hypermetabolic left supraclavicular, superior mediastinal, and  paraesophageal lymph nodes. The small pulmonary nodules are minimally  hypermetabolic, maximum SUV of the lesion in the right middle lobe is 1.9.     ABDOMEN/PELVIS: Para-aortic and aortocaval lymphadenopathy is hypermetabolic,  maximum SUV 8.0.  Small but hypermetabolic bilateral external iliac lymph nodes  are noted.     SKELETON: There is a hypermetabolic lytic lesion in the sternum, maximum SUV is  9.7. There is a small sclerotic lesion at L3 which is new compared to the prior  PET/CT. Given the tracer activity corresponding to the adjacent retroperitoneal  lymphadenopathy that is difficult to determine if this small lesion is  hypermetabolic.     IMPRESSION:   1. Mildly hypermetabolic pulmonary nodules are concerning for metastatic  disease. 2. Hypermetabolic left supraclavicular, superior mediastinal, and paraesophageal  lymph nodes as well as retroperitoneal and bilateral external iliac lymph nodes. 3. Hypermetabolic lytic lesion in the sternum is concerning for metastatic  disease. Small sclerotic lesion at L3 is new compared to the prior PET/CT but  too small for accurate characterization by PET CT.      CT of abdomen/pelvis (8/14/17)  There is a left jugular Port-A-Cath with tip in the superior vena cava. LOWER NECK:The visualized portions of the thyroid and structures of the lower  neck are within normal limits. CHEST: There is a 1.4 cm left supraclavicular lymph node. Lungs: There are multiple nodules throughout the lungs, some with cavitation  which are unchanged. Pleura: There is no pleural effusion or pneumothorax. Aorta: The aorta enhances normally with no evidence of aneurysm or dissection. Mediastinum: There is no mediastinal or hilar adenopathy or mass. Bones and soft tissues: The bones and soft tissues of the chest wall are normal.  ABDOMEN:  Liver: The liver is normal in size and contour with no focal abnormality. Gallbladder and bile ducts: There are small stones in the gallbladder. The  gallbladder wall is not thickened and there is no pericholecystic fluid. Spleen: No abnormality. There are tiny calcified granulomas in the spleen. Pancreas: No abnormality. Adrenal glands: No abnormality. Kidneys: No abnormality. PELVIS:  Reproductive organs: The uterus is absent. Bladder: No abnormality.    BOWEL AND MESENTERY: There is a laparoscopic gastric band in expected position  in the upper abdomen. The small bowel is not obstructed. There is no mesenteric  mass or adenopathy. The appendix is absent. There are diverticula of the  sigmoid colon. PERITONEUM: There is no ascites or free intraperitoneal air. RETROPERITONEUM: The aorta is atherosclerotic and tapers without aneurysm. There  is retroperitoneal adenopathy. There is a 2.7 x 2.1 x 3.3 cm left para-aortic  lymph node and just anterior to this there is a 1.9 x 1.9 x 2.6 cm preaortic  lymph node. These have decreased minimally in size and previously measured 2 .9  x 2.5 and 2 x 2 centimeters. There is no pelvic mass or adenopathy. BONES AND SOFT TISSUES: There are degenerative changes of the spine and there  are sclerotic lesions in the L3 and L4 vertebral bodies. There is a sclerotic  and lytic lesion in the sternum.     IMPRESSION:   1. Status post hysterectomy and bilateral oophorectomy. 2. Bilateral pulmonary nodules, some with cavitation unchanged consistent with  metastatic disease. 3 Retroperitoneal adenopathy unchanged or minimally decreased in size and  enlarged left supraclavicular lymph node consistent with metastatic disease. 4. Small left pelvic mass or scar unchanged. 5. Lytic and sclerotic sternal lesion consistent with metastasis. 6. L3 and L4 bone lesions more sclerotic suggesting healing. 7. Atherosclerotic  abdominal aorta without aneurysm. 8. Gallstones. 9. Splenic granulomas indicative of old granulomatous disease. CT of chest/abdomen/pelvis (10/23/17)  THYROID: No nodule. MEDIASTINUM: No mass or lymphadenopathy. PATRICIA: No mass or enlarged lymphadenopathy. Calcified right hilar lymph nodes. THORACIC AORTA: Atherosclerotic calcifications without aneurysm. MAIN PULMONARY ARTERY: Normal in caliber. TRACHEA/BRONCHI: Patent. ESOPHAGUS: No wall thickening or dilatation. HEART:  The visualized heart is normal in size without pericardial effusion.   PLEURA: No effusion or pneumothorax. LUNGS: : There are multiple nodules throughout the lungs, some with cavitation  which are unchanged. Calcified granulomata right lung. LIVER: Calcified granulomata. Otherwise unremarkable. GALLBLADDER: Partially distended. Calcified stones within the lumen at the  fundus. Otherwise unremarkable. SPLEEN: Outside granuloma. Otherwise unremarkable. PANCREAS: No mass or duct dilation. ADRENALS: Unremarkable. KIDNEYS: No mass, calculus, or hydronephrosis. STOMACH: Gastric lap band in position. Otherwise unremarkable. SMALL BOWEL: No dilatation or wall thickening. COLON: No dilation or wall thickening. APPENDIX: Unremarkable. PERITONEUM: No ascites or pneumoperitoneum. RETROPERITONEUM: The aorta is atherosclerotic and tapers without aneurysm. There  is retroperitoneal adenopathy. There is a 2.5 x 2.7 cm left para-aortic lymph  node, previously 2.4 x 2.5 cm and a preaortic lymph node just anterior to this  measuring 1.7 x 2.1 cm, previously 1.9 x 2.3 cm. Overall confluent periaortic  retroperitoneal adenopathy extending to the bifurcation appears progressed from  prior. There is no pelvic mass or adenopathy. REPRODUCTIVE ORGANS: Uterus and ovaries are surgically absent. URINARY BLADDER: No mass or calculus. BONES: Sclerotic manubrial and vertebral body lesions appear stable. ADDITIONAL COMMENTS: Left-sided Port-A-Cath in position with catheter extending  to the upper superior vena cava. IMPRESSION:  Slight progression of retroperitoneal adenopathy with stable  appearing pulmonary and osseous metastases as above. Additional incidental  findings as above. IMPRESSION/PLAN:  Erica Basilio is a 75 y.o. female with a history of stage III vaginal carcinoma. She was treated with pelvic radiation therapy and concurrent cisplatin chemotherapy. She now has recurrent disease with multiple sites of metastases.   I had a long discussion with the patient and her  discussing the best course of management. Surgery is not an option due to the widespread nature of her disease. The same is true for radiation therapy. Her only viable option is systemic chemotherapy. Since vaginal cancer behaves like cervical carcinoma, I recommended using the standard regimen for metastatic cervical carcinoma, Taxol/Cisplatin/Avastin. Unfortunately, the Avastin was denied by her insurance company. We chose to proceed with Taxol/Cisplatin. She completed 6 cycles. On her CT in July 2017 her pulmonary nodules appeared stable and her retroperitoneal lymphadenopathy appeared slightly improved. Her most recent scan in October 2017 demonstrated slight progression of the lymphadenopathy, while the pulmonary and bony disease appeared stable. On my review, the changes didn't meet RECIST criteria for progression. I recommended continuing with therapy, but I suggested that we exchange Carboplatin for Cisplatin due to nephrotoxicity. Her creatinine had steadily climbed with the Cisplatin. She has completed 3 cycles of Taxol/Carboplatin since the switch. We will get a CT at this time to reevaluate and plan to continue with the current regimen.       Signed By: Betty South MD     1/2/2018/4:31 PM

## 2018-01-03 PROBLEM — R79.89 ELEVATED SERUM CREATININE: Status: ACTIVE | Noted: 2018-01-01

## 2018-01-03 NOTE — IP AVS SNAPSHOT
[address] 
[phone] Patient: Ra Coleman MRN: [id number] JMX:[id number] A check michael indicates which time of day the medication should be taken. My Medications START taking these medications Instructions Each Dose to Equal  
 Morning Noon Evening Bedtime  
 aspirin delayed-release 81 mg tablet Start taking on:  1/6/2018 Your last dose was: Your next dose is: Take 1 Tab by mouth daily. 81 mg  
    
   
   
   
  
 famotidine 20 mg tablet Commonly known as:  PEPCID Start taking on:  1/6/2018 Your last dose was: Your next dose is: Take 1 Tab by mouth daily. Indications: gastroesophageal reflux disease 20 mg CONTINUE taking these medications Instructions Each Dose to Equal  
 Morning Noon Evening Bedtime  
 atorvastatin 80 mg tablet Commonly known as:  LIPITOR Your last dose was: Your next dose is: Take 1 Tab by mouth daily. 80 mg  
    
   
   
   
  
 carvedilol 3.125 mg tablet Commonly known as:  Jhoana Gordon Your last dose was: Your next dose is: Take 1 Tab by mouth two (2) times daily (with meals). 3.125 mg  
    
   
   
   
  
 dexamethasone 4 mg tablet Commonly known as:  DECADRON Your last dose was: Your next dose is: Take 2 tablets with breakfast the day before chemo and for 2 days after chemo  
     
   
   
   
  
 docusate sodium 100 mg capsule Commonly known as:  Jerre Carota Your last dose was: Your next dose is: Take 100 mg by mouth daily. 100 mg  
    
   
   
   
  
 ondansetron 4 mg disintegrating tablet Commonly known as:  ZOFRAN ODT Your last dose was: Your next dose is: Take 1 Tab by mouth every eight (8) hours as needed for Nausea. Indications: CANCER CHEMOTHERAPY-INDUCED NAUSEA AND VOMITING  
 4 mg  
    
   
   
   
  
 oxyCODONE-acetaminophen 5-325 mg per tablet Commonly known as:  PERCOCET Your last dose was: Your next dose is: Take 1-2 Tabs by mouth every four (4) hours as needed for Pain. Max Daily Amount: 12 Tabs. 1-2 Tab  
    
   
   
   
  
 triamterene-hydroCHLOROthiazide 37.5-25 mg per tablet Commonly known as:  Marilee Brooks Your last dose was: Your next dose is: Take 1 Tab by mouth daily. 1 Tab VITAMIN D3 1,000 unit Cap Generic drug:  cholecalciferol Your last dose was: Your next dose is: Take 1 Cap by mouth daily. 1 Cap Where to Get Your Medications These medications were sent to [facility], [address] [place] [address] [address] [place] [address], [address] Phone:  [phone]  
  aspirin delayed-release 81 mg tablet  
 famotidine 20 mg tablet

## 2018-01-03 NOTE — ED TRIAGE NOTES
Pt was getting chemo and prior to finishing it she developed mid chest pain radiating up both arms, denies sob, denies n/v, denies fever , mask placed on pt in triage

## 2018-01-03 NOTE — Clinical Note
Patient Class[de-identified] Observation [088] Type of Bed: Telemetry [19] Reason for Observation: acute chest pain/ dehydration Admitting Diagnosis: Acute chest pain [8264005] Admitting Diagnosis: Dehydration [276.51. ICD-9-CM] Admitting Physician: Grace Smith Attending Physician: Grace Smith

## 2018-01-03 NOTE — PROGRESS NOTES
Misha Cadena. Derrick, DEBBIE Monroe      Date of visit: 1/3/2018   Gyn Onc: Dr. Kiko Wong Dx: Vaginal cancer    Mrs. Aruna Lao presents today for C4 of Taxol/Carbo  She began a new regimen of carbo/taxol on 11/1/17. She has been monitored closely due to rising creatine, which is currently essentially stable at 1.77. Oncology Treatment History:  12/4/2015: Dx with Vaginal cancer after an EUA and biopsies of pelvic mass and vagina                     Vagina, biopsy: Invasive squamous cell carcinoma, poorly differentiated. No loss of mismatch repair proteins   1/15/16-2/12/16: Weekly Cisplatin with XRT with good response. 5/16/17: CT scan showed Bilateral pulmonary nodules and retroperitoneal lymphadenopathy compatible with metastatic disease. 6/14/17-10/4/17: 6 cycles of Cisplatin/Taxol. Much difficulty with neuropathy  10/23/17: CT scan (without contrast) after 6 cycles showed  Slight progression of retroperitoneal adenopathy with stable appearing pulmonary and osseous metastases as above. 10/26/17: saw after above CT results. Dr. Julio Cesar Rodriguez felt the changes didn't meet RECIST criteria for progression. He recommend continuing with therapy, but suggested that we exchange Carboplatin for Cisplatin due to nephrotoxicity. Her creatinine has pamella with the Cisplatin. 11/1/17: Began Carbo/Taxol        Current Outpatient Prescriptions   Medication Sig    oxyCODONE-acetaminophen (PERCOCET) 5-325 mg per tablet Take 1-2 Tabs by mouth every four (4) hours as needed for Pain. Max Daily Amount: 12 Tabs.  cyclobenzaprine (FLEXERIL) 10 mg tablet Take 1 Tab by mouth three (3) times daily as needed for Muscle Spasm(s).  ondansetron (ZOFRAN ODT) 4 mg disintegrating tablet Take 1 Tab by mouth every eight (8) hours as needed for Nausea.  Indications: CANCER CHEMOTHERAPY-INDUCED NAUSEA AND VOMITING    dexamethasone (DECADRON) 4 mg tablet Take 2 tablets with breakfast the day before chemo and for 2 days after chemo    carvedilol (COREG) 3.125 mg tablet Take 1 Tab by mouth two (2) times daily (with meals).  COLACE 100 mg capsule     furosemide (LASIX) 20 mg tablet Take 1 Tab by mouth daily as needed.  lidocaine-prilocaine (EMLA) topical cream Apply small amount over port area one hour before chemo treatment and cover with a Band-Aid    docusate sodium (STOOL SOFTENER) 100 mg capsule Take 100 mg by mouth daily.  atorvastatin (LIPITOR) 80 mg tablet Take 1 Tab by mouth daily.  triamterene-hydroCHLOROthiazide (MAXZIDE) 37.5-25 mg per tablet Take 1 Tab by mouth daily.  Cholecalciferol, Vitamin D3, (VITAMIN D3) 1,000 unit cap Take 1 Cap by mouth. Current Facility-Administered Medications   Medication Dose Route Frequency    sodium chloride 0.9 % injection 10 mL  10 mL IntraVENous PRN    heparin (porcine) pf 500 Units  500 Units IntraVENous PRN    sodium chloride (NS) flush 10-40 mL  10-40 mL IntraVENous PRN    CARBOplatin (PARAPLATIN) 388 mg in dextrose 5% 250 mL, overfill volume 25 mL chemo infusion  388 mg IntraVENous ONCE    sodium chloride 0.9 % injection 10 mL  10 mL IntraVENous PRN    heparin (porcine) pf 500 Units  500 Units IntraVENous PRN    sodium chloride (NS) flush 10 mL  10 mL IntraVENous PRN        Review of Systems:  General: Denies wt loss. Says fatigue is still her only \"real complaint\" but she is still trying to be as active as possible. HEENT: Denies visual changes, dysphagia or headache  Resp: Denies SOB, BLACKBURN, wheezing or cough  CV: Denies CP, palpitations   GI/:Continues with Miralax for occasional constipation. Denies N/V, diarrhea or dysuria  MuskSkel: Says left shoulder is slowly improving and she is trying to stretch it a little more very day. Continues with lymphedema clinic and has seen improvement with the compression machine and is using it as much as she can for left leg lymphedema. Says she is going to begin to use it on right leg as well per the clinic nurses. Neuro: Denies dizzyness or syncope    Objective:     Visit Vitals    Ht 5' 2\" (1.575 m)    Wt 246 lb (111.6 kg)    BMI 44.99 kg/m2         Physical Exam:  General: A&O X3 in NAD with pleasant affect but tired appearing  HEENT: Sclera anicteric, mucosa pink, moist   Neck: No JVD or adenopathy appreciated. Port Site: without redness, tenderness or swelling  Heart: Regular without M/R/G  Lungs: CTA Bilat   Abd: Soft, obese without tenderness, fluid wave or distention and with + BS throughout  Ext:Left shoulder less sore than previously examined. Improved upward movement noted. + radial pulses and fingers warm to touch. Extensive left leg edema with vascular skin changes previously improved this visit. Skin now more pink than red and some wrinkles now noted to both lower ext.  + pedal pulses bilat  Neuro: grossly intact        IMAGING:  CT abd/prlvis W/O contrast 10/23/2017  THYROID: No nodule. MEDIASTINUM: No mass or lymphadenopathy. PATRICIA: No mass or enlarged lymphadenopathy. Calcified right hilar lymph nodes. THORACIC AORTA: Atherosclerotic calcifications without aneurysm. MAIN PULMONARY ARTERY: Normal in caliber. TRACHEA/BRONCHI: Patent. ESOPHAGUS: No wall thickening or dilatation. HEART:  The visualized heart is normal in size without pericardial effusion. PLEURA: No effusion or pneumothorax. LUNGS: : There are multiple nodules throughout the lungs, some with cavitation  which are unchanged. Calcified granulomata right lung. LIVER: Calcified granulomata. Otherwise unremarkable. GALLBLADDER: Partially distended. Calcified stones within the lumen at the  fundus. Otherwise unremarkable. SPLEEN: Outside granuloma. Otherwise unremarkable. PANCREAS: No mass or duct dilation. ADRENALS: Unremarkable. KIDNEYS: No mass, calculus, or hydronephrosis. STOMACH: Gastric lap band in position. Otherwise unremarkable.   SMALL BOWEL: No dilatation or wall thickening. COLON: No dilation or wall thickening. APPENDIX: Unremarkable. PERITONEUM: No ascites or pneumoperitoneum. RETROPERITONEUM: The aorta is atherosclerotic and tapers without aneurysm. There  is retroperitoneal adenopathy. There is a 2.5 x 2.7 cm left para-aortic lymph  node, previously 2.4 x 2.5 cm and a preaortic lymph node just anterior to this  measuring 1.7 x 2.1 cm, previously 1.9 x 2.3 cm. Overall confluent periaortic  retroperitoneal adenopathy extending to the bifurcation appears progressed from  prior. There is no pelvic mass or adenopathy. REPRODUCTIVE ORGANS: Uterus and ovaries are surgically absent. URINARY BLADDER: No mass or calculus. BONES: Sclerotic manubrial and vertebral body lesions appear stable. ADDITIONAL COMMENTS: Left-sided Port-A-Cath in position with catheter extending  to the upper superior vena cava. IMPRESSION:  Slight progression of retroperitoneal adenopathy with stable  appearing pulmonary and osseous metastases as above. Additional incidental  findings as above. Assessment:     Patient Active Problem List   Diagnosis Code    LAP-BAND surgery status Z98.84    Vaginal cancer ([facility] [address] C52    Hypertension, essential I10    Hypercholesterolemia E78.00    GERD without esophagitis K21.9    Lymph edema I89.0    Bilateral lower extremity edema R60.0    Uncontrolled type 2 diabetes mellitus without complication, without long-term current use of insulin (HCC) E11.65    Stage 4 chronic kidney disease (HCC) N18.4    Strain of left trapezius muscle S46.812A    Obesity, morbid (HCC) E66.01    Elevated serum creatinine R79.89         Plan: Will plan on proceeding with 4th cycle of Carbo/Taxol   Monitor creat closely and hydrate with 1 liter of NaCl today especially since pt says she does not feel she is able to hydrate as well as she should.    Follow up with lymphedema clinic and continue with compression machine per Lymphedema clinic  Continue to monitor hgb and transfuse if goes below 8  Continue with anti-hypertensive and if creatine goes higher, will discuss diuretics with PCP. Hydration encouraged  Encouraged to call for any concerns or questions  Leslie Ruiz NP       ADDENDUM:  Pt tolerated Taxol wall and after 20-25 minutes of Carbo infusion, pt began to complain of \"the worse indigestion I have ever had\". The nurse treated as a reaction and Solu-medrol was given. Vitals remained stable throughout and never had any other symptoms. After about 20 minutes, pt said it began to [facility] BRANT TELLE[facility] up\" but then said it radiated to her neck and jaw. At this point, I recommended pt proceed to the ER with her comorbidity for further evaluation. Pt and family were in agreement.   Leslie Ruiz NP DISPLAY PLAN FREE TEXT

## 2018-01-03 NOTE — IP AVS SNAPSHOT
2700 [facility] [address] [place] [address] 
[phone] Patient: Latha Garrido MRN: [id number] CLS:2/7/2788 About your hospitalization You were admitted on:  January 4, 2018 You last received care in the:  [place] [address] You were discharged on:  January 5, 2018 Why you were hospitalized Your primary diagnosis was: Atypical Chest Pain Your diagnoses also included:  Dehydration, Vaginal Cancer (Hcc), Edema, Anasarca, Anemia, Thrombocytopenia (Hcc), Hypertensive Urgency, Stage 4 Chronic Kidney Disease (Hcc), Obesity, Morbid (Hcc), Gerd Without Esophagitis Follow-up Information Follow up With Details Comments Contact Info Matthew Harris MD In 1 week hospital follow up [place] [address] [address] [facility] [address] [phone] Petr Grubbs MD  Gyn/Onc; as scheduled 5899 [facility]7 603 [facility] 7 [address] 
[phone] 
  
 chemotherapy as scheduled Your Scheduled Appointments Friday January 19, 2018 11:00 AM EST  
CT ABD PELV W CONT with 521 [facility] CT ER 3 521 [facility] RAD CT ([address]) [address]  
[phone] CONTRAST STUDY: 1. The patient should not eat solid food four hours before the appointment but should be encouraged to drink clear liquids. 2.  If you have to drink oral contrast, please pick it up any weekday prior to your appointment, if you cannot please check in 2 hrs before appt time. 3.  The patient will require IV access for contrast administration. 4.  The patient should not take Ibuprofen (Advil, Motrin, etc.) and Naproxen Sodium (Aleve, etc.)  on the day of the exam. Stopping non-steroidal anti-inflammatory agents (NSAIDs) like Ibuprofen decreases the risk of kidney damage from the x-ray contrast (dye).  5.  Bring any non [facility] facility films/images pertaining to the area of interest with you on the day of appointment. 6.  Bring current lab work if available (within last 90 days CMP) ***If scheduled at [facility], iSTAT is not available, labs will need to be done before appointment*** 7. Check in at registration at least 30 minutes before appt time unless you were instructed to do otherwise. Patient should report to outpatient registration ([address] [place] [address]), 30 minutes prior to the appointment time. (NOT FOR MRI)  After the time of 4:30pm, please go to Admitting (Main Entrance, 1st office on the left) to be registered until 7:00pm (closing). After 7pm pt goes to ER registration On Saturday during the hours of 7:00am - 3:00pm patients can be registered for outpatient services at Admitting. After 3pm go to ER registration. On Sunday pt goes to ER registration only. Monday January 22, 2018 10:00 AM EST [facility]  [facility] ([address] [place] [address]) 200 [facility] [address] [facility] 7 [address]  
[phone] Wednesday January 24, 2018  9:00 AM EST INFUSION 300 with BREMO INFUSION NURSE 5  
455 [facility] ([address]) [address] [facility] [address]  
[phone] Go to [address] [facility], ground floor. Tuesday February 13, 2018  9:00 AM EST CHEMOTHERAPY with Khari Phillips MD  
1310 [facility] Gynecologic Oncology [address] [place] [address]) 200 [facility] Suite [facility] [address]  
[phone] Wednesday February 14, 2018  9:00 AM EST INFUSION 300 with BREMO INFUSION NURSE 5  
455 [facility] ([address]) [address] [facility] 7 [address]  
[phone] Go to [address], ground floor. Discharge Orders None A check michael indicates which time of day the medication should be taken. My Medications START taking these medications Instructions Each Dose to Equal  
 Morning Noon Evening Bedtime  
 aspirin delayed-release 81 mg tablet Start taking on:  1/6/2018 Your last dose was: Your next dose is: Take 1 Tab by mouth daily. 81 mg  
    
   
   
   
  
 famotidine 20 mg tablet Commonly known as:  PEPCID Start taking on:  1/6/2018 Your last dose was: Your next dose is: Take 1 Tab by mouth daily. Indications: gastroesophageal reflux disease 20 mg CONTINUE taking these medications Instructions Each Dose to Equal  
 Morning Noon Evening Bedtime  
 atorvastatin 80 mg tablet Commonly known as:  LIPITOR Your last dose was: Your next dose is: Take 1 Tab by mouth daily. 80 mg  
    
   
   
   
  
 carvedilol 3.125 mg tablet Commonly known as:  Lurlene Solo Your last dose was: Your next dose is: Take 1 Tab by mouth two (2) times daily (with meals). 3.125 mg  
    
   
   
   
  
 dexamethasone 4 mg tablet Commonly known as:  DECADRON Your last dose was: Your next dose is: Take 2 tablets with breakfast the day before chemo and for 2 days after chemo  
     
   
   
   
  
 docusate sodium 100 mg capsule Commonly known as:  Olene Cam Your last dose was: Your next dose is: Take 100 mg by mouth daily. 100 mg  
    
   
   
   
  
 ondansetron 4 mg disintegrating tablet Commonly known as:  ZOFRAN ODT Your last dose was: Your next dose is: Take 1 Tab by mouth every eight (8) hours as needed for Nausea. Indications: CANCER CHEMOTHERAPY-INDUCED NAUSEA AND VOMITING  
 4 mg  
    
   
   
   
  
 oxyCODONE-acetaminophen 5-325 mg per tablet Commonly known as:  PERCOCET Your last dose was: Your next dose is: Take 1-2 Tabs by mouth every four (4) hours as needed for Pain. Max Daily Amount: 12 Tabs. 1-2 Tab  
    
   
   
   
  
 triamterene-hydroCHLOROthiazide 37.5-25 mg per tablet Commonly known as:  Darin Arauz Your last dose was: Your next dose is: Take 1 Tab by mouth daily. 1 Tab VITAMIN D3 1,000 unit Cap Generic drug:  cholecalciferol Your last dose was: Your next dose is: Take 1 Cap by mouth daily. 1 Cap Where to Get Your Medications These medications were sent to 230 [facility], [address] 2211 [facility]  2211 [facility], [address] Phone:  [phone]  
  aspirin delayed-release 81 mg tablet  
 famotidine 20 mg tablet Discharge Instructions ADDITIONAL CARE RECOMMENDATIONS:  
1. Take medications as prescribed. 2. Keep appointment(s) as recommended/scheduled. DIET: Cardiac Diet ACTIVITY: Activity as tolerated WOUND CARE: routine Portacath care EQUIPMENT needed: bilateral  ASHLEE hose when not ambulating Gastroesophageal Reflux Disease (GERD): Care Instructions Your Care Instructions Gastroesophageal reflux disease (GERD) is the backward flow of stomach acid into the esophagus. The esophagus is the tube that leads from your throat to your stomach. A one-way valve prevents the stomach acid from moving up into this tube. When you have GERD, this valve does not close tightly enough. If you have mild GERD symptoms including heartburn, you may be able to control the problem with antacids or over-the-counter medicine. Changing your diet, losing weight, and making other lifestyle changes can also help reduce symptoms. Follow-up care is a key part of your treatment and safety. Be sure to make and go to all appointments, and call your doctor if you are having problems.  It's also a good idea to know your test results and keep a list of the medicines you take. How can you care for yourself at home? · Take your medicines exactly as prescribed. Call your doctor if you think you are having a problem with your medicine. · Your doctor may recommend over-the-counter medicine. For mild or occasional indigestion, antacids, such as Tums, Gaviscon, Mylanta, or Maalox, may help. Your doctor also may recommend over-the-counter acid reducers, such as Pepcid AC, Tagamet HB, Zantac 75, or Prilosec. Read and follow all instructions on the label. If you use these medicines often, talk with your doctor. · Change your eating habits. ¨ It's best to eat several small meals instead of two or three large meals. ¨ After you eat, wait 2 to 3 hours before you lie down. ¨ Chocolate, mint, and alcohol can make GERD worse. ¨ Spicy foods, foods that have a lot of acid (like tomatoes and oranges), and coffee can make GERD symptoms worse in some people. If your symptoms are worse after you eat a certain food, you may want to stop eating that food to see if your symptoms get better. · Do not smoke or chew tobacco. Smoking can make GERD worse. If you need help quitting, talk to your doctor about stop-smoking programs and medicines. These can increase your chances of quitting for good. · If you have GERD symptoms at night, raise the head of your bed 6 to 8 inches by putting the frame on blocks or placing a foam wedge under the head of your mattress. (Adding extra pillows does not work.) · Do not wear tight clothing around your middle. · Lose weight if you need to. Losing just 5 to 10 pounds can help. When should you call for help? Call your doctor now or seek immediate medical care if: 
? · You have new or different belly pain. ? · Your stools are black and tarlike or have streaks of blood. ? Watch closely for changes in your health, and be sure to contact your doctor if: 
? · Your symptoms have not improved after 2 days. ? · Food seems to catch in your throat or chest.  
Where can you learn more? Go to http://cele-alyssa.info/. Enter S125 in the search box to learn more about \"Gastroesophageal Reflux Disease (GERD): Care Instructions. \" Current as of: May 12, 2017 Content Version: 11.4 © 0413-7154 [employer]. Care instructions adapted under license by [employer] (which disclaims liability or warranty for this information). If you have questions about a medical condition or this instruction, always ask your healthcare professional. [address] any warranty or liability for your use of this information. [employer] Announcement We are excited to announce that we are making your provider's discharge notes available to you in [employer]. You will see these notes when they are completed and signed by the physician that discharged you from your recent hospital stay. If you have any questions or concerns about any information you see in [employer], please call the Health Information Department where you were seen or reach out to your Primary Care Provider for more information about your plan of care. Introducing [facility]! Dear Tomy Arreola: Thank you for requesting a [employer] account. Our records indicate that you already have an active [employer] account. You can access your account anytime at [email or web address] Did you know that you can access your hospital and ER discharge instructions at any time in [employer]? You can also review all of your test results from your hospital stay or ER visit. Additional Information If you have questions, please visit the Frequently Asked Questions section of the [employer] website at [email or web address] Remember, [employer] is NOT to be used for urgent needs. For medical emergencies, dial 911. Now available from your iPhone and Android! Providers Seen During Your Hospitalization Provider Specialty Primary office phone Clarisa Morgan MD Emergency Medicine [phone] Herbert Dupree MD Internal Medicine [phone] Your Primary Care Physician (PCP) Primary Care Physician Office Phone Office Fax [address], [address] [phone] You are allergic to the following Allergen Reactions Amlodipine Other (comments) Edema in LE's Recent Documentation Height Weight BMI OB Status Smoking Status 1.575 m 109.7 kg 44.23 kg/m2 Hysterectomy Former Smoker Emergency Contacts Name Discharge Info Relation Home Work Mobile [facility] CAREGIVER [3] Spouse [3] [phone] M,A  Spouse [3] [phone] Patient Belongings The following personal items are in your possession at time of discharge: 
     Visual Aid: Glasses Discharge Instructions Attachments/References ASPIRIN (BY MOUTH) (ENGLISH) Patient Handouts Aspirin (By mouth) Aspirin (AS-pir-in) Treats pain, fever, and inflammation. May lower risk of heart attack and stroke. Brand Name(s): Ascriptin Regular Strength, Aspergum, Aspir Low, Aspirin Adult Low Dose, Aspirin Low Dose, Devorah Aspirin Children's, Devorah Aspirin Regimen, Devorah Extra Strength, Devorah Genuine Aspirin, Devorah Low Dose, Bufferin, Bufferin Low Dose, Durlaza, Ecotrin, Ecpirin There may be other brand names for this medicine. When This Medicine Should Not Be Used: This medicine is not right for everyone. Do not use it if you had an allergic reaction to aspirin or other NSAIDs, or if you have a history of asthma with nasal polyps and rhinitis. How to Use This Medicine:  
Delayed Release Capsule, Long Acting Capsule, Gum, Tablet, Chewable Tablet, Fizzy Tablet, Coated Tablet, Long Acting Tablet, 24 Hour Capsule · Your doctor will tell you how much medicine to use. Do not use more than directed. · It is best to take this medicine with food or milk. · Capsule, tablet, or coated tablet: Swallow whole. Do not crush, break, or chew it. · Chewable tablet: You may chew it completely or swallow it whole. · Gum: Chew completely to make sure you get as much medicine as possible. Drink a full glass (8 ounces) of water after chewing the gum. · Swallow the extended-release capsule whole. Do not crush, break, or chew it. Take the capsule with a full glass of water at the same time each day. · Follow the instructions on the medicine label if you are using this medicine without a prescription. · Missed dose: If you miss a dose of Durlaza, skip the missed dose and go back to your regular dosing schedule. Do not take extra medicine to make up for a missed dose. · Store the medicine in a closed container at room temperature, away from heat, moisture, and direct light. Drugs and Foods to Avoid: Ask your doctor or pharmacist before using any other medicine, including over-the-counter medicines, vitamins, and herbal products. · Some foods and medicines can affect how aspirin works. Tell your doctor if you are using any of the following: ¨ Dipyridamole, methotrexate, probenecid, sulfinpyrazone, ticlopidine ¨ Blood thinner (including clopidogrel, prasugrel, ticagrelor, warfarin) ¨ Blood pressure medicine ¨ Medicine to treat seizures (including phenytoin, valproic acid) ¨ NSAID pain or arthritis medicine (including celecoxib, diclofenac, ibuprofen, naproxen) ¨ Steroid medicine (including dexamethasone, hydrocortisone, methylprednisolone, prednisolone, prednisone) · Do not take Durlaza 2 hours before or 1 hour after you drink alcohol or take medicines that contain alcohol. Warnings While Using This Medicine: · Tell your doctor if you are pregnant or breastfeeding.  Do not use this medicine during the later part of a pregnancy unless your doctor tells you to. · Tell your doctor if you have kidney disease, liver disease, high blood pressure, heart disease, or a history of stomach bleeding or ulcers. · This medicine may increase your risk for bleeding, including stomach ulcers. · Do not give aspirin to a child or teenager who has chickenpox or flu symptoms, unless the doctor says it is okay. Aspirin can cause a life-threatening reaction called Reye syndrome. · Tell any doctor or dentist who treats you that you are using this medicine. This medicine may affect certain medical test results. · Keep all medicine out of the reach of children. Never share your medicine with anyone. Possible Side Effects While Using This Medicine:  
Call your doctor right away if you notice any of these side effects: · Allergic reaction: Itching or hives, swelling in your face or hands, swelling or tingling in your mouth or throat, chest tightness, trouble breathing · Bloody or black stools, bloody vomit or vomit that looks like coffee grounds · Chest tightness, wheezing · Ringing in the ears · Severe stomach pain · Unusual bleeding, bruising, or weakness If you notice other side effects that you think are caused by this medicine, tell your doctor. Call your doctor for medical advice about side effects. You may report side effects to FDA at 6-876-FDA-4714 © 2017 [facility] Information is for End User's use only and may not be sold, redistributed or otherwise used for commercial purposes. The above information is an  only. It is not intended as medical advice for individual conditions or treatments. Talk to your doctor, nurse or pharmacist before following any medical regimen to see if it is safe and effective for you. Please provide this summary of care documentation to your next provider. Signatures-by signing, you are acknowledging that this After Visit Summary has been reviewed with you and you have received a copy. Patient Signature:  ____________________________________________________________ Date:  ____________________________________________________________  
  
Janace Minda Provider Signature:  ____________________________________________________________ Date:  ____________________________________________________________

## 2018-01-04 PROBLEM — E86.0 DEHYDRATION: Status: ACTIVE | Noted: 2018-01-01

## 2018-01-04 PROBLEM — R60.9 EDEMA: Status: ACTIVE | Noted: 2018-01-01

## 2018-01-04 PROBLEM — D64.9 ANEMIA: Status: ACTIVE | Noted: 2018-01-01

## 2018-01-04 PROBLEM — R07.9 ACUTE CHEST PAIN: Status: ACTIVE | Noted: 2018-01-01

## 2018-01-04 PROBLEM — D69.6 THROMBOCYTOPENIA (HCC): Status: ACTIVE | Noted: 2018-01-01

## 2018-01-04 PROBLEM — I16.0 HYPERTENSIVE URGENCY: Status: ACTIVE | Noted: 2018-01-01

## 2018-01-04 PROBLEM — R60.1 ANASARCA: Status: ACTIVE | Noted: 2018-01-01

## 2018-01-04 NOTE — ED NOTES
TRANSFER - OUT REPORT:    Verbal report given to Anitha(name) on Nathaniel Vitale  being transferred to [employer](unit) for routine progression of care       Report consisted of patients Situation, Background, Assessment and   Recommendations(SBAR). Information from the following report(s) SBAR, ED Summary, [facility] and Recent Results was reviewed with the receiving nurse. Lines:   Venous Access Device Port a cath 01/03/18 Upper chest (subclavicular area), left (Active)        Opportunity for questions and clarification was provided.       Patient transported with:   [employer]

## 2018-01-04 NOTE — PROGRESS NOTES
Outpatient Infusion Center - Chemotherapy Progress Note    0900 Pt admit to [facility] for Taxol/Carbo ambulatory in stable condition. Assessment completed. No new concerns voiced. Platelets were low on labs drawn yesterday so repeat CBC drawn peripherally. Platelets increased over 100 so chemotherapy ordered. Port accessed with positive blood return. IV attached to NS at [facility] given as ordered. 26 Family member came and reports patient is [place] Rodo trouble breathing\" Carbo stopped and saline bolus started. Vitals taken and stable. Pt reports \"numbness in hands and radiating chest pain. \" Memory [place] NP alerted and orders given for Solumedrol 125mg IVP. Med given. Job  and the bedside and decision made to sent to the ER to be evaluated      Chemotherapy Flowsheet 1/3/2018   Cycle C4   Date 1/3/2018   Drug / Regimen Taxol/Carbo   Dosage -   Pre Hydration -   Post Hydration -   Pre Meds given   Notes given       Visit Vitals    /81    Pulse 98    Temp 98 °F (36.7 °C)    Resp 18    Ht 5' 2\" (1.575 m)    Wt 111.6 kg (246 lb)    SpO2 98%    BMI 44.99 kg/m2       Medications:  NS  Benadryl  Decadron  Pepcid  Kytril  Taxol  Carbo - stopped after 20 minutes of infusion    1630. Port flushed with positive blood return and capped. D/c via wheelchair to ER in stable condition    Recent Results (from the past 12 hour(s))   CBC WITH AUTOMATED DIFF    Collection Time: 01/03/18  9:30 AM   Result Value Ref Range    WBC 7.2 3.6 - 11.0 K/uL    RBC 2.95 (L) 3.80 - 5.20 M/uL    HGB 9.1 (L) 11.5 - 16.0 g/dL    HCT 27.4 (L) 35.0 - 47.0 %    MCV 92.9 80.0 - 99.0 FL    MCH 30.8 26.0 - 34.0 PG    MCHC 33.2 30.0 - 36.5 g/dL    RDW 17.3 (H) 11.5 - 14.5 %    PLATELET 665 (L) 399 - 400 K/uL    NEUTROPHILS 83 (H) 32 - 75 %    BAND NEUTROPHILS 3 0 - 6 %    LYMPHOCYTES 10 (L) 12 - 49 %    MONOCYTES 2 (L) 5 - 13 %    EOSINOPHILS 0 0 - 7 %    BASOPHILS 0 0 - 1 %    METAMYELOCYTES 1 (H) 0 %    MYELOCYTES 1 (H) 0 %    ABS. NEUTROPHILS 6.2 1.8 - 8.0 K/UL    ABS. LYMPHOCYTES 0.7 (L) 0.8 - 3.5 K/UL    ABS. MONOCYTES 0.1 0.0 - 1.0 K/UL    ABS. EOSINOPHILS 0.0 0.0 - 0.4 K/UL    ABS. BASOPHILS 0.0 0.0 - 0.1 K/UL    DF MANUAL      RBC COMMENTS ANISOCYTOSIS  1+        RBC COMMENTS OVALOCYTES  PRESENT        RBC COMMENTS MACROCYTOSIS  1+       EKG, 12 LEAD, INITIAL    Collection Time: 01/03/18  4:52 PM   Result Value Ref Range    Ventricular Rate 90 BPM    Atrial Rate 90 BPM    P-R Interval 140 ms    QRS Duration 86 ms    Q-T Interval 330 ms    QTC Calculation (Bezet) 403 ms    Calculated P Axis 29 degrees    Calculated R Axis -20 degrees    Calculated T Axis 6 degrees    Diagnosis       Normal sinus rhythm  Nonspecific T wave abnormality  When compared with ECG of 09-JUN-2017 07:54,  Nonspecific T wave abnormality, worse in Lateral leads     CBC WITH AUTOMATED DIFF    Collection Time: 01/03/18  5:02 PM   Result Value Ref Range    WBC 5.8 3.6 - 11.0 K/uL    RBC 2.84 (L) 3.80 - 5.20 M/uL    HGB 8.8 (L) 11.5 - 16.0 g/dL    HCT 26.3 (L) 35.0 - 47.0 %    MCV 92.6 80.0 - 99.0 FL    MCH 31.0 26.0 - 34.0 PG    MCHC 33.5 30.0 - 36.5 g/dL    RDW 17.7 (H) 11.5 - 14.5 %    PLATELET 097 (L) 535 - 400 K/uL    NEUTROPHILS 84 (H) 32 - 75 %    BAND NEUTROPHILS 1 0 - 6 %    LYMPHOCYTES 11 (L) 12 - 49 %    MONOCYTES 3 (L) 5 - 13 %    EOSINOPHILS 0 0 - 7 %    BASOPHILS 0 0 - 1 %    MYELOCYTES 1 (H) 0 %    ABS. NEUTROPHILS 4.9 1.8 - 8.0 K/UL    ABS. LYMPHOCYTES 0.6 (L) 0.8 - 3.5 K/UL    ABS. MONOCYTES 0.2 0.0 - 1.0 K/UL    ABS. EOSINOPHILS 0.0 0.0 - 0.4 K/UL    ABS.  BASOPHILS 0.0 0.0 - 0.1 K/UL    DF MANUAL      RBC COMMENTS ROULEAUX  PRESENT        RBC COMMENTS ANISOCYTOSIS  1+        RBC COMMENTS POLYCHROMASIA  1+        RBC COMMENTS BASOPHILIC STIPPLING  PRESENT        RBC COMMENTS OVALOCYTES  PRESENT       METABOLIC PANEL, COMPREHENSIVE    Collection Time: 01/03/18  5:02 PM   Result Value Ref Range    Sodium 137 136 - 145 mmol/L    Potassium 3.9 3.5 - 5.1 mmol/L    Chloride 102 97 - 108 mmol/L    CO2 25 21 - 32 mmol/L    Anion gap 10 5 - 15 mmol/L    Glucose 182 (H) 65 - 100 mg/dL    BUN 32 (H) 6 - 20 MG/DL    Creatinine 1.80 (H) 0.55 - 1.02 MG/DL    BUN/Creatinine ratio 18 12 - 20      GFR est AA 34 (L) >60 ml/min/1.73m2    GFR est non-AA 28 (L) >60 ml/min/1.73m2    Calcium 8.2 (L) 8.5 - 10.1 MG/DL    Bilirubin, total 0.5 0.2 - 1.0 MG/DL    ALT (SGPT) 17 12 - 78 U/L    AST (SGOT) 17 15 - 37 U/L    Alk.  phosphatase 100 45 - 117 U/L    Protein, total 6.6 6.4 - 8.2 g/dL    Albumin 3.1 (L) 3.5 - 5.0 g/dL    Globulin 3.5 2.0 - 4.0 g/dL    A-G Ratio 0.9 (L) 1.1 - 2.2     TROPONIN I    Collection Time: 01/03/18  5:02 PM   Result Value Ref Range    Troponin-I, Qt. <0.04 <0.05 ng/mL   CK W/ CKMB & INDEX    Collection Time: 01/03/18  5:02 PM   Result Value Ref Range    CK 41 26 - 192 U/L    CK - MB <1.0 <3.6 NG/ML    CK-MB Index Cannot be calculated 0 - 2.5

## 2018-01-04 NOTE — CARDIO/PULMONARY
Cardiac Rehab: Met with Gianfranco Kaye and her . Echo and Stress Test brochures given. They live in [place]. Teach back method used. Discussed procedure and purpose of echo and stress test. Allowed patient to talk about recent event (\"incurable cancer, brother recently dies of cancer\", and experience with chest pain). Much emotional support given. Gianfranco Kaye and  verbalized understanding with questions answered.

## 2018-01-04 NOTE — PROCEDURES
[facility] [employer]  *** FINAL REPORT ***    Name: Kenneth Hemphill  MRN: [id number]  : 1950  HIS Order #: [id number]  [id number] [facility] Visit #: [id number]  Date: 2018    TYPE OF TEST: Peripheral Venous Testing    REASON FOR TEST  Elevated d-dimer    Right Leg:-  Deep venous thrombosis:           No  Superficial venous thrombosis:    No  Deep venous insufficiency:        Not examined  Superficial venous insufficiency: Not examined    Left Leg:-  Deep venous thrombosis:           No  Superficial venous thrombosis:    No  Deep venous insufficiency:        Not examined  Superficial venous insufficiency: Not examined      INTERPRETATION/FINDINGS  PROCEDURE:  Color duplex ultrasound imaging of lower extremity veins. FINDINGS:       Right: The common femoral, deep femoral, femoral, popliteal,  posterior tibial, peroneal, and great saphenous are patent and without   evidence of thrombus;  each is fully compressible and there is no  narrowing of the flow channel on color Doppler imaging. Phasic flow  is observed in the common femoral vein. Left:   The common femoral, deep femoral, femoral, popliteal,  posterior tibial, peroneal, and great saphenous are patent and without   evidence of thrombus;  each is fully compressible and there is no  narrowing of the flow channel on color Doppler imaging. Phasic flow  is observed in the common femoral vein. Unable to visualize distal  femoral vein due to swelling. IMPRESSION:  No evidence of right or left lower extremity vein  thrombosis where visualized. Due to limited visualization of calf  veins, isolated calf vein thrombosis cannot be completely excluded. ADDITIONAL COMMENTS    I have personally reviewed the data relevant to the interpretation of  this  study.     TECHNOLOGIST: Willard Palacios RVT  Signed: 2018 02:50 PM    PHYSICIAN: Tory Kohli MD  Signed: 2018 08:55 AM

## 2018-01-04 NOTE — ED PROVIDER NOTES
HPI Comments: 75 y.o. female with past medical history significant for Hypertension, GERD, and Vaginal Cancer who presents from Home with chief complaint of Chest Pain. Patient has a previous history of metastatic vaginal cancer followed by Gynecology Oncologist Dr. Cande Tanner and is currently undergoing systemic chemotherapy with Taxol/Carbo, last infusion 12/14/17. Patient was receiving chemotherapy today prior to arrival when she had sudden onset of mid chest pain with radiation to her neck and down her bilateral arms. Pt reports the episode of chest pain lasted for a duration \"of about 15 minutes\". Pt also notes nausea and bilateral hand redness and itching with onset. Patient was given Benadryl IM with relief. Patient was then seen by the Oncology NP Alejandro Osborn and was referred to ED for further evaluation. Pt presents to ED with no acute pain or discomfort. Pt denies significant cardiac history. Pt denies previous history of DVTs or PEs. Patient reports previous history of GERD which patient states has worsened with chemotherapy treatments. Pt denies fever, chills, cough, congestion, shortness of breath,  abdominal pain, vomiting, diarrhea, difficulty with urination or dysuria. There are no other acute medical concerns at this time. PCP: Rm Pathak MD    Note written by Leander Romo, as dictated by George Steele MD 8:34 PM    The history is provided by the patient.         Past Medical History:   Diagnosis Date    Abnormal mammogram of left breast 12/5/2016    Cancer ([facility] [address] 06/2017    LYMPH NODES IN BACK    Cancer ([facility] [address] 2016    VAGINAL CANCER    GERD (gastroesophageal reflux disease) 6/11/06    Hypercholesterolemia 10/11/06    Hypertension 10/11/06    Morbid obesity ([address] 10/11/06       Past Surgical History:   Procedure Laterality Date    HX APPENDECTOMY      HX GASTRIC BYPASS  2009    LAP BAND    HX GYN  12/4/15    EUA/Bx of pelvic mass and vagina/cystoscopy/proctoscopy    HX HYSTERECTOMY  1978    HX ORTHOPAEDIC  2009    right ankle    HX OTHER SURGICAL  2001? benign mass removed from lower back    LAP, PLACE ADJUST GASTR BAND  1/30/08    EB: AP Std lap band, liver bx         Family History:   Problem Relation Age of Onset    Cancer Mother      colon WITH METS TO LIVER    Diabetes Father     Heart Disease Father     Cancer Sister      breast & bone    Cancer Maternal Aunt      BREAST    Cancer Paternal Aunt      BREAST    Anesth Problems Neg Hx        Social History     Social History    Marital status:      Spouse name: N/A    Number of children: N/A    Years of education: N/A     Occupational History    Not on file. Social History Main Topics    Smoking status: Former Smoker     Packs/day: 0.50     Years: 15.00     Types: Cigarettes     Quit date: 1/1/1993    Smokeless tobacco: Never Used    Alcohol use Yes      Comment: socially-WINE ONCE A WEEK    Drug use: No    Sexual activity: Not Currently     Other Topics Concern    Not on file     Social History Narrative         ALLERGIES: Amlodipine    Review of Systems   Constitutional: Negative for chills and fever. HENT: Negative for congestion. Respiratory: Negative for cough and shortness of breath. Cardiovascular: Positive for chest pain (Resolved). Gastrointestinal: Positive for nausea (Resolved). Negative for abdominal pain, diarrhea and vomiting. Genitourinary: Negative for difficulty urinating and dysuria. Skin: Positive for color change (Resolved). All other systems reviewed and are negative. Vitals:    01/03/18 1639   BP: (!) 185/112   Pulse: (!) 102   Resp: 20   Temp: 97.9 °F (36.6 °C)   SpO2: 95%   Height: 5' 2\" (1.575 m)            Physical Exam   Constitutional: She is oriented to person, place, and time. She appears well-developed. No distress. HENT:   Head: Normocephalic and atraumatic.    Eyes: Conjunctivae and EOM are normal. Pupils are equal, round, and reactive to light.   Neck: Normal range of motion. Neck supple. Cardiovascular: Normal rate, regular rhythm and normal heart sounds. No murmur heard. Pulmonary/Chest: Effort normal and breath sounds normal. No respiratory distress. Port left upper chest wall    Abdominal: Soft. Bowel sounds are normal. She exhibits no distension. There is no tenderness. There is no rebound. Musculoskeletal: Normal range of motion. She exhibits edema. Bilateral lymphedema in lower extremities, left greater than right   Neurological: She is alert and oriented to person, place, and time. No cranial nerve deficit. She exhibits normal muscle tone. Coordination normal.   Skin: Skin is warm and dry. No rash noted. Psychiatric: She has a normal mood and affect. Her behavior is normal.   Nursing note and vitals reviewed. Note written by Leander Patel, as dictated by Pancho Ortiz MD 8:39 PM      [facility]  ED Course       Procedures      ED EKG interpretation:  Rhythm: normal sinus rhythm; and regular . Rate (approx.): 90bpm Axis: normal; ST/T wave: Nonspecific T wave abnormality. No STEMI, No ischemia  Note written by Leander Patel, as dictated by Pancho Ortiz MD 4:42 PM    PROGRESS NOTE:8:42 PM  Initial Trop is negative    PROGRESS NOTE:10:00 PM  DDimer is 2.39    V/Q scan approved by radiologist, Dr. Chris Gomez. 11:31 PM  I spoke to Dr. Rick Griffith, we discussed the case and findings, he agrees to accept the patient for admission.

## 2018-01-04 NOTE — PROGRESS NOTES
Hospitalist Progress Note  Payal Sheehan MD  Answering service: [phone] from in house phone      Date of Service:  2018  NAME:  Lovely Salmon  :  1950  MRN:  [id number]      Admission Summary:   76 yo woman with HTN, GERD, CKD Stage 3/4, vaginal CA on Taxol/carboplatin s/p pelvic XRT, left breast CA, pulmonary nodules, anasarca, morbid obesity, and hyperlipidemia was sent to the ED from outpatient infusion center on 1/3/18 with chest pain during carboplatin infusion. She was admitted with IMCU with acute chest pain and hypertensive urgency. Interval history / Subjective:   Denies CP, SOB, palpitations, abdo pain, f/c/n/v; upset about 4-hour wait in ED and mixup with beds     Assessment & Plan:     Acute atypical chest pain (POA)  - resolved; likely due to MSK vs anxiety vs GI etiology  - seems more like GERD  - start famotidine  - Cards consulted  - check TTE  - troponin negative x2    Hypertensive urgency (POA) - resolved; likely due to acute situation; resumed home meds    Anasarca (POA) - b/l LE venous duplex negative; elevation; stop IVF    CKD Stage 3/4 - Cr slightly above baseline    Anemia of chronic illness - stable; transfuse for Hb<7    Thrombocytopenia - stable    Morbid obesity, Body mass index is 44.23 kg/(m^2).     GERD - start famotidine    Vaginal cancer - f/u with Onc    Code status: full  DVT prophylaxis: SCDs/TEDs    Care Plan discussed with: Patient/Family, Nurse and   Disposition: Likely home tomorrow if NST negative     Hospital Problems  Date Reviewed: 2018          Codes Class Noted POA    Dehydration ICD-10-CM: E86.0  ICD-9-CM: 276.51  2018 Unknown        * (Principal)Acute chest pain ICD-10-CM: R07.9  ICD-9-CM: 786.50  2018 Unknown        Edema ICD-10-CM: R60.9  ICD-9-CM: 782.3  2018 Unknown        Anasarca ICD-10-CM: R60.1  ICD-9-CM: 782.3  2018 Unknown        Anemia ICD-10-CM: D64.9  ICD-9-CM: 285.9  1/4/2018 Unknown        Thrombocytopenia ([facility] ICD-10-CM: D69.6  ICD-9-CM: 287.5  1/4/2018 Unknown        Hypertensive urgency ICD-10-CM: I16.0  ICD-9-CM: 401.9  1/4/2018 Unknown        Vaginal cancer ([facility] ICD-10-CM: C52  ICD-9-CM: 184.0  12/10/2015 Unknown            Review of Systems:   Pertinent items are noted in HPI. Vital Signs:    Last 24hrs VS reviewed since prior progress note.  Most recent are:  Visit Vitals    /80 (BP 1 Location: Left arm, BP Patient Position: At rest)    Pulse 79    Temp 98.1 °F (36.7 °C)    Resp 18    Ht 5' 2\" (1.575 m)    Wt 109.7 kg (241 lb 13.5 oz)    SpO2 92%    BMI 44.23 kg/m2     No intake or output data in the 24 hours ending 01/04/18 0757     Physical Examination:     Constitutional:  awake, no acute distress, cooperative, pleasant, obese   ENT:  oral mucosa moist, oropharynx benign  Neck supple,    Resp:  CTA bilaterally, no wheezing/rhonchi/rales   CV:  regular rhythm, normal rate, no m/r/g appreciated, b/l LE edema, left chest wall portacath    GI:  +BS, soft, non distended, non tender     Musculoskeletal:  moves all extremities    Neurologic:  AAOx3, NFD     Skin:  b/l L>R LE venous stasis  Eyes:  PERRL    Data Review:    Review and/or order of clinical lab test  Review and/or order of tests in the radiology section of CPT  Review and/or order of tests in the medicine section of CPT    Labs:     Recent Labs      01/03/18   1702 01/03/18   0930   WBC  5.8  7.2   HGB  8.8*  9.1*   HCT  26.3*  27.4*   PLT  131*  123*     Recent Labs      01/03/18   1702  01/02/18   0952   NA  137  141   K  3.9  4.3   CL  102  95*   CO2  25  26   BUN  32*  25   CREA  1.80*  1.77*   GLU  182*  117*   CA  8.2*  8.9   MG   --   1.6     Recent Labs      01/03/18   1702  01/02/18   0952   SGOT  17  13   ALT  17  10   AP  100  104   TBILI  0.5  0.4   TP  6.6  6.6   ALB  3.1*  3.7   GLOB  3.5   --      No results for input(s): INR, PTP, APTT in the last 72 hours. No lab exists for component: INREXT   No results for input(s): FE, TIBC, PSAT, FERR in the last 72 hours. No results found for: FOL, RBCF   No results for input(s): PH, PCO2, PO2 in the last 72 hours.   Recent Labs      01/03/18 2117 01/03/18   1702   CPK   --   41   CKNDX   --   Cannot be calculated   TROIQ  <0.04  <0.04     Lab Results   Component Value Date/Time    Cholesterol, total 172 04/18/2017 10:13 AM    HDL Cholesterol 47 04/18/2017 10:13 AM    LDL, calculated 95 04/18/2017 10:13 AM    Triglyceride 152 04/18/2017 10:13 AM     No results found for: GLUCPOC  Lab Results   Component Value Date/Time    Color YELLOW/STRAW 12/13/2017 02:08 PM    Appearance CLEAR 12/13/2017 02:08 PM    Specific gravity 1.014 12/13/2017 02:08 PM    pH (UA) 6.5 12/13/2017 02:08 PM    Protein NEGATIVE  12/13/2017 02:08 PM    Glucose NEGATIVE  12/13/2017 02:08 PM    Ketone NEGATIVE  12/13/2017 02:08 PM    Bilirubin NEGATIVE  12/13/2017 02:08 PM    Urobilinogen 0.2 12/13/2017 02:08 PM    Nitrites NEGATIVE  12/13/2017 02:08 PM    Leukocyte Esterase NEGATIVE  12/13/2017 02:08 PM    Epithelial cells FEW 12/13/2017 02:08 PM    Bacteria NEGATIVE  12/13/2017 02:08 PM    WBC 0-4 12/13/2017 02:08 PM    RBC 0-5 12/13/2017 02:08 PM     Medications Reviewed:     Current Facility-Administered Medications   Medication Dose Route Frequency    famotidine (PEPCID) tablet 20 mg  20 mg Oral BID     Facility-Administered Medications Ordered in Other Encounters   Medication Dose Route Frequency    sodium chloride 0.9 % injection 10 mL  10 mL IntraVENous PRN    heparin (porcine) pf 500 Units  500 Units IntraVENous PRN    sodium chloride (NS) flush 10-40 mL  10-40 mL IntraVENous PRN    sodium chloride 0.9 % injection 10 mL  10 mL IntraVENous PRN    heparin (porcine) pf 500 Units  500 Units IntraVENous PRN    sodium chloride (NS) flush 10 mL  10 mL IntraVENous PRN ______________________________________________________________________  EXPECTED LENGTH OF STAY: - - -  ACTUAL LENGTH OF STAY:          Omar Ortega MD

## 2018-01-04 NOTE — CONSULTS
Cardiology Consult Note    CC: CP  Reason for consult:  CP  Requesting MD:  Dr. Duane Bolden:      Date of  Admission: 1/3/2018  7:47 PM     Admission type:Emergency    Janette Esparza is a 75 y.o. female admitted for Acute chest pain  Dehydration  Acute chest pain  Edema  Anasarca  Dehydration  Vaginal cancer ([address]  Hypertensive urgency  Anemia  Thrombocytopenia ([address] Patient complains of SS chest pain with neck radiation while receiving chemotherapy at [facility] center. It was SS and severe with dizziness and bilateral hand numbness and color changes. It lasted for about ten minutes and relieved when she was given IV Benadryl. She felt fine this am. She has HTN, hyperlipidemia, and obesity for er CRFs. Her past cancer history is well described in her chart. No recent exertional sx or any palpitations.     Patient Active Problem List    Diagnosis Date Noted    Dehydration 01/04/2018    Acute chest pain 01/04/2018    Edema 01/04/2018    Anasarca 01/04/2018    Anemia 01/04/2018    Thrombocytopenia ([address] 01/04/2018    Hypertensive urgency 01/04/2018    Elevated serum creatinine 01/03/2018    Obesity, morbid ([address] 12/12/2017    Strain of left trapezius muscle 11/10/2017    Uncontrolled type 2 diabetes mellitus without complication, without long-term current use of insulin ([address] 08/16/2017    Stage 4 chronic kidney disease ([address] 08/16/2017    Lymph edema 06/14/2017    Bilateral lower extremity edema 06/14/2017    Hypertension, essential 01/18/2017    Hypercholesterolemia 01/18/2017    GERD without esophagitis 01/18/2017    Vaginal cancer ([address] 12/10/2015    LAP-BAND surgery status 05/17/2012      Aileen Peoples MD  Past Medical History:   Diagnosis Date    Abnormal mammogram of left breast 12/5/2016    Cancer ([address] 06/2017    LYMPH NODES IN BACK    Cancer ([address] 2016    VAGINAL CANCER    GERD (gastroesophageal reflux disease) 6/11/06    Hypercholesterolemia 10/11/06    Hypertension 10/11/06    Morbid obesity ([address] 10/11/06      Past Surgical History:   Procedure Laterality Date    HX APPENDECTOMY      HX GASTRIC BYPASS  2009    LAP BAND    HX GYN  12/4/15    EUA/Bx of pelvic mass and vagina/cystoscopy/proctoscopy    HX HYSTERECTOMY  1978    HX ORTHOPAEDIC  2009    right ankle    HX OTHER SURGICAL  2001? benign mass removed from lower back    LAP, PLACE ADJUST GASTR BAND  1/30/08    EB: AP Std lap band, liver bx     Allergies   Allergen Reactions    Amlodipine Other (comments)     Edema in LE's      Family History   Problem Relation Age of Onset    Cancer Mother      colon WITH METS TO LIVER    Diabetes Father     Heart Disease Father     Cancer Sister      breast & bone    Cancer Maternal Aunt      BREAST    Cancer Paternal Aunt      BREAST    Anesth Problems Neg Hx       Current Facility-Administered Medications   Medication Dose Route Frequency    sodium chloride (NS) flush 5-10 mL  5-10 mL IntraVENous Q8H    sodium chloride (NS) flush 5-10 mL  5-10 mL IntraVENous PRN    aspirin delayed-release tablet 81 mg  81 mg Oral DAILY    0.9% sodium chloride infusion  75 mL/hr IntraVENous CONTINUOUS    morphine injection 1 mg  1 mg IntraVENous Q4H PRN    famotidine (PEPCID) tablet 20 mg  20 mg Oral DAILY        Prior to Admission Medications:  Prior to Admission medications    Medication Sig Start Date End Date Taking? Authorizing Provider   oxyCODONE-acetaminophen (PERCOCET) 5-325 mg per tablet Take 1-2 Tabs by mouth every four (4) hours as needed for Pain. Max Daily Amount: 12 Tabs. 11/20/17   Gracy Meza NP   cyclobenzaprine (FLEXERIL) 10 mg tablet Take 1 Tab by mouth three (3) times daily as needed for Muscle Spasm(s). 11/10/17   Gracy Meza NP   ondansetron (ZOFRAN ODT) 4 mg disintegrating tablet Take 1 Tab by mouth every eight (8) hours as needed for Nausea.  Indications: CANCER CHEMOTHERAPY-INDUCED NAUSEA AND VOMITING 10/26/17   Elio Hi MD   dexamethasone (DECADRON) 4 mg tablet Take 2 tablets with breakfast the day before chemo and for 2 days after chemo 10/26/17   Jimena James MD   carvedilol (COREG) 3.125 mg tablet Take 1 Tab by mouth two (2) times daily (with meals). 17   Aileen Peoples MD   COLACE 100 mg capsule  17   Historical Provider   furosemide (LASIX) 20 mg tablet Take 1 Tab by mouth daily as needed. 17   Jimena James MD   lidocaine-prilocaine (EMLA) topical cream Apply small amount over port area one hour before chemo treatment and cover with a Band-Aid 17   Jimena James MD   docusate sodium (STOOL SOFTENER) 100 mg capsule Take 100 mg by mouth daily. Historical Provider   atorvastatin (LIPITOR) 80 mg tablet Take 1 Tab by mouth daily. 3/17/17   Aileen Peoples MD   triamterene-hydroCHLOROthiazide (MAXZIDE) 37.5-25 mg per tablet Take 1 Tab by mouth daily. 3/17/17   Aileen Peoples MD   Cholecalciferol, Vitamin D3, (VITAMIN D3) 1,000 unit cap Take 1 Cap by mouth. Historical Provider        Review of Symptoms:  Except as noted in HPI, patient denies recent fever or chills, nausea, vomiting, diarrhea, hemoptysis, hematemesis, dysuria, myalgias, focal neurologic symptoms, ecchymosis, angioedema, odynophagia, dysphagia, sore throat, earache,rash, melena, hematochezia, depression, GERD, cold intolerance, petechia, bleeding gums, or significant weight loss. A comprehensive review of systems was negative except for that written in the HPI.      Subjective:    24 hr VS reviewed, overall VSSAF  Temp (24hrs), Av.8 °F (36.6 °C), Min:97.6 °F (36.4 °C), Max:98.1 °F (36.7 °C)    Patient Vitals for the past 8 hrs:   Pulse   18 0719 79    Patient Vitals for the past 8 hrs:   Resp   18 1128 (P) 18   18 0719 18    Patient Vitals for the past 8 hrs:   BP   18 0719 141/80        No intake or output data in the 24 hours ending 18 1219      Physical Exam (complete single organ system exam)    Visit Vitals    /80 (BP 1 Location: Left arm, BP Patient Position: At rest)    Pulse 79    Temp 98.1 °F (36.7 °C)    Resp (P) 18    Ht 5' 2\" (1.575 m)    Wt 109.7 kg (241 lb 13.5 oz)    SpO2 92%    BMI 44.23 kg/m2     General Appearance:  Well developed, well nourished,alert and oriented x 3, and individual in no acute distress. Ears/Nose/Mouth/Throat:   Hearing grossly normal.         Neck: Supple. Chest:   Lungs clear to auscultation bilaterally. Cardiovascular:  Regular rate and rhythm, S1, S2 normal, no murmur. Abdomen:   Soft, non-tender, bowel sounds are active. Extremities: Lt leg lymphedema from cancer. Skin: Warm and dry. Cardiographics    Telemetry: normal sinus rhythm  ECG: normal EKG, normal sinus rhythm, unchanged from previous tracings  Echocardiogram: Not done    Labs:   Recent Results (from the past 24 hour(s))   EKG, 12 LEAD, INITIAL    Collection Time: 01/03/18  4:52 PM   Result Value Ref Range    Ventricular Rate 90 BPM    Atrial Rate 90 BPM    P-R Interval 140 ms    QRS Duration 86 ms    Q-T Interval 330 ms    QTC Calculation (Bezet) 403 ms    Calculated P Axis 29 degrees    Calculated R Axis -20 degrees    Calculated T Axis 6 degrees    Diagnosis       Normal sinus rhythm  Nonspecific T wave abnormality  When compared with ECG of 09-JUN-2017 07:54,  Nonspecific T wave abnormality, worse in Lateral leads     CBC WITH AUTOMATED DIFF    Collection Time: 01/03/18  5:02 PM   Result Value Ref Range    WBC 5.8 3.6 - 11.0 K/uL    RBC 2.84 (L) 3.80 - 5.20 M/uL    HGB 8.8 (L) 11.5 - 16.0 g/dL    HCT 26.3 (L) 35.0 - 47.0 %    MCV 92.6 80.0 - 99.0 FL    MCH 31.0 26.0 - 34.0 PG    MCHC 33.5 30.0 - 36.5 g/dL    RDW 17.7 (H) 11.5 - 14.5 %    PLATELET 764 (L) 103 - 400 K/uL    NEUTROPHILS 84 (H) 32 - 75 %    BAND NEUTROPHILS 1 0 - 6 %    LYMPHOCYTES 11 (L) 12 - 49 %    MONOCYTES 3 (L) 5 - 13 %    EOSINOPHILS 0 0 - 7 %    BASOPHILS 0 0 - 1 %    MYELOCYTES 1 (H) 0 %    ABS. NEUTROPHILS 4.9 1.8 - 8.0 K/UL    ABS. LYMPHOCYTES 0.6 (L) 0.8 - 3.5 K/UL    ABS. MONOCYTES 0.2 0.0 - 1.0 K/UL    ABS. EOSINOPHILS 0.0 0.0 - 0.4 K/UL    ABS. BASOPHILS 0.0 0.0 - 0.1 K/UL    DF MANUAL      RBC COMMENTS ROULEAUX  PRESENT        RBC COMMENTS ANISOCYTOSIS  1+        RBC COMMENTS POLYCHROMASIA  1+        RBC COMMENTS BASOPHILIC STIPPLING  PRESENT        RBC COMMENTS OVALOCYTES  PRESENT       METABOLIC PANEL, COMPREHENSIVE    Collection Time: 01/03/18  5:02 PM   Result Value Ref Range    Sodium 137 136 - 145 mmol/L    Potassium 3.9 3.5 - 5.1 mmol/L    Chloride 102 97 - 108 mmol/L    CO2 25 21 - 32 mmol/L    Anion gap 10 5 - 15 mmol/L    Glucose 182 (H) 65 - 100 mg/dL    BUN 32 (H) 6 - 20 MG/DL    Creatinine 1.80 (H) 0.55 - 1.02 MG/DL    BUN/Creatinine ratio 18 12 - 20      GFR est AA 34 (L) >60 ml/min/1.73m2    GFR est non-AA 28 (L) >60 ml/min/1.73m2    Calcium 8.2 (L) 8.5 - 10.1 MG/DL    Bilirubin, total 0.5 0.2 - 1.0 MG/DL    ALT (SGPT) 17 12 - 78 U/L    AST (SGOT) 17 15 - 37 U/L    Alk.  phosphatase 100 45 - 117 U/L    Protein, total 6.6 6.4 - 8.2 g/dL    Albumin 3.1 (L) 3.5 - 5.0 g/dL    Globulin 3.5 2.0 - 4.0 g/dL    A-G Ratio 0.9 (L) 1.1 - 2.2     TROPONIN I    Collection Time: 01/03/18  5:02 PM   Result Value Ref Range    Troponin-I, Qt. <0.04 <0.05 ng/mL   CK W/ CKMB & INDEX    Collection Time: 01/03/18  5:02 PM   Result Value Ref Range    CK 41 26 - 192 U/L    CK - MB <1.0 <3.6 NG/ML    CK-MB Index Cannot be calculated 0 - 2.5     TROPONIN I    Collection Time: 01/03/18  9:17 PM   Result Value Ref Range    Troponin-I, Qt. <0.04 <0.05 ng/mL   D DIMER    Collection Time: 01/03/18  9:17 PM   Result Value Ref Range    D-dimer 2.39 (H) 0.00 - 0.65 mg/L FEU   NT-PRO BNP    Collection Time: 01/03/18  9:17 PM   Result Value Ref Range    NT pro- (H) 0 - 125 PG/ML        Assessment:     Assessment:   CP; atypical for ischemia; probably related to her chemo  Obesity  HTN  Hyperlipidemia  Metastatic vaginal cancer     Plan:   Tele  Chemical stress test in am as she just had VQ scan    Radha Angel MD

## 2018-01-04 NOTE — PROGRESS NOTES
Problem: Falls - Risk of  Goal: *Absence of Falls  Document Harriet Fall Risk and appropriate interventions in the flowsheet. Fall Risk Interventions:     Patient remains free from falls during shift, bed kept in low and locked position,call bell and all personal items kept within reach.  Frequent purposeful rounding throughout shift       Medication Interventions: Evaluate medications/consider consulting pharmacy

## 2018-01-04 NOTE — H&P
[address] [facility]  ACUTE CARE HISTORY AND PHYSICAL    Name:ANDRA TAMEZ  MR#: [id number]  : 1950  ACCOUNT #: [de-identified]   DATE OF SERVICE: 2018    CHIEF COMPLAINT:  Chest pain. HISTORY OF PRESENT ILLNESS:  A 58-year-old white female with past medical history of hypertension, GERD, vaginal cancer, presented to the emergency department from home with chief complaint of chest pain. Patient notes onset of symptoms starting yesterday. The pain is located in the substernal region radiating to both sides of her neck. It remained constant, severe, and rated at 9/10. She initially thought and described it as indigestion without specific exacerbating or alleviating factors. She reportedly has a known history of metastatic vaginal cancer, and has been followed by a gynecologic oncologist, Dr. Leena De Leon. She has been receiving systemic chemotherapy with Taxol/carbo; last infusion on 2017. The patient reported was receiving chemotherapy treatments at the time of onset of symptoms. She also had other complaints of having some change in the color of her hands to bright red with itching sensation which was relieved after she had an IM dose and Benadryl. She was reportedly seen by a nurse practitioner/oncologist and then was referred to the emergency department. The patient complains of increased swelling and edema of her legs, left greater than right, with some redness. She has not complained of fever or chills. On arrival to the emergency department initial recorded vital signs were blood pressure 185/112, heart rate 102, respiratory rate 20, oxygen saturation 95% on room air. Her labs showed a D-dimer 2.39. Elevated BUN at 32, creatinine 1.80 (compared to last creatinine of 1.77 on 2018). Chest x-ray portable showed no acute cardiopulmonary process. A 12-lead EKG showed normal sinus rhythm, T-wave changes at 90 beats per minute.  The patient is now seen for admission to the hospitalist service for continued evaluation and treatment. The patient has not complained of being dizzy, lightheadedness, focal weakness, new onset numbness, paresthesias, slurred speech, facial droop, headache, neck pain, back pain, abdominal pain, nausea, vomiting, diarrhea, melena, dysuria, hematuria, fever or chills. PAST MEDICAL HISTORY  1. Left breast cancer. 2.  Vaginal cancer with pelvic radiation therapy and chemotherapy for metastatic disease. 3.  Pulmonary nodules. 4.  Morbid obesity. 5.  Hypertension. 6.  Hyperlipidemia. 7.  Edema. 8.  GERD. PAST SURGICAL HISTORY   1. Appendectomy. 2.  Laparoscopic band gastric bypass. 3.  Status post EUA with biopsy of pelvic mass and biopsy of vagina. 4.  Cystoscopy and proctoscopy, 12/04/2015.  5.  Hysterectomy, 1978. 6.  Right ankle surgery, 2009. 7.  Removal of benign mass from lower back, 2001.  8.  Laparoscopic band liver biopsy, 01/30/2008. MEDICATIONS  1. Atorvastatin 80 mg p.o. daily. 2.  Coreg 3.125 mg p.o. b.i.d.  3.  Vitamin D 1000 units p.o. every day. 4.  Colace 100 mg p.o. every day. 5.  Flexeril 10 mg p.o. t.i.d. p.r.n.  6.  Decadron 4 mg 2 tablets p.o. 2 days after chemotherapy. 7.  Stool softener 100 mg p.o. daily. 8.  Furosemide 20 mg 1 tablet p.o. every day p.r.n.  9.  Zofran ODT 4 mg disintegrating tab 1 tablet p.o. q.8h.  p.r.n.   10. Percocet 5/325 mg 1-2 tablets p.o. q.4h. p.r.n. 11. Maxzide 37.5/125 mg 1 tablet p.o. every day. ALLERGIES:  AMLODIPINE. SOCIAL HISTORY:  Former smoker of cigarettes, reportedly quit in 1993. Positive for occasional alcohol. Negative for illicit drugs. . FAMILY HISTORY:  Heart disease, father. Breast cancer, paternal aunt. Lung cancer, brother. Breast cancer, maternal aunt, sister. Bone cancer, sister. Diabetes, father. Colon cancer with metastasis to the liver, mother.     REVIEW OF SYSTEMS  All systems deferred and positives as per HPI, otherwise negative. PHYSICAL EXAMINATION  VITAL SIGNS:  Temperature 97.9 degrees Fahrenheit, blood pressure 161/72, heart rate 94, respiratory rate of 14, saturation 95% on room. Recorded weight is 246 pounds (111.6 kg), and recorded height of 5 feet 2 inches tall. GENERAL:  Morbidly obese female in no acute respiratory distress. PSYCHIATRIC:  The patient is awake, alert x3. Slightly anxious. NEUROLOGIC:  GCS of 15. Moves extremities x4. Generalized weakness. Sensation grossly intact without slurred speech or facial droop. HEENT:  Normocephalic, atraumatic. PERRLA. EOMs intact. Sclera anicteric. Conjunctivae clear. Nares are patent. Oropharynx clear. Tongue is midline, not edematous. NECK:  Supple without lymphadenopathy. No JVD, carotid bruits or thyromegaly. LYMPH:  Negative cervical or supraclavicular lymphadenopathy. RESPIRATORY:  Clear to auscultation bilaterally. HEART:  Regular rate and rhythm. Normal S1, S2, without murmurs, rubs or gallops. CHEST:  Port-A-Cath access needle in place, right anterior chest.  GASTROINTESTINAL:  Abdomen soft, nontender, nondistended, normoactive bowel sounds. No rebound, guarding, rigidity. No auscultated abdominal bruits or pulsatile mass. BACK:  No CVA tenderness. No step-off deformity. MUSCULOSKELETAL:  No acute palpable bony deformity. Negative calf tenderness. VASCULAR:  2+ radial and 1+ dorsalis pedis pulse without cyanosis, clubbing. She has massive edema of bilateral lower extremities, chronic venous stasis and discoloration of both legs, left greater than the right, with some erythema. SKIN:  Warm and dry. LABORATORY DATA:  I reviewed as follows:  Sodium 137, potassium 3.9, chloride 102, CO2 25, BUN 32, creatinine 1.80, glucose 182, anion gap 10, calcium of 8.2, GFR 28, total bilirubin 0.5, total protein 6.6, albumin 3.1, ALT 17, AST is 17, alkaline phosphatase is 100.   CK total 41, troponin I of less than 0.04, CK-MB less than 1.0. ProBNP of 576. WBC 5.8, hemoglobin 8.8, hematocrit 26.3, platelets 158, neutrophils 84%, bands 1%. D-dimer 2.39. Nuclear medicine VQ lung scan, normal study. Chest x-ray, portable, no evidence of acute cardiopulmonary disease. A  12-lead EKG normal sinus rhythm, nonspecific ST-T wave changes at 90 beats per minute. IMPRESSION AND PLAN  1. Acute chest pain. The patient has noted cardiac risk factors. She will be admitted to telemetry floor and placed on continuous telemetry monitoring. Consult with cardiologist.  Order nuclear medicine cardiac stress test for further evaluation today. Provide oxygen, nitroglycerin, morphine and aspirin therapy as needed. 2.  Hypertensive urgency. Again, dose of nitroglycerin added. Blood pressure control. Continue home medications. Monitor blood pressure closely. 3.  Tachycardia, currently resolved. 4.  Generalized anasarca/edema involving the upper and lower extremities. She has some lymphedema of the lower extremities. Keep on strict I's and O's and daily weights. Keep legs elevated at rest.  Order venous duplex to rule out any deep vein thrombosis. 5.  Acute kidney injury. Elevated creatinine/dehydration. Cautious with administration of IV fluids. Will have to monitor closely, as patient has fluid retention and edema, especially in the upper and lower extremities. Repeat the renal panel in a.m. Consider for Nephrology consultation. The patient had mentioned that chemotherapy had been involved with some problems with her kidney function in the past.  Continue to  monitor. 6.  Anemia. Repeat hemoglobin and hematocrit. 7.  Thrombocytopenia. Repeat platelet count. 8.  Metastatic vaginal cancer. Consult gynecologic oncology. 9.  Venous thromboembolism prophylaxis. Sequential compression devices on bilateral lower extremities, if venous duplex is negative. MD NYASIA Martinez / [place]  D: 01/04/2018 04:25     T: 01/04/2018 06:45  JOB #: [id number]

## 2018-01-05 PROBLEM — I16.0 HYPERTENSIVE URGENCY: Status: RESOLVED | Noted: 2018-01-01 | Resolved: 2018-01-01

## 2018-01-05 PROBLEM — R07.89 ATYPICAL CHEST PAIN: Status: ACTIVE | Noted: 2018-01-01

## 2018-01-05 NOTE — PROGRESS NOTES
Written and verbal instructions given to pt and her spouse for discharge/ no new prescriptions provided but pt is aware of new medications to be started. Aspirin 81 mg and pepcid, over the counter. Pt has no questions or concerns voiced.

## 2018-01-05 NOTE — DISCHARGE INSTRUCTIONS
ADDITIONAL CARE RECOMMENDATIONS:   1. Take medications as prescribed. 2. Keep appointment(s) as recommended/scheduled. DIET: Cardiac Diet    ACTIVITY: Activity as tolerated    WOUND CARE: routine Portacath care    EQUIPMENT needed: bilateral  ASHLEE hose when not ambulating       Gastroesophageal Reflux Disease (GERD): Care Instructions  Your Care Instructions    Gastroesophageal reflux disease (GERD) is the backward flow of stomach acid into the esophagus. The esophagus is the tube that leads from your throat to your stomach. A one-way valve prevents the stomach acid from moving up into this tube. When you have GERD, this valve does not close tightly enough. If you have mild GERD symptoms including heartburn, you may be able to control the problem with antacids or over-the-counter medicine. Changing your diet, losing weight, and making other lifestyle changes can also help reduce symptoms. Follow-up care is a key part of your treatment and safety. Be sure to make and go to all appointments, and call your doctor if you are having problems. It's also a good idea to know your test results and keep a list of the medicines you take. How can you care for yourself at home? · Take your medicines exactly as prescribed. Call your doctor if you think you are having a problem with your medicine. · Your doctor may recommend over-the-counter medicine. For mild or occasional indigestion, antacids, such as Tums, Gaviscon, Mylanta, or Maalox, may help. Your doctor also may recommend over-the-counter acid reducers, such as Pepcid AC, Tagamet HB, Zantac 75, or Prilosec. Read and follow all instructions on the label. If you use these medicines often, talk with your doctor. · Change your eating habits. ¨ It's best to eat several small meals instead of two or three large meals. ¨ After you eat, wait 2 to 3 hours before you lie down. ¨ Chocolate, mint, and alcohol can make GERD worse.   ¨ Spicy foods, foods that have a lot of acid (like tomatoes and oranges), and coffee can make GERD symptoms worse in some people. If your symptoms are worse after you eat a certain food, you may want to stop eating that food to see if your symptoms get better. · Do not smoke or chew tobacco. Smoking can make GERD worse. If you need help quitting, talk to your doctor about stop-smoking programs and medicines. These can increase your chances of quitting for good. · If you have GERD symptoms at night, raise the head of your bed 6 to 8 inches by putting the frame on blocks or placing a foam wedge under the head of your mattress. (Adding extra pillows does not work.)  · Do not wear tight clothing around your middle. · Lose weight if you need to. Losing just 5 to 10 pounds can help. When should you call for help? Call your doctor now or seek immediate medical care if:  ? · You have new or different belly pain. ? · Your stools are black and tarlike or have streaks of blood. ? Watch closely for changes in your health, and be sure to contact your doctor if:  ? · Your symptoms have not improved after 2 days. ? · Food seems to catch in your throat or chest.   Where can you learn more? Go to http://cele-alyssa.info/. Enter I165 in the search box to learn more about \"Gastroesophageal Reflux Disease (GERD): Care Instructions. \"  Current as of: May 12, 2017  Content Version: 11.4  © 2470-0628 [employer]. Care instructions adapted under license by [employer] (which disclaims liability or warranty for this information). If you have questions about a medical condition or this instruction, always ask your healthcare professional. [address] any warranty or liability for your use of this information.

## 2018-01-05 NOTE — PROGRESS NOTES
Discharge noted and reviewed with Dr Adelina Sanchez. CM requested that care specialist schedule follow up appointment with her PCP in 1 week. No further discharge needs identified. Discharged home with family.     Rodrigo Santos RN CRM

## 2018-01-05 NOTE — DISCHARGE SUMMARY
Discharge Summary       PATIENT ID: Jax Dodson  MRN: [id number]   YOB: 1950    DATE OF ADMISSION: 1/3/2018  7:47 PM    DATE OF DISCHARGE: 1/5/18   PRIMARY CARE PROVIDER: Shahbaz Flores MD     ATTENDING PHYSICIAN: Jesus Arroyo MD, S  DISCHARGING PROVIDER: Jesus Arroyo MD, S    To contact this individual call [phone] and ask the  to page. If unavailable ask to be transferred the Adult Hospitalist Department. CONSULTATIONS: IP CONSULT TO HOSPITALIST  IP CONSULT TO CARDIOLOGY    PROCEDURES/SURGERIES: * No surgery found *  1/4 bilateral LE venous duplex  1/4 TTE  1/5 nuclear stress test  1/4 V/Q scan    ADMITTING DIAGNOSES & HOSPITAL COURSE:   74 yo woman with HTN, GERD, CKD Stage 3/4, vaginal CA on Taxol/carboplatin s/p pelvic XRT, left breast CA, pulmonary nodules, anasarca, morbid obesity, and hyperlipidemia was sent to the ED from outpatient infusion center on 1/3/18 with chest pain during carboplatin infusion. She was admitted with IMCU with acute chest pain and hypertensive urgency. Acute atypical chest pain (POA)  - resolved; likely due to MSK vs anxiety vs GI etiology  - seems more like GERD  - started famotidine  - Cards consulted  - TTE 1/4 EF 50-05%, grade 1 diastolic dysfunction  - troponin negative x2    Chronic diastolic dysfunction, NYHA Class 1   - no evidence of exacerbation  - TTE as above     Hypertensive urgency (POA) - resolved; likely due to acute situation; resumed home meds     Anasarca (POA) - b/l LE venous duplex negative; elevation; stopped IVF     CKD Stage 4 - Cr at baseline     Anemia of chronic illness - stable; transfuse for Hb<7     Thrombocytopenia - stable     Morbid obesity, Body mass index is 44.23 kg/(m^2).     GERD - started famotidine     Vaginal cancer - f/u with Gyn/Onc       DISCHARGE DIAGNOSES / PLAN:      Stable for discharge home to self-care. Follow up with PCP, Gyn/Onc.        PENDING TEST RESULTS:   At the time of discharge the following test results are still pending: none    FOLLOW UP APPOINTMENTS:    Follow-up Information     Follow up With Details Comments Contact Info    Luis Alfredo Shepherd MD In 1 week hospital follow up Ever Millan MD  Gyn/Onc; as scheduled Andre Amor  [phone]      chemotherapy as scheduled              ADDITIONAL CARE RECOMMENDATIONS:   1. Take medications as prescribed. 2. Keep appointment(s) as recommended/scheduled. DIET: Cardiac Diet    ACTIVITY: Activity as tolerated    WOUND CARE: routine Portacath care    EQUIPMENT needed: bilateral  ASHLEE hose when not ambulating      DISCHARGE MEDICATIONS:  Current Discharge Medication List      START taking these medications    Details   aspirin delayed-release 81 mg tablet Take 1 Tab by mouth daily. Qty: 30 Tab, Refills: 0      famotidine (PEPCID) 20 mg tablet Take 1 Tab by mouth daily. Indications: gastroesophageal reflux disease  Qty: 30 Tab, Refills: 0         CONTINUE these medications which have NOT CHANGED    Details   docusate sodium (COLACE) 100 mg capsule Take 100 mg by mouth daily. oxyCODONE-acetaminophen (PERCOCET) 5-325 mg per tablet Take 1-2 Tabs by mouth every four (4) hours as needed for Pain. Max Daily Amount: 12 Tabs. Qty: 30 Tab, Refills: 0    Associated Diagnoses: Vaginal cancer ([address] Cancer related pain; Metastatic neoplastic disease (HCC)      ondansetron (ZOFRAN ODT) 4 mg disintegrating tablet Take 1 Tab by mouth every eight (8) hours as needed for Nausea. Indications: CANCER CHEMOTHERAPY-INDUCED NAUSEA AND VOMITING  Qty: 30 Tab, Refills: 2      dexamethasone (DECADRON) 4 mg tablet Take 2 tablets with breakfast the day before chemo and for 2 days after chemo  Qty: 36 Tab, Refills: 2      carvedilol (COREG) 3.125 mg tablet Take 1 Tab by mouth two (2) times daily (with meals).   Qty: 60 Tab, Refills: 2 atorvastatin (LIPITOR) 80 mg tablet Take 1 Tab by mouth daily. Qty: 90 Tab, Refills: 3    Associated Diagnoses: Hypercholesterolemia      triamterene-hydroCHLOROthiazide (MAXZIDE) 37.5-25 mg per tablet Take 1 Tab by mouth daily. Qty: 90 Tab, Refills: 3    Associated Diagnoses: Hypertension, essential      Cholecalciferol, Vitamin D3, (VITAMIN D3) 1,000 unit cap Take 1 Cap by mouth daily. NOTIFY YOUR PHYSICIAN FOR ANY OF THE FOLLOWING:   Fever over 101 degrees for 24 hours. Chest pain, shortness of breath, fever, chills, nausea, vomiting, diarrhea, change in mentation, falling, weakness, bleeding. Severe pain or pain not relieved by medications. Or, any other signs or symptoms that you may have questions about.     DISPOSITION:  x  Home With:   OT  PT  HH  RN       Long term SNF/Inpatient Rehab    Independent/assisted living    Hospice    Other:       PATIENT CONDITION AT DISCHARGE:     Functional status    Poor     Deconditioned    x Independent      Cognition   x  Lucid     Forgetful     Dementia      Catheters/lines (plus indication)    Taylor     PICC     PEG    x None      Code status   x  Full code     DNR      PHYSICAL EXAMINATION AT DISCHARGE:  Visit Vitals    /80    Pulse 72    Temp 97.7 °F (36.5 °C)    Resp 18    Ht 5' 2\" (1.575 m)    Wt 109.7 kg (241 lb 13.5 oz)    SpO2 97%    BMI 44.23 kg/m2     Constitutional:  awake, no acute distress, cooperative, pleasant, obese   ENT:  oral mucosa moist, oropharynx benign  Neck supple,    Resp:  CTA bilaterally, no wheezing/rhonchi/rales   CV:  regular rhythm, normal rate, no m/r/g appreciated, b/l LE edema, left chest wall portacath    GI:  +BS, soft, non distended, non tender     Musculoskeletal:  moves all extremities    Neurologic:  AAOx3, NFD                                             Skin:  b/l L>R LE venous stasis  Eyes:  PERRL    Labs  Recent Results (from the past 24 hour(s))   URINALYSIS W/ REFLEX CULTURE    Collection Time: 01/04/18 11:15 PM   Result Value Ref Range    Color YELLOW/STRAW      Appearance CLEAR CLEAR      Specific gravity 1.007 1.003 - 1.030      pH (UA) 6.0 5.0 - 8.0      Protein NEGATIVE  NEG mg/dL    Glucose NEGATIVE  NEG mg/dL    Ketone NEGATIVE  NEG mg/dL    Bilirubin NEGATIVE  NEG      Blood NEGATIVE  NEG      Urobilinogen 0.2 0.2 - 1.0 EU/dL    Nitrites NEGATIVE  NEG      Leukocyte Esterase NEGATIVE  NEG      WBC 0-4 0 - 4 /hpf    RBC 0-5 0 - 5 /hpf    Epithelial cells FEW FEW /lpf    Bacteria NEGATIVE  NEG /hpf    UA:UC IF INDICATED CULTURE NOT INDICATED BY UA RESULT CNI      Hyaline cast 0-2 0 - 5 /lpf   CBC W/O DIFF    Collection Time: 01/05/18  4:45 AM   Result Value Ref Range    WBC 3.9 3.6 - 11.0 K/uL    RBC 2.70 (L) 3.80 - 5.20 M/uL    HGB 8.2 (L) 11.5 - 16.0 g/dL    HCT 25.3 (L) 35.0 - 47.0 %    MCV 93.7 80.0 - 99.0 FL    MCH 30.4 26.0 - 34.0 PG    MCHC 32.4 30.0 - 36.5 g/dL    RDW 18.4 (H) 11.5 - 14.5 %    PLATELET 147 (L) 811 - 400 K/uL   RENAL FUNCTION PANEL    Collection Time: 01/05/18  4:45 AM   Result Value Ref Range    Sodium 142 136 - 145 mmol/L    Potassium 3.5 3.5 - 5.1 mmol/L    Chloride 105 97 - 108 mmol/L    CO2 27 21 - 32 mmol/L    Anion gap 10 5 - 15 mmol/L    Glucose 111 (H) 65 - 100 mg/dL    BUN 40 (H) 6 - 20 MG/DL    Creatinine 1.78 (H) 0.55 - 1.02 MG/DL    BUN/Creatinine ratio 22 (H) 12 - 20      GFR est AA 34 (L) >60 ml/min/1.73m2    GFR est non-AA 28 (L) >60 ml/min/1.73m2    Calcium 7.9 (L) 8.5 - 10.1 MG/DL    Phosphorus 4.1 2.6 - 4.7 MG/DL    Albumin 2.8 (L) 3.5 - 5.0 g/dL   MAGNESIUM    Collection Time: 01/05/18  4:45 AM   Result Value Ref Range    Magnesium 1.8 1.6 - 2.4 mg/dL   NUCLEAR STRESS TEST    Collection Time: 01/05/18  9:24 AM   Result Value Ref Range    Diagnosis         Confirmed by Nena Saab MD, Ubaldo Hilton ([id number]),  Anthony Gould ([id number])   on 1/5/2018 10:06:10 AM      Test indication Chest Discomfort     Functional capacity Could Not Be Adequately Assessed     ECG Interp. Before Exercise Normal     ECG Interp. During Exercise Nondiagnostic     Overall HR response to exercise could not be adequately assessed     Overall BP response to exercise resting hypertension - appropriate response     Max. Systolic  mmHg    Max. Diastolic BP 64 mmHg    Max.  Heart rate 85 BPM    [facility] treadmill score      Bales TM score result      Peak Ex METs 1.0 METS    Protocol name Theresa Arnold cardiac condition      Attending physician MD JOHN      CHRONIC MEDICAL DIAGNOSES:  Problem List as of 1/5/2018  Date Reviewed: 1/4/2018          Codes Class Noted - Resolved    Dehydration ICD-10-CM: E86.0  ICD-9-CM: 276.51  1/4/2018 - Present        * (Principal)Atypical chest pain ICD-10-CM: R07.89  ICD-9-CM: 786.59  1/4/2018 - Present        Edema ICD-10-CM: R60.9  ICD-9-CM: 782.3  1/4/2018 - Present        Anasarca ICD-10-CM: R60.1  ICD-9-CM: 782.3  1/4/2018 - Present        Anemia ICD-10-CM: D64.9  ICD-9-CM: 285.9  1/4/2018 - Present        Thrombocytopenia ([facility] ICD-10-CM: D69.6  ICD-9-CM: 287.5  1/4/2018 - Present        Elevated serum creatinine ICD-10-CM: R79.89  ICD-9-CM: 790.99  1/3/2018 - Present        Obesity, morbid ([facility] ICD-10-CM: E66.01  ICD-9-CM: 278.01  12/12/2017 - Present        Strain of left trapezius muscle ICD-10-CM: L73.213L  ICD-9-CM: 840.8  11/10/2017 - Present        Uncontrolled type 2 diabetes mellitus without complication, without long-term current use of insulin ([facility] ICD-10-CM: E11.65  ICD-9-CM: 250.02  8/16/2017 - Present        Stage 4 chronic kidney disease ([facility] ICD-10-CM: N18.4  ICD-9-CM: 585.4  8/16/2017 - Present        Lymph edema ICD-10-CM: I89.0  ICD-9-CM: 457.1  6/14/2017 - Present        Bilateral lower extremity edema ICD-10-CM: R60.0  ICD-9-CM: 782.3  6/14/2017 - Present        Hypertension, essential ICD-10-CM: I10  ICD-9-CM: 401.9  1/18/2017 - Present        Hypercholesterolemia ICD-10-CM: E78.00  ICD-9-CM: 272.0  1/18/2017 - Present GERD without esophagitis ICD-10-CM: K21.9  ICD-9-CM: 530.81  1/18/2017 - Present        Vaginal cancer ([address] ICD-10-CM: C52  ICD-9-CM: 184.0  12/10/2015 - Present        LAP-BAND surgery status ICD-10-CM: Z98.84  ICD-9-CM: V45.86  5/17/2012 - Present    Overview Signed 5/17/2012  9:38 AM by Jessy Saha NP     January 2008 AP Std Band / Bermudez             RESOLVED: Hypertensive urgency ICD-10-CM: I16.0  ICD-9-CM: 401.9  1/4/2018 - 1/5/2018        RESOLVED: Pelvic mass ICD-10-CM: R19.00  ICD-9-CM: 789.30  12/2/2015 - 1/17/2017        RESOLVED: Pelvic lymphadenopathy ICD-10-CM: R59.0  ICD-9-CM: 785.6  12/2/2015 - 1/17/2017              Greater than 30 minutes were spent with the patient on counseling and coordination of care.     Signed:   Everett Lim MD  1/5/2018  12:12 PM

## 2018-01-19 NOTE — TELEPHONE ENCOUNTER
The radiology department called to notify us the patients Createnine is 1.8 and the radiologist will not perform the ct scan with IV contrast.   Dr. Chaitanya Elizondo advised to cancel the scan today and we re-schedule for a later date. He would like to set up for the pt to have hydration.

## 2018-01-22 NOTE — PATIENT INSTRUCTIONS
Noninsulin Medicines for Type 2 Diabetes: Care Instructions  Your Care Instructions    There are different types of noninsulin medicines for diabetes. Each works in a different way. But they all help you control your blood sugar. Some types help your body make insulin to lower your blood sugar. Others lower how much insulin your body needs. Some can slow how fast your body digests sugars. And some can remove extra glucose through your urine. · Alpha-glucosidase inhibitors. These keep starches from breaking down. This means that they lower the amount of glucose absorbed when you eat. They don't help your body make more insulin. So they will not cause low blood sugar unless you use them with other medicines for diabetes. They include acarbose and miglitol. · DPP-4 inhibitors. These help your body raise the level of insulin after you eat. They also help your body make less of a hormone that raises blood sugar. They include linagliptin, saxagliptin, and sitagliptin. · Incretin hormones (GLP-1 receptor agonists). Your body makes a protein that can raise your insulin level. It also can lower your blood sugar and make you less hungry. You can get shots of hormones that work the same way. They include exenatide and liraglutide. · Meglitinides. These help your body release insulin. They also help slow how your body digests sugars. So they can keep your blood sugar from rising too fast after you eat. They include nateglinide and repaglinide. · Metformin. This lowers how much glucose your liver makes. And it helps you respond better to insulin. It also lowers the amount of stored sugar that your liver releases when you are not eating. · SGLT2 inhibitors. These help to remove extra glucose through your urine. They may also help some people lose weight. They include canagliflozin, dapagliflozin, and empagliflozin. · Sulfonylureas. These help your body release more insulin. Some work for many hours.  They can cause low blood sugar if you don't eat as you planned. They include glipizide and glyburide. · Thiazolidinediones. These reduce the amount of blood glucose. They also help you respond better to insulin. They include pioglitazone and rosiglitazone. You may need to take more than one medicine for diabetes. Two or more medicines may work better to lower your blood sugar level than just one does. Follow-up care is a key part of your treatment and safety. Be sure to make and go to all appointments, and call your doctor if you are having problems. It's also a good idea to know your test results and keep a list of the medicines you take. How can you care for yourself at home? · Eat a healthy diet. Get some exercise each day. This may help you to reduce how much medicine you need. · Do not take other prescription or over-the-counter medicines, vitamins, herbal products, or supplements without talking to your doctor first. Some medicines for type 2 diabetes can cause problems with other medicines or supplements. · Tell your doctor if you plan to get pregnant. Some of these drugs are not safe for pregnant women. · Be safe with medicines. Take your medicines exactly as prescribed. Meglitinides and sulfonylureas can cause your blood sugar to drop very low. Call your doctor if you think you are having a problem with your medicine. · Check your blood sugar often. You can use a glucose monitor. Keeping track can help you know how certain foods, activities, and medicines affect your blood sugar. And it can help you keep your blood sugar from getting so low that it's not safe. When should you call for help? Call 911 anytime you think you may need emergency care. For example, call if:  ? · You passed out (lost consciousness). ? · You are confused or cannot think clearly. ? · Your blood sugar is very high or very low. ? Watch closely for changes in your health, and be sure to contact your doctor if:  ? · Your blood sugar stays outside the level your doctor set for you. ? · You have any problems. Where can you learn more? Go to http://cele-alyssa.info/. Enter H153 in the search box to learn more about \"Noninsulin Medicines for Type 2 Diabetes: Care Instructions. \"  Current as of: March 13, 2017  Content Version: 11.4  © 3996-1248 [employer]. Care instructions adapted under license by [employer] (which disclaims liability or warranty for this information). If you have questions about a medical condition or this instruction, always ask your healthcare professional. [address] any warranty or liability for your use of this information.

## 2018-01-22 NOTE — PROGRESS NOTES
pre chemo appt for C5 Taxol/Carbo at [facility] on 1/24/18, labs to be drawn today, pt complains of back pain that is a 7/10 on the pain scale, pt request refill on Percocet

## 2018-01-22 NOTE — PROGRESS NOTES
Ashley Wagner. Derrick, NP     DEBBIE Reed      Date of visit: 1/22/2018   Gyn Onc: Dr. Deepali Molina Dx: Vaginal cancer    Mrs. Earle Hernández presents today for pre-chemo evaluation prior to her C5 of Taxol/Carbo. She had chest pain after her last infusion on 1/2 and was seen in the ER. She was evaluated over night and seen by cardiology and cleared for discharge in 48 hours. She has negative Nuc Stress test and denies any CP since then,  She continues to complain of fatigue and has been noted with HGB trending down and was 8.2 on last lab on 1/5. Denies further CP, but continues to acknowledges increasing fatigue. Upon discussion of previous A1C in August 2017 that was elevated to 7.2, pt admitted today she has not followed up with her PCP. She felt she had too many other things and \"kept meaning to see her about it\". The A1C had been given to the pt in August and she was to have taken it to her PCP at that time. She continues with lymphedema machine \"almost daily\", but says on days like this past weekend when she and her  go out of town, she does not use it and then Sammy Burgos like she pays for it in the discomfort and increased swelling\" when she does not use it. She began a new regimen of carbo/taxol on 11/1/17. She has been monitored closely due to rising creatine, which on her last lab work was essentially stable at 1.77. Oncology Treatment History:  12/4/2015: Dx with Vaginal cancer after an EUA and biopsies of pelvic mass and vagina                     Vagina, biopsy: Invasive squamous cell carcinoma, poorly differentiated. No loss of mismatch repair proteins   1/15/16-2/12/16: Weekly Cisplatin with XRT with good response. 5/16/17: CT scan showed Bilateral pulmonary nodules and retroperitoneal lymphadenopathy compatible with metastatic disease. 6/14/17-10/4/17: 6 cycles of Cisplatin/Taxol.  Much difficulty with neuropathy  10/23/17: CT scan (without contrast) after 6 cycles showed  Slight progression of retroperitoneal adenopathy with stable appearing pulmonary and osseous metastases as above. 10/26/17: saw after above CT results. Dr. Mela Rosales felt the changes didn't meet RECIST criteria for progression. He recommend continuing with therapy, but suggested that we exchange Carboplatin for Cisplatin due to nephrotoxicity. Her creatinine has pamella with the Cisplatin. 11/1/17: Began Carbo/Taxol. 1/19/17: due to elevated creat, were unable to proceed with CT scan and will allow pt to complete C6 and re-evaluate for CT scan at that time. Current Outpatient Prescriptions   Medication Sig    oxyCODONE-acetaminophen (PERCOCET) 5-325 mg per tablet Take 1-2 Tabs by mouth every four (4) hours as needed for Pain. Max Daily Amount: 12 Tabs.  aspirin delayed-release 81 mg tablet Take 1 Tab by mouth daily.  famotidine (PEPCID) 20 mg tablet Take 1 Tab by mouth daily. Indications: gastroesophageal reflux disease    docusate sodium (COLACE) 100 mg capsule Take 100 mg by mouth daily.  carvedilol (COREG) 3.125 mg tablet Take 1 Tab by mouth two (2) times daily (with meals).  atorvastatin (LIPITOR) 80 mg tablet Take 1 Tab by mouth daily.  triamterene-hydroCHLOROthiazide (MAXZIDE) 37.5-25 mg per tablet Take 1 Tab by mouth daily.  Cholecalciferol, Vitamin D3, (VITAMIN D3) 1,000 unit cap Take 1 Cap by mouth daily.  ondansetron (ZOFRAN ODT) 4 mg disintegrating tablet Take 1 Tab by mouth every eight (8) hours as needed for Nausea. Indications: CANCER CHEMOTHERAPY-INDUCED NAUSEA AND VOMITING    dexamethasone (DECADRON) 4 mg tablet Take 2 tablets with breakfast the day before chemo and for 2 days after chemo     No current facility-administered medications for this visit. Review of Systems:  General: Denies wt loss.  Says fatigue is still her only maincomplaint, but she is still trying to be as active as possible especially with her . HEENT: Denies visual changes, dysphagia or headache  Resp:Continues with BLACKBURN, but says it is not any worse and when she rest, it does improve. Denies any wheezing or cough  CV: Denies CP, palpitations since discharge  GI/:Continues with Miralax for occasional constipation. Denies N/V, bloating, diarrhea or dysuria. Says she is eating well. Denies early satiety   MuskSkel: Says left shoulder continues to improve and continues to stretch it. Continues with lymphedema clinic and has seen improvement with the compression machine and is using it as much as she can for left leg lymphedema. Neuro: Says neuropathy persist, but is really bad only on her left leg and when her edema gets worse. Denies dizzyness or syncope  Psych: Denies depression or feelings of saddness      Objective:     Visit Vitals    BP (!) 127/93 (BP 1 Location: Left arm, BP Patient Position: Sitting)    Pulse 86    Ht 5' 2\" (1.575 m)    Wt 236 lb 9.6 oz (107.3 kg)    BMI 43.27 kg/m2         Physical Exam:  General: A&O X3 in NAD with pleasant affect with  supportive at her side  HEENT: Sclera anicteric, mucosa pink, moist   Neck: No JVD or adenopathy appreciated. No bruits bilat    Port Site: without redness, tenderness or swelling  Heart: Regular without M/R/G  Lungs: CTA Bilat without wheezing or rales noted   Abd: Soft, obese without tenderness, fluid wave or distention and with + BS throughout  Ext:Left shoulder has improved in movement.  + radial pulses and fingers warm to touch. Extensive left leg edema persist with vascular skin changes previously seen, unchanged. + pedal pulses bilat  Neuro: grossly intact        IMAGING:  CT abd/prlvis W/O contrast 10/23/2017  THYROID: No nodule. MEDIASTINUM: No mass or lymphadenopathy. PATRICIA: No mass or enlarged lymphadenopathy. Calcified right hilar lymph nodes. THORACIC AORTA: Atherosclerotic calcifications without aneurysm.   MAIN PULMONARY ARTERY: Normal in caliber. TRACHEA/BRONCHI: Patent. ESOPHAGUS: No wall thickening or dilatation. HEART:  The visualized heart is normal in size without pericardial effusion. PLEURA: No effusion or pneumothorax. LUNGS: : There are multiple nodules throughout the lungs, some with cavitation  which are unchanged. Calcified granulomata right lung. LIVER: Calcified granulomata. Otherwise unremarkable. GALLBLADDER: Partially distended. Calcified stones within the lumen at the  fundus. Otherwise unremarkable. SPLEEN: Outside granuloma. Otherwise unremarkable. PANCREAS: No mass or duct dilation. ADRENALS: Unremarkable. KIDNEYS: No mass, calculus, or hydronephrosis. STOMACH: Gastric lap band in position. Otherwise unremarkable. SMALL BOWEL: No dilatation or wall thickening. COLON: No dilation or wall thickening. APPENDIX: Unremarkable. PERITONEUM: No ascites or pneumoperitoneum. RETROPERITONEUM: The aorta is atherosclerotic and tapers without aneurysm. There  is retroperitoneal adenopathy. There is a 2.5 x 2.7 cm left para-aortic lymph  node, previously 2.4 x 2.5 cm and a preaortic lymph node just anterior to this  measuring 1.7 x 2.1 cm, previously 1.9 x 2.3 cm. Overall confluent periaortic  retroperitoneal adenopathy extending to the bifurcation appears progressed from  prior. There is no pelvic mass or adenopathy. REPRODUCTIVE ORGANS: Uterus and ovaries are surgically absent. URINARY BLADDER: No mass or calculus. BONES: Sclerotic manubrial and vertebral body lesions appear stable. ADDITIONAL COMMENTS: Left-sided Port-A-Cath in position with catheter extending  to the upper superior vena cava. IMPRESSION:  Slight progression of retroperitoneal adenopathy with stable  appearing pulmonary and osseous metastases as above. Additional incidental  findings as above.         Assessment:     Patient Active Problem List   Diagnosis Code    LAP-BAND surgery status Z98.84    Vaginal cancer ([facility] [address] C52    Hypertension, essential I10    Hypercholesterolemia E78.00    GERD without esophagitis K21.9    Lymph edema I89.0    Bilateral lower extremity edema R60.0    Uncontrolled type 2 diabetes mellitus without complication, without long-term current use of insulin (HCC) E11.65    Stage 4 chronic kidney disease (HCC) N18.4    Strain of left trapezius muscle S46.812A    Obesity, morbid ([facility]) E66.01    Elevated serum creatinine R79.89    Dehydration E86.0    Atypical chest pain R07.89    Edema R60.9    Anasarca R60.1    Anemia D64.9    Thrombocytopenia (HCC) D69.6         Plan: Will proceed with labs today and will include HgA1C since it has been 5 months since last checked. Will discuss with pt and her PCP after results available. If labs good and creat stable, will plan on proceeding with 5th cycle of Carbo/Taxol 1/24  Monitor creat closely and will consider hydrating extra 1 liter of NaCl on 1/24 (Pt's EF is 71%)   Follow up with lymphedema clinic and continue with compression machine per Lymphedema clinic  Continue to monitor hgb and transfuse if goes below 8  Continue with anti-hypertensive and if creatine goes higher, will discuss diuretics with PCP. Have asked pt to schedule an appointment with her for next week no matter what. Will fax all lab work to her office once it is resulted  Renewed Pain meds due to pain in both lower legs and some in lower abd.    Hydration encouraged  Encouraged to call for any concerns or questions  Ruth Coffman, ALONSO

## 2018-01-24 PROBLEM — N18.30 CHRONIC KIDNEY FAILURE, STAGE 3 (MODERATE) (HCC): Status: ACTIVE | Noted: 2018-01-01

## 2018-01-24 PROBLEM — D69.6 THROMBOCYTOPENIA (HCC): Status: RESOLVED | Noted: 2018-01-01 | Resolved: 2018-01-01

## 2018-01-24 NOTE — PROGRESS NOTES
0910 Pt admit to [facility] for C 5 Taxol/Carbo ambulatory in stable condition. Assessment completed. No new concerns voiced. Port accessed with positive blood return. No labs due today. Seen chairside by [address], NP - Liter of hydration ordered and given. Premeds given, chemo ordered. Visit Vitals    /64    Pulse 72    Temp 97.3 °F (36.3 °C)    Resp 18    SpO2 92%    Breastfeeding No       Medications:  Normal Saline bolus 1 liter  Normal Saline  Benadryl  Kytril  Pepcid  Decadron  Paclitaxel  Carboplatin 127 ML of ordered dose infused and then stopped for reaction  Normal Saline 500 ML bolus  Solumedrol 125 MG   Benadryl 25 MG  Compazine 5 MG    1450 Pt tolerated treatment for 127 ML of Carboplatin and then began feeling pain in fingers. Stopped Carboplatin, patient began having \"heartburn pain\", back pain, neck pain, Shortness of Breath. Bolus of Normal Saline 500 ML given, Seen at bedside by DEBBIE Hutchisno. Additional rescue medications given, O2 Nasal Cannula applied at 2 LPM.  At conclusion of bolus, patient re evaluated and is now stable, symptoms have resolved. Expressed desire for discharge. [address] [place] [address] Pkwy maintained positive blood return throughout treatment, flushed with positive blood return at conclusion and heparinized per protocol. D/c home ambulatory in no distress. Pt aware of next [facility] appointment currently scheduled for 2/14/18.

## 2018-01-24 NOTE — PROGRESS NOTES
Donato Meza, NP     DEBBIE Dsouza      Date of visit: 1/24/2018   Gyn Onc: Dr. Miha Gardner Dx: Vaginal cancer    Mrs. Marco Luu presents today for pre-chemo evaluation prior to her C5 of Taxol/Carbo. She had chest pain after her last infusion on 1/2 and was seen in the ER. She was evaluated over night and seen by cardiology and cleared for discharge in 48 hours. She has negative Nuc Stress test and denies any CP since then,  She continues to complain of fatigue and has been noted with HGB trending down and was 8.2 on last lab on 1/5. Denies further CP, but continues to acknowledges increasing fatigue. Upon discussion of previous A1C in August 2017 that was elevated to 7.2, pt admitted today she has not followed up with her PCP. She felt she had too many other things and \"kept meaning to see her about it\". The A1C had been given to the pt in August and she was to have taken it to her PCP at that time. She continues with lymphedema machine \"almost daily\", but says on days like this past weekend when she and her  go out of town, she does not use it and then Sammy Burgos like she pays for it in the discomfort and increased swelling\" when she does not use it. She began a new regimen of carbo/taxol on 11/1/17. She has been monitored closely due to rising creatine, which on her last lab work was essentially stable at 1.77. Oncology Treatment History:  12/4/2015: Dx with Vaginal cancer after an EUA and biopsies of pelvic mass and vagina                     Vagina, biopsy: Invasive squamous cell carcinoma, poorly differentiated. No loss of mismatch repair proteins   1/15/16-2/12/16: Weekly Cisplatin with XRT with good response. 5/16/17: CT scan showed Bilateral pulmonary nodules and retroperitoneal lymphadenopathy compatible with metastatic disease. 6/14/17-10/4/17: 6 cycles of Cisplatin/Taxol.  Much difficulty with neuropathy  10/23/17: CT scan (without contrast) after 6 cycles showed  Slight progression of retroperitoneal adenopathy with stable appearing pulmonary and osseous metastases as above. 10/26/17: saw after above CT results. Dr. Jesse Henriquez felt the changes didn't meet RECIST criteria for progression. He recommend continuing with therapy, but suggested that we exchange Carboplatin for Cisplatin due to nephrotoxicity. Her creatinine has pamella with the Cisplatin. 11/1/17: Began Carbo/Taxol. 1/19/17: due to elevated creat, were unable to proceed with CT scan and will allow pt to complete C6 and re-evaluate for CT scan at that time. Current Outpatient Prescriptions   Medication Sig    oxyCODONE-acetaminophen (PERCOCET) 5-325 mg per tablet Take 1-2 Tabs by mouth every four (4) hours as needed for Pain. Max Daily Amount: 12 Tabs.  aspirin delayed-release 81 mg tablet Take 1 Tab by mouth daily.  famotidine (PEPCID) 20 mg tablet Take 1 Tab by mouth daily. Indications: gastroesophageal reflux disease    docusate sodium (COLACE) 100 mg capsule Take 100 mg by mouth daily.  ondansetron (ZOFRAN ODT) 4 mg disintegrating tablet Take 1 Tab by mouth every eight (8) hours as needed for Nausea. Indications: CANCER CHEMOTHERAPY-INDUCED NAUSEA AND VOMITING    dexamethasone (DECADRON) 4 mg tablet Take 2 tablets with breakfast the day before chemo and for 2 days after chemo    carvedilol (COREG) 3.125 mg tablet Take 1 Tab by mouth two (2) times daily (with meals).  atorvastatin (LIPITOR) 80 mg tablet Take 1 Tab by mouth daily.  triamterene-hydroCHLOROthiazide (MAXZIDE) 37.5-25 mg per tablet Take 1 Tab by mouth daily.  Cholecalciferol, Vitamin D3, (VITAMIN D3) 1,000 unit cap Take 1 Cap by mouth daily.      Current Facility-Administered Medications   Medication Dose Route Frequency    sodium chloride 0.9 % injection 10 mL  10 mL IntraVENous PRN    heparin (porcine) pf 500 Units  500 Units IntraVENous PRN    sodium chloride (NS) flush 10-40 mL  10-40 mL IntraVENous PRN    0.9% sodium chloride infusion  25 mL/hr IntraVENous PRN    CARBOplatin (PARAPLATIN) 388 mg in dextrose 5% 250 mL, overfill volume 25 mL chemo infusion  388 mg IntraVENous ONCE       Review of Systems:  General: Denies wt loss. Says fatigue is still her only maincomplaint, but she is still trying to be as active as possible especially with her . HEENT: Denies visual changes, dysphagia or headache  Resp:Continues with BLACKBURN, but says it is not any worse and when she rest, it does improve. Denies any wheezing or cough  CV: Denies CP, palpitations since discharge  GI/:Continues with Miralax for occasional constipation. Denies N/V, bloating, diarrhea or dysuria. Says she is eating well. Denies early satiety   Musk[place]el: Says left shoulder continues to improve and continues to stretch it. Continues with lymphedema clinic and has seen improvement with the compression machine and is using it as much as she can for left leg lymphedema. Neuro: Says neuropathy persist, but is really bad only on her left leg and when her edema gets worse. Denies dizzyness or syncope  Psych: Denies depression or feelings of saddness      Objective:     Visit Vitals    /64    Pulse 72    Temp 97.3 °F (36.3 °C)    Resp 18    Ht 5' 2\" (1.575 m)    Wt 239 lb (108.4 kg)    SpO2 92%    Breastfeeding No    BMI 43.71 kg/m2         Physical Exam:  General: A&O X3 in NAD with pleasant affect with  supportive at her side  HEENT: Sclera anicteric, mucosa pink, moist   Neck: No JVD or adenopathy appreciated.  No bruits bilat    Port Site: without redness, tenderness or swelling  Heart: Regular without M/R/G  Lungs: CTA Bilat without wheezing or rales noted   Abd: Soft, obese without tenderness, fluid wave or distention and with + BS throughout  Ext:Left shoulder with continued improve motion without discomfort  + radial pulses and fingers warm to touch. Extensive left leg edema persist with vascular skin changes previously seen, improved from office visit 1/22 as pt was able to use compression boots/machine. + pedal pulses bilat  Neuro: grossly intact      LABS: 1/22/2018  WBC-6.8; HGB-9.0; HCT-27.9; Plt-174; ANC-5.1; Na-142; K+ 3.7; Cl-97; CO2-25; BUN 24; Creat-1.84; Mag 1.8; A1C-5.7 (down from 7.2 last August)  CA-125 1/22/18=104.8 (1/2/18= 76.1; 12/12/17= 81.2; 11/20/17=111.2)          IMAGING:  CT abd/prlvis W/O contrast 10/23/2017  THYROID: No nodule. MEDIASTINUM: No mass or lymphadenopathy. PATRICIA: No mass or enlarged lymphadenopathy. Calcified right hilar lymph nodes. THORACIC AORTA: Atherosclerotic calcifications without aneurysm. MAIN PULMONARY ARTERY: Normal in caliber. TRACHEA/BRONCHI: Patent. ESOPHAGUS: No wall thickening or dilatation. HEART:  The visualized heart is normal in size without pericardial effusion. PLEURA: No effusion or pneumothorax. LUNGS: : There are multiple nodules throughout the lungs, some with cavitation  which are unchanged. Calcified granulomata right lung. LIVER: Calcified granulomata. Otherwise unremarkable. GALLBLADDER: Partially distended. Calcified stones within the lumen at the  fundus. Otherwise unremarkable. SPLEEN: Outside granuloma. Otherwise unremarkable. PANCREAS: No mass or duct dilation. ADRENALS: Unremarkable. KIDNEYS: No mass, calculus, or hydronephrosis. STOMACH: Gastric lap band in position. Otherwise unremarkable. SMALL BOWEL: No dilatation or wall thickening. COLON: No dilation or wall thickening. APPENDIX: Unremarkable. PERITONEUM: No ascites or pneumoperitoneum. RETROPERITONEUM: The aorta is atherosclerotic and tapers without aneurysm. There  is retroperitoneal adenopathy. There is a 2.5 x 2.7 cm left para-aortic lymph  node, previously 2.4 x 2.5 cm and a preaortic lymph node just anterior to this  measuring 1.7 x 2.1 cm, previously 1.9 x 2.3 cm.  Overall confluent periaortic  retroperitoneal adenopathy extending to the bifurcation appears progressed from  prior. There is no pelvic mass or adenopathy. REPRODUCTIVE ORGANS: Uterus and ovaries are surgically absent. URINARY BLADDER: No mass or calculus. BONES: Sclerotic manubrial and vertebral body lesions appear stable. ADDITIONAL COMMENTS: Left-sided Port-A-Cath in position with catheter extending  to the upper superior vena cava. IMPRESSION:  Slight progression of retroperitoneal adenopathy with stable  appearing pulmonary and osseous metastases as above. Additional incidental  findings as above. Assessment:     Patient Active Problem List   Diagnosis Code    LAP-BAND surgery status Z98.84    Vaginal cancer ([facility] [address] C52    Hypertension, essential I10    Hypercholesterolemia E78.00    GERD without esophagitis K21.9    Lymph edema I89.0    Bilateral lower extremity edema R60.0    Strain of left trapezius muscle S46.812A    Obesity, morbid ([facility] [address] E66.01    Elevated serum creatinine R79.89    Dehydration E86.0    Atypical chest pain R07.89    Edema R60.9    Anasarca R60.1    Anemia D64.9    Chronic kidney failure, stage 3 (moderate) N18.3         Plan: Will proceed with C5 of Taxol/Carbo. Will add extra 1000 cc's NaCl hydration today (her recent EF was 71%)  Pt will continue with her compression machine daily and follow up with Lymphedema clinc as needed  Discussed decrease in A1C and encouraged pt to continue to monitor sweets/carb intake and try to continue (as she has been doing since August) with following a diabetic diet with her . Continue to monitor hgb and transfuse if goes below 8. If fatigue gets worse, she will let us know nad we will check Hgb and hydrate between chemo treatments  Continue with anti-hypertensive and since creatine has gone higher (MDRD GFR 30.39), would like her to discuss diuretics with PCP. Pt has an appointment with her on Jan 30th.   I have sent a note and faxed her labs from 1/22 to her office already. Hydration encouraged and if she feels she is becoming dehydrated at any time, to call and we will get her in for IV infusion. Encouraged to call for any concerns or questions  Rutherford Schwab, ALONSO       Addendum to note  Roughly 100mls of carboplatin provided. Taxol administered. Patient with hand cramping, reflux/epigastric burning, nausea and light-headedness - called by staff. Monitored with hypotension noted systolic 85A, BP 90 and subsequent pulse ox 89 on RA. Solumedrol, benadryl and compazine with NS wide open provided. Monitored with increase BP 96/59, pulse 101, sats 94% on 1-2L NC. Alert and oriented throught. Sx subsided after about 10 minutes. Plan to release to home when stable. Previously admitted for cardiac workup.     Consider inpatient admission for sensitization protocol TBD, forward to Dr. Justin Ramirez, PA

## 2018-01-29 PROBLEM — R73.02 GLUCOSE INTOLERANCE (IMPAIRED GLUCOSE TOLERANCE): Status: ACTIVE | Noted: 2018-01-01

## 2018-01-29 PROBLEM — N18.30 CKD (CHRONIC KIDNEY DISEASE) STAGE 3, GFR 30-59 ML/MIN (HCC): Status: ACTIVE | Noted: 2018-01-01

## 2018-01-29 PROBLEM — S46.812A STRAIN OF LEFT TRAPEZIUS MUSCLE: Status: RESOLVED | Noted: 2017-11-10 | Resolved: 2018-01-01

## 2018-01-29 PROBLEM — R07.89 ATYPICAL CHEST PAIN: Status: RESOLVED | Noted: 2018-01-01 | Resolved: 2018-01-01

## 2018-01-29 PROBLEM — E86.0 DEHYDRATION: Status: RESOLVED | Noted: 2018-01-01 | Resolved: 2018-01-01

## 2018-01-29 PROBLEM — R60.9 EDEMA: Status: RESOLVED | Noted: 2018-01-01 | Resolved: 2018-01-01

## 2018-01-29 PROBLEM — R60.1 ANASARCA: Status: RESOLVED | Noted: 2018-01-01 | Resolved: 2018-01-01

## 2018-01-30 NOTE — PATIENT INSTRUCTIONS
Stop Maxide (triamterene/hydrochlorothiazide). Creatinine might improve a bit. Increase carvedilol to 6.25 mg twice a day. I do not blood pressure too low because it drops during chemo  Office visit in 4 months        Chronic Kidney Disease: Care Instructions  Your Care Instructions    Chronic kidney disease happens when your kidneys don't work as well as they should. Your kidneys have a few important jobs. They remove waste from your blood. This waste leaves your body in your urine. They also balance your body's fluids and chemicals. When your kidneys don't work well, extra waste and fluid can build up. This can poison the body and sometimes cause death. The most common causes of this disease are diabetes and high blood pressure. In some cases, the disease develops in 2 to 3 months. But it usually develops over many years. If you take medicine and make healthy changes to your lifestyle, you may be able to prevent the disease from getting worse. But if your kidney damage gets worse, you may need dialysis or a kidney transplant. Dialysis uses a machine to filter waste from the blood. A transplant is surgery to give you a healthy kidney from another person. Follow-up care is a key part of your treatment and safety. Be sure to make and go to all appointments, and call your doctor if you are having problems. It's also a good idea to know your test results and keep a list of the medicines you take. How can you care for yourself at home? ? Treatments and appointments  ? · Be safe with medicines. Take your medicines exactly as prescribed. Call your doctor if you have any problems with your medicine. You also may take medicine to control your blood pressure or to treat diabetes. Many people who have diabetes take blood pressure medicine. ? · If you have diabetes, do your best to keep your blood sugar in your target range. You may do this by eating healthy food and exercising. You may also take medicines.    ? · Go to your dialysis appointments if you have this treatment. ? · Do not take ibuprofen (Advil, Motrin), naproxen (Aleve), or similar medicines, unless your doctor tells you to. These may make the disease worse. ? · Do not take any vitamins, over-the-counter medicines, or herbal products without talking to your doctor first.   ? · Do not smoke or use other tobacco products. Smoking can reduce blood flow to the kidneys. If you need help quitting, talk to your doctor about stop-smoking programs and medicines. These can increase your chances of quitting for good. ? · Do not drink alcohol or use illegal drugs. ? · Talk to your doctor about an exercise plan. Exercise helps lower your blood pressure. It also makes you feel better. ? · If you have an advance directive, let your doctor know. It may include a living will and a durable power of  for health care. If you don't have one, you may want to prepare one. It lets your doctor and loved ones know your health care wishes if you become unable to speak for yourself. Diet  ? · Talk to a registered dietitian. He or she can help you make a meal plan that is right for you. Most people with kidney disease need to limit salt (sodium), fluids, and protein. Some also have to limit potassium and phosphorus. ? · You may have to give up many foods you like. But try to focus on the fact that this will help you stay healthy for as long as possible. ? · If you have a hard time eating enough, talk to your doctor or dietitian about ways to add calories to your diet. ? · Your diet may change as your disease changes. See your doctor for regular testing. And work with a dietitian to change your diet as needed. When should you call for help? Call 911 anytime you think you may need emergency care. For example, call if:  ? · You passed out (lost consciousness). ?Call your doctor now or seek immediate medical care if:  ? · You have less urine than normal or no urine. ? · You have trouble urinating or can urinate only very small amounts. ? · You are confused or have trouble thinking clearly. ? · You feel weaker or more tired than usual.   ? · You are very thirsty, lightheaded, or dizzy. ? · You have nausea and vomiting. ? · You have new swelling of your arms or feet, or your swelling is worse. ? · You have blood in your urine. ? · You have new or worse trouble breathing. ? Watch closely for changes in your health, and be sure to contact your doctor if:  ? · You have any problems with your medicine or other treatment. Where can you learn more? Go to http://cele-alyssa.info/. Enter N276 in the search box to learn more about \"Chronic Kidney Disease: Care Instructions. \"  Current as of: May 12, 2017  Content Version: 11.4  © 4436-0237 [employer]. Care instructions adapted under license by [employer] (which disclaims liability or warranty for this information). If you have questions about a medical condition or this instruction, always ask your healthcare professional. [address] any warranty or liability for your use of this information.

## 2018-01-30 NOTE — PROGRESS NOTES
Carolyn Fall  Identified pt with two pt identifiers(name and ). No chief complaint on file. Reviewed record In preparation for visit and have obtained necessary documentation. Has info on advanced directive but has not filled them out. 1. Have you been to the ER, urgent care clinic or hospitalized since your last visit? 1/3/18 [facility]     2. Have you seen or consulted any other health care providers outside of the [address] [place] [address] since your last visit? Include any pap smears or colon screening. Dr Romie Jessica, [facility], infusion, [place]    Vitals reviewed with provider. Health Maintenance reviewed:     Health Maintenance Due   Topic    DTaP/Tdap/Td series (1 - Tdap)    GLAUCOMA SCREENING Q2Y     COLONOSCOPY         Wt Readings from Last 3 Encounters:   18 239 lb (108.4 kg)   18 236 lb 9.6 oz (107.3 kg)   18 241 lb 13.5 oz (109.7 kg)      Temp Readings from Last 3 Encounters:   18 96.8 °F (36 °C)   18 97.7 °F (36.5 °C)   18 98 °F (36.7 °C)      BP Readings from Last 3 Encounters:   18 115/69   18 (!) 127/93   18 125/69      Pulse Readings from Last 3 Encounters:   18 94   18 86   18 64      There were no vitals filed for this visit.        Learning Assessment:   :     Learning Assessment 2017   PRIMARY LEARNER Patient Patient Patient   HIGHEST LEVEL OF EDUCATION - PRIMARY LEARNER  SOME COLLEGE SOME COLLEGE SOME COLLEGE   BARRIERS PRIMARY LEARNER NONE NONE NONE   CO-LEARNER CAREGIVER No No No   PRIMARY LANGUAGE ENGLISH ENGLISH ENGLISH   LEARNER PREFERENCE PRIMARY LISTENING LISTENING LISTENING   ANSWERED BY patient patient patient   RELATIONSHIP SELF SELF SELF        Depression Screening:   :     PHQ over the last two weeks 2017   Little interest or pleasure in doing things Not at all   Feeling down, depressed or hopeless Not at all   Total Score PHQ 2 0        Fall Risk Assessment:   : Fall Risk Assessment, last 12 mths 9/12/2017   Able to walk? Yes   Fall in past 12 months? No        Abuse Screening:   :     Abuse Screening Questionnaire 1/17/2017   Do you ever feel afraid of your partner? N   Are you in a relationship with someone who physically or mentally threatens you? N   Is it safe for you to go home?  Y        ADL Screening:   :     ADL Assessment 1/17/2017   Feeding yourself No Help Needed   Getting from bed to chair No Help Needed   Getting dressed No Help Needed   Bathing or showering No Help Needed   Walk across the room (includes cane/walker) No Help Needed   Using the telphone No Help Needed   Taking your medications No Help Needed   Preparing meals No Help Needed   Managing money (expenses/bills) No Help Needed   Moderately strenuous housework (laundry) No Help Needed   Shopping for personal items (toiletries/medicines) No Help Needed   Shopping for groceries No Help Needed   Driving No Help Needed   Climbing a flight of stairs No Help Needed   Getting to places beyond walking distances No Help Needed

## 2018-01-30 NOTE — PROGRESS NOTES
HISTORY OF PRESENT ILLNESS  Nay Villareal is a 75 y.o. female. HPI   She presents for follow up of hypertension, chronic kidney disease (due to cis platinum), hyperlipidemia, obesity and glucose intolerance. She is now being treated for vaginal cancer with other agents. Plan was for a CT scan to gauge progress, but high creat precludes use of contrast. She had one visit with Dr Keyanna Silva (nephrologist); Was supposed to have follow up, but visit was cancelled by his office. They did not call her to reschedule and she did not call them. She is treating lymphedema with Lymph Press, but not using regularly. Diet and Lifestyle: generally follows a low fat low cholesterol diet, generally follows a low sodium diet, follows a diabetic diet regularly, sedentary, nonsmoker  Medication compliance: compliant all of the time  Medication side effects: none  Home BP Monitoring: not done  Cardiovascular ROS:  She complains of lower extremity edema, dizziness.     She denies palpitations, orthopnea, exertional chest pressure/discomfort, claudication, dyspnea on exertion     PHQ over the last two weeks 1/30/2018   Little interest or pleasure in doing things Not at all   Feeling down, depressed or hopeless Not at all   Total Score PHQ 2 0       Patient Active Problem List   Diagnosis Code    LAP-BAND surgery status Z98.84    Vaginal cancer ([facility] C52    Hypertension, essential I10    Hypercholesterolemia E78.00    GERD without esophagitis K21.9    Lymphedema of both lower extremities I89.0    Morbid obesity with BMI of 40.0-44.9, adult (HCC) E66.01, Z68.41    CKD (chronic kidney disease) stage 3, GFR 30-59 ml/min N18.3    Anemia D64.9    Glucose intolerance (impaired glucose tolerance) R73.02     Past Medical History:   Diagnosis Date    Abnormal mammogram of left breast 12/5/2016    Cancer ([facility] 06/2017    LYMPH NODES IN BACK    Cancer ([facility] 2016    VAGINAL CANCER    GERD (gastroesophageal reflux disease) 6/11/06  Hypercholesterolemia 10/11/06    Hypertension 10/11/06    Morbid obesity ([address] 10/11/06     Past Surgical History:   Procedure Laterality Date    HX APPENDECTOMY      HX GASTRIC BYPASS  2009    LAP BAND    HX GYN  12/4/15    EUA/Bx of pelvic mass and vagina/cystoscopy/proctoscopy    HX HYSTERECTOMY  1978    HX ORTHOPAEDIC  2009    right ankle    HX OTHER SURGICAL  2001? benign mass removed from lower back    LAP, PLACE ADJUST GASTR BAND  1/30/08    EB: AP Std lap band, liver bx     Social History     Social History    Marital status:      Spouse name: N/A    Number of children: N/A    Years of education: N/A     Social History Main Topics    Smoking status: Former Smoker     Packs/day: 0.50     Years: 15.00     Types: Cigarettes     Quit date: 1/1/1993    Smokeless tobacco: Never Used    Alcohol use Yes      Comment: socially-WINE ONCE A WEEK    Drug use: No    Sexual activity: Not Currently     Other Topics Concern    None     Social History Narrative     Family History   Problem Relation Age of Onset    Cancer Mother      colon WITH METS TO LIVER    Diabetes Father     Heart Disease Father     Cancer Sister      breast & bone    Cancer Maternal Aunt      BREAST    Cancer Paternal Aunt      BREAST    Anesth Problems Neg Hx      Allergies   Allergen Reactions    Amlodipine Other (comments)     Edema in LE's     Current Outpatient Prescriptions   Medication Sig Dispense Refill    oxyCODONE-acetaminophen (PERCOCET) 5-325 mg per tablet Take 1-2 Tabs by mouth every four (4) hours as needed for Pain. Max Daily Amount: 12 Tabs. 30 Tab 0    aspirin delayed-release 81 mg tablet Take 1 Tab by mouth daily. 30 Tab 0    famotidine (PEPCID) 20 mg tablet Take 1 Tab by mouth daily. Indications: gastroesophageal reflux disease 30 Tab 0    docusate sodium (COLACE) 100 mg capsule Take 100 mg by mouth daily.       ondansetron (ZOFRAN ODT) 4 mg disintegrating tablet Take 1 Tab by mouth every eight (8) hours as needed for Nausea. Indications: CANCER CHEMOTHERAPY-INDUCED NAUSEA AND VOMITING 30 Tab 2    dexamethasone (DECADRON) 4 mg tablet Take 2 tablets with breakfast the day before chemo and for 2 days after chemo 36 Tab 2    carvedilol (COREG) 3.125 mg tablet Take 1 Tab by mouth two (2) times daily (with meals). 60 Tab 2    atorvastatin (LIPITOR) 80 mg tablet Take 1 Tab by mouth daily. 90 Tab 3    triamterene-hydroCHLOROthiazide (MAXZIDE) 37.5-25 mg per tablet Take 1 Tab by mouth daily. 90 Tab 3    Cholecalciferol, Vitamin D3, (VITAMIN D3) 1,000 unit cap Take 1 Cap by mouth daily. Review of Systems   Constitutional: Positive for malaise/fatigue. Negative for weight loss. Gastrointestinal: Negative for abdominal pain, constipation and diarrhea. Genitourinary: Negative for frequency and urgency. Musculoskeletal: Positive for back pain. Negative for joint pain. Neurological: Positive for sensory change (numbness in hands and feet). Negative for dizziness, tingling and focal weakness. Visit Vitals    /82 (BP 1 Location: Left arm, BP Patient Position: Sitting)    Pulse 67    Temp 97.2 °F (36.2 °C) (Oral)    Resp 20    Ht 5' 2\" (1.575 m)    Wt 241 lb (109.3 kg)    SpO2 97%    BMI 44.08 kg/m2     Physical Exam   Constitutional: She is oriented to person, place, and time. She appears well-developed and well-nourished. HENT:   Head: Normocephalic and atraumatic. Eyes: Conjunctivae are normal. Pupils are equal, round, and reactive to light. Neck: Neck supple. Carotid bruit is not present. No thyromegaly present. Cardiovascular: Normal rate, regular rhythm and normal heart sounds. PMI is not displaced. Exam reveals no gallop. No murmur heard. Pulses:       Dorsalis pedis pulses are 2+ on the right side, and 2+ on the left side. Posterior tibial pulses are 2+ on the right side, and 2+ on the left side. Pulmonary/Chest: Effort normal. She has no wheezes. She has no rhonchi. She has no rales. Abdominal: Soft. Normal appearance. She exhibits no abdominal bruit and no mass. There is no hepatosplenomegaly. There is no tenderness. Musculoskeletal: She exhibits edema (left 3+ to mid thigh, right 2+ ro below knee). Lymphadenopathy:     She has no cervical adenopathy. Right: No supraclavicular adenopathy present. Left: No supraclavicular adenopathy present. Neurological: She is alert and oriented to person, place, and time. No sensory deficit. Skin: Skin is warm, dry and intact. No rash noted. Psychiatric: She has a normal mood and affect. Her behavior is normal.   Nursing note and vitals reviewed. Lab Results   Component Value Date/Time    Sodium 142 01/22/2018 10:52 AM    Potassium 3.7 01/22/2018 10:52 AM    Chloride 97 01/22/2018 10:52 AM    CO2 25 01/22/2018 10:52 AM    Anion gap 10 01/05/2018 04:45 AM    Glucose 122 01/22/2018 10:52 AM    BUN 24 01/22/2018 10:52 AM    Creatinine 1.84 01/22/2018 10:52 AM    BUN/Creatinine ratio 13 01/22/2018 10:52 AM    GFR est AA 32 01/22/2018 10:52 AM    GFR est non-AA 28 01/22/2018 10:52 AM    Calcium 9.1 01/22/2018 10:52 AM    Bilirubin, total 0.4 01/22/2018 10:52 AM    AST (SGOT) 15 01/22/2018 10:52 AM    Alk.  phosphatase 104 01/22/2018 10:52 AM    Protein, total 6.7 01/22/2018 10:52 AM    Albumin 4.0 01/22/2018 10:52 AM    Globulin 3.5 01/03/2018 05:02 PM    A-G Ratio 1.5 01/22/2018 10:52 AM    ALT (SGPT) 11 01/22/2018 10:52 AM     Lab Results   Component Value Date/Time    Hemoglobin A1c 5.7 01/22/2018 10:52 AM     Lab Results   Component Value Date/Time    WBC 6.8 01/22/2018 10:52 AM    HGB 9.0 01/22/2018 10:52 AM    HCT 27.9 01/22/2018 10:52 AM    PLATELET 178 72/81/5111 10:52 AM    MCV 98 01/22/2018 10:52 AM     Lab Results   Component Value Date/Time    Cholesterol, total 172 04/18/2017 10:13 AM    HDL Cholesterol 47 04/18/2017 10:13 AM    LDL, calculated 95 04/18/2017 10:13 AM    VLDL, calculated 30 04/18/2017 10:13 AM    Triglyceride 152 04/18/2017 10:13 AM         ASSESSMENT and PLAN    ICD-10-CM ICD-9-CM    1. Hypertension, essential I10 401.9 carvedilol (COREG) 6.25 mg tablet   2. Hypercholesterolemia E78.00 272.0    3. Glucose intolerance (impaired glucose tolerance) R73.02 790.22    4. CKD (chronic kidney disease) stage 3, GFR 30-59 ml/min N18.3 585.3    5. Lymphedema of both lower extremities I89.0 457.1    6. Morbid obesity with BMI of 40.0-44.9, adult ([facility] E66.01 278.01     Z68.41 V85.41    7. Vaginal cancer ([facility] C52 184.0    8. Screening for depression Z13.89 V79.0 [place] DEPRESSION SCREEN ANNUAL     Diagnoses and all orders for this visit:    1. Hypertension, essential  Blood pressure is not quite at goal and creatinine is rising. This likely is still effect of cis platinum. Recommended making follow up appointment with Dr Kavon Lucero.   -     carvedilol (COREG) 6.25 mg tablet; Take 1 Tab by mouth two (2) times daily (with meals). Indications: hypertension    2. Hypercholesterolemia  Hyperlipidemia is controlled    3. Glucose intolerance (impaired glucose tolerance)    4. CKD (chronic kidney disease) stage 3, GFR 30-59 ml/min    5. Lymphedema of both lower extremities  Urged to use Lymph Press for an hour twice a day    6. Morbid obesity with BMI of 40.0-44.9, adult ([facility]    7. Vaginal cancer ([facility]    8. Screening for depression  -     [place] DEPRESSION SCREEN ANNUAL      Follow-up Disposition:  Return in about 4 months (around 5/30/2018) for HTN, gluc, wt, .  lab results and schedule of future lab studies reviewed with patient  reviewed diet, exercise and weight control  I have discussed the diagnosis with the patient and the intended plan as seen in the above orders. Patient is in agreement. The patient has received an after-visit summary and questions were answered concerning future plans. I have discussed medication side effects and warnings with the patient as well.

## 2018-01-30 NOTE — MR AVS SNAPSHOT
[address] [facility] [address] [phone] Patient: Gianfranco Kaye MRN: [id number] Visit Information Date & Time Provider Department Dept. Phone Encounter #  
 1/30/2018 10:30 AM Goran Alanis MD [facility] [phone] [id number] Follow-up Instructions Return in about 4 months (around 5/30/2018) for HTN, gluc, wt, . Your Appointments 2/13/2018  9:00 AM  
CHEMOTHERAPY with Mario Gutierrez MD  
[address]) Appt Note: 3rd floor labs today/for c6 taxol/carbo at [facility] opic 2/14 at [address] [facility] [address] [phone]  
[address] Upcoming Health Maintenance Date Due DTaP/Tdap/Td series (1 - Tdap) 7/7/1971 GLAUCOMA SCREENING Q2Y 11/16/2017 COLONOSCOPY 11/16/2017 MEDICARE YEARLY EXAM 3/18/2018 BREAST CANCER SCRN MAMMOGRAM 11/30/2018 Allergies as of 1/30/2018  Review Complete On: 1/30/2018 By: Goran Alanis MD  
  
 Severity Noted Reaction Type Reactions Amlodipine  04/24/2017   Side Effect Other (comments) Edema in LE's Current Immunizations  Reviewed on 1/30/2018 Name Date Influenza High Dose Vaccine PF 12/1/2016 Influenza Vaccine 10/15/2015 Pneumococcal Conjugate (PCV-13) 9/4/2015 Pneumococcal Vaccine (Unspecified Type) 10/22/2015 Zoster Vaccine, Live 10/15/2015 Reviewed by Janell Mays LPN on 6/96/1341 at 86:60 AM  
You Were Diagnosed With   
  
 Codes Comments Hypertension, essential    -  Primary ICD-10-CM: I10 
ICD-9-CM: 401.9 Hypercholesterolemia     ICD-10-CM: E78.00 ICD-9-CM: 272.0 Glucose intolerance (impaired glucose tolerance)     ICD-10-CM: R73.02 
ICD-9-CM: 790.22 CKD (chronic kidney disease) stage 3, GFR 30-59 ml/min     ICD-10-CM: N18.3 ICD-9-CM: 298. 3 Lymphedema of both lower extremities     ICD-10-CM: I89.0 ICD-9-CM: 360.7 Morbid obesity with BMI of 40.0-44.9, adult (HCC)     ICD-10-CM: E66.01, Z68.41 
ICD-9-CM: 278.01, V85.41 [facility]     ICD-10-CM: B10 ICD-9-CM: 184.0 Vitals BP Pulse Temp Resp Height(growth percentile) Weight(growth percentile) 143/82 (BP 1 Location: Left arm, BP Patient Position: Sitting) 67 97.2 °F (36.2 °C) (Oral) 20 5' 2\" (1.575 m) 241 lb (109.3 kg) SpO2 BMI OB Status Smoking Status 97% 44.08 kg/m2 Hysterectomy Former Smoker Vitals History BMI and BSA Data Body Mass Index Body Surface Area 44.08 kg/m 2 2.19 m 2 Preferred Pharmacy Pharmacy Name Phone [facility] [address] [facility] [place] [phone] Your Updated Medication List  
  
   
This list is accurate as of: 1/30/18 11:43 AM.  Always use your most recent med list.  
  
  
  
  
 aspirin delayed-release 81 mg tablet Take 1 Tab by mouth daily. atorvastatin 80 mg tablet Commonly known as:  LIPITOR Take 1 Tab by mouth daily. carvedilol 6.25 mg tablet Commonly known as:  Dewey Dine Take 1 Tab by mouth two (2) times daily (with meals). Indications: hypertension  
  
 dexamethasone 4 mg tablet Commonly known as:  DECADRON Take 2 tablets with breakfast the day before chemo and for 2 days after chemo  
  
 docusate sodium 100 mg capsule Commonly known as:  Roseann  Take 100 mg by mouth daily. famotidine 20 mg tablet Commonly known as:  PEPCID Take 1 Tab by mouth daily. Indications: gastroesophageal reflux disease  
  
 ondansetron 4 mg disintegrating tablet Commonly known as:  ZOFRAN ODT Take 1 Tab by mouth every eight (8) hours as needed for Nausea. Indications: CANCER CHEMOTHERAPY-INDUCED NAUSEA AND VOMITING  
  
 oxyCODONE-acetaminophen 5-325 mg per tablet Commonly known as:  PERCOCET  
 Take 1-2 Tabs by mouth every four (4) hours as needed for Pain. Max Daily Amount: 12 Tabs. VITAMIN D3 1,000 unit Cap Generic drug:  cholecalciferol Take 1 Cap by mouth daily. Prescriptions Sent to Pharmacy Refills  
 carvedilol (COREG) 6.25 mg tablet 5 Sig: Take 1 Tab by mouth two (2) times daily (with meals). Indications: hypertension Class: Normal  
 Pharmacy: [address] [facility], [address] [place] [address] #: [phone] Route: Oral  
  
Follow-up Instructions Return in about 4 months (around 5/30/2018) for HTN, gluc, wt, . To-Do List   
 02/14/2018 9:00 AM  
  Appointment with [address] [place] [address] [address] [facility] ([phone]) Patient Instructions Stop Maxide (triamterene/hydrochlorothiazide). Creatinine might improve a bit. Increase carvedilol to 6.25 mg twice a day. I do not blood pressure too low because it drops during chemo Office visit in 4 months Chronic Kidney Disease: Care Instructions Your Care Instructions Chronic kidney disease happens when your kidneys don't work as well as they should. Your kidneys have a few important jobs. They remove waste from your blood. This waste leaves your body in your urine. They also balance your body's fluids and chemicals. When your kidneys don't work well, extra waste and fluid can build up. This can poison the body and sometimes cause death. The most common causes of this disease are diabetes and high blood pressure. In some cases, the disease develops in 2 to 3 months. But it usually develops over many years. If you take medicine and make healthy changes to your lifestyle, you may be able to prevent the disease from getting worse. But if your kidney damage gets worse, you may need dialysis or a kidney transplant. Dialysis uses a machine to filter waste from the blood. A transplant is surgery to give you a healthy kidney from another person. Follow-up care is a key part of your treatment and safety. Be sure to make and go to all appointments, and call your doctor if you are having problems. It's also a good idea to know your test results and keep a list of the medicines you take. How can you care for yourself at home? ? Treatments and appointments ? · Be safe with medicines. Take your medicines exactly as prescribed. Call your doctor if you have any problems with your medicine. You also may take medicine to control your blood pressure or to treat diabetes. Many people who have diabetes take blood pressure medicine. ? · If you have diabetes, do your best to keep your blood sugar in your target range. You may do this by eating healthy food and exercising. You may also take medicines. ? · Go to your dialysis appointments if you have this treatment. ? · Do not take ibuprofen (Advil, Motrin), naproxen (Aleve), or similar medicines, unless your doctor tells you to. These may make the disease worse. ? · Do not take any vitamins, over-the-counter medicines, or herbal products without talking to your doctor first.  
? · Do not smoke or use other tobacco products. Smoking can reduce blood flow to the kidneys. If you need help quitting, talk to your doctor about stop-smoking programs and medicines. These can increase your chances of quitting for good. ? · Do not drink alcohol or use illegal drugs. ? · Talk to your doctor about an exercise plan. Exercise helps lower your blood pressure. It also makes you feel better. ? · If you have an advance directive, let your doctor know. It may include a living will and a durable power of  for health care. If you don't have one, you may want to prepare one. It lets your doctor and loved ones know your health care wishes if you become unable to speak for yourself. Diet ? · Talk to a registered dietitian.  He or she can help you make a meal plan that is right for you. Most people with kidney disease need to limit salt (sodium), fluids, and protein. Some also have to limit potassium and phosphorus. ? · You may have to give up many foods you like. But try to focus on the fact that this will help you stay healthy for as long as possible. ? · If you have a hard time eating enough, talk to your doctor or dietitian about ways to add calories to your diet. ? · Your diet may change as your disease changes. See your doctor for regular testing. And work with a dietitian to change your diet as needed. When should you call for help? Call 911 anytime you think you may need emergency care. For example, call if: 
? · You passed out (lost consciousness). ?Call your doctor now or seek immediate medical care if: 
? · You have less urine than normal or no urine. ? · You have trouble urinating or can urinate only very small amounts. ? · You are confused or have trouble thinking clearly. ? · You feel weaker or more tired than usual.  
? · You are very thirsty, lightheaded, or dizzy. ? · You have nausea and vomiting. ? · You have new swelling of your arms or feet, or your swelling is worse. ? · You have blood in your urine. ? · You have new or worse trouble breathing. ? Watch closely for changes in your health, and be sure to contact your doctor if: 
? · You have any problems with your medicine or other treatment. Where can you learn more? Go to http://cele-alyssa.info/. Enter N276 in the search box to learn more about \"Chronic Kidney Disease: Care Instructions. \" Current as of: May 12, 2017 Content Version: 11.4 © 0768-0272 [employer]. Care instructions adapted under license by [employer] (which disclaims liability or warranty for this information).  If you have questions about a medical condition or this instruction, always ask your healthcare professional. [employer] disclaims any warranty or liability for your use of this information. Introducing \[facility] [place] [facility]\"" & HEALTH SERVICES! Dear Rubens Carreon: Thank you for requesting a [employer] account. Our records indicate that you already have an active [employer] account. You can access your account anytime at [email or web address] Did you know that you can access your hospital and ER discharge instructions at any time in [employer]? You can also review all of your test results from your hospital stay or ER visit. Additional Information If you have questions, please visit the Frequently Asked Questions section of the [employer] website at [email or web address] Remember, [employer] is NOT to be used for urgent needs. For medical emergencies, dial 911. Now available from your iPhone and Android! Please provide this summary of care documentation to your next provider. Your primary care clinician is listed as Matthew Harris. If you have any questions after today's visit, please call [phone].

## 2018-02-13 NOTE — PROGRESS NOTES
[address] [place] [address] Mathias Moritz [address], 5[address] [facility]  [phone]  Veronia Florence, MD Coy Lundborg, MD  Office Note  Patient ID:  Name:  Missy Adams  MRN:  [id number]  :  67 y.o. Date:  2018      HISTORY OF PRESENT ILLNESS:  Missy Adams is a 75 y.o.  postmenopausal female who was referred to me in 2015 for a pelvic mass by Dr. Faye Olmos, who noted the mass on routine gynecologic exam.  He sent her for a CT of the pelvis, which revealed a mass at the vaginal apex, that appeared to be invading into the soft tissues of the left pelvis. There were several enlarged lymph nodes on the left. There was also questionable bladder invasion, and possibly even involvement of the rectosigmoid colon. She reported a history of having a mass removed from one of her ovaries in 1 at [facility], which required a second surgery to remove her uterus and contralateral ovary. She said she did not have to take any chemotherapy, but she did have to take some kind of pill for a year. She has not had any gynecologic issues since that time. Presumably her hysterectomy was a total, rather than a supracervical, but she is unsure and there are no records. She denied any vaginal bleeding, except for after her examination with Dr. Rosanne Owen. She reported minimal pain in the pelvis. I took her the OR for and exam under anesthesia with cystoscopy and proctoscopy. Pathology revealed a squamous cell carcinoma, consistent with a vaginal primary. Examination revealed a fixed mass involving the left upper vagina with extension to the left pelvic sidewall. There also appeared to be involvement of the posterior bladder wall, but no mucosal involvement. The mass did push against the rectal wall, but there did not appear to be any direct involvement. I referred her to radiation oncology and she saw Dr. Zachariah Landeros.   She completed pelvic radiation along with concurrent cisplatin chemotherapy in February 2016 and had a great response. When I saw her in the office in March 2017 for follow up she was doing well and without complaints. Her pelvic exam and pap smear at that time were both negative. Subsequent to that visit she began having back pain that had progressively worsened. She saw Negin Boyd in May and she ordered a CT. This unfortunately demonstrated extensive metastatic disease outside of the prior radiated field. Dr. Yuval Bonilla then sent her for a PET/CT which demonstrated widely metastatic disease. I recommended systemic chemotherapy with Taxol/Cisplatin/Avastin, which is the standard for metastatic cervical cancer, since vaginal cancer behaves similar to cervical cancer. Unfortunately, the Avastin was denied by her insurance company. We chose to proceed with Taxol/Cisplatin. She completed 6 cycles. She has done well so far with her treatment. She denies nausea. She reports some persistent lower extremity edema, but that has overall improved. Her scan in October 2017 demonstrated slight progression of the lymphadenopathy, while the pulmonary and bony disease appeared stable. On my review, the changes didn't meet RECIST criteria for progression. I recommended continuing with therapy, but I suggested that we exchange Carboplatin for Cisplatin due to nephrotoxicity. Her creatinine had steadily climbed with the Cisplatin. She has completed 5 cycles of Taxol/Carboplatin since the switch. She feels more tired with the Carboplatin rather than the Cisplatin, but otherwise not much different. She is now having more swelling in her lower extremities and reporting some more back pain. ECOG score: 1       ROS:   and GI review: Negative  Cardiopulmonary review:Negative   Musculoskeletal:  Negative    A comprehensive review of systems was negative except for that written in the History of Present Illness.  , 10 point ROS        Problem List:  Patient Active Problem List    Diagnosis Date Noted    Glucose intolerance (impaired glucose tolerance) 01/29/2018    Anemia 01/04/2018    CKD (chronic kidney disease) stage 3, GFR 30-59 ml/min 01/03/2018    Morbid obesity with BMI of 40.0-44.9, adult ([facility] 12/12/2017    Lymphedema of both lower extremities 06/14/2017    Hypertension, essential 01/18/2017    Hypercholesterolemia 01/18/2017    GERD without esophagitis 01/18/2017    Vaginal cancer ([facility] 12/10/2015    LAP-BAND surgery status 05/17/2012     PMH:  Past Medical History:   Diagnosis Date    Abnormal mammogram of left breast 12/5/2016    Cancer ([facility] 06/2017    LYMPH NODES IN BACK    Cancer ([facility] 2016    VAGINAL CANCER    GERD (gastroesophageal reflux disease) 6/11/06    Hypercholesterolemia 10/11/06    Hypertension 10/11/06    Morbid obesity ([facility] 10/11/06      PSH:  Past Surgical History:   Procedure Laterality Date    HX APPENDECTOMY      HX GASTRIC BYPASS  2009    LAP BAND    HX GYN  12/4/15    EUA/Bx of pelvic mass and vagina/cystoscopy/proctoscopy    HX HYSTERECTOMY  1978    HX ORTHOPAEDIC  2009    right ankle    HX OTHER SURGICAL  2001?     benign mass removed from lower back    LAP, PLACE ADJUST GASTR BAND  1/30/08    EB: AP Std lap band, liver bx      Social History:  Social History   Substance Use Topics    Smoking status: Former Smoker     Packs/day: 0.50     Years: 15.00     Types: Cigarettes     Quit date: 1/1/1993    Smokeless tobacco: Never Used    Alcohol use Yes      Comment: socially-WINE ONCE A WEEK      Family History:  Family History   Problem Relation Age of Onset    Cancer Mother      colon WITH METS TO LIVER    Diabetes Father     Heart Disease Father     Cancer Sister      breast & bone    Cancer Maternal Aunt      BREAST    Cancer Paternal Aunt      BREAST    Anesth Problems Neg Hx       Medications: (reviewed)  Current Outpatient Prescriptions   Medication Sig    acetaminophen (TYLENOL) 325 mg tablet Take  by mouth every four (4) hours as needed for Pain.  carvedilol (COREG) 6.25 mg tablet Take 1 Tab by mouth two (2) times daily (with meals). Indications: hypertension    oxyCODONE-acetaminophen (PERCOCET) 5-325 mg per tablet Take 1-2 Tabs by mouth every four (4) hours as needed for Pain. Max Daily Amount: 12 Tabs.  aspirin delayed-release 81 mg tablet Take 1 Tab by mouth daily.  famotidine (PEPCID) 20 mg tablet Take 1 Tab by mouth daily. Indications: gastroesophageal reflux disease    docusate sodium (COLACE) 100 mg capsule Take 100 mg by mouth daily.  ondansetron (ZOFRAN ODT) 4 mg disintegrating tablet Take 1 Tab by mouth every eight (8) hours as needed for Nausea. Indications: CANCER CHEMOTHERAPY-INDUCED NAUSEA AND VOMITING    dexamethasone (DECADRON) 4 mg tablet Take 2 tablets with breakfast the day before chemo and for 2 days after chemo    atorvastatin (LIPITOR) 80 mg tablet Take 1 Tab by mouth daily.  Cholecalciferol, Vitamin D3, (VITAMIN D3) 1,000 unit cap Take 1 Cap by mouth daily. No current facility-administered medications for this visit. Allergies: (reviewed)  Allergies   Allergen Reactions    Amlodipine Other (comments)     Edema in LE's          OBJECTIVE:    Physical Exam:  VITAL SIGNS: Vitals:    02/13/18 0852   BP: 147/85   Pulse: 70   Weight: 256 lb 12.8 oz (116.5 kg)   Height: 5' 2.01\" (1.575 m)     Body mass index is 46.96 kg/(m^2). GENERAL MIGUELANGEL: Conversant, alert, oriented. No acute distress. HEENT: HEENT. No thyroid enlargement. No JVD. Neck: Supple without restrictions. RESPIRATORY: Clear to auscultation and percussion to the bases. No CVAT. CARDIOVASC: RRR without murmur/rub. GASTROINT: soft, non-tender, without masses or organomegaly   MUSCULOSKEL: no joint tenderness, deformity or swelling   EXTREMITIES: Marked edema of bilateral lower extremities, L>R. Skin noted with vascular changes on the left.    PELVIC: Deferred   RECTAL: Deferred PATRICIO SURVEY: No suspicious lymphadenopathy. NEURO: Grossly intact. No acute deficit. CT of abdomen/pelvis (5/16/17)  LUNG BASES: Multiple bilateral pulmonary nodules. INCIDENTALLY IMAGED HEART AND MEDIASTINUM: Unremarkable. LIVER: No mass or biliary dilatation. GALLBLADDER: Cholelithiasis. SPLEEN: Calcified granulomata. PANCREAS: No mass or ductal dilatation. ADRENALS: Unremarkable. KIDNEYS: Right renal cyst.  STOMACH: Unremarkable. SMALL BOWEL: No dilatation or wall thickening. COLON: No dilatation or wall thickening. APPENDIX: Unremarkable. PERITONEUM: No ascites or pneumoperitoneum. RETROPERITONEUM: Significant retroperitoneal lymphadenopathy, largest dimension  surrounding the aorta 6.5 x 4.2 cm. REPRODUCTIVE ORGANS: There is 4.6 x 2.4 cm soft tissue in the region of  previously identified left pelvic sidewall mass. URINARY BLADDER: No mass or calculus. BONES: No destructive bone lesion. Degenerative changes of L4-L5 and L5-S1. ADDITIONAL COMMENTS: N/A     IMPRESSION:  1. Bilateral pulmonary nodules and retroperitoneal lymphadenopathy compatible  with metastatic disease. 2. Much smaller mass in the left pelvis, or scar. 3. Cholelithiasis. PET/CT (6/6/17)  HEAD/NECK: No apparent foci of abnormal hypermetabolism. Cerebral evaluation is  limited by normal intense activity.     CHEST: There are hypermetabolic left supraclavicular, superior mediastinal, and  paraesophageal lymph nodes. The small pulmonary nodules are minimally  hypermetabolic, maximum SUV of the lesion in the right middle lobe is 1.9.     ABDOMEN/PELVIS: Para-aortic and aortocaval lymphadenopathy is hypermetabolic,  maximum SUV 8.0. Small but hypermetabolic bilateral external iliac lymph nodes  are noted.     SKELETON: There is a hypermetabolic lytic lesion in the sternum, maximum SUV is  9.7. There is a small sclerotic lesion at L3 which is new compared to the prior  PET/CT.  Given the tracer activity corresponding to the adjacent retroperitoneal  lymphadenopathy that is difficult to determine if this small lesion is  hypermetabolic.     IMPRESSION:   1. Mildly hypermetabolic pulmonary nodules are concerning for metastatic  disease. 2. Hypermetabolic left supraclavicular, superior mediastinal, and paraesophageal  lymph nodes as well as retroperitoneal and bilateral external iliac lymph nodes. 3. Hypermetabolic lytic lesion in the sternum is concerning for metastatic  disease. Small sclerotic lesion at L3 is new compared to the prior PET/CT but  too small for accurate characterization by PET CT.      CT of abdomen/pelvis (8/14/17)  There is a left jugular Port-A-Cath with tip in the superior vena cava. LOWER NECK:The visualized portions of the thyroid and structures of the lower  neck are within normal limits. CHEST: There is a 1.4 cm left supraclavicular lymph node. Lungs: There are multiple nodules throughout the lungs, some with cavitation  which are unchanged. Pleura: There is no pleural effusion or pneumothorax. Aorta: The aorta enhances normally with no evidence of aneurysm or dissection. Mediastinum: There is no mediastinal or hilar adenopathy or mass. Bones and soft tissues: The bones and soft tissues of the chest wall are normal.  ABDOMEN:  Liver: The liver is normal in size and contour with no focal abnormality. Gallbladder and bile ducts: There are small stones in the gallbladder. The  gallbladder wall is not thickened and there is no pericholecystic fluid. Spleen: No abnormality. There are tiny calcified granulomas in the spleen. Pancreas: No abnormality. Adrenal glands: No abnormality. Kidneys: No abnormality. PELVIS:  Reproductive organs: The uterus is absent. Bladder: No abnormality. BOWEL AND MESENTERY: There is a laparoscopic gastric band in expected position  in the upper abdomen. The small bowel is not obstructed. There is no mesenteric  mass or adenopathy. The appendix is absent.   There are diverticula of the  sigmoid colon. PERITONEUM: There is no ascites or free intraperitoneal air. RETROPERITONEUM: The aorta is atherosclerotic and tapers without aneurysm. There  is retroperitoneal adenopathy. There is a 2.7 x 2.1 x 3.3 cm left para-aortic  lymph node and just anterior to this there is a 1.9 x 1.9 x 2.6 cm preaortic  lymph node. These have decreased minimally in size and previously measured 2 .9  x 2.5 and 2 x 2 centimeters. There is no pelvic mass or adenopathy. BONES AND SOFT TISSUES: There are degenerative changes of the spine and there  are sclerotic lesions in the L3 and L4 vertebral bodies. There is a sclerotic  and lytic lesion in the sternum.     IMPRESSION:   1. Status post hysterectomy and bilateral oophorectomy. 2. Bilateral pulmonary nodules, some with cavitation unchanged consistent with  metastatic disease. 3 Retroperitoneal adenopathy unchanged or minimally decreased in size and  enlarged left supraclavicular lymph node consistent with metastatic disease. 4. Small left pelvic mass or scar unchanged. 5. Lytic and sclerotic sternal lesion consistent with metastasis. 6. L3 and L4 bone lesions more sclerotic suggesting healing. 7. Atherosclerotic  abdominal aorta without aneurysm. 8. Gallstones. 9. Splenic granulomas indicative of old granulomatous disease. CT of chest/abdomen/pelvis (10/23/17)  THYROID: No nodule. MEDIASTINUM: No mass or lymphadenopathy. PATRICIA: No mass or enlarged lymphadenopathy. Calcified right hilar lymph nodes. THORACIC AORTA: Atherosclerotic calcifications without aneurysm. MAIN PULMONARY ARTERY: Normal in caliber. TRACHEA/BRONCHI: Patent. ESOPHAGUS: No wall thickening or dilatation. HEART:  The visualized heart is normal in size without pericardial effusion. PLEURA: No effusion or pneumothorax. LUNGS: : There are multiple nodules throughout the lungs, some with cavitation  which are unchanged. Calcified granulomata right lung.   LIVER: Calcified granulomata. Otherwise unremarkable. GALLBLADDER: Partially distended. Calcified stones within the lumen at the  fundus. Otherwise unremarkable. SPLEEN: Outside granuloma. Otherwise unremarkable. PANCREAS: No mass or duct dilation. ADRENALS: Unremarkable. KIDNEYS: No mass, calculus, or hydronephrosis. STOMACH: Gastric lap band in position. Otherwise unremarkable. SMALL BOWEL: No dilatation or wall thickening. COLON: No dilation or wall thickening. APPENDIX: Unremarkable. PERITONEUM: No ascites or pneumoperitoneum. RETROPERITONEUM: The aorta is atherosclerotic and tapers without aneurysm. There  is retroperitoneal adenopathy. There is a 2.5 x 2.7 cm left para-aortic lymph  node, previously 2.4 x 2.5 cm and a preaortic lymph node just anterior to this  measuring 1.7 x 2.1 cm, previously 1.9 x 2.3 cm. Overall confluent periaortic  retroperitoneal adenopathy extending to the bifurcation appears progressed from  prior. There is no pelvic mass or adenopathy. REPRODUCTIVE ORGANS: Uterus and ovaries are surgically absent. URINARY BLADDER: No mass or calculus. BONES: Sclerotic manubrial and vertebral body lesions appear stable. ADDITIONAL COMMENTS: Left-sided Port-A-Cath in position with catheter extending  to the upper superior vena cava. IMPRESSION:  Slight progression of retroperitoneal adenopathy with stable  appearing pulmonary and osseous metastases as above. Additional incidental  findings as above. IMPRESSION/PLAN:  Sky Shafer is a 75 y.o. female with a history of stage III vaginal carcinoma. She was treated with pelvic radiation therapy and concurrent cisplatin chemotherapy. She now has recurrent disease with multiple sites of metastases. I had a long discussion with the patient and her  discussing the best course of management. Surgery is not an option due to the widespread nature of her disease. The same is true for radiation therapy.   Her only viable option is systemic chemotherapy. Since vaginal cancer behaves like cervical carcinoma, I recommended using the standard regimen for metastatic cervical carcinoma, Taxol/Cisplatin/Avastin. Unfortunately, the Avastin was denied by her insurance company. We chose to proceed with Taxol/Cisplatin. She completed 6 cycles. On her CT in July 2017 her pulmonary nodules appeared stable and her retroperitoneal lymphadenopathy appeared slightly improved. Her most recent scan in October 2017 demonstrated slight progression of the lymphadenopathy, while the pulmonary and bony disease appeared stable. On my review, the changes didn't meet RECIST criteria for progression. I recommended continuing with therapy, but I suggested that we exchange Carboplatin for Cisplatin due to nephrotoxicity. Her creatinine had steadily climbed with the Cisplatin. She has completed 5 cycles of Taxol/Carboplatin since the switch. We will get a CT at this time to reevaluate ASAP. I am concerned for disease progression. She was supposed to get a scan before but was cancelled due to her creatinine being elevated. If the scan shows improvement, we will continue with the current regimen.       Signed By: Ahmet Brewer MD     2/13/2018/4:31 PM

## 2018-02-13 NOTE — PROGRESS NOTES
Pre chemo, labs today and Taxol/Carbo on 2/14. Pt c/o increased swelling. Pt has had a 15lb weight gain since 1/30.

## 2018-02-14 NOTE — PROGRESS NOTES
Praveena Meza, DEBBIE Dickinson      Date of visit: 2/14/2018   Gyn Onc: Dr. Josefa Costello Dx: Vaginal cancer    Mrs. Anabella Partida presents today for her C6 of Taxol/Carbo. She met with Dr. Mitchel Stiles yesterday and due to having increasing concerns about pain/discomfort, he ordered a CT scan without contrast. Results below. She had chest pain after her infusion on 1/2 and was seen in the ER. She was evaluated over night and seen by cardiology and cleared for discharge in 48 hours. She has negative Nuc Stress test and denies any CP since then. Feel this could have been a reaction to the Carbo. She continues to complain of fatigue and has been noted with HGB trending down and was 7.7 on last lab on 2/13. Denies further CP, but continues to acknowledges increasing fatigue. She continues with lymphedema machine \"almost daily\". She says she is trying to do it every day, but misses some now and then and notices it a lot when she does. She began a new regimen of carbo/taxol on 11/1/17. She has been monitored closely due to rising creatine, which on her last lab work was essentially stable at 1.88    Oncology Treatment History:  12/4/2015: Dx with Vaginal cancer after an EUA and biopsies of pelvic mass and vagina                     Vagina, biopsy: Invasive squamous cell carcinoma, poorly differentiated. No loss of mismatch repair proteins   1/15/16-2/12/16: Weekly Cisplatin with XRT with good response. 5/16/17: CT scan showed Bilateral pulmonary nodules and retroperitoneal lymphadenopathy compatible with metastatic disease. 6/14/17-10/4/17: 6 cycles of Cisplatin/Taxol. Much difficulty with neuropathy  10/23/17: CT scan (without contrast) after 6 cycles showed  Slight progression of retroperitoneal adenopathy with stable appearing pulmonary and osseous metastases as above. 10/26/17: saw after above CT results.   Dr. Mitchel Stiles felt the changes didn't meet RECIST criteria for progression. He recommend continuing with therapy, but suggested that we exchange Carboplatin for Cisplatin due to nephrotoxicity. Her creatinine has pamella with the Cisplatin. 11/1/17: Began Carbo/Taxol. 1/3/2018; Had mild reaction to Carbo, but received Taxol  1/19/17: due to elevated creat, were unable to proceed with CT scan .  1/24/2018: Again, had moderate reaction to Carbo, but was easily controlled with Solu Medrol  2/13/2018: Again, due to elevated creat, but symptomatic, a non-contrast CT was obtained before planned time. Resulted out as Stable pulmonary metastases. Stable bone metastases. Multiple gallstones. Stable para-aortic lymphadenopathy. See full report below. Dr. Chuy Jacobs wanted to complete all cycles if possible of Carbo/Taxol with to positive results of CT scan and pt was premeded with Decadron for the Taxol and Solumedrol for the Carbo. Current Outpatient Prescriptions   Medication Sig    acetaminophen (TYLENOL) 325 mg tablet Take  by mouth every four (4) hours as needed for Pain.  carvedilol (COREG) 6.25 mg tablet Take 1 Tab by mouth two (2) times daily (with meals). Indications: hypertension    oxyCODONE-acetaminophen (PERCOCET) 5-325 mg per tablet Take 1-2 Tabs by mouth every four (4) hours as needed for Pain. Max Daily Amount: 12 Tabs.  aspirin delayed-release 81 mg tablet Take 1 Tab by mouth daily.  famotidine (PEPCID) 20 mg tablet Take 1 Tab by mouth daily. Indications: gastroesophageal reflux disease    docusate sodium (COLACE) 100 mg capsule Take 100 mg by mouth daily.  ondansetron (ZOFRAN ODT) 4 mg disintegrating tablet Take 1 Tab by mouth every eight (8) hours as needed for Nausea.  Indications: CANCER CHEMOTHERAPY-INDUCED NAUSEA AND VOMITING    dexamethasone (DECADRON) 4 mg tablet Take 2 tablets with breakfast the day before chemo and for 2 days after chemo    atorvastatin (LIPITOR) 80 mg tablet Take 1 Tab by mouth daily.    Cholecalciferol, Vitamin D3, (VITAMIN D3) 1,000 unit cap Take 1 Cap by mouth daily. Current Facility-Administered Medications   Medication Dose Route Frequency    sodium chloride (NS) flush 5-10 mL  5-10 mL IntraVENous PRN    heparin (porcine) pf 500 Units  500 Units IntraVENous PRN    CARBOplatin (PARAPLATIN) 388 mg in dextrose 5% 250 mL, overfill volume 25 mL chemo infusion  388 mg IntraVENous ONCE    PACLitaxel (TAXOL) 296 mg in 0.9% sodium chloride 250 mL, overfill volume 25 mL chemo infusion  296 mg IntraVENous ONCE    famotidine (PF) (PEPCID) 20 mg in sodium chloride 0.9 % 10 mL injection  20 mg IntraVENous BID    sodium chloride (NS) flush 10 mL  10 mL IntraVENous PRN    diphenhydrAMINE (BENADRYL) injection 50 mg  50 mg IntraVENous ONCE PRN    methylPREDNISolone (PF) (SOLU-MEDROL) injection 125 mg  125 mg IntraVENous PRN    EPINEPHrine HCl (PF) (ADRENALIN) 1 mg/mL (1 mL) injection 0.3 mg  0.3 mg IntraMUSCular Q10MIN PRN    famotidine (PF) (PEPCID) 20 mg in sodium chloride 0.9 % 10 mL injection  20 mg IntraVENous PRN    methylPREDNISolone (PF) (SOLU-MEDROL) injection 125 mg  125 mg IntraVENous ONCE       Review of Systems:  General: Denies wt loss. Says fatigue is still her main complaint, but she is continues trying to be as active as possible especially with her . HEENT: Denies visual changes, dysphagia or headache  Resp:Continues with BLACKBURN, but says it is not any worse and when she rest, it does improve. Denies any wheezing or cough  CV: Denies CP, palpitations since discharge  GI/:Continues with Miralax for occasional constipation. Denies N/V, bloating, diarrhea or dysuria. Says she is eating well. Denies early satiety   MuskSkel: Says left shoulder continues to improve and continues to stretch it. Continues with lymphedema clinic and has seen improvement with the compression machine and is using it as much as she can for left leg lymphedema.    Neuro: Says neuropathy persist, but is really bad only on her left leg and when her edema gets worse. Denies dizzyness or syncope  Psych: Denies depression or feelings of saddness      Objective:     Visit Vitals    /68 (BP 1 Location: Left arm, BP Patient Position: At rest)    Pulse 64    Temp 97.2 °F (36.2 °C)    Resp 18    Ht 5' 2.21\" (1.58 m)    Wt 255 lb (115.7 kg)    BMI 46.33 kg/m2         Physical Exam:  General: A&O X3 in NAD with pleasant affect with  again supportive at her side  HEENT: Sclera anicteric, mucosa pink, moist   Neck: No JVD or adenopathy appreciated. No bruits bilat    Port Site: without redness, tenderness or swelling  Heart: Regular without M/R/G  Lungs: CTA Bilat without wheezing or rales noted   Abd: Soft, obese without tenderness, fluid wave or distention and with + BS throughout  Ext: Extensive left leg edema persist with vascular skin changes previously seen, improved some  + pedal pulses bilat  Neuro: grossly intact      LABS: 2/13/2018  WBC-4.1; HGB-7.7; HCT-24.7; Plt-173; ANC-5.1; Na-144; K+ 4.4; Cl-103; CO2-25; BUN 21; Creat-1.88; Mag 1.6; A1C-5.7 (down from 7.2 last August)  CA-125: 2/13= 106.2; (1/22/18=104.8 1/2/18= 76.1; 12/12/17= 81.2; 11/20/17=111.2)          IMAGING:  CT chest/abd/Pelvis 2/13/2018  FINDINGS:      Previous studies for comparison:  October 23, 2017. Images through the mediastinum and hilar regions reveal no new mass or  adenopathy. Images through the lung parenchyma redemonstrate scattered bilateral small  pulmonary nodules which appear stable to slightly smaller. Small peripheral nodule with cystic appearance in the right lower lobe measures  7.2 mm, previously 7.9 mm. Small peripheral nodule in the left lower lobe measures 6.3 mm, previously 6.3  mm. Nodular density in the right base measures 11.4 x 7.0 mm, previously 11.1 x 7.0  mm. Images through the liver reveal no new space-occupying lesion or biliary  dilatation.   Status post lap band.   Images through the gallbladder fossa reveal gallstones. The spleen is normal in size. The pancreas has a normal morphologic appearance. The kidneys demonstrated normal morphologic appearance. Renal function cannot be  assessed due to lack of intravenous contrast.  The adrenal glands are notable for persistent right adrenal enlargement  measuring 3.0 x 2.2 cm, previously 2.8 x 2.5 cm. Persistent para-aortic retroperitoneal lymphadenopathy measuring roughly 2.6 x  2.6 cm, previously 2.7 x 2.5 cm. Another adjacent lymph node measures 2.1 x 1.7  cm, previously 2.1 x 1.7 cm. The visualized opacified bowel loops are nonobstructed. Images through the pelvis and a straight apparent prior hysterectomy. Overall  stable appearance. Apparent sclerotic and lytic processes involving the sternum vertebra. IMPRESSION:     Stable pulmonary metastases.     Stable bone metastases.     Multiple gallstones.     Stable para-aortic lymphadenopathy.       Assessment:     Patient Active Problem List   Diagnosis Code    LAP-BAND surgery status Z98.84    Vaginal cancer ([facility] [address] C52    Hypertension, essential I10    Hypercholesterolemia E78.00    GERD without esophagitis K21.9    Lymphedema of both lower extremities I89.0    Morbid obesity with BMI of 40.0-44.9, adult (HCC) E66.01, Z68.41    CKD (chronic kidney disease) stage 3, GFR 30-59 ml/min N18.3    Anemia D64.9    Glucose intolerance (impaired glucose tolerance) R73.02         Plan: Will proceed with C5 of Taxol/Carbo. Discussed previous reaction with Dr. Ravi Cha and he would like to try to proceed with last dose of Carbo. Will give Solu Medrol 125 mg prior to Carbo with 50 of Benadryl and infuse slowly  Pt will continue with her compression machine daily and follow up with Lymphedema clinc as needed  Continue following a diabetic diet with her .    Anemia: With Hgb 7.7 and pt symptomatic, will arrange for transfusion of 2UPRBC's tomorrow (type and screen to be drawn today)   Continue with anti-hypertensive  Hydration encouraged and if she feels she is becoming dehydrated at any time, to call and we will get her in for IV infusion. Encouraged to call for any concerns or questions      Greater than 45 minutes spent with pt with greater than 50% face to face monitoring for reaction and management of pre-treatment.      Marge Ramirez, NP

## 2018-02-14 NOTE — PROGRESS NOTES
Outpatient Infusion Center - Chemotherapy Progress Note    4970 Pt admit to [facility] for Taxol/Carbo ambulatory in stable condition. Assessment completed. Pt continues to have a lot of swelling and stiffness in bilateral legs. Pt has had Carboplatin twice in the past with reactions. Decision made to re-challenge today with additional premeds added. Port accessed with positive blood return. Labs drawn 2/15/18 and used for today's treatment. In addition, HGB low at 7.7 so two units of blood ordered for tomorrow. IV attached to NS at [address] 1/24/2018   Cycle C 5   Date 1/24/2018   Drug / Regimen Taxol/Carbo   Dosage -   Pre Hydration -   Post Hydration -   Pre Meds given   Notes Carbo RXN       Visit Vitals    /71 (BP 1 Location: Left arm, BP Patient Position: At rest)    Pulse 68    Temp 98.2 °F (36.8 °C)    Resp 18    Ht 5' 2.21\" (1.58 m)    Wt 115.7 kg (255 lb)    BMI 46.33 kg/m2       Medications:  NS  Benadryl 50mg IVP  Pepcid 20mg IVP  Kytril 1 mg IVP  Decadron 20mg IVP  Paclitaxel over 3 hours  Pepcid 20mg IVP  Solumedrol 125mg IVP    1434 Carboplatin started at a slower rate 104cc/hr. Stayed at the bedside with the patient. 1530 Pt complains of itching in hands, Additional 25mg of Benadryl given and symptoms resolved      1655 Pt tolerated treatment well. Port maintained positive blood return throughout treatment, flushed with positive blood return at conclusion. Port flushed and type and cross drawn and sent. IV capped for tomorrow's transfusion. D/c home ambulatory in no distress.

## 2018-02-15 NOTE — PROGRESS NOTES
Outpatient Infusion Center Short Visit Progress Note    08/15 Pt admit to [facility] for blood transfusion ambulatory in stable condition. Assessment completed. No new concerns voiced. Blood bank called and stated they needed 5 additional tubes due to antibodies. Blood will not be ready for 5-6 hours so pt rescheduled to come back tomorrow. Visit Vitals    /64    Pulse 79    Temp 97.9 °F (36.6 °C)    Resp 18         0900 Port flushed, heparinized and capped for tomorrow's infusion. D/c home ambulatory in no distress.  Pt aware of next appointment scheduled for 2/16/18 at 11am.

## 2018-02-16 NOTE — PROGRESS NOTES
[facility] VISIT NOTE    1110  Pt arrived at [facility] ambulatory and in no distress for 2 units PRBC. Assessment completed, pt c/o swelling in mireille legs. Pt was ordered 2 units PRBC,  [facility] office called and instructed to only infuse 1 unit, per Dr. Anibal Humphrey, due to the patient being vunerable to a reaction. Left chest port still accessed from 2/15/18. Positive blood return noted     Medications received:  1 units PRBC    Tolerated treatment well, no adverse reaction noted. Port de-accessed and flushed per protocol. Positive blood return noted. Blood discharge instructions given. Pt remained in [facility] for 30 minutes post transfusion. Patient Vitals for the past 12 hrs:   Temp Pulse Resp BP   02/16/18 1430 97.4 °F (36.3 °C) 63 18 140/74   02/16/18 1410 97.4 °F (36.3 °C) 60 18 146/75   02/16/18 1310 97.6 °F (36.4 °C) 73 18 133/74   02/16/18 1240 97.8 °F (36.6 °C) 70 18 121/72   02/16/18 1225 97.5 °F (36.4 °C) 63 - 130/75   02/16/18 1209 98.6 °F (37 °C) 62 18 116/66   02/16/18 1110 97.1 °F (36.2 °C) 64 18 128/71     1530  D/C'd from [facility] ambulatory and in no distress accompanied by .  Next appointment is pt will call ROBBY

## 2018-02-16 NOTE — TELEPHONE ENCOUNTER
Minnette Penton called to verify the pts order for blood. the pt has received one unit of the 2 units of blood ordered. She stated someone  called their office to advise that she is to only receive one now. I spoke with Connie Thomas who gave a verbal order for only 1 unit to be given now. She spoke with  who stated the Blood Bank contacted him in the OR and advised they were concerned with a possible reaction. Jackson Sinha in the [facility] was given this verbal order.

## 2018-02-16 NOTE — PROGRESS NOTES
Problem: Knowledge Deficit  Goal: *Verbalizes understanding of procedures and medications  Outcome: Progressing Towards Goal  Pt receiving 2 Units PRBC

## 2018-03-01 PROBLEM — Z79.899 ON ANTINEOPLASTIC CHEMOTHERAPY: Status: ACTIVE | Noted: 2018-01-01

## 2018-03-01 PROBLEM — C79.9 METASTATIC NEOPLASTIC DISEASE (HCC): Status: ACTIVE | Noted: 2018-01-01

## 2018-03-01 PROBLEM — D64.81 ANEMIA DUE TO ANTINEOPLASTIC CHEMOTHERAPY: Status: ACTIVE | Noted: 2018-01-01

## 2018-03-01 PROBLEM — T45.1X5A ANEMIA DUE TO ANTINEOPLASTIC CHEMOTHERAPY: Status: ACTIVE | Noted: 2018-01-01

## 2018-03-01 PROBLEM — G89.3 CANCER RELATED PAIN: Status: ACTIVE | Noted: 2018-01-01

## 2018-03-01 NOTE — PROGRESS NOTES
pre chemo appt, to have labs drawn on 3/5/18 for C7 Taxol/Carbo on 3/7/18 at [facility], pt is requesting a refill rx on the percocet

## 2018-03-01 NOTE — PROGRESS NOTES
[address] [place] [address] Mathias Moritz [address], [address]  [phone]  MD Tiny Christianson MD  Office Note  Patient ID:  Name:  Nathaniel Vitale  MRN:  [id number]  :   y.o. Date:  3/1/2018      HISTORY OF PRESENT ILLNESS (copied per prior office notes):  Nathaniel Vitale is a 75 y.o.  postmenopausal female who was referred to me in 2015 for a pelvic mass by Dr. Anusha Brown, who noted the mass on routine gynecologic exam.  He sent her for a CT of the pelvis, which revealed a mass at the vaginal apex, that appeared to be invading into the soft tissues of the left pelvis. There were several enlarged lymph nodes on the left. There was also questionable bladder invasion, and possibly even involvement of the rectosigmoid colon. She reported a history of having a mass removed from one of her ovaries in 1 at [facility], which required a second surgery to remove her uterus and contralateral ovary. She said she did not have to take any chemotherapy, but she did have to take some kind of pill for a year. She has not had any gynecologic issues since that time. Presumably her hysterectomy was a total, rather than a supracervical, but she is unsure and there are no records. She denied any vaginal bleeding, except for after her examination with Dr. Sima Perez. She reported minimal pain in the pelvis. I took her the OR for and exam under anesthesia with cystoscopy and proctoscopy. Pathology revealed a squamous cell carcinoma, consistent with a vaginal primary. Examination revealed a fixed mass involving the left upper vagina with extension to the left pelvic sidewall. There also appeared to be involvement of the posterior bladder wall, but no mucosal involvement. The mass did push against the rectal wall, but there did not appear to be any direct involvement. I referred her to radiation oncology and she saw Dr. Lucinda Corea.   She completed pelvic radiation along with concurrent cisplatin chemotherapy in February 2016 and had a great response. When I saw her in the office in March 2017 for follow up she was doing well and without complaints. Her pelvic exam and pap smear at that time were both negative. Subsequent to that visit she began having back pain that had progressively worsened. She saw Velton Kawasaki in May and she ordered a CT. This unfortunately demonstrated extensive metastatic disease outside of the prior radiated field. Dr. Gloria Jade then sent her for a PET/CT which demonstrated widely metastatic disease. I recommended systemic chemotherapy with Taxol/Cisplatin/Avastin, which is the standard for metastatic cervical cancer, since vaginal cancer behaves similar to cervical cancer. Unfortunately, the Avastin was denied by her insurance company. We chose to proceed with Taxol/Cisplatin (6/2017). Her scan in October 2017 demonstrated slight progression of the lymphadenopathy, while the pulmonary and bony disease appeared stable. On my review, the changes didn't meet RECIST criteria for progression. I recommended continuing with therapy, but I suggested that we exchange Carboplatin for Cisplatin due to nephrotoxicity. Her creatinine had steadily climbed with the Cisplatin. She has completed 6 cycles of Taxol/Carboplatin since the switch. She feels more tired with the Carboplatin rather than the Cisplatin, but otherwise not much different. She is now having more swelling in her lower extremities and reporting some more back pain, currently being treated with torsemide, has removed 16# apparently. Recently dose cut in half. She had a recent fall 2 weeks ago, light headed, tripped. She is set up for another transfusion next week. Using percocet at night for leg/foot pain d/t edema. She was Rx'd pump therapy by lymphedema clinic but is not very compliant with use. Otherwise no bone/abd pain, vaginal bleeding, CP/palp.  SOB is improved since diuretic therapy. No GI/ complaints.m  Eating reg diet. Completes her own ADLs with light housework included. Slow to get up stairs. ECOG score: 1       ROS:   and GI review: Negative  Cardiopulmonary review:Negative   Musculoskeletal:  Negative    A comprehensive review of systems was negative except for that written in the History of Present Illness. , 10 point ROS        Problem List:  Patient Active Problem List    Diagnosis Date Noted    Glucose intolerance (impaired glucose tolerance) 01/29/2018    Anemia 01/04/2018    CKD (chronic kidney disease) stage 3, GFR 30-59 ml/min 01/03/2018    Morbid obesity with BMI of 40.0-44.9, adult ([address] 12/12/2017    Lymphedema of both lower extremities 06/14/2017    Hypertension, essential 01/18/2017    Hypercholesterolemia 01/18/2017    GERD without esophagitis 01/18/2017    Vaginal cancer ([address] 12/10/2015    LAP-BAND surgery status 05/17/2012     PMH:  Past Medical History:   Diagnosis Date    Abnormal mammogram of left breast 12/5/2016    Cancer ([address] 06/2017    LYMPH NODES IN BACK    Cancer ([address] 2016    VAGINAL CANCER    GERD (gastroesophageal reflux disease) 6/11/06    Hypercholesterolemia 10/11/06    Hypertension 10/11/06    Morbid obesity ([address] 10/11/06      PSH:  Past Surgical History:   Procedure Laterality Date    HX APPENDECTOMY      HX GASTRIC BYPASS  2009    LAP BAND    HX GYN  12/4/15    EUA/Bx of pelvic mass and vagina/cystoscopy/proctoscopy    HX HYSTERECTOMY  1978    HX ORTHOPAEDIC  2009    right ankle    HX OTHER SURGICAL  2001?     benign mass removed from lower back    LAP, PLACE ADJUST GASTR BAND  1/30/08    EB: AP Std lap band, liver bx      Social History:  Social History   Substance Use Topics    Smoking status: Former Smoker     Packs/day: 0.50     Years: 15.00     Types: Cigarettes     Quit date: 1/1/1993    Smokeless tobacco: Never Used    Alcohol use Yes      Comment: socially-WINE ONCE A WEEK Family History:  Family History   Problem Relation Age of Onset    Cancer Mother      colon WITH METS TO LIVER    Diabetes Father     Heart Disease Father     Cancer Sister      breast & bone    Cancer Maternal Aunt      BREAST    Cancer Paternal Aunt      BREAST    Anesth Problems Neg Hx       Medications: (reviewed)  Current Outpatient Prescriptions   Medication Sig    carvedilol (COREG) 6.25 mg tablet Take 1 Tab by mouth two (2) times daily (with meals). Indications: hypertension    oxyCODONE-acetaminophen (PERCOCET) 5-325 mg per tablet Take 1-2 Tabs by mouth every four (4) hours as needed for Pain. Max Daily Amount: 12 Tabs.  aspirin delayed-release 81 mg tablet Take 1 Tab by mouth daily.  famotidine (PEPCID) 20 mg tablet Take 1 Tab by mouth daily. Indications: gastroesophageal reflux disease    docusate sodium (COLACE) 100 mg capsule Take 100 mg by mouth daily.  atorvastatin (LIPITOR) 80 mg tablet Take 1 Tab by mouth daily.  Cholecalciferol, Vitamin D3, (VITAMIN D3) 1,000 unit cap Take 1 Cap by mouth daily.  acetaminophen (TYLENOL) 325 mg tablet Take  by mouth every four (4) hours as needed for Pain.  ondansetron (ZOFRAN ODT) 4 mg disintegrating tablet Take 1 Tab by mouth every eight (8) hours as needed for Nausea. Indications: CANCER CHEMOTHERAPY-INDUCED NAUSEA AND VOMITING    dexamethasone (DECADRON) 4 mg tablet Take 2 tablets with breakfast the day before chemo and for 2 days after chemo     No current facility-administered medications for this visit. Allergies: (reviewed)  Allergies   Allergen Reactions    Amlodipine Other (comments)     Edema in LE's          OBJECTIVE:  Physical Exam:  VITAL SIGNS: Vitals:    03/01/18 1007   BP: 115/54   Pulse: 83   Weight: 246 lb (111.6 kg)   Height: 5' 2\" (1.575 m)     Body mass index is 44.99 kg/(m^2). GENERAL MIGUELANGEL: Conversant, alert, oriented. No acute distress. HEENT: HEENT. No thyroid enlargement. No JVD.    Neck: Supple without restrictions. RESPIRATORY: Clear to auscultation and percussion to the bases. No CVAT. CARDIOVASC: RRR without murmur/rub. GASTROINT: soft, non-tender, without masses or organomegaly   MUSCULOSKEL: no joint tenderness, deformity or swelling   EXTREMITIES: Marked edema of bilateral lower extremities, L>R. Skin noted with vascular changes on the left. PELVIC: Deferred   RECTAL: Deferred   PATRICIO SURVEY: No suspicious lymphadenopathy. NEURO: Grossly intact. No acute deficit. CT of abdomen/pelvis (5/16/17)  LUNG BASES: Multiple bilateral pulmonary nodules. INCIDENTALLY IMAGED HEART AND MEDIASTINUM: Unremarkable. LIVER: No mass or biliary dilatation. GALLBLADDER: Cholelithiasis. SPLEEN: Calcified granulomata. PANCREAS: No mass or ductal dilatation. ADRENALS: Unremarkable. KIDNEYS: Right renal cyst.  STOMACH: Unremarkable. SMALL BOWEL: No dilatation or wall thickening. COLON: No dilatation or wall thickening. APPENDIX: Unremarkable. PERITONEUM: No ascites or pneumoperitoneum. RETROPERITONEUM: Significant retroperitoneal lymphadenopathy, largest dimension  surrounding the aorta 6.5 x 4.2 cm. REPRODUCTIVE ORGANS: There is 4.6 x 2.4 cm soft tissue in the region of  previously identified left pelvic sidewall mass. URINARY BLADDER: No mass or calculus. BONES: No destructive bone lesion. Degenerative changes of L4-L5 and L5-S1. ADDITIONAL COMMENTS: N/A     IMPRESSION:  1. Bilateral pulmonary nodules and retroperitoneal lymphadenopathy compatible  with metastatic disease. 2. Much smaller mass in the left pelvis, or scar. 3. Cholelithiasis. PET/CT (6/6/17)  HEAD/NECK: No apparent foci of abnormal hypermetabolism. Cerebral evaluation is  limited by normal intense activity.     CHEST: There are hypermetabolic left supraclavicular, superior mediastinal, and  paraesophageal lymph nodes.  The small pulmonary nodules are minimally  hypermetabolic, maximum SUV of the lesion in the right middle lobe is 1.9.     ABDOMEN/PELVIS: Para-aortic and aortocaval lymphadenopathy is hypermetabolic,  maximum SUV 8.0. Small but hypermetabolic bilateral external iliac lymph nodes  are noted.     SKELETON: There is a hypermetabolic lytic lesion in the sternum, maximum SUV is  9.7. There is a small sclerotic lesion at L3 which is new compared to the prior  PET/CT. Given the tracer activity corresponding to the adjacent retroperitoneal  lymphadenopathy that is difficult to determine if this small lesion is  hypermetabolic.     IMPRESSION:   1. Mildly hypermetabolic pulmonary nodules are concerning for metastatic  disease. 2. Hypermetabolic left supraclavicular, superior mediastinal, and paraesophageal  lymph nodes as well as retroperitoneal and bilateral external iliac lymph nodes. 3. Hypermetabolic lytic lesion in the sternum is concerning for metastatic  disease. Small sclerotic lesion at L3 is new compared to the prior PET/CT but  too small for accurate characterization by PET CT.      CT of abdomen/pelvis (8/14/17)  There is a left jugular Port-A-Cath with tip in the superior vena cava. LOWER NECK:The visualized portions of the thyroid and structures of the lower  neck are within normal limits. CHEST: There is a 1.4 cm left supraclavicular lymph node. Lungs: There are multiple nodules throughout the lungs, some with cavitation  which are unchanged. Pleura: There is no pleural effusion or pneumothorax. Aorta: The aorta enhances normally with no evidence of aneurysm or dissection. Mediastinum: There is no mediastinal or hilar adenopathy or mass. Bones and soft tissues: The bones and soft tissues of the chest wall are normal.  ABDOMEN:  Liver: The liver is normal in size and contour with no focal abnormality. Gallbladder and bile ducts: There are small stones in the gallbladder. The  gallbladder wall is not thickened and there is no pericholecystic fluid. Spleen: No abnormality.  There are tiny calcified granulomas in the spleen. Pancreas: No abnormality. Adrenal glands: No abnormality. Kidneys: No abnormality. PELVIS:  Reproductive organs: The uterus is absent. Bladder: No abnormality. BOWEL AND MESENTERY: There is a laparoscopic gastric band in expected position  in the upper abdomen. The small bowel is not obstructed. There is no mesenteric  mass or adenopathy. The appendix is absent. There are diverticula of the  sigmoid colon. PERITONEUM: There is no ascites or free intraperitoneal air. RETROPERITONEUM: The aorta is atherosclerotic and tapers without aneurysm. There  is retroperitoneal adenopathy. There is a 2.7 x 2.1 x 3.3 cm left para-aortic  lymph node and just anterior to this there is a 1.9 x 1.9 x 2.6 cm preaortic  lymph node. These have decreased minimally in size and previously measured 2 .9  x 2.5 and 2 x 2 centimeters. There is no pelvic mass or adenopathy. BONES AND SOFT TISSUES: There are degenerative changes of the spine and there  are sclerotic lesions in the L3 and L4 vertebral bodies. There is a sclerotic  and lytic lesion in the sternum.     IMPRESSION:   1. Status post hysterectomy and bilateral oophorectomy. 2. Bilateral pulmonary nodules, some with cavitation unchanged consistent with  metastatic disease. 3 Retroperitoneal adenopathy unchanged or minimally decreased in size and  enlarged left supraclavicular lymph node consistent with metastatic disease. 4. Small left pelvic mass or scar unchanged. 5. Lytic and sclerotic sternal lesion consistent with metastasis. 6. L3 and L4 bone lesions more sclerotic suggesting healing. 7. Atherosclerotic  abdominal aorta without aneurysm. 8. Gallstones. 9. Splenic granulomas indicative of old granulomatous disease. CT of chest/abdomen/pelvis (10/23/17)  THYROID: No nodule. MEDIASTINUM: No mass or lymphadenopathy. PATRICIA: No mass or enlarged lymphadenopathy. Calcified right hilar lymph nodes.   THORACIC AORTA: Atherosclerotic calcifications without aneurysm. MAIN PULMONARY ARTERY: Normal in caliber. TRACHEA/BRONCHI: Patent. ESOPHAGUS: No wall thickening or dilatation. HEART:  The visualized heart is normal in size without pericardial effusion. PLEURA: No effusion or pneumothorax. LUNGS: : There are multiple nodules throughout the lungs, some with cavitation  which are unchanged. Calcified granulomata right lung. LIVER: Calcified granulomata. Otherwise unremarkable. GALLBLADDER: Partially distended. Calcified stones within the lumen at the  fundus. Otherwise unremarkable. SPLEEN: Outside granuloma. Otherwise unremarkable. PANCREAS: No mass or duct dilation. ADRENALS: Unremarkable. KIDNEYS: No mass, calculus, or hydronephrosis. STOMACH: Gastric lap band in position. Otherwise unremarkable. SMALL BOWEL: No dilatation or wall thickening. COLON: No dilation or wall thickening. APPENDIX: Unremarkable. PERITONEUM: No ascites or pneumoperitoneum. RETROPERITONEUM: The aorta is atherosclerotic and tapers without aneurysm. There  is retroperitoneal adenopathy. There is a 2.5 x 2.7 cm left para-aortic lymph  node, previously 2.4 x 2.5 cm and a preaortic lymph node just anterior to this  measuring 1.7 x 2.1 cm, previously 1.9 x 2.3 cm. Overall confluent periaortic  retroperitoneal adenopathy extending to the bifurcation appears progressed from  prior. There is no pelvic mass or adenopathy. REPRODUCTIVE ORGANS: Uterus and ovaries are surgically absent. URINARY BLADDER: No mass or calculus. BONES: Sclerotic manubrial and vertebral body lesions appear stable. ADDITIONAL COMMENTS: Left-sided Port-A-Cath in position with catheter extending  to the upper superior vena cava. IMPRESSION:  Slight progression of retroperitoneal adenopathy with stable  appearing pulmonary and osseous metastases as above. Additional incidental  findings as above.     CT CAP 2/2018:  EXAM: Multiple contiguous helically acquired images were obtained through the  chest, abdomen and pelvis without contrast administration. Coronal and sagittal  reconstructions were performed.     CT dose reduction was achieved through the use of a standardized protocol  tailored for this examination and automatic exposure control for dose  modulation.     FINDINGS:      Previous studies for comparison:  October 23, 2017. Images through the mediastinum and hilar regions reveal no new mass or  adenopathy. Images through the lung parenchyma redemonstrate scattered bilateral small  pulmonary nodules which appear stable to slightly smaller. Small peripheral nodule with cystic appearance in the right lower lobe measures  7.2 mm, previously 7.9 mm. Small peripheral nodule in the left lower lobe measures 6.3 mm, previously 6.3  mm. Nodular density in the right base measures 11.4 x 7.0 mm, previously 11.1 x 7.0  mm. Images through the liver reveal no new space-occupying lesion or biliary  dilatation. Status post lap band. Images through the gallbladder fossa reveal gallstones. The spleen is normal in size. The pancreas has a normal morphologic appearance. The kidneys demonstrated normal morphologic appearance. Renal function cannot be  assessed due to lack of intravenous contrast.  The adrenal glands are notable for persistent right adrenal enlargement  measuring 3.0 x 2.2 cm, previously 2.8 x 2.5 cm. Persistent para-aortic retroperitoneal lymphadenopathy measuring roughly 2.6 x  2.6 cm, previously 2.7 x 2.5 cm. Another adjacent lymph node measures 2.1 x 1.7  cm, previously 2.1 x 1.7 cm. The visualized opacified bowel loops are nonobstructed. Images through the pelvis and a straight apparent prior hysterectomy. Overall  stable appearance. Apparent sclerotic and lytic processes involving the sternum vertebra.     IMPRESSION:  Stable pulmonary metastases. Stable bone metastases. Multiple gallstones.   Stable para-aortic lymphadenopathy.         IMPRESSION/PLAN:  Fiorellae Lindsey Ivan Bobo is a 75 y.o. female with a history of stage III vaginal carcinoma. She was treated with pelvic radiation therapy and concurrent cisplatin chemotherapy. She now has recurrent disease with multiple sites of metastases. ICD-10-CM ICD-9-CM    1. Anemia due to antineoplastic chemotherapy D64.81 285.3     T45. 1X5A E933.1    2. Vaginal cancer (HCC) C52 184.0 oxyCODONE-acetaminophen (PERCOCET) 5-325 mg per tablet   3. Cancer related pain G89.3 338.3 oxyCODONE-acetaminophen (PERCOCET) 5-325 mg per tablet   4. Metastatic neoplastic disease (HCC) C79.9 199.1 oxyCODONE-acetaminophen (PERCOCET) 5-325 mg per tablet   5. Type 2 diabetes mellitus with diabetic nephropathy, without long-term current use of insulin (HCC) E11.21 250.40 oxyCODONE-acetaminophen (PERCOCET) 5-325 mg per tablet     583.81    6. On antineoplastic chemotherapy Z79.899 V58.69    7. CKD (chronic kidney disease) stage 3, GFR 30-59 ml/min N18.3 585.3    8. Lymphedema of both lower extremities I89.0 457.1        Recommended using the standard regimen for metastatic cervical carcinoma, Taxol/Cisplatin/Avastin. Unfortunately, the Avastin was denied by her insurance company. We chose to proceed with Taxol/Cisplatin. She completed 6 cycles. On her CT in July 2017 her pulmonary nodules appeared stable and her retroperitoneal lymphadenopathy appeared slightly improved. Her scan in October 2017 demonstrated slight progression of the lymphadenopathy, while the pulmonary and bony disease appeared stable. On our review, the changes didn't meet RECIST criteria for progression. She is recommended continuing with therapy exchange Carboplatin for Cisplatin due to nephrotoxicity. Her creatinine had steadily climbed with the Cisplatin. She has completed 6 cycles of Taxol/Carboplatin since the switch with likely carboplatin reactions during this. Her last treatment went smoothly with slowed rate of infusion.   Current CT scan shows disease stability in all areas.     Continue current regimen, sensitization measures  Continue to treat anemia, transfusion planned  Recommended lymphedema therapy adherence  Percocet refilled      Signed By: DEBBIE Duarte     3/1/2018/4:31 PM

## 2018-03-07 NOTE — PROGRESS NOTES
[facility] - Chemotherapy Progress Note    1000 Pt admit to [facility] for Taxol/Carbo ambulatory in stable condition. Assessment completed. Pt continues to have a lot of swelling in bilateral legs. Pt has had Carboplatin in the past with reactions. Port accessed with positive blood return. Labs drawn 3/5/18 and used for today's treatment. Port attached to NS at [address] 3/7/2018   Cycle C6   Date 3/7/2018   Drug / Regimen Taxol/Carbo   Dosage -   Pre Hydration -   Post Hydration -   Pre Meds given   Notes slight reaction       Visit Vitals    /69    Pulse 63    Temp 97 °F (36.1 °C)    Resp 18    Ht 5' 2\" (1.575 m)    Wt 113.3 kg (249 lb 12.8 oz)    SpO2 96%    Breastfeeding No    BMI 45.69 kg/m2       Medications:  NS  Benadryl 50mg IVP  Pepcid 20mg IVP  Kytril 1 mg IVP  Decadron 20mg IVP  Paclitaxel over 3 hours  Pepcid 20mg IVP  Solumedrol 125mg IVP    1550  Carbo running at 104cc/hr. Pt complains of itching in hands and numbness, Additional 25mg of Benadryl given and symptoms resolved. Patient tolerated the rest of treatment well. 1800 Pt tolerated treatment well. Port maintained positive blood return throughout treatment, flushed with positive blood return at conclusion and removed per protocol. Gregory Collins  Next appointment is 3/28

## 2018-03-07 NOTE — PROGRESS NOTES
Rocio Means. ALONSO Meza PA      Date of visit: 3/7/2018   Gyn Onc: Dr. Doherty Officer Dx: Vaginal cancer    Mrs. Elyssa Lamas presents today for her C7 of Taxol/Carbo. Only c/o feet pain, ?neuropathy. bilat leg edema continues, mostly stable. Bothersome to ambulate. Discussed her compliance with leg pumps from lymphedema clinic alast visit. States they have been better about it. No SOB, N/V, +GI/ function. She was transfused and remains stable. Renal function stable. Discussed. She has been trying to hydrate. Premedicated today with slow infusion rate for prior carbo reactions. Oncology Treatment History:  12/4/2015: Dx with Vaginal cancer after an EUA and biopsies of pelvic mass and vagina                     Vagina, biopsy: Invasive squamous cell carcinoma, poorly differentiated. No loss of mismatch repair proteins   1/15/16-2/12/16: Weekly Cisplatin with XRT with good response. 5/16/17: CT scan showed Bilateral pulmonary nodules and retroperitoneal lymphadenopathy compatible with metastatic disease. 6/14/17-10/4/17: 6 cycles of Cisplatin/Taxol. Much difficulty with neuropathy  10/23/17: CT scan (without contrast) after 6 cycles showed  Slight progression of retroperitoneal adenopathy with stable appearing pulmonary and osseous metastases as above. 10/26/17: saw after above CT results. Dr. Taisha Villagomez felt the changes didn't meet RECIST criteria for progression. He recommend continuing with therapy, but suggested that we exchange Carboplatin for Cisplatin due to nephrotoxicity. Her creatinine has pamella with the Cisplatin. 11/1/17: Began Carbo/Taxol. 1/3/2018;  Had mild reaction to Carbo, but received Taxol  1/19/17: due to elevated creat, were unable to proceed with CT scan .  1/24/2018: Again, had moderate reaction to Carbo, but was easily controlled with Solu Medrol  2/13/2018: Again, due to elevated creat, but symptomatic, a non-contrast CT was obtained before planned time. Resulted out as Stable pulmonary metastases. Stable bone metastases. Multiple gallstones. Stable para-aortic lymphadenopathy. See full report below. Dr. Devante Carreno wanted to complete all cycles if possible of Carbo/Taxol with to positive results of CT scan and pt was premeded with Decadron for the Taxol and Solumedrol for the Carbo. Current Outpatient Prescriptions   Medication Sig    oxyCODONE-acetaminophen (PERCOCET) 5-325 mg per tablet Take 1 Tab by mouth every six (6) hours as needed for Pain. Max Daily Amount: 4 Tabs.  acetaminophen (TYLENOL) 325 mg tablet Take  by mouth every four (4) hours as needed for Pain.  carvedilol (COREG) 6.25 mg tablet Take 1 Tab by mouth two (2) times daily (with meals). Indications: hypertension    aspirin delayed-release 81 mg tablet Take 1 Tab by mouth daily.  famotidine (PEPCID) 20 mg tablet Take 1 Tab by mouth daily. Indications: gastroesophageal reflux disease    docusate sodium (COLACE) 100 mg capsule Take 100 mg by mouth daily.  ondansetron (ZOFRAN ODT) 4 mg disintegrating tablet Take 1 Tab by mouth every eight (8) hours as needed for Nausea. Indications: CANCER CHEMOTHERAPY-INDUCED NAUSEA AND VOMITING    dexamethasone (DECADRON) 4 mg tablet Take 2 tablets with breakfast the day before chemo and for 2 days after chemo    atorvastatin (LIPITOR) 80 mg tablet Take 1 Tab by mouth daily.  Cholecalciferol, Vitamin D3, (VITAMIN D3) 1,000 unit cap Take 1 Cap by mouth daily. Current Facility-Administered Medications   Medication Dose Route Frequency    CARBOplatin (PARAPLATIN) 388 mg in 0.9% sodium chloride 250 mL, overfill volume 25 mL chemo infusion  388 mg IntraVENous ONCE       Review of Systems:  General: Denies wt loss. Performs self care mostly, requires some assist.  does most meal prep.   HEENT: Denies visual changes, dysphagia or headache  Resp:Continues with BLACKBURN, but says it is not any worse and when she rest, it does improve. Denies any wheezing or cough  CV: Denies CP, palpitations since discharge  GI/:Continues with Miralax for occasional constipation. Denies N/V, bloating, diarrhea or dysuria. Says she is eating well. Denies early satiety   MuskSkel: Continues bilat L>R lymphedema  Neuro:bilat LE foot neuropathy. Denies dizzyness or syncope  Psych: Denies depression or feelings of saddness      Objective:     Visit Vitals    /69    Pulse 63    Temp 97 °F (36.1 °C)    Resp 18    Ht 5' 2\" (1.575 m)    Wt 249 lb 12.8 oz (113.3 kg)    SpO2 96%    BMI 45.69 kg/m2         Physical Exam:  General: A&O X3 in NAD with pleasant affect with  again supportive at her side  HEENT: Sclera anicteric, mucosa pink, moist   Neck: No JVD or adenopathy appreciated. Port Site: without redness, tenderness or swelling  Heart: Regular without M/R/G  Lungs: CTA Bilat without wheezing or rales noted   Abd: Soft, obese without tenderness, fluid wave or distention  Ext: Extensive left leg edema persist with vascular skin changes previously seen  Neuro: grossly intact      LABS: 2/13/2018  WBC-4.1; HGB-7.7; HCT-24.7; Plt-173; ANC-5.1; Na-144; K+ 4.4; Cl-103; CO2-25; BUN 21; Creat-1.88; Mag 1.6; A1C-5.7 (down from 7.2 last August)  CA-125: 2/13= 106.2; (1/22/18=104.8 1/2/18= 76.1; 12/12/17= 81.2; 11/20/17=111. 2)    Lab Results   Component Value Date/Time    WBC 4.6 03/05/2018 11:33 AM    HGB 9.0 (L) 03/05/2018 11:33 AM    HCT 28.0 (L) 03/05/2018 11:33 AM    PLATELET 000 39/29/8227 11:33 AM    MCV 96 03/05/2018 11:33 AM     Lab Results   Component Value Date/Time    CA-125 94 (H) 10/04/2017 08:14 AM    Cancer Ag (CA) 125 126.9 (H) 03/05/2018 11:33 AM     Lab Results   Component Value Date/Time    Sodium 144 03/05/2018 11:33 AM    Potassium 3.6 03/05/2018 11:33 AM    Chloride 91 (L) 03/05/2018 11:33 AM    CO2 32 (H) 03/05/2018 11:33 AM    Anion gap 10 01/05/2018 04:45 AM Glucose 104 (H) 03/05/2018 11:33 AM    BUN 21 03/05/2018 11:33 AM    Creatinine 1.81 (H) 03/05/2018 11:33 AM    BUN/Creatinine ratio 12 03/05/2018 11:33 AM    GFR est AA 33 (L) 03/05/2018 11:33 AM    GFR est non-AA 28 (L) 03/05/2018 11:33 AM    Calcium 8.5 (L) 03/05/2018 11:33 AM    Bilirubin, total 0.4 03/05/2018 11:33 AM    AST (SGOT) 12 03/05/2018 11:33 AM    Alk. phosphatase 97 03/05/2018 11:33 AM    Protein, total 6.6 03/05/2018 11:33 AM    Albumin 4.0 03/05/2018 11:33 AM    Globulin 3.5 01/03/2018 05:02 PM    A-G Ratio 1.5 03/05/2018 11:33 AM    ALT (SGPT) 7 03/05/2018 11:33 AM       IMAGING:  CT chest/abd/Pelvis 2/13/2018  FINDINGS:      Previous studies for comparison:  October 23, 2017. Images through the mediastinum and hilar regions reveal no new mass or  adenopathy. Images through the lung parenchyma redemonstrate scattered bilateral small  pulmonary nodules which appear stable to slightly smaller. Small peripheral nodule with cystic appearance in the right lower lobe measures  7.2 mm, previously 7.9 mm. Small peripheral nodule in the left lower lobe measures 6.3 mm, previously 6.3  mm. Nodular density in the right base measures 11.4 x 7.0 mm, previously 11.1 x 7.0  mm. Images through the liver reveal no new space-occupying lesion or biliary  dilatation. Status post lap band.   Images through the gallbladder fossa reveal gallstones. The spleen is normal in size. The pancreas has a normal morphologic appearance. The kidneys demonstrated normal morphologic appearance. Renal function cannot be  assessed due to lack of intravenous contrast.  The adrenal glands are notable for persistent right adrenal enlargement  measuring 3.0 x 2.2 cm, previously 2.8 x 2.5 cm. Persistent para-aortic retroperitoneal lymphadenopathy measuring roughly 2.6 x  2.6 cm, previously 2.7 x 2.5 cm. Another adjacent lymph node measures 2.1 x 1.7  cm, previously 2.1 x 1.7 cm.   The visualized opacified bowel loops are nonobstructed. Images through the pelvis and a straight apparent prior hysterectomy. Overall  stable appearance. Apparent sclerotic and lytic processes involving the sternum vertebra. IMPRESSION:     Stable pulmonary metastases.     Stable bone metastases.     Multiple gallstones.     Stable para-aortic lymphadenopathy.       Assessment:     Patient Active Problem List   Diagnosis Code    LAP-BAND surgery status Z98.84    Vaginal cancer ([facility] [address] C52    Hypertension, essential I10    Hypercholesterolemia E78.00    GERD without esophagitis K21.9    Lymphedema of both lower extremities I89.0    Morbid obesity with BMI of 40.0-44.9, adult (HCC) E66.01, Z68.41    CKD (chronic kidney disease) stage 3, GFR 30-59 ml/min N18.3    Anemia D64.9    Glucose intolerance (impaired glucose tolerance) R73.02    On antineoplastic chemotherapy Z79.899    Anemia due to antineoplastic chemotherapy D64.81, T45.1X5A    Cancer related pain G89.3    Metastatic neoplastic disease (HCC) C79.9         Plan: Will proceed with C7 of Taxol/Carbo for persistent vaginal cancer. Predose Solu Medrol 125 mg prior to Carbo with 50 of Benadryl and infuse slowly  Lymphedema - continue with her compression machine daily and follow up with Lymphedema clinic reconsulted. Foot neuropathy/pain. Does not want to use opioid, trial Neurontin 600mg qhs  Continue following a diabetic diet with her . Anemia: Transfusion PRN  Continue with anti-hypertensive  Hydration encouraged and if she feels she is becoming dehydrated at any time, to call and we will get her in for IV infusion.   Encouraged to call for any concerns or questions        DEBBIE Nicole

## 2018-03-27 NOTE — PROGRESS NOTES
Ashley Wagner. Derrick, NP     DEBBIE Reed      Date of visit: 3/27/2018   Gyn Onc: Dr. Deepali Molina Dx: Vaginal cancer    Mrs. Earle Hernández presents today for pre chemo visit and labs prior to C8 of 9 planned treatments of Taxol/Carbo. Has been \"fighting\" a bout of bronchitis for the past week. Said she has had no fever's or chills, and that cough is mostly nonproductive. She said others around her had URI's and she got it, but says she is beginning to feel a little better, just still run down from it. She also said while esting the other day, she lost a post-root canal tooth in the front. It is not painful and has not cause her any problems other then remy  Says she feels the Neurontin has helped her feet pain \"a little\". Bilat leg edema continues, mostly stable. Bothersome to ambulate. She continues with lymphedema pumps and says they continue to be more regular about using them. No N/V, +GI/ function. She was transfused in Feb 15th and has remained stable. Renal function stable on 3/5 lab draw. Continues with hydration \"best I can\"  Premedicated again tomorrow with slow infusion rate for prior carbo reactions. Oncology Treatment History:  12/4/2015: Dx with Vaginal cancer after an EUA and biopsies of pelvic mass and vagina                     Vagina, biopsy: Invasive squamous cell carcinoma, poorly differentiated. No loss of mismatch repair proteins   1/15/16-2/12/16: Weekly Cisplatin with XRT with good response. 5/16/17: CT scan showed Bilateral pulmonary nodules and retroperitoneal lymphadenopathy compatible with metastatic disease. 6/14/17-10/4/17: 6 cycles of Cisplatin/Taxol. Much difficulty with neuropathy  10/23/17: CT scan (without contrast) after 6 cycles showed  Slight progression of retroperitoneal adenopathy with stable appearing pulmonary and osseous metastases as above.   10/26/17: saw after above CT results. Dr. Lauren Galdamez felt the changes didn't meet RECIST criteria for progression. He recommend continuing with therapy, but suggested that we exchange Carboplatin for Cisplatin due to nephrotoxicity. Her creatinine has pamella with the Cisplatin. 11/1/17: Began Carbo/Taxol. 1/3/2018; Had mild reaction to Carbo, but received Taxol  1/19/17: due to elevated creat, were unable to proceed with CT scan .  1/24/2018: Again, had moderate reaction to Carbo, but was easily controlled with Solu Medrol  2/13/2018: Again, due to elevated creat, but symptomatic, a non-contrast CT was obtained before planned time. Resulted out as Stable pulmonary metastases. Stable bone metastases. Multiple gallstones. Stable para-aortic lymphadenopathy. See full report below. Dr. Lauren Galdamez wanted to complete all cycles if possible of Carbo/Taxol with to positive results of CT scan and pt was premeded with Decadron for the Taxol and Solumedrol for the Carbo. Current Outpatient Prescriptions   Medication Sig    gabapentin (NEURONTIN) 300 mg capsule Take 1 Cap by mouth nightly. Indications: NEUROPATHIC PAIN    oxyCODONE-acetaminophen (PERCOCET) 5-325 mg per tablet Take 1 Tab by mouth every six (6) hours as needed for Pain. Max Daily Amount: 4 Tabs.  acetaminophen (TYLENOL) 325 mg tablet Take  by mouth every four (4) hours as needed for Pain.  carvedilol (COREG) 6.25 mg tablet Take 1 Tab by mouth two (2) times daily (with meals). Indications: hypertension    aspirin delayed-release 81 mg tablet Take 1 Tab by mouth daily.  famotidine (PEPCID) 20 mg tablet Take 1 Tab by mouth daily. Indications: gastroesophageal reflux disease    docusate sodium (COLACE) 100 mg capsule Take 100 mg by mouth daily.  ondansetron (ZOFRAN ODT) 4 mg disintegrating tablet Take 1 Tab by mouth every eight (8) hours as needed for Nausea.  Indications: CANCER CHEMOTHERAPY-INDUCED NAUSEA AND VOMITING    dexamethasone (DECADRON) 4 mg tablet Take 2 tablets with breakfast the day before chemo and for 2 days after chemo    atorvastatin (LIPITOR) 80 mg tablet Take 1 Tab by mouth daily.  Cholecalciferol, Vitamin D3, (VITAMIN D3) 1,000 unit cap Take 1 Cap by mouth daily. No current facility-administered medications for this visit. Review of Systems:  General: Denies wt loss. Performs self care mostly, requires some assist.  does most meal prep and is very supportive. HEENT: Denies visual changes, dysphagia or headache  Resp:Continues with BLACKBURN, but says it is not any worse and when she rest, it does improve. Acknowledges some SOB with current bronchitis. Denies hemoptysis or wheezing  CV: Denies CP, palpitations   GI/:Continues with Miralax for occasional constipation. Denies N/V, bloating, diarrhea or dysuria. Says she is eating well. Denies early satiety   MuskSkel: Continues bilat L>R lymphedema  Neuro:bilat LE foot neuropathy, but as above, feels the Neurontin may be helping some. Denies dizzyness or syncope  Psych: Denies depression or feelings of sadness. Objective:     Visit Vitals    /77 (BP 1 Location: Right arm, BP Patient Position: Sitting)    Pulse 65    Ht 5' 2.01\" (1.575 m)    Wt 250 lb 12.8 oz (113.8 kg)    BMI 45.86 kg/m2         Physical Exam:  General: A&O X3 in NAD with pleasant affect with  again supportive at her side  HEENT: Sclera anicteric, mucosa pink, moist. She had   Neck: No JVD or adenopathy appreciated. Port Site: without redness, tenderness or swelling  Heart: Regular without M/R/G  Lungs: CTA Bilat without wheezing or rales noted. Nonproductive cough noted. Abd: Soft, obese without tenderness, fluid wave or distention  Ext: Extensive left leg edema persist with vascular skin changes previously seen  Neuro: grossly intact    Labs to be drawn today. See previous labs below.      Lab Results   Component Value Date/Time    WBC 4.6 03/05/2018 11:33 AM    HGB 9.0 (L) 03/05/2018 11:33 AM    HCT 28.0 (L) 03/05/2018 11:33 AM    PLATELET 024 94/29/5914 11:33 AM    MCV 96 03/05/2018 11:33 AM     Lab Results   Component Value Date/Time    CA-125 94 (H) 10/04/2017 08:14 AM    Cancer Ag (CA) 125 126.9 (H) 03/05/2018 11:33 AM     Lab Results   Component Value Date/Time    Sodium 144 03/05/2018 11:33 AM    Potassium 3.6 03/05/2018 11:33 AM    Chloride 91 (L) 03/05/2018 11:33 AM    CO2 32 (H) 03/05/2018 11:33 AM    Anion gap 10 01/05/2018 04:45 AM    Glucose 104 (H) 03/05/2018 11:33 AM    BUN 21 03/05/2018 11:33 AM    Creatinine 1.81 (H) 03/05/2018 11:33 AM    BUN/Creatinine ratio 12 03/05/2018 11:33 AM    GFR est AA 33 (L) 03/05/2018 11:33 AM    GFR est non-AA 28 (L) 03/05/2018 11:33 AM    Calcium 8.5 (L) 03/05/2018 11:33 AM    Bilirubin, total 0.4 03/05/2018 11:33 AM    AST (SGOT) 12 03/05/2018 11:33 AM    Alk. phosphatase 97 03/05/2018 11:33 AM    Protein, total 6.6 03/05/2018 11:33 AM    Albumin 4.0 03/05/2018 11:33 AM    Globulin 3.5 01/03/2018 05:02 PM    A-G Ratio 1.5 03/05/2018 11:33 AM    ALT (SGPT) 7 03/05/2018 11:33 AM       IMAGING:  CT chest/abd/Pelvis 2/13/2018  FINDINGS:      Previous studies for comparison:  October 23, 2017. Images through the mediastinum and hilar regions reveal no new mass or  adenopathy. Images through the lung parenchyma redemonstrate scattered bilateral small  pulmonary nodules which appear stable to slightly smaller. Small peripheral nodule with cystic appearance in the right lower lobe measures  7.2 mm, previously 7.9 mm. Small peripheral nodule in the left lower lobe measures 6.3 mm, previously 6.3  mm. Nodular density in the right base measures 11.4 x 7.0 mm, previously 11.1 x 7.0  mm. Images through the liver reveal no new space-occupying lesion or biliary  dilatation. Status post lap band.   Images through the gallbladder fossa reveal gallstones. The spleen is normal in size. The pancreas has a normal morphologic appearance.     The kidneys demonstrated normal morphologic appearance. Renal function cannot be  assessed due to lack of intravenous contrast.  The adrenal glands are notable for persistent right adrenal enlargement  measuring 3.0 x 2.2 cm, previously 2.8 x 2.5 cm. Persistent para-aortic retroperitoneal lymphadenopathy measuring roughly 2.6 x  2.6 cm, previously 2.7 x 2.5 cm. Another adjacent lymph node measures 2.1 x 1.7  cm, previously 2.1 x 1.7 cm. The visualized opacified bowel loops are nonobstructed. Images through the pelvis and a straight apparent prior hysterectomy. Overall  stable appearance. Apparent sclerotic and lytic processes involving the sternum vertebra. IMPRESSION:     Stable pulmonary metastases.     Stable bone metastases.     Multiple gallstones.     Stable para-aortic lymphadenopathy.       Assessment:     Patient Active Problem List   Diagnosis Code    LAP-BAND surgery status Z98.84    Vaginal cancer ([facility] [address] C52    Hypertension, essential I10    Hypercholesterolemia E78.00    GERD without esophagitis K21.9    Lymphedema of both lower extremities I89.0    Morbid obesity with BMI of 40.0-44.9, adult (HCC) E66.01, Z68.41    CKD (chronic kidney disease) stage 3, GFR 30-59 ml/min N18.3    Anemia D64.9    Glucose intolerance (impaired glucose tolerance) R73.02    On antineoplastic chemotherapy Z79.899    Anemia due to antineoplastic chemotherapy D64.81, T45.1X5A    Cancer related pain G89.3    Metastatic neoplastic disease (HCC) C79.9         Plan: Will obtain labs today and if stable, will proceed with C8 of Taxol/Carbo for persistent vaginal cancer. Noted gradual rise in CA-125. Will monitor today and discuss with Dr. Bonnie Nuñez  Again, predose with Solu Medrol 125 mg prior to Carbo with 50 of Benadryl and infuse slowly  Due to persistent cough, will check CXR today. Still will hold off on abx as pt has had no fevers or chills.    She will follow up with dentist for tooth replacement and let us know if he need to do any work other then replacing a cap  Lymphedema - continue with her compression machine daily and follow up with Lymphedema clinic reconsulted. Foot neuropathy/pain, continue with Neurontin 600mg qhs for further evaluation   Continue following a diabetic diet with her . Anemia: Transfusion PRN  Continue with anti-hypertensive  Continue to monitor creat and hydration encouraged and if she feels she is becoming dehydrated at any time, to call and we will get her in for IV infusion.   Encouraged to call for any concerns or questions        Maria E Avendaño NP

## 2018-03-27 NOTE — PATIENT INSTRUCTIONS
Bronchitis: Care Instructions  Your Care Instructions    Bronchitis is inflammation of the bronchial tubes, which carry air to the lungs. The tubes swell and produce mucus, or phlegm. The mucus and inflamed bronchial tubes make you cough. You may have trouble breathing. Most cases of bronchitis are caused by viruses like those that cause colds. Antibiotics usually do not help and they may be harmful. Bronchitis usually develops rapidly and lasts about 2 to 3 weeks in otherwise healthy people. Follow-up care is a key part of your treatment and safety. Be sure to make and go to all appointments, and call your doctor if you are having problems. It's also a good idea to know your test results and keep a list of the medicines you take. How can you care for yourself at home? · Take all medicines exactly as prescribed. Call your doctor if you think you are having a problem with your medicine. · Get some extra rest.  · Take an over-the-counter pain medicine, such as acetaminophen (Tylenol), ibuprofen (Advil, Motrin), or naproxen (Aleve) to reduce fever and relieve body aches. Read and follow all instructions on the label. · Do not take two or more pain medicines at the same time unless the doctor told you to. Many pain medicines have acetaminophen, which is Tylenol. Too much acetaminophen (Tylenol) can be harmful. · Take an over-the-counter cough medicine that contains dextromethorphan to help quiet a dry, hacking cough so that you can sleep. Avoid cough medicines that have more than one active ingredient. Read and follow all instructions on the label. · Breathe moist air from a humidifier, hot shower, or sink filled with hot water. The heat and moisture will thin mucus so you can cough it out. · Do not smoke. Smoking can make bronchitis worse. If you need help quitting, talk to your doctor about stop-smoking programs and medicines. These can increase your chances of quitting for good.   When should you call for help? Call 911 anytime you think you may need emergency care. For example, call if:  ? · You have severe trouble breathing. ?Call your doctor now or seek immediate medical care if:  ? · You have new or worse trouble breathing. ? · You cough up dark brown or bloody mucus (sputum). ? · You have a new or higher fever. ? · You have a new rash. ? Watch closely for changes in your health, and be sure to contact your doctor if:  ? · You cough more deeply or more often, especially if you notice more mucus or a change in the color of your mucus. ? · You are not getting better as expected. Where can you learn more? Go to http://cele-alyssa.info/. Enter H333 in the search box to learn more about \"Bronchitis: Care Instructions. \"  Current as of: May 12, 2017  Content Version: 11.4  © 6799-0957 [employer]. Care instructions adapted under license by [employer] (which disclaims liability or warranty for this information). If you have questions about a medical condition or this instruction, always ask your healthcare professional. [place] [address] any warranty or liability for your use of this information. Bronchitis: Care Instructions  Your Care Instructions    Bronchitis is inflammation of the bronchial tubes, which carry air to the lungs. The tubes swell and produce mucus, or phlegm. The mucus and inflamed bronchial tubes make you cough. You may have trouble breathing. Most cases of bronchitis are caused by viruses like those that cause colds. Antibiotics usually do not help and they may be harmful. Bronchitis usually develops rapidly and lasts about 2 to 3 weeks in otherwise healthy people. Follow-up care is a key part of your treatment and safety. Be sure to make and go to all appointments, and call your doctor if you are having problems. It's also a good idea to know your test results and keep a list of the medicines you take.   How can you care for yourself at home? · Take all medicines exactly as prescribed. Call your doctor if you think you are having a problem with your medicine. · Get some extra rest.  · Take an over-the-counter pain medicine, such as acetaminophen (Tylenol), ibuprofen (Advil, Motrin), or naproxen (Aleve) to reduce fever and relieve body aches. Read and follow all instructions on the label. · Do not take two or more pain medicines at the same time unless the doctor told you to. Many pain medicines have acetaminophen, which is Tylenol. Too much acetaminophen (Tylenol) can be harmful. · Take an over-the-counter cough medicine that contains dextromethorphan to help quiet a dry, hacking cough so that you can sleep. Avoid cough medicines that have more than one active ingredient. Read and follow all instructions on the label. · Breathe moist air from a humidifier, hot shower, or sink filled with hot water. The heat and moisture will thin mucus so you can cough it out. · Do not smoke. Smoking can make bronchitis worse. If you need help quitting, talk to your doctor about stop-smoking programs and medicines. These can increase your chances of quitting for good. When should you call for help? Call 911 anytime you think you may need emergency care. For example, call if:  ? · You have severe trouble breathing. ?Call your doctor now or seek immediate medical care if:  ? · You have new or worse trouble breathing. ? · You cough up dark brown or bloody mucus (sputum). ? · You have a new or higher fever. ? · You have a new rash. ? Watch closely for changes in your health, and be sure to contact your doctor if:  ? · You cough more deeply or more often, especially if you notice more mucus or a change in the color of your mucus. ? · You are not getting better as expected. Where can you learn more? Go to http://cele-alyssa.info/.   Enter H333 in the search box to learn more about \"Bronchitis: Care Instructions. \"  Current as of: May 12, 2017  Content Version: 11.4  © 8378-7074 [employer], [facility]. Care instructions adapted under license by [employer] (which disclaims liability or warranty for this information). If you have questions about a medical condition or this instruction, always ask your healthcare professional. [place] [address] any warranty or liability for your use of this information.

## 2018-03-27 NOTE — PROGRESS NOTES
Pre chemo, labs today and for C8 taxol/carbo on 3/28. Pt c/o nasal congestion and cough. Pt denies fever or chills.

## 2018-03-28 NOTE — PROGRESS NOTES
[facility] Note:  1000*Pt arrived at [address] [place] [address] ambulatory and in no distress for  C8  Assessment stable, no new complaints voiced. Continues to have wet cough ; had cxr yesterday. Pt reacted to Whitney after 35 min  At rate of 105 ml/hr. Stopped med and called NP LOIS Meza. Extra meds given and symptoms began to ease. Eda Guidry came to see pt and stated she would talk to doctor but that the carb would probably be discontinued. Pt feels back to normal and JCARLOS Meza was contacted and pt left. Medications received:  Pepcid  Benadryl  Kytril  Decadron  Taxol  Solumedrol  Carboplatin that was stopped after 35 min due to itchy hands and chest pain. /77 P89  Benadryl  Pepcid    1710 Tolerated treatment well,  adverse reaction noted. D/Cd from [address] [place] [address] and in no distress accompanied by spouse. Next appt 4/18  Visit Vitals    BP (!) 139/91    Pulse 73    Temp 96.8 °F (36 °C)    Resp 18    Ht 5' 2\" (1.575 m)    Wt 113.8 kg (250 lb 14.1 oz)    SpO2 99%    BMI 45.89 kg/m2     No results found for this or any previous visit (from the past 12 hour(s)).

## 2018-03-28 NOTE — PROGRESS NOTES
Jax Meza, DEBBIE Bacon      Date of visit: 3/28/2018   Gyn Onc: Dr. Clau Casas Dx: Vaginal cancer    Mrs. Otto Guajardo presents today for pre chemo visit and labs prior to C8 of 9 planned treatments of Taxol/Carbo. Has been \"fighting\" a bout of bronchitis for the past week. Said she has had no fever's or chills, and that cough is mostly nonproductive. She said others around her had URI's and she got it, but says she is beginning to feel a little better, just still run down from it. She also said while esting the other day, she lost a post-root canal tooth in the front. It is not painful and has not cause her any problems other then remy  Says she feels the Neurontin has helped her feet pain \"a little\". Bilat leg edema continues, mostly stable. Bothersome to ambulate. She continues with lymphedema pumps and says they continue to be more regular about using them. No N/V, +GI/ function. She was transfused in Feb 15th and has remained stable. Renal function stable on 3/5 lab draw. Continues with hydration \"best I can\"  Premedicated again tomorrow with slow infusion rate for prior carbo reactions. Oncology Treatment History:  12/4/2015: Dx with Vaginal cancer after an EUA and biopsies of pelvic mass and vagina                     Vagina, biopsy: Invasive squamous cell carcinoma, poorly differentiated. No loss of mismatch repair proteins   1/15/16-2/12/16: Weekly Cisplatin with XRT with good response. 5/16/17: CT scan showed Bilateral pulmonary nodules and retroperitoneal lymphadenopathy compatible with metastatic disease. 6/14/17-10/4/17: 6 cycles of Cisplatin/Taxol. Much difficulty with neuropathy  10/23/17: CT scan (without contrast) after 6 cycles showed  Slight progression of retroperitoneal adenopathy with stable appearing pulmonary and osseous metastases as above.   10/26/17: saw after above CT results. Dr. Mitchel Stiles felt the changes didn't meet RECIST criteria for progression. He recommend continuing with therapy, but suggested that we exchange Carboplatin for Cisplatin due to nephrotoxicity. Her creatinine has pamella with the Cisplatin. 11/1/17: Began Carbo/Taxol. 1/3/2018; Had mild reaction to Carbo, but received Taxol  1/19/17: due to elevated creat, were unable to proceed with CT scan .  1/24/2018: Again, had moderate reaction to Carbo, but was easily controlled with Solu Medrol  2/13/2018: Again, due to elevated creat, but symptomatic, a non-contrast CT was obtained before planned time. Resulted out as Stable pulmonary metastases. Stable bone metastases. Multiple gallstones. Stable para-aortic lymphadenopathy. See full report below. Dr. Mitchel Stiles wanted to complete all 9 cycles if possible of Carbo/Taxol with  positive results of CT scan and pt was premeded with Decadron for the Taxol and Solumedrol for the Carbo. Subjective:  She presents today in good spirits for cycle 8 of Carbo/Taxol   She denies any complaint other than persistent cough. CXR obtained 3/27 showed \"There is no focal airspace opacity, pleural effusion, or pneumothorax. The bones and upper abdomen are stable. A gastric lap band is noted\". Continues to deny fevers/chills or productive cough    Current Outpatient Prescriptions   Medication Sig    albuterol (PROVENTIL HFA, VENTOLIN HFA, PROAIR HFA) 90 mcg/actuation inhaler Take 2 Puffs by inhalation every six (6) hours as needed for Wheezing. Indications: BRONCHOSPASM PREVENTION    gabapentin (NEURONTIN) 300 mg capsule Take 1 Cap by mouth nightly. Indications: NEUROPATHIC PAIN    oxyCODONE-acetaminophen (PERCOCET) 5-325 mg per tablet Take 1 Tab by mouth every six (6) hours as needed for Pain. Max Daily Amount: 4 Tabs.  acetaminophen (TYLENOL) 325 mg tablet Take  by mouth every four (4) hours as needed for Pain.     carvedilol (COREG) 6.25 mg tablet Take 1 Tab by mouth two (2) times daily (with meals). Indications: hypertension    aspirin delayed-release 81 mg tablet Take 1 Tab by mouth daily.  famotidine (PEPCID) 20 mg tablet Take 1 Tab by mouth daily. Indications: gastroesophageal reflux disease    docusate sodium (COLACE) 100 mg capsule Take 100 mg by mouth daily.  ondansetron (ZOFRAN ODT) 4 mg disintegrating tablet Take 1 Tab by mouth every eight (8) hours as needed for Nausea. Indications: CANCER CHEMOTHERAPY-INDUCED NAUSEA AND VOMITING    dexamethasone (DECADRON) 4 mg tablet Take 2 tablets with breakfast the day before chemo and for 2 days after chemo    atorvastatin (LIPITOR) 80 mg tablet Take 1 Tab by mouth daily.  Cholecalciferol, Vitamin D3, (VITAMIN D3) 1,000 unit cap Take 1 Cap by mouth daily. Current Facility-Administered Medications   Medication Dose Route Frequency    CARBOplatin (PARAPLATIN) 388 mg in 0.9% sodium chloride 250 mL, overfill volume 25 mL chemo infusion  388 mg IntraVENous ONCE    sodium chloride (NS) flush 5-10 mL  5-10 mL IntraVENous PRN    0.9% sodium chloride infusion  25 mL/hr IntraVENous PRN    sodium chloride injection 10 mL  10 mL IntraVENous PRN    heparin (porcine) pf 500 Units  500 Units IntraVENous PRN       Review of Systems:  General: Denies wt loss. Performs self care mostly, requires some assist.  does most meal prep and is very supportive. HEENT: Denies visual changes, dysphagia or headache  Resp:Continues with BLACKBURN, but says it is not any worse and when she rest, it does improve. Acknowledges some SOB with current bronchitis. Denies hemoptysis or wheezing  CV: Denies CP, palpitations   GI/:Continues with Miralax for occasional constipation. Denies N/V, bloating, diarrhea or dysuria. Says she is eating well. Denies early satiety   [place]el: Continues bilat L>R lymphedema  Neuro:bilat LE foot neuropathy, but as above, feels the Neurontin may be helping some.   Denies dizzyness or syncope  Psych: Denies depression or feelings of sadness. Objective:     Visit Vitals    BP (!) 139/91    Pulse 73    Temp 96.8 °F (36 °C)    Resp 18    Ht 5' 2\" (1.575 m)    Wt 250 lb 14.1 oz (113.8 kg)    SpO2 99%    BMI 45.89 kg/m2         Physical Exam:  General: A&O X3 in NAD with pleasant affect with  again supportive at her side  HEENT: Sclera anicteric, mucosa pink, moist. She had   Neck: No JVD or adenopathy appreciated. Port Site: without redness, tenderness or swelling  Heart: Regular without M/R/G  Lungs: CTA Bilat without wheezing or rales noted. Nonproductive cough persist. (Noted CXR from 3/27)  Abd: Soft, obese without tenderness, fluid wave or distention  Ext: Extensive left leg edema persist with vascular skin changes previously seen  Neuro: grossly intact         Lab Results   Component Value Date/Time    WBC 8.5 03/27/2018 11:11 AM    HGB 8.4 (L) 03/27/2018 11:11 AM    HCT 26.2 (L) 03/27/2018 11:11 AM    PLATELET 752 43/47/0665 11:11 AM    MCV 98 (H) 03/27/2018 11:11 AM     Lab Results   Component Value Date/Time    CA-125 94 (H) 10/04/2017 08:14 AM    Cancer Ag (CA) 125 111.6 (H) 03/27/2018 11:11 AM     Lab Results   Component Value Date/Time    Sodium 144 03/27/2018 11:11 AM    Potassium 5.2 03/27/2018 11:11 AM    Chloride 96 03/27/2018 11:11 AM    CO2 30 (H) 03/27/2018 11:11 AM    Anion gap 10 01/05/2018 04:45 AM    Glucose 113 (H) 03/27/2018 11:11 AM    BUN 26 03/27/2018 11:11 AM    Creatinine 1.67 (H) 03/27/2018 11:11 AM    BUN/Creatinine ratio 16 03/27/2018 11:11 AM    GFR est AA 36 (L) 03/27/2018 11:11 AM    GFR est non-AA 31 (L) 03/27/2018 11:11 AM    Calcium 8.9 03/27/2018 11:11 AM    Bilirubin, total 0.3 03/27/2018 11:11 AM    AST (SGOT) 15 03/27/2018 11:11 AM    Alk.  phosphatase 104 03/27/2018 11:11 AM    Protein, total 6.6 03/27/2018 11:11 AM    Albumin 3.8 03/27/2018 11:11 AM    Globulin 3.5 01/03/2018 05:02 PM    A-G Ratio 1.4 03/27/2018 11:11 AM    ALT (SGPT) 8 03/27/2018 11:11 AM IMAGING:  CT chest/abd/Pelvis 2/13/2018  FINDINGS:      Previous studies for comparison:  October 23, 2017. Images through the mediastinum and hilar regions reveal no new mass or  adenopathy. Images through the lung parenchyma redemonstrate scattered bilateral small  pulmonary nodules which appear stable to slightly smaller. Small peripheral nodule with cystic appearance in the right lower lobe measures  7.2 mm, previously 7.9 mm. Small peripheral nodule in the left lower lobe measures 6.3 mm, previously 6.3  mm. Nodular density in the right base measures 11.4 x 7.0 mm, previously 11.1 x 7.0  mm. Images through the liver reveal no new space-occupying lesion or biliary  dilatation. Status post lap band.   Images through the gallbladder fossa reveal gallstones. The spleen is normal in size. The pancreas has a normal morphologic appearance. The kidneys demonstrated normal morphologic appearance. Renal function cannot be  assessed due to lack of intravenous contrast.  The adrenal glands are notable for persistent right adrenal enlargement  measuring 3.0 x 2.2 cm, previously 2.8 x 2.5 cm. Persistent para-aortic retroperitoneal lymphadenopathy measuring roughly 2.6 x  2.6 cm, previously 2.7 x 2.5 cm. Another adjacent lymph node measures 2.1 x 1.7  cm, previously 2.1 x 1.7 cm. The visualized opacified bowel loops are nonobstructed. Images through the pelvis and a straight apparent prior hysterectomy. Overall  stable appearance. Apparent sclerotic and lytic processes involving the sternum vertebra.     IMPRESSION:     Stable pulmonary metastases.     Stable bone metastases.     Multiple gallstones.     Stable para-aortic lymphadenopathy.       Assessment:     Patient Active Problem List   Diagnosis Code    LAP-BAND surgery status Z98.84    Vaginal cancer ([facility] [address] C52    Hypertension, essential I10    Hypercholesterolemia E78.00    GERD without esophagitis K21.9    Lymphedema of both lower extremities I89.0    Morbid obesity with BMI of 40.0-44.9, adult (HCC) E66.01, Z68.41    CKD (chronic kidney disease) stage 3, GFR 30-59 ml/min N18.3    Anemia D64.9    Glucose intolerance (impaired glucose tolerance) R73.02    On antineoplastic chemotherapy Z79.899    Chemotherapy adverse reaction T45.1X5A    Cancer related pain G89.3    Metastatic neoplastic disease (HCC) C79.9         Plan: Will proceed with C8 of Taxol/Carbo for persistent vaginal cancer. Noted  CA-125 down today to 111 (from 126). Again, predose with Solu Medrol 125 mg prior to Carbo with 50 of Benadryl and infuse slowly  Due to persistent cough and clear CXR yesterday, will order Albuterol MDI and have pt follow up with PCP   She will follow up with dentist for tooth replacement and let us know if he need to do any work other then replacing a cap  Lymphedema - continue with her compression machine daily and follow up with Lymphedema clinic reconsulted. Foot neuropathy/pain, continue with Neurontin 600mg qhs for further evaluation   Continue following a diabetic diet with her . Anemia: Transfusion PRN  Continue with anti-hypertensive  Continue to monitor creat and hydration encouraged and if she feels she is becoming dehydrated at any time, to call and we will get her in for IV infusion. Encouraged to call for any concerns or questions    Diana Hall NP       ADDENDUM:  After 40 minutes of infusion of Carbo, pt had the beginnings of reaction with itching to hands. She was given another dose of Benadryl 50 mg and Pepcid (it had been over 3 hours that she had received these)   After 15 minutes, pt said hand itching was improving and she was feeling \"normal again\". We will now hold Carbo going forward (only one cycle left) and will discuss with Dr. Devante Carreno  She will call for any concerns or questions and if any further reaction occurs, she will proceed directly to the ER.   Greater than 50 minutes spend with pt with greater than 50% in face to face encounter/counseling  [address], NP

## 2018-04-10 PROBLEM — N93.9 VAGINAL BLEEDING: Status: ACTIVE | Noted: 2018-01-01

## 2018-04-10 PROBLEM — R33.9 INABILITY TO URINATE: Status: ACTIVE | Noted: 2018-01-01

## 2018-04-10 PROBLEM — R33.9 INABILITY TO URINATE: Status: RESOLVED | Noted: 2018-01-01 | Resolved: 2018-01-01

## 2018-04-10 PROBLEM — R33.9 URINARY RETENTION: Status: ACTIVE | Noted: 2018-01-01

## 2018-04-10 NOTE — TELEPHONE ENCOUNTER
Pt , she states she stated bleeding vaginally yesterday, it is bright red in color, heavy in flow like a normal menstrual cycle with clots, she states she is not soaking a pad an hour but it is heavy in flow, she also states she is unable to urinate, she states she woke at 2:30 am and had to go to the bathroom \"really bad\" and she was unable to go.

## 2018-04-10 NOTE — PROGRESS NOTES
[address] [facility] [address], [address] [facility]  [phone]  MD Lakeshia Diana MD  Office Note  Patient ID:  Name:  Raymon Elliott  MRN:  [id number]  :  67 y.o. Date:  4/10/2018      HISTORY OF PRESENT ILLNESS (copied per prior office notes):  Raymon Elliott is a 75 y.o.  postmenopausal female who was referred to me in 2015 for a pelvic mass by Dr. Catrachito Franco, who noted the mass on routine gynecologic exam.  He sent her for a CT of the pelvis, which revealed a mass at the vaginal apex, that appeared to be invading into the soft tissues of the left pelvis. There were several enlarged lymph nodes on the left. There was also questionable bladder invasion, and possibly even involvement of the rectosigmoid colon. She reported a history of having a mass removed from one of her ovaries in 1 at [facility], which required a second surgery to remove her uterus and contralateral ovary. She said she did not have to take any chemotherapy, but she did have to take some kind of pill for a year. She has not had any gynecologic issues since that time. Presumably her hysterectomy was a total, rather than a supracervical, but she is unsure and there are no records. She denied any vaginal bleeding, except for after her examination with Dr. Elysia Rockwell. She reported minimal pain in the pelvis. I took her the OR for and exam under anesthesia with cystoscopy and proctoscopy. Pathology revealed a squamous cell carcinoma, consistent with a vaginal primary. Examination revealed a fixed mass involving the left upper vagina with extension to the left pelvic sidewall. There also appeared to be involvement of the posterior bladder wall, but no mucosal involvement. The mass did push against the rectal wall, but there did not appear to be any direct involvement. I referred her to radiation oncology and she saw Dr. Katelyn Schofield.   She completed pelvic radiation along with concurrent cisplatin chemotherapy in February 2016 and had a great response. When I saw her in the office in March 2017 for follow up she was doing well and without complaints. Her pelvic exam and pap smear at that time were both negative. Subsequent to that visit she began having back pain that had progressively worsened. She saw Bill Shaunasavanna in May and she ordered a CT. This unfortunately demonstrated extensive metastatic disease outside of the prior radiated field. Dr. Parmjit Polk then sent her for a PET/CT which demonstrated widely metastatic disease. Dr. Nixon Gonsalez recommended systemic chemotherapy with Taxol/Cisplatin/Avastin, which is the standard for metastatic cervical cancer, since vaginal cancer behaves similar to cervical cancer. Unfortunately, the Avastin was denied by her insurance company. We chose to proceed with Taxol/Cisplatin (6/2017). Her scan in October 2017 demonstrated slight progression of the lymphadenopathy, while the pulmonary and bony disease appeared stable. On review, the changes didn't meet RECIST criteria for progression. He then recommended continuing with therapy, but I suggested that we exchange Carboplatin for Cisplatin due to nephrotoxicity. Her creatinine had steadily climbed with the Cisplatin. She has completed 6 cycles of Taxol/Carboplatin since the switch. She feels more tired with the Carboplatin rather than the Cisplatin, but otherwise not much different. She continues with increased swelling in her lower extremities and reporting some more back pain, currently being treated with torsemide by nephrology. Recently dose cut in half. Using percocet at night for leg/foot pain d/t edema. She was Rx'd pump therapy by lymphedema clinic and has improved with her compliance. This morning, she called the office saying she had begun \"bleeding like a heavy period\" last evening and it has continued into this morning .  She also stated that she was \"unaware of urinating\" since last evening. Said she sometimes leaks and may have missed some urinating with the bleeding. She denies any other changes and denies new pain, nausea, chills or fevers. ECOG score: 1       ROS:   and GI review: As above  Cardiopulmonary review:Negative   Musculoskeletal:  Negative    A comprehensive review of systems was negative except for that written in the History of Present Illness. , 10 point ROS        Problem List:  Patient Active Problem List    Diagnosis Date Noted    Vaginal bleeding 04/10/2018    Urinary retention 04/10/2018    On antineoplastic chemotherapy 03/01/2018    Chemotherapy adverse reaction 03/01/2018    Cancer related pain 03/01/2018    Metastatic neoplastic disease ([address] 03/01/2018    Glucose intolerance (impaired glucose tolerance) 01/29/2018    Anemia 01/04/2018    CKD (chronic kidney disease) stage 3, GFR 30-59 ml/min 01/03/2018    Morbid obesity with BMI of 40.0-44.9, adult ([address] 12/12/2017    Lymphedema of both lower extremities 06/14/2017    Hypertension, essential 01/18/2017    Hypercholesterolemia 01/18/2017    GERD without esophagitis 01/18/2017    Vaginal cancer ([address] 12/10/2015    LAP-BAND surgery status 05/17/2012     PMH:  Past Medical History:   Diagnosis Date    Abnormal mammogram of left breast 12/5/2016    Cancer ([address] 06/2017    LYMPH NODES IN BACK    Cancer ([address] 2016    VAGINAL CANCER    GERD (gastroesophageal reflux disease) 6/11/06    Hypercholesterolemia 10/11/06    Hypertension 10/11/06    Morbid obesity ([address] 10/11/06      PSH:  Past Surgical History:   Procedure Laterality Date    HX APPENDECTOMY      HX GASTRIC BYPASS  2009    LAP BAND    HX GYN  12/4/15    EUA/Bx of pelvic mass and vagina/cystoscopy/proctoscopy    HX HYSTERECTOMY  1978    HX ORTHOPAEDIC  2009    right ankle    HX OTHER SURGICAL  2001?     benign mass removed from lower back    LAP, PLACE ADJUST GASTR BAND 1/30/08    EB: AP Std lap band, liver bx      Social History:  Social History   Substance Use Topics    Smoking status: Former Smoker     Packs/day: 0.50     Years: 15.00     Types: Cigarettes     Quit date: 1/1/1993    Smokeless tobacco: Never Used    Alcohol use Yes      Comment: socially-WINE ONCE A WEEK      Family History:  Family History   Problem Relation Age of Onset    Cancer Mother      colon WITH METS TO LIVER    Diabetes Father     Heart Disease Father     Cancer Sister      breast & bone    Cancer Maternal Aunt      BREAST    Cancer Paternal Aunt      BREAST    Anesth Problems Neg Hx       Medications: (reviewed)  Current Outpatient Prescriptions   Medication Sig    gabapentin (NEURONTIN) 300 mg capsule Take 1 Cap by mouth nightly. Indications: NEUROPATHIC PAIN    carvedilol (COREG) 6.25 mg tablet Take 1 Tab by mouth two (2) times daily (with meals). Indications: hypertension    aspirin delayed-release 81 mg tablet Take 1 Tab by mouth daily.  docusate sodium (COLACE) 100 mg capsule Take 100 mg by mouth daily.  atorvastatin (LIPITOR) 80 mg tablet Take 1 Tab by mouth daily.  Cholecalciferol, Vitamin D3, (VITAMIN D3) 1,000 unit cap Take 1 Cap by mouth daily.  albuterol (PROVENTIL HFA, VENTOLIN HFA, PROAIR HFA) 90 mcg/actuation inhaler Take 2 Puffs by inhalation every six (6) hours as needed for Wheezing. Indications: BRONCHOSPASM PREVENTION    oxyCODONE-acetaminophen (PERCOCET) 5-325 mg per tablet Take 1 Tab by mouth every six (6) hours as needed for Pain. Max Daily Amount: 4 Tabs.  acetaminophen (TYLENOL) 325 mg tablet Take  by mouth every four (4) hours as needed for Pain.  famotidine (PEPCID) 20 mg tablet Take 1 Tab by mouth daily. Indications: gastroesophageal reflux disease    ondansetron (ZOFRAN ODT) 4 mg disintegrating tablet Take 1 Tab by mouth every eight (8) hours as needed for Nausea.  Indications: CANCER CHEMOTHERAPY-INDUCED NAUSEA AND VOMITING    dexamethasone (DECADRON) 4 mg tablet Take 2 tablets with breakfast the day before chemo and for 2 days after chemo     No current facility-administered medications for this visit. Allergies: (reviewed)  Allergies   Allergen Reactions    Amlodipine Other (comments)     Edema in LE's          OBJECTIVE:  Physical Exam:  VITAL SIGNS: Vitals:    04/10/18 1100 04/10/18 1106   BP: 165/89 (!) 160/93   Pulse: 86 78   Weight: 270 lb (122.5 kg)    Height: 5' 2\" (1.575 m)      Body mass index is 49.38 kg/(m^2). GENERAL MIGUELANGEL: Conversant, alert, oriented. No acute distress. HEENT:  Sclera anicteric, pupils equal and reactive to light   RESPIRATORY: CTA Bilat   CARDIOVASC: Regular   GASTROINT: soft, obese, NT to palpation. MUSCULOSKEL: no joint tenderness, deformity   EXTREMITIES: Persistent marked edema of bilateral lower extremities, L>R. Skin noted with vascular changes on the left. PELVIC: Dark, old appearing blood noted at vaginal opening and into vagina. Some urine noted leaking down from urethra onto bed pad  Speculum exam: Area to left cervix noted with some narrowing seen with questionable tunneling (exam uncomfortable for pt). Digital exam: No mass or lesion noted. Radiation changes felt. Dark red blood with urine noted after exam leaking from area. RECTAL: Deferred   NEURO: Grossly intact. No acute deficit. CT CAP 2/2018:    FINDINGS:      Previous studies for comparison:  October 23, 2017. Images through the mediastinum and hilar regions reveal no new mass or  adenopathy. Images through the lung parenchyma redemonstrate scattered bilateral small  pulmonary nodules which appear stable to slightly smaller. Small peripheral nodule with cystic appearance in the right lower lobe measures  7.2 mm, previously 7.9 mm. Small peripheral nodule in the left lower lobe measures 6.3 mm, previously 6.3  mm. Nodular density in the right base measures 11.4 x 7.0 mm, previously 11.1 x 7.0  mm.   Images through the liver reveal no new space-occupying lesion or biliary  dilatation. Status post lap band. Images through the gallbladder fossa reveal gallstones. The spleen is normal in size. The pancreas has a normal morphologic appearance. The kidneys demonstrated normal morphologic appearance. Renal function cannot be  assessed due to lack of intravenous contrast.  The adrenal glands are notable for persistent right adrenal enlargement  measuring 3.0 x 2.2 cm, previously 2.8 x 2.5 cm. Persistent para-aortic retroperitoneal lymphadenopathy measuring roughly 2.6 x  2.6 cm, previously 2.7 x 2.5 cm. Another adjacent lymph node measures 2.1 x 1.7  cm, previously 2.1 x 1.7 cm. The visualized opacified bowel loops are nonobstructed. Images through the pelvis and a straight apparent prior hysterectomy. Overall  stable appearance. Apparent sclerotic and lytic processes involving the sternum vertebra.     IMPRESSION:  Stable pulmonary metastases. Stable bone metastases. Multiple gallstones. Stable para-aortic lymphadenopathy.         IMPRESSION/PLAN:  Massimo Fallon is a 75 y.o. female with a history of stage III vaginal carcinoma. She was treated with pelvic radiation therapy and concurrent cisplatin chemotherapy. She now has recurrent disease with multiple sites of metastases and new vaginal bleeding since yesterday with difficulty voiding. 1) Since bleeding appears to be old, decreasing and pt asymptomatic, will monitor. She will let us know if it becomes brighter red, has dizziness or any concerns  2). Pt definitely was urinating on the table (pt even stated she felt some relief) and will hold off straight cathing now. If she has any further concern voiding, she will call us immediately   3) Dr. Adalid Agosto presented in the exam room and examined pt.  We will proceed to obtain a CT of chest/abd/pelvis (without contrast due to CRI) as soon as approval is obtained from her insurance company to evaluate for possible hydro, mass, or other changes due to malignancy   4) She will follow up with Dr. Dena Ahmadi after CT scan  Or sooner if needed  5) she was encouraged to call for any concerns or questions. ICD-10-CM ICD-9-CM    1. Vaginal cancer (HCC) C52 184.0 CT ABD PELV WO CONT      CT CHEST WO CONT   2. Cancer related pain G89.3 338.3 CT ABD PELV WO CONT      CT CHEST WO CONT   3. CKD (chronic kidney disease) stage 3, GFR 30-59 ml/min N18.3 585.3 CT ABD PELV WO CONT      CT CHEST WO CONT   4. Morbid obesity with BMI of 40.0-44.9, adult (HCC) E66.01 278.01 CT ABD PELV WO CONT    Z68.41 V85.41 CT CHEST WO CONT   5. Lymphedema of both lower extremities I89.0 457.1 CT ABD PELV WO CONT      CT CHEST WO CONT   6.  Vaginal bleeding N93.9 623.8                Signed By: Morenita Hewitt NP     4/10/2018/4:31 PM

## 2018-04-10 NOTE — TELEPHONE ENCOUNTER
S/w pt, per Gracy and Dr. Ale Heredia, pt to come into office to see Brenda Warren.   Pt was also advised to pack a bag in case she was admitted to hospital, pt verbalized understanding, was placed on [facility] scheduled at 11 am (pt is approximately  1 1/2 hours from office)

## 2018-04-10 NOTE — PROGRESS NOTES
Problem visit, she states she stated bleeding vaginally yesterday, it is bright red in color, heavy in flow like a normal menstrual cycle with clots, she states she is not soaking a pad an hour but it is heavy in flow, she also states she is unable to urinate, she thinks the last time she urinated was yesterday afternoon, she states she woke at 2:30 am and had to go to the bathroom \"really bad\" and she was unable to go., Initial blood pressure reading 165/89, repeat blood pressure reading 160/93

## 2018-04-13 NOTE — TELEPHONE ENCOUNTER
Called pt to let her know I have faxed her lab req to [facility] lab [facility] to be drawn on 4/16/18.

## 2018-04-16 NOTE — PROGRESS NOTES
[facility]  Physical Therapy Lymphedema Clinic  [address] [facility], [address] [facility], [facility] [address].    Lymphedema Therapy      4/16/2018:  All Duarte was not seen on this date for physical therapy for the following reasons:    [x]      Patient called to cancel her scheduled re-evaluation this am for the following reasons: Patient is having additional scans done today. She was offered to reschedule this re-evaluation while she was on the phone, but she declined and requested to call back later to reschedule once she received the results of her scan.    []      Patient missed the visit and did not call to cancel.      CHEPE Cardoso, VIANCA-FALLON

## 2018-04-17 NOTE — PROGRESS NOTES
[address] [place] [address] Mathias Moritz [address], [address] [place] [address]  [phone]  MD Yessenia Holloway MD  Office Note  Patient ID:  Name:  Massimo Fallon  MRN:  [id number]  :  67 y.o. Date:  2018      HISTORY OF PRESENT ILLNESS:  Massimo Fallon is a 75 y.o.  postmenopausal female who was referred to me in 2015 for a pelvic mass by Dr. Michel Patel, who noted the mass on routine gynecologic exam.  He sent her for a CT of the pelvis, which revealed a mass at the vaginal apex, that appeared to be invading into the soft tissues of the left pelvis. There were several enlarged lymph nodes on the left. There was also questionable bladder invasion, and possibly even involvement of the rectosigmoid colon. She reported a history of having a mass removed from one of her ovaries in 1 at [address] [place] [address], which required a second surgery to remove her uterus and contralateral ovary. She said she did not have to take any chemotherapy, but she did have to take some kind of pill for a year. She has not had any gynecologic issues since that time. Presumably her hysterectomy was a total, rather than a supracervical, but she is unsure and there are no records. She denied any vaginal bleeding, except for after her examination with Dr. Vin Madison. She reported minimal pain in the pelvis. I took her the OR for and exam under anesthesia with cystoscopy and proctoscopy. Pathology revealed a squamous cell carcinoma, consistent with a vaginal primary. Examination revealed a fixed mass involving the left upper vagina with extension to the left pelvic sidewall. There also appeared to be involvement of the posterior bladder wall, but no mucosal involvement. The mass did push against the rectal wall, but there did not appear to be any direct involvement. I referred her to radiation oncology and she saw Dr. Aki Moya.   She completed pelvic radiation along with concurrent cisplatin chemotherapy in February 2016 and had a great response. When I saw her in the office in March 2017 for follow up she was doing well and without complaints. Her pelvic exam and pap smear at that time were both negative. Subsequent to that visit she began having back pain that had progressively worsened. She saw Gregg Pak in May and she ordered a CT. This unfortunately demonstrated extensive metastatic disease outside of the prior radiated field. Dr. Patrick Thornton then sent her for a PET/CT which demonstrated widely metastatic disease. I recommended systemic chemotherapy with Taxol/Cisplatin/Avastin, which is the standard for metastatic cervical cancer, since vaginal cancer behaves similar to cervical cancer. Unfortunately, the Avastin was denied by her insurance company. We chose to proceed with Taxol/Cisplatin. She completed 6 cycles. She has done well so far with her treatment. She denies nausea. She reports some persistent lower extremity edema, but that has overall improved. Her scan in October 2017 demonstrated slight progression of the lymphadenopathy, while the pulmonary and bony disease appeared stable. On my review, the changes didn't meet RECIST criteria for progression. I recommended continuing with therapy, but I suggested that we exchange Carboplatin for Cisplatin due to nephrotoxicity. Her creatinine had steadily climbed with the Cisplatin. She has completed 8 cycles of Taxol/Carboplatin since the switch. She had a severe reaction to the Carboplatin at the time of her 8th cycle. She felt more tired with the Carboplatin rather than the Cisplatin, but otherwise not much different. She continues to have swelling in her lower extremities and reporting some more back pain. She presents today to review her recent CT scan and to discuss going forward with chemotherapy. She was seen in the office last week for some vaginal bleeding, but no source was seen. That has since subsided. ECOG score: 1       ROS:   and GI review:  Negative  Cardiopulmonary review:  Negative   Musculoskeletal:  Negative    A comprehensive review of systems was negative except for that written in the History of Present Illness. , 10 point ROS        Problem List:  Patient Active Problem List    Diagnosis Date Noted    Vaginal bleeding 04/10/2018    Urinary retention 04/10/2018    On antineoplastic chemotherapy 03/01/2018    Chemotherapy adverse reaction 03/01/2018    Cancer related pain 03/01/2018    Metastatic neoplastic disease ([address] 03/01/2018    Glucose intolerance (impaired glucose tolerance) 01/29/2018    Anemia 01/04/2018    CKD (chronic kidney disease) stage 3, GFR 30-59 ml/min 01/03/2018    Morbid obesity with BMI of 40.0-44.9, adult ([address] 12/12/2017    Lymphedema of both lower extremities 06/14/2017    Hypertension, essential 01/18/2017    Hypercholesterolemia 01/18/2017    GERD without esophagitis 01/18/2017    Vaginal cancer ([address] 12/10/2015    LAP-BAND surgery status 05/17/2012     PMH:  Past Medical History:   Diagnosis Date    Abnormal mammogram of left breast 12/5/2016    Cancer ([address] 06/2017    LYMPH NODES IN BACK    Cancer ([address] 2016    VAGINAL CANCER    GERD (gastroesophageal reflux disease) 6/11/06    Hypercholesterolemia 10/11/06    Hypertension 10/11/06    Morbid obesity ([address] 10/11/06      PSH:  Past Surgical History:   Procedure Laterality Date    HX APPENDECTOMY      HX GASTRIC BYPASS  2009    LAP BAND    HX GYN  12/4/15    EUA/Bx of pelvic mass and vagina/cystoscopy/proctoscopy    HX HYSTERECTOMY  1978    HX ORTHOPAEDIC  2009    right ankle    HX OTHER SURGICAL  2001?     benign mass removed from lower back    LAP, PLACE ADJUST GASTR BAND  1/30/08    EB: AP Std lap band, liver bx      Social History:  Social History   Substance Use Topics    Smoking status: Former Smoker     Packs/day: 0.50     Years: 15.00     Types: Cigarettes Quit date: 1/1/1993    Smokeless tobacco: Never Used    Alcohol use Yes      Comment: socially-WINE ONCE A WEEK      Family History:  Family History   Problem Relation Age of Onset    Cancer Mother      colon WITH METS TO LIVER    Diabetes Father     Heart Disease Father     Cancer Sister      breast & bone    Cancer Maternal Aunt      BREAST    Cancer Paternal Aunt      BREAST    Anesth Problems Neg Hx       Medications: (reviewed)  Current Outpatient Prescriptions   Medication Sig    amoxicillin 500 mg tab Take  by mouth.  albuterol (PROVENTIL HFA, VENTOLIN HFA, PROAIR HFA) 90 mcg/actuation inhaler Take 2 Puffs by inhalation every six (6) hours as needed for Wheezing. Indications: BRONCHOSPASM PREVENTION    gabapentin (NEURONTIN) 300 mg capsule Take 1 Cap by mouth nightly. Indications: NEUROPATHIC PAIN    oxyCODONE-acetaminophen (PERCOCET) 5-325 mg per tablet Take 1 Tab by mouth every six (6) hours as needed for Pain. Max Daily Amount: 4 Tabs.  acetaminophen (TYLENOL) 325 mg tablet Take  by mouth every four (4) hours as needed for Pain.  carvedilol (COREG) 6.25 mg tablet Take 1 Tab by mouth two (2) times daily (with meals). Indications: hypertension    aspirin delayed-release 81 mg tablet Take 1 Tab by mouth daily.  famotidine (PEPCID) 20 mg tablet Take 1 Tab by mouth daily. Indications: gastroesophageal reflux disease    docusate sodium (COLACE) 100 mg capsule Take 100 mg by mouth daily.  ondansetron (ZOFRAN ODT) 4 mg disintegrating tablet Take 1 Tab by mouth every eight (8) hours as needed for Nausea. Indications: CANCER CHEMOTHERAPY-INDUCED NAUSEA AND VOMITING    dexamethasone (DECADRON) 4 mg tablet Take 2 tablets with breakfast the day before chemo and for 2 days after chemo    atorvastatin (LIPITOR) 80 mg tablet Take 1 Tab by mouth daily.  Cholecalciferol, Vitamin D3, (VITAMIN D3) 1,000 unit cap Take 1 Cap by mouth daily.      No current facility-administered medications for this visit. Allergies: (reviewed)  Allergies   Allergen Reactions    Amlodipine Other (comments)     Edema in LE's    Carboplatin Shortness of Breath, Rash and Itching          OBJECTIVE:    Physical Exam:  VITAL SIGNS: Vitals:    04/17/18 1344   BP: 159/83   Pulse: 86   Weight: 276 lb (125.2 kg)   Height: 5' 2.01\" (1.575 m)     Body mass index is 50.47 kg/(m^2). GENERAL MIGUELANGEL: Conversant, alert, oriented. No acute distress. HEENT: HEENT. No thyroid enlargement. No JVD. Neck: Supple without restrictions. RESPIRATORY: Clear to auscultation and percussion to the bases. No CVAT. CARDIOVASC: RRR without murmur/rub. GASTROINT: soft, non-tender, without masses or organomegaly   MUSCULOSKEL: no joint tenderness, deformity or swelling   EXTREMITIES: Marked edema of bilateral lower extremities, L>R. Skin noted with vascular changes on the left. PELVIC: Deferred   RECTAL: Deferred   PATRICIO SURVEY: No suspicious lymphadenopathy. NEURO: Grossly intact. No acute deficit. CT of abdomen/pelvis (4/16/18)  Images through the mediastinum and hilar regions reveal no new mass or  adenopathy. Sclerotic/destructive changes involving the left manubrium  redemonstrated without appreciable interval change.     Images through the lung parenchyma reveal scattered bilateral small pulmonary  nodular densities unchanged.     Images through the liver reveal no new space-occupying lesion or biliary  dilatation.     There is adequate opacification of the hepatic and portal veins.     Images through the gallbladder fossa reveal multiple gallstones.     The spleen is normal in size.      The pancreas has a normal morphologic appearance.      Kidneys demonstrated normal morphologic appearance.  No hydronephrosis is  present.     The right adrenal gland remains enlarged measuring 2.9 x 2.2 cm, previously 3.0  x 2.2 cm.     The visualized opacified bowel loops are nonobstructed.     Persistent confluent appearing retroperitoneal lymphadenopathy is demonstrated  with two dominant lesions measuring roughly 2.6 x 2.6 cm, previously 2.6 x 2.6  cm. The other closely adjacent lesion measures roughly 2.1 x 1.7 cm, previously  2.1 x 1.7 cm.     Images through the pelvis no space-occupying lesion or lymphadenopathy. Prominent soft tissue swelling is present in the pelvis and lower extremities.     Scattered predominately sclerotic lesions in the lower lumbar vertebra and  sternum are redemonstrated without appreciable interval change.     IMPRESSION:     Status post gastric sleeve.     Stable scattered bilateral pulmonary nodular densities.     Stable retroperitoneal lymphadenopathy.     Multiple gallstones.     Stable bony metastatic disease.     Possible minimal left hydronephrosis.     Pelvic and lower extremity presumed lymphedema. IMPRESSION/PLAN:  Noel De Leon is a 75 y.o. female with a history of stage III vaginal carcinoma. She was treated with pelvic radiation therapy and concurrent cisplatin chemotherapy. She now has recurrent disease with multiple sites of metastases. I had a long discussion with the patient and her  discussing the best course of management. Surgery is not an option due to the widespread nature of her disease. The same is true for radiation therapy. Her only viable option is systemic chemotherapy. Since vaginal cancer behaves like cervical carcinoma, I recommended using the standard regimen for metastatic cervical carcinoma, Taxol/Cisplatin/Avastin. Unfortunately, the Avastin was denied by her insurance company. We chose to proceed with Taxol/Cisplatin. She completed 6 cycles. On her CT in July 2017 her pulmonary nodules appeared stable and her retroperitoneal lymphadenopathy appeared slightly improved. Her most recent scan in October 2017 demonstrated slight progression of the lymphadenopathy, while the pulmonary and bony disease appeared stable.   On my review, the changes didn't meet RECIST criteria for progression. I recommended continuing with therapy, but I suggested that we exchange Carboplatin for Cisplatin due to nephrotoxicity. Her creatinine had steadily climbed with the Cisplatin. She completed 8 cycles of Taxol/Carboplatin since the switch. We have stopped the Carboplatin due to a reaction. Her CT scan still suggests stable disease. I have recommended that we continue with Taxol and add Avastin, that is if insurance will cover it. We don't really have a lot of treatment options remaining. I am also going to send her tumor to [facility] for molecular tumor testing to determine if there are other treatment options available, including clinical trials.       Signed By: Bao Catalan MD     4/17/2018/4:31 PM

## 2018-04-18 NOTE — PROGRESS NOTES
Mustapha Eduardo. ALONSO Meza PA      Date of visit: 4/18/2018   Gyn Onc: Dr. Junior Dye Dx: Vaginal cancer  HPI:  Massimo Tam is a 75 y.o.  postmenopausal female who was referred to Dr. Oriana Frank in December 2015 for a pelvic mass by Dr. Doralee Landau, who noted the mass on routine gynecologic exam.  He sent her for a CT of the pelvis, which revealed a mass at the vaginal apex, that appeared to be invading into the soft tissues of the left pelvis. There were several enlarged lymph nodes on the left. There was also questionable bladder invasion, and possibly even involvement of the rectosigmoid colon. She reported a history of having a mass removed from one of her ovaries in 1 at [facility], which required a second surgery to remove her uterus and contralateral ovary. She said she did not have to take any chemotherapy, but she did have to take some kind of pill for a year. She has not had any gynecologic issues since that time. Presumably her hysterectomy was a total, rather than a supracervical, but she is unsure and there are no records. She denied any vaginal bleeding, except for after her examination with Dr. Stefany Palencia. She reported minimal pain in the pelvis. Dr. Oriana Frank took her the OR for and exam under anesthesia with cystoscopy and proctoscopy. Pathology revealed a squamous cell carcinoma, consistent with a vaginal primary. Examination revealed a fixed mass involving the left upper vagina with extension to the left pelvic sidewall. There also appeared to be involvement of the posterior bladder wall, but no mucosal involvement. The mass did push against the rectal wall, but there did not appear to be any direct involvement. He then referred her to radiation oncology and she saw Dr. Miri Altamirano.   She completed pelvic radiation along with concurrent cisplatin chemotherapy in February 2016 and had a great response.       When he saw her in the office in March 2017 for follow up she was doing well and without complaints. Her pelvic exam and pap smear at that time were both negative. Subsequent to that visit she began having back pain that had progressively worsened. She saw Milbert Blizzard in May and she ordered a CT. This unfortunately demonstrated extensive metastatic disease outside of the prior radiated field. Dr. Екатерина Nugent then sent her for a PET/CT which demonstrated widely metastatic disease.       Dr. Taisha Villagomez then recommended systemic chemotherapy with Taxol/Cisplatin/Avastin, which is the standard for metastatic cervical cancer, since vaginal cancer behaves similar to cervical cancer. Unfortunately, the Avastin was denied by her insurance company. We chose to proceed with Taxol/Cisplatin. She completed 6 cycles. She had done well with her treatment. She has had some persistent lower extremity edema, but that has overall improved. Her scan in October 2017 demonstrated slight progression of the lymphadenopathy, while the pulmonary and bony disease appeared stable. On review, the changes didn't meet RECIST criteria for progression and he recommended continuing with therapy, but suggested that we exchange Carboplatin for Cisplatin due to nephrotoxicity. Her creatinine had steadily climbed with the Cisplatin. She completed 8 cycles of Taxol/Carboplatin since the switch. She had a severe reaction to the Carboplatin at the time of her 8th cycle. She felt more tired with the Carboplatin rather than the Cisplatin, but otherwise not much different. She continues to have swelling in her lower extremities and reporting some more back pain. She met with Dr. Taisha Villagomez 4/17 to review her recent CT scan and to discuss going forward with chemotherapy. She was seen in the office last week for some vaginal bleeding, but no source was seen. That has since subsided.       Oncology Treatment History:  12/4/2015: Dx with Vaginal cancer after an EUA and biopsies of pelvic mass and vagina                     Vagina, biopsy: Invasive squamous cell carcinoma, poorly differentiated. No loss of mismatch repair proteins   1/15/16-2/12/16: Weekly Cisplatin with XRT with good response. 5/16/17: CT scan showed Bilateral pulmonary nodules and retroperitoneal lymphadenopathy compatible with metastatic disease. 6/14/17-10/4/17: 6 cycles of Cisplatin/Taxol. Much difficulty with neuropathy  10/23/17: CT scan (without contrast) after 6 cycles showed  Slight progression of retroperitoneal adenopathy with stable appearing pulmonary and osseous metastases as above. 10/26/17: saw after above CT results. Dr. Viridiana Anne felt the changes didn't meet RECIST criteria for progression. He recommend continuing with therapy, but suggested that we exchange Carboplatin for Cisplatin due to nephrotoxicity. Her creatinine has pamella with the Cisplatin. 11/1/17: Began Carbo/Taxol. 1/3/2018; Had mild reaction to Carbo, but received Taxol  1/19/17: due to elevated creat, were unable to proceed with CT scan .  1/24/2018: Again, had moderate reaction to Carbo, but was easily controlled with Solu Medrol  2/13/2018: Again, due to elevated creat, but symptomatic, a non-contrast CT was obtained before planned time. Resulted out as Stable pulmonary metastases. Stable bone metastases. Multiple gallstones. Stable para-aortic lymphadenopathy. See full report below. Dr. Viridiana Anne wanted to complete all 9 cycles if possible of Taxol  4/17/18: Met with Dr. Viridiana Anne. We will continue Taxol and he will do a peer to peer to try to have the Avastin approved now that she is not able to take Carbo and Cisplatin    Subjective:  She presents today in good spirits for cycle 9 of Taxol   Her only complaint continues to be her lower ext edema that she says limits her mobility. She is trying to use the lymphedema pumps as directed and feels when she does this, it is a little better.    She meets with her nephrologist May 1st for routine follow up/monitoring of her renal insufficiency. Continues to deny fevers/chills or productive cough     is again at her side and supportive. Current Outpatient Prescriptions   Medication Sig    amoxicillin 500 mg tab Take  by mouth.  oxyCODONE-acetaminophen (PERCOCET) 5-325 mg per tablet Take 1 Tab by mouth every six (6) hours as needed for Pain. Max Daily Amount: 4 Tabs.  albuterol (PROVENTIL HFA, VENTOLIN HFA, PROAIR HFA) 90 mcg/actuation inhaler Take 2 Puffs by inhalation every six (6) hours as needed for Wheezing. Indications: BRONCHOSPASM PREVENTION    gabapentin (NEURONTIN) 300 mg capsule Take 1 Cap by mouth nightly. Indications: NEUROPATHIC PAIN    acetaminophen (TYLENOL) 325 mg tablet Take  by mouth every four (4) hours as needed for Pain.  carvedilol (COREG) 6.25 mg tablet Take 1 Tab by mouth two (2) times daily (with meals). Indications: hypertension    aspirin delayed-release 81 mg tablet Take 1 Tab by mouth daily.  famotidine (PEPCID) 20 mg tablet Take 1 Tab by mouth daily. Indications: gastroesophageal reflux disease    docusate sodium (COLACE) 100 mg capsule Take 100 mg by mouth daily.  ondansetron (ZOFRAN ODT) 4 mg disintegrating tablet Take 1 Tab by mouth every eight (8) hours as needed for Nausea. Indications: CANCER CHEMOTHERAPY-INDUCED NAUSEA AND VOMITING    dexamethasone (DECADRON) 4 mg tablet Take 2 tablets with breakfast the day before chemo and for 2 days after chemo    atorvastatin (LIPITOR) 80 mg tablet Take 1 Tab by mouth daily.  Cholecalciferol, Vitamin D3, (VITAMIN D3) 1,000 unit cap Take 1 Cap by mouth daily.      Current Facility-Administered Medications   Medication Dose Route Frequency    sodium chloride 0.9% injection 10 mL  10 mL IntraVENous PRN    heparin (porcine) pf 500 Units  500 Units IntraVENous PRN    sodium chloride (NS) flush 10-40 mL  10-40 mL IntraVENous PRN    PACLitaxel (TAXOL) 316 mg in 0.9% sodium chloride 250 mL, overfill volume 25 mL chemo infusion  316 mg IntraVENous ONCE    dexamethasone (DECADRON) 20 mg in 0.9% sodium chloride 50 mL IVPB  20 mg IntraVENous ONCE       Review of Systems:  General: Denies wt loss. Performs self care mostly, requires some assistance from  . He does most meal prep and is very supportive. HEENT: Denies visual changes, dysphagia or headache  Resp:Continues with BLACKBURN, but says it is not any worse and when she rest, it does improve. Acknowledges some SOB with current bronchitis. Denies hemoptysis or wheezing  CV: Denies CP, palpitations   GI/:Continues with Miralax for occasional constipation. Denies N/V, bloating, diarrhea or dysuria. Says she is eating well. Continues to denies early satiety   [place]: Continues bilat L>R lymphedema  Neuro:bilat LE foot neuropathy, but as above, feels the Neurontin may be helping some. Denies dizzyness or syncope  Psych: Denies depression or feelings of sadness. Objective:     Visit Vitals    /72 (BP 1 Location: Left arm, BP Patient Position: Sitting)    Pulse 64    Temp 97 °F (36.1 °C)    Resp 18    Ht 5' 2\" (1.575 m)    Wt 277 lb 6.4 oz (125.8 kg)    SpO2 98%    BMI 50.74 kg/m2         Physical Exam:  General: A&O X3 in NAD with pleasant affect  HEENT: Sclera anicteric, mucosa pink, moist. Pupils equal and reactive to light. Right upper tooth was removed by dentist yesterday. No bleeding or inflammation noted. Neck: No JVD or cervical adenopathy appreciated. Port Site: without redness, tenderness or swelling. Accessed without difficulty  Heart: Regular without M/R/G  Lungs: CTA Bilat without wheezing or rales noted. Abd: Soft, obese without tenderness, fluid wave or distention. + BS throughout  Ext: Extensive bilat lower ext edema.   Persist with vascular skin changes previously seen  Neuro: grossly intact         Lab Results   Component Value Date/Time    WBC 5.3 04/16/2018 01:52 PM HGB 7.7 (L) 04/16/2018 01:52 PM    HCT 24.0 (L) 04/16/2018 01:52 PM    PLATELET 717 50/27/6521 01:52 PM    MCV 95 04/16/2018 01:52 PM     Lab Results   Component Value Date/Time    CA-125 94 (H) 10/04/2017 08:14 AM    Cancer Ag (CA) 125 116.4 (H) 04/16/2018 01:52 PM     Lab Results   Component Value Date/Time    Sodium 144 04/16/2018 01:52 PM    Potassium 4.7 04/16/2018 01:52 PM    Chloride 102 04/16/2018 01:52 PM    CO2 23 04/16/2018 01:52 PM    Anion gap 10 01/05/2018 04:45 AM    Glucose 89 04/16/2018 01:52 PM    BUN 19 04/16/2018 01:52 PM    Creatinine 1.63 (H) 04/16/2018 01:52 PM    BUN/Creatinine ratio 12 04/16/2018 01:52 PM    GFR est AA 37 (L) 04/16/2018 01:52 PM    GFR est non-AA 32 (L) 04/16/2018 01:52 PM    Calcium 8.9 04/16/2018 01:52 PM    Bilirubin, total 0.2 04/16/2018 01:52 PM    AST (SGOT) 13 04/16/2018 01:52 PM    Alk. phosphatase 94 04/16/2018 01:52 PM    Protein, total 6.1 04/16/2018 01:52 PM    Albumin 3.5 (L) 04/16/2018 01:52 PM    Globulin 3.5 01/03/2018 05:02 PM    A-G Ratio 1.3 04/16/2018 01:52 PM    ALT (SGPT) 8 04/16/2018 01:52 PM       IMAGING:  CT chest/abd/Pelvis 2/13/2018  FINDINGS:      Previous studies for comparison:  October 23, 2017. Images through the mediastinum and hilar regions reveal no new mass or  adenopathy. Images through the lung parenchyma redemonstrate scattered bilateral small  pulmonary nodules which appear stable to slightly smaller. Small peripheral nodule with cystic appearance in the right lower lobe measures  7.2 mm, previously 7.9 mm. Small peripheral nodule in the left lower lobe measures 6.3 mm, previously 6.3  mm. Nodular density in the right base measures 11.4 x 7.0 mm, previously 11.1 x 7.0  mm. Images through the liver reveal no new space-occupying lesion or biliary  dilatation. Status post lap band.   Images through the gallbladder fossa reveal gallstones. The spleen is normal in size. The pancreas has a normal morphologic appearance.     The kidneys demonstrated normal morphologic appearance. Renal function cannot be  assessed due to lack of intravenous contrast.  The adrenal glands are notable for persistent right adrenal enlargement  measuring 3.0 x 2.2 cm, previously 2.8 x 2.5 cm. Persistent para-aortic retroperitoneal lymphadenopathy measuring roughly 2.6 x  2.6 cm, previously 2.7 x 2.5 cm. Another adjacent lymph node measures 2.1 x 1.7  cm, previously 2.1 x 1.7 cm. The visualized opacified bowel loops are nonobstructed. Images through the pelvis and a straight apparent prior hysterectomy. Overall  stable appearance. Apparent sclerotic and lytic processes involving the sternum vertebra. IMPRESSION:     Stable pulmonary metastases.     Stable bone metastases.     Multiple gallstones.     Stable para-aortic lymphadenopathy.       Assessment:     Patient Active Problem List   Diagnosis Code    LAP-BAND surgery status Z98.84    Vaginal cancer ([facility] [address] C52    Hypertension, essential I10    Hypercholesterolemia E78.00    GERD without esophagitis K21.9    Lymphedema of both lower extremities I89.0    Morbid obesity with BMI of 40.0-44.9, adult (HCC) E66.01, Z68.41    CKD (chronic kidney disease) stage 3, GFR 30-59 ml/min N18.3    Anemia D64.9    Glucose intolerance (impaired glucose tolerance) R73.02    On antineoplastic chemotherapy Z79.899    Chemotherapy adverse reaction T45.1X5A    Cancer related pain G89.3    Metastatic neoplastic disease (HCC) C79.9    Vaginal bleeding N93.9    Urinary retention R33.9         Plan: Will proceed with C9 of Taxol for persistent vaginal cancer.    Will attempt to have ability to give Avastin if approved from insurance company  Follow up with nephrologist as scheduled and let us know   of any changes he makes (Dr. Mitesh Griffin)  She will follow up with dentist as directed/needed  Lymphedema - continue with her compression machine daily and follow up with Lymphedema clinic as scheduled  Foot neuropathy/pain, continue with Neurontin 600mg qhs for further evaluation   Continue following a diabetic diet with her . Anemia:Will continue to monitor with Hgb 7.7 today and transfusion PRN  Continue with anti-hypertensive  Continue to monitor creat and hydration encouraged and if she feels she is becoming dehydrated at any time, to call and we will get her in for IV infusion.   Encouraged to call for any concerns or questions    Ramone Mendez NP

## 2018-04-18 NOTE — PROGRESS NOTES
Problem: Chemotherapy Treatment  Goal: *Chemotherapy regimen followed  Outcome: Progressing Towards Goal  Patient here for C9 Taxol

## 2018-04-18 NOTE — PROGRESS NOTES
\[facility]\"" Progress Note    Date: 2018    Name: Ana Mcqueen    MRN: [id number]         : 1950    Ms. Anabella Partida arrived ambulatory at 1000 and in no distress for C9 Taxol. Assessment was completed, no acute issues at this time, no new complaints voiced. LCW port accessed without difficulty with 1 inch alejo needle; + blood return noted, labs drawn and sent. Ms. Rickie Bryan vitals were reviewed. Patient Vitals for the past 12 hrs:   Temp Pulse Resp BP SpO2   18 1540 - 64 - 126/73 -   18 1003 97 °F (36.1 °C) 64 18 118/72 98 %         Lab results were obtained and reviewed. Recent Results (from the past 12 hour(s))   TYPE & SCREEN    Collection Time: 18 10:31 AM   Result Value Ref Range    Crossmatch Expiration 2018     ABO/Rh(D) O POSITIVE     Antibody screen NEG     Comment Previously identified warm autoantibodies      Type and cross drawn for blood tomorrow. Pre-medications  were administered as ordered and chemotherapy was initiated. Medication:  Benadryl IVP  Pepcid IV  Kytril IV  Decadron IVPB  Taxol IV      Ms. Anabella Partida tolerated treatment well and was discharged from [place] [address] in stable condition at [phone]. She is to return on 2018 at 11:00am for her next appointment.     Bia 2018

## 2018-04-19 NOTE — PROGRESS NOTES
[facility] VISIT NOTE    1110. Pt arrived at [facility] ambulatory and in no distress for 2 unit PRBC Blood Transfusion. Assessment completed, pt without complaint. LCW chest port accessed with 1 in alejo with no difficulty. Positive blood return noted. Medications received:  NS KVO  2 unit PRBC units    Patient Vitals for the past 12 hrs:   Temp Pulse Resp BP SpO2   04/19/18 1715 97 °F (36.1 °C) (!) 53 14 149/88 -   04/19/18 1645 97 °F (36.1 °C) (!) 57 14 151/85 -   04/19/18 1545 97 °F (36.1 °C) 76 14 142/76 -   04/19/18 1515 97.8 °F (36.6 °C) (!) 51 16 125/76 -   04/19/18 1500 97.5 °F (36.4 °C) (!) 55 16 136/73 -   04/19/18 1438 97.4 °F (36.3 °C) (!) 55 16 135/73 -   04/19/18 1355 97.5 °F (36.4 °C) (!) 52 14 141/71 -   04/19/18 1255 97.3 °F (36.3 °C) (!) 54 16 129/76 -   04/19/18 1225 97 °F (36.1 °C) (!) 56 14 132/82 -   04/19/18 1210 97 °F (36.1 °C) (!) 56 16 135/80 -   04/19/18 1147 97.1 °F (36.2 °C) (!) 55 16 121/72 -   04/19/18 1110 97.3 °F (36.3 °C) 75 16 136/65 98 %       Tolerated treatment well, no adverse reaction noted. Pt refused to stay for 1 hour observation and copy of discharge instructions. Port de-accessed and flushed per protocol. Positive blood return noted.    1720. D/C'd from [facility] and in no distress accompanied by spouse.

## 2018-04-23 NOTE — TELEPHONE ENCOUNTER
Requested Prescriptions     Signed Prescriptions Disp Refills    ondansetron (ZOFRAN ODT) 4 mg disintegrating tablet 30 Tab 2     Sig: Take 1 Tab by mouth every eight (8) hours as needed for Nausea. Indications: CANCER CHEMOTHERAPY-INDUCED NAUSEA AND VOMITING     Authorizing Provider: Emily Miller     Ordering User: Morteza Given     Rx sent to pharmacy per vo Dr. Lilian Carlson for chemotherapy.

## 2018-05-01 NOTE — TELEPHONE ENCOUNTER
Nurse from dr. Gissel Valenzuela office called, needs [employer] labs faxed to [phone], pt is at their office for her visit

## 2018-05-02 NOTE — TELEPHONE ENCOUNTER
Received a call from Dr. Darin Michaels regarding Ashely Burt. She presented to his office today and although creat is slightly improved (1.5 today from 1.63 on 4/16, he found her fluid overloaded and has admitted her to [facility] for IV diuresing. I have sent her last office note from 4/17 and her most recent CT of chest/abd/pelvis to his office as he requested. He said he will keep us up to date regarding her progress.

## 2018-05-08 NOTE — TELEPHONE ENCOUNTER
I spoke with pt and she plans for discharge from [facility] 5/7 or 5/8/18 and will call to see Rula Guillaume NP for a pre chemo visit on 5/8/18. Pt states she cannot see her appts in [facility]. I have reviewed appts under [facility] and they are there.

## 2018-05-09 NOTE — TELEPHONE ENCOUNTER
Called pt with appt for C1 Taxol/Avastin for 5/16/18 at [facility]. Pt states she has lost 60lbs from fluid output, wt is now 104kg. Informed pt of [employer] approval of Avastin given to me by Arnel Henry ref# [id number] with auth # [id number] dates are 4/30/18 to 11/3/18.

## 2018-05-16 PROBLEM — R33.9 URINARY RETENTION: Status: RESOLVED | Noted: 2018-01-01 | Resolved: 2018-01-01

## 2018-05-16 PROBLEM — R31.9 HEMATURIA: Status: ACTIVE | Noted: 2018-01-01

## 2018-05-16 PROBLEM — T45.1X5A CHEMOTHERAPY ADVERSE REACTION: Status: RESOLVED | Noted: 2018-01-01 | Resolved: 2018-01-01

## 2018-05-16 PROBLEM — N93.9 VAGINAL BLEEDING: Status: RESOLVED | Noted: 2018-01-01 | Resolved: 2018-01-01

## 2018-05-16 NOTE — IP AVS SNAPSHOT
[address] [id number] 
[phone] Patient: Chani Andrea MRN: [id number] OBA:[birth year] A check michael indicates which time of day the medication should be taken. My Medications ASK your doctor about these medications Instructions Each Dose to Equal  
 Morning Noon Evening Bedtime  
 albuterol 90 mcg/actuation inhaler Commonly known as:  PROVENTIL HFA, VENTOLIN HFA, PROAIR HFA Your last dose was: Your next dose is: Take 2 Puffs by inhalation every six (6) hours as needed for Wheezing. Indications: BRONCHOSPASM PREVENTION  
 2 Puff  
    
   
   
   
  
 amoxicillin 500 mg Tab Your last dose was: Your next dose is: Take  by mouth. aspirin delayed-release 81 mg tablet Your last dose was: Your next dose is: Take 1 Tab by mouth daily. 81 mg  
    
   
   
   
  
 atorvastatin 80 mg tablet Commonly known as:  LIPITOR Your last dose was: Your next dose is: Take 1 Tab by mouth daily. 80 mg  
    
   
   
   
  
 carvedilol 6.25 mg tablet Commonly known as:  Lennice Pamela Your last dose was: Your next dose is: Take 1 Tab by mouth two (2) times daily (with meals). Indications: hypertension 6.25 mg  
    
   
   
   
  
 dexamethasone 4 mg tablet Commonly known as:  DECADRON Your last dose was: Your next dose is: Take 2 tablets with breakfast the day before chemo and for 2 days after chemo  
     
   
   
   
  
 docusate sodium 100 mg capsule Commonly known as:  Yvrose Anel Your last dose was: Your next dose is: Take 100 mg by mouth daily. 100 mg  
    
   
   
   
  
 famotidine 20 mg tablet Commonly known as:  PEPCID Your last dose was: Your next dose is: Take 1 Tab by mouth daily. Indications: gastroesophageal reflux disease 20 mg  
    
   
   
   
  
 * gabapentin 300 mg capsule Commonly known as:  NEURONTIN Your last dose was: Your next dose is: Take 1 Cap by mouth nightly. Indications: NEUROPATHIC PAIN  
 300 mg  
    
   
   
   
  
 * gabapentin 300 mg capsule Commonly known as:  NEURONTIN Your last dose was: Your next dose is: TAKE 1 CAPSULES BY MOUTH NIGHTLY  
     
   
   
   
  
 ondansetron 4 mg disintegrating tablet Commonly known as:  ZOFRAN ODT Your last dose was: Your next dose is: Take 1 Tab by mouth every eight (8) hours as needed for Nausea. Indications: CANCER CHEMOTHERAPY-INDUCED NAUSEA AND VOMITING  
 4 mg  
    
   
   
   
  
 oxyCODONE-acetaminophen 5-325 mg per tablet Commonly known as:  PERCOCET Your last dose was: Your next dose is: Take 1 Tab by mouth every six (6) hours as needed for Pain. Max Daily Amount: 4 Tabs. 1 Tab  
    
   
   
   
  
 potassium chloride SR 10 mEq tablet Commonly known as:  KLOR-CON 10 Your last dose was: Your next dose is: Take 20 mEq by mouth daily. 20 mEq  
    
   
   
   
  
 predniSONE 10 mg tablet Commonly known as:  Bell [place] Your last dose was: Your next dose is: Take 10 mg by mouth daily. 10 mg  
    
   
   
   
  
 TYLENOL 325 mg tablet Generic drug:  acetaminophen Your last dose was: Your next dose is: Take  by mouth every four (4) hours as needed for Pain. VITAMIN D3 1,000 unit Cap Generic drug:  cholecalciferol Your last dose was: Your next dose is: Take 1 Cap by mouth daily. 1 Cap * Notice:   This list has 2 medication(s) that are the same as other medications prescribed for you. Read the directions carefully, and ask your doctor or other care provider to review them with you.

## 2018-05-16 NOTE — PROGRESS NOTES
C10 taxol/avastin held today d/t abnormal CMP. R/s to 5/23/18 at 8am with Teena at [address].

## 2018-05-16 NOTE — PROGRESS NOTES
[facility]  [address] [place] [address], [address], [address]  P [phone]  F [phone]      [facility] Progress Note  Bhupendra Alejandre  75 y.o.    CC: Presents for infusion therapy    Radha Matos MD: Richard Cutler MD  PCP: Rhys Salazar MD     Primary Onc Dx: Vaginal cancer  Date of Dx: 12/2015      Current Agent(s): Taxol/Avastin  Cycle: 1       HPI:  Bhupendra Alejandre is a 75 y.o.  postmenopausal female who was referred to Dr. Julio Cesar Rodriguez in December 2015 for a pelvic mass by Dr. Hanna Dixon, who noted the mass on routine gynecologic exam.  CT of the pelvis revealed a mass at the vaginal apex, that appeared to be invading into the soft tissues of the left pelvis. There were several enlarged lymph nodes on the left. There was also questionable bladder invasion, and possibly even involvement of the rectosigmoid colon. She reported a history of having a mass removed from one of her ovaries in 1 at [facility], which required a second surgery to remove her uterus and contralateral ovary. She said she did not have to take any chemotherapy, but she did have to take some kind of pill for a year. She has not had any gynecologic issues since that time. Presumably her hysterectomy was a total, rather than a supracervical, but she is unsure and there are no records. She underwent EUA with cystoscopy and proctoscopy. Pathology revealed a squamous cell carcinoma, consistent with a vaginal primary. Examination revealed a fixed mass involving the left upper vagina with extension to the left pelvic sidewall. There also appeared to be involvement of the posterior bladder wall, but no mucosal involvement. The mass did push against the rectal wall, but there did not appear to be any direct involvement. He then referred her to radiation oncology and she saw Dr. Leonora Castano. She completed pelvic radiation along with concurrent cisplatin chemotherapy in February 2016 and had a great response. She recurred in mid 2017 with metastatic disease. Chemotherapy recommended. Oncology Treatment History:  12/4/2015: Dx with Vaginal cancer after an EUA and biopsies of pelvic mass and vagina                     Vagina, biopsy: Invasive squamous cell carcinoma, poorly differentiated. No loss of mismatch repair proteins   1/15/16-2/12/16: Weekly Cisplatin with XRT with good response. 5/16/17: CT scan showed Bilateral pulmonary nodules and retroperitoneal lymphadenopathy compatible with metastatic disease. 6/14/17-10/4/17: 6 cycles of Cisplatin/Taxol. Much difficulty with neuropathy  10/23/17: CT scan (without contrast) after 6 cycles showed  Slight progression of retroperitoneal adenopathy with stable appearing pulmonary and osseous metastases as above. 10/26/17: saw after above CT results. Dr. Selena Kumar felt the changes didn't meet RECIST criteria for progression. He recommend continuing with therapy, but suggested that we exchange Carboplatin for Cisplatin due to nephrotoxicity. Her creatinine has pamella with the Cisplatin. 11/1/17 - 4/18/18: Carbo/Taxol, continued carboplatin reaction. Single agent taxol   2/13/2018: Due to elevated creat, a non-contrast CT was obtained before planned time. Resulted out as Stable pulmonary metastases. Stable bone metastases. Multiple gallstones. Stable para-aortic lymphadenopathy  4/16/18: stable metastatic disease, possible minimal left hydro. Full report below  5/1/18: admitted under Dr. Veronica Laguna (nephrology) to [facility] for diuresis. 60-70lbs and 2units PRBCs  5/16/18: taxol/avastin anticipated. Held d/t renal failure      Subjective:  She is here with , Julio Cesar Veliz. Feels much better functionally since being released from [facility] 5 days ago. She was diuresed to remove 60-70lbs of fluid and given 2 units PRBCs. She reports passing intense clots in schroeder catheter during admission.   She is unsure of meds she was released on at home as she forgot med list.  She reports a little light headed at times, noting light blood in urine ongoing at home. No vaginal bleeding. Still with some lower extremity discomfort, but she can ambulate, prepare some meals and dress self again. I spoke with Dr. Mitesh Griffin to clarify her course, noting today acute renal dysfunction in setting of postdiuretic state. Her Cr on discharge was 2.2. She was placed on torsemide 100mg daily, Potassium 20mEq daily, predinsone 10mg daily and a uric acid medication. He had asked that she f/u with urology for evaluation of hematuria. Cytology was negative. ROS:  Constitutional:  fever, night sweats  Respiratory: no cough, shortness of breath, or wheezing  Cardiovascular: no chest pain  Heme: No bruising, recent illness  Gastrointestinal: no abdominal pain, change in bowel habits, or black or bloody stools  Genito-Urinary: no dysuria, trouble voiding. +hematuria  Musculoskeletal: +muscle pain  Neurological: + dizziness. bilat LE foot neuropathy,  Derm: scaling bilat LE  Psych: negative for anxiety and depression        Current Outpatient Prescriptions   Medication Sig    predniSONE (DELTASONE) 10 mg tablet Take 10 mg by mouth daily.  potassium chloride SR (KLOR-CON 10) 10 mEq tablet Take 20 mEq by mouth daily.  ondansetron (ZOFRAN ODT) 4 mg disintegrating tablet Take 1 Tab by mouth every eight (8) hours as needed for Nausea. Indications: CANCER CHEMOTHERAPY-INDUCED NAUSEA AND VOMITING    gabapentin (NEURONTIN) 300 mg capsule TAKE 1 CAPSULES BY MOUTH NIGHTLY    amoxicillin 500 mg tab Take  by mouth.  oxyCODONE-acetaminophen (PERCOCET) 5-325 mg per tablet Take 1 Tab by mouth every six (6) hours as needed for Pain. Max Daily Amount: 4 Tabs.  albuterol (PROVENTIL HFA, VENTOLIN HFA, PROAIR HFA) 90 mcg/actuation inhaler Take 2 Puffs by inhalation every six (6) hours as needed for Wheezing. Indications: BRONCHOSPASM PREVENTION    gabapentin (NEURONTIN) 300 mg capsule Take 1 Cap by mouth nightly. Indications: NEUROPATHIC PAIN    acetaminophen (TYLENOL) 325 mg tablet Take  by mouth every four (4) hours as needed for Pain.  carvedilol (COREG) 6.25 mg tablet Take 1 Tab by mouth two (2) times daily (with meals). Indications: hypertension    aspirin delayed-release 81 mg tablet Take 1 Tab by mouth daily.  famotidine (PEPCID) 20 mg tablet Take 1 Tab by mouth daily. Indications: gastroesophageal reflux disease    docusate sodium (COLACE) 100 mg capsule Take 100 mg by mouth daily.  dexamethasone (DECADRON) 4 mg tablet Take 2 tablets with breakfast the day before chemo and for 2 days after chemo    atorvastatin (LIPITOR) 80 mg tablet Take 1 Tab by mouth daily.  Cholecalciferol, Vitamin D3, (VITAMIN D3) 1,000 unit cap Take 1 Cap by mouth daily. Current Facility-Administered Medications   Medication Dose Route Frequency    sodium chloride (NS) flush 5-10 mL  5-10 mL IntraVENous PRN    heparin (porcine) pf 500 Units  500 Units IntraVENous PRN    sodium chloride 0.9% injection 10 mL  10 mL IntraVENous PRN         Objective:     Visit Vitals    /75 (BP 1 Location: Right arm, BP Patient Position: At rest)    Pulse (!) 59    Temp 97.1 °F (36.2 °C)    Resp 18    Ht 5' 2\" (1.575 m)    Wt 225 lb (102.1 kg)    Breastfeeding No    BMI 41.15 kg/m2       Physical Exam:  General: A&O X3 in NAD with pleasant affect  HEENT: Sclera anicteric, mucosa pink, moist. No bleeding or inflammation noted. Neck: No JVD or cervical adenopathy appreciated. Port Site: without redness, tenderness or swelling. Accessed without difficulty  Heart: Regular without M/R/G  Lungs: CTA Bilat without wheezing or rales noted. Abd: Soft, obese without tenderness, fluid wave or distention. Ext: Extensive bilat lower ext edema.   Persist with vascular skin changes previously seen  Neuro: grossly intact         Lab Results   Component Value Date/Time    WBC 13.1 (H) 05/16/2018 09:13 AM    HGB 10.3 (L) 05/16/2018 09:13 AM    HCT 31.7 (L) 05/16/2018 09:13 AM    PLATELET 431 66/91/2504 09:13 AM    MCV 92.7 05/16/2018 09:13 AM     Lab Results   Component Value Date/Time    CA-125 94 (H) 10/04/2017 08:14 AM    Cancer Ag (CA) 125 116.4 (H) 04/16/2018 01:52 PM     Lab Results   Component Value Date/Time    Sodium 135 (L) 05/16/2018 09:13 AM    Potassium 2.6 (LL) 05/16/2018 09:13 AM    Chloride 83 (L) 05/16/2018 09:13 AM    CO2 36 (H) 05/16/2018 09:13 AM    Anion gap 16 (H) 05/16/2018 09:13 AM    Glucose 136 (H) 05/16/2018 09:13 AM    BUN 75 (H) 05/16/2018 09:13 AM    Creatinine 2.63 (H) 05/16/2018 09:13 AM    BUN/Creatinine ratio 29 (H) 05/16/2018 09:13 AM    GFR est AA 22 (L) 05/16/2018 09:13 AM    GFR est non-AA 18 (L) 05/16/2018 09:13 AM    Calcium 9.9 05/16/2018 09:13 AM    Bilirubin, total 0.8 05/16/2018 09:13 AM    AST (SGOT) 20 05/16/2018 09:13 AM    Alk. phosphatase 124 (H) 05/16/2018 09:13 AM    Protein, total 8.0 05/16/2018 09:13 AM    Albumin 4.0 05/16/2018 09:13 AM    Globulin 4.0 05/16/2018 09:13 AM    A-G Ratio 1.0 (L) 05/16/2018 09:13 AM    ALT (SGPT) 26 05/16/2018 09:13 AM       IMAGING:  COMPARISON: February 13, 2018. October 23, 2017.     CONTRAST: None.     TECHNIQUE:  5 mm axial images were obtained through the chest. Images were  obtained without contrast through the abdomen and pelvis as well. Coronal and  sagittal reconstructions were generated. CT dose reduction was achieved through  use of a standardized protocol tailored for this examination and automatic  exposure control for dose modulation.     The absence of intravenous contrast reduces the sensitivity for evaluation of  the mediastinum and upper abdominal organs.     FINDINGS:      Previous studies for comparison:  February 13, 2018. October 23, 2017.     Images through the mediastinum and hilar regions reveal no new mass or  adenopathy.  Sclerotic/destructive changes involving the left manubrium  redemonstrated without appreciable interval change.     Images through the lung parenchyma reveal scattered bilateral small pulmonary  nodular densities unchanged.     Images through the liver reveal no new space-occupying lesion or biliary  dilatation.     There is adequate opacification of the hepatic and portal veins.     Images through the gallbladder fossa reveal multiple gallstones.     The spleen is normal in size.      The pancreas has a normal morphologic appearance.      Kidneys demonstrated normal morphologic appearance. No hydronephrosis is  present.     The right adrenal gland remains enlarged measuring 2.9 x 2.2 cm, previously 3.0  x 2.2 cm.     The visualized opacified bowel loops are nonobstructed.     Persistent confluent appearing retroperitoneal lymphadenopathy is demonstrated  with two dominant lesions measuring roughly 2.6 x 2.6 cm, previously 2.6 x 2.6  cm. The other closely adjacent lesion measures roughly 2.1 x 1.7 cm, previously  2.1 x 1.7 cm.     Images through the pelvis no space-occupying lesion or lymphadenopathy.   Prominent soft tissue swelling is present in the pelvis and lower extremities.     Scattered predominately sclerotic lesions in the lower lumbar vertebra and  sternum are redemonstrated without appreciable interval change.     IMPRESSION  IMPRESSION:     Status post gastric sleeve.     Stable scattered bilateral pulmonary nodular densities.     Stable retroperitoneal lymphadenopathy.     Multiple gallstones.     Stable bony metastatic disease.     Possible minimal left hydronephrosis.     Pelvic and lower extremity presumed lymphedema       Assessment:   75 y.o. with metastatic vaginal cancer, ongoing antineoplastic chemotherapy    Patient Active Problem List   Diagnosis Code    LAP-BAND surgery status Z98.84    Vaginal cancer ([facility] [address] C52    Hypertension, essential I10    Hypercholesterolemia E78.00    GERD without esophagitis K21.9    Lymphedema of both lower extremities I89.0    Morbid obesity with BMI of 40.0-44.9, adult ([facility] [address] E66.01, Z68.41    CKD (chronic kidney disease) stage 3, GFR 30-59 ml/min N18.3    Anemia D64.9    Glucose intolerance (impaired glucose tolerance) R73.02    On antineoplastic chemotherapy Z79.899    Cancer related pain G89.3    Metastatic neoplastic disease (HCC) C79.9    Hematuria R31.9       Plan:        Hold Taxol/Avastin, return one week to evaluate labs. Acute on chronic renal disease, likely prerenal state. No proteinuria. Discussed with Dr. uKrt Garcia, no hydration. Hold torsemide until this weekend, resume at 1/2 dose (50mg). Add'l 40mEq potassium today. Daily weights  Hematuria: Urology consult. Prior CTs have raised question of possible bladder involvement  Anemia improved s/p transfusion. Leukocytosis d/t prednisone.       DEBBIE Alonso

## 2018-05-16 NOTE — IP AVS SNAPSHOT
[address] [facility] 
 
 
 [address] [facility] [address] [address] 
[phone] Patient: Bhupendra Alejandre MRN: [id number] YPS:[id number] About your hospitalization You were admitted on:  May 16, 2018 You last received care in the:  [address] [place] [address] Road You were discharged on:  May 16, 2018 Why you were hospitalized Your primary diagnosis was:  Not on File Follow-up Information None Your Scheduled Appointments Tuesday May 29, 2018 10:00 AM EDT ACUTE CARE with Rhys Salazar MD  
[facility] [address] [phone] Wednesday June 06, 2018  9:00 AM EDT Infusion with BREMO INFUSION NURSE 6  
[facility] BREMO ([address]) [address] [address]  
[phone] Go to [address], ground floor. Wednesday June 27, 2018  9:00 AM EDT Infusion with BREMO INFUSION NURSE 6  
[facility] BREMO ([address]) [address] [address]  
[phone] Go to [address], ground floor. Discharge Orders None A check michael indicates which time of day the medication should be taken. My Medications ASK your doctor about these medications Instructions Each Dose to Equal  
 Morning Noon Evening Bedtime  
 albuterol 90 mcg/actuation inhaler Commonly known as:  PROVENTIL HFA, VENTOLIN HFA, PROAIR HFA Your last dose was: Your next dose is: Take 2 Puffs by inhalation every six (6) hours as needed for Wheezing. Indications: BRONCHOSPASM PREVENTION  
 2 Puff  
    
   
   
   
  
 amoxicillin 500 mg Tab Your last dose was: Your next dose is: Take  by mouth. aspirin delayed-release 81 mg tablet Your last dose was: Your next dose is: Take 1 Tab by mouth daily. 81 mg  
    
   
   
   
  
 atorvastatin 80 mg tablet Commonly known as:  LIPITOR Your last dose was: Your next dose is: Take 1 Tab by mouth daily. 80 mg  
    
   
   
   
  
 carvedilol 6.25 mg tablet Commonly known as:  Laureen Lujan Your last dose was: Your next dose is: Take 1 Tab by mouth two (2) times daily (with meals). Indications: hypertension 6.25 mg  
    
   
   
   
  
 dexamethasone 4 mg tablet Commonly known as:  DECADRON Your last dose was: Your next dose is: Take 2 tablets with breakfast the day before chemo and for 2 days after chemo  
     
   
   
   
  
 docusate sodium 100 mg capsule Commonly known as:  Roel Edgar Your last dose was: Your next dose is: Take 100 mg by mouth daily. 100 mg  
    
   
   
   
  
 famotidine 20 mg tablet Commonly known as:  PEPCID Your last dose was: Your next dose is: Take 1 Tab by mouth daily. Indications: gastroesophageal reflux disease 20 mg  
    
   
   
   
  
 * gabapentin 300 mg capsule Commonly known as:  NEURONTIN Your last dose was: Your next dose is: Take 1 Cap by mouth nightly. Indications: NEUROPATHIC PAIN  
 300 mg  
    
   
   
   
  
 * gabapentin 300 mg capsule Commonly known as:  NEURONTIN Your last dose was: Your next dose is: TAKE 1 CAPSULES BY MOUTH NIGHTLY  
     
   
   
   
  
 ondansetron 4 mg disintegrating tablet Commonly known as:  ZOFRAN ODT Your last dose was: Your next dose is: Take 1 Tab by mouth every eight (8) hours as needed for Nausea. Indications: CANCER CHEMOTHERAPY-INDUCED NAUSEA AND VOMITING  
 4 mg oxyCODONE-acetaminophen 5-325 mg per tablet Commonly known as:  PERCOCET Your last dose was: Your next dose is: Take 1 Tab by mouth every six (6) hours as needed for Pain. Max Daily Amount: 4 Tabs. 1 Tab  
    
   
   
   
  
 potassium chloride SR 10 mEq tablet Commonly known as:  KLOR-CON 10 Your last dose was: Your next dose is: Take 20 mEq by mouth daily. 20 mEq  
    
   
   
   
  
 predniSONE 10 mg tablet Commonly known as:  Lon Bullocks Your last dose was: Your next dose is: Take 10 mg by mouth daily. 10 mg  
    
   
   
   
  
 TYLENOL 325 mg tablet Generic drug:  acetaminophen Your last dose was: Your next dose is: Take  by mouth every four (4) hours as needed for Pain. VITAMIN D3 1,000 unit Cap Generic drug:  cholecalciferol Your last dose was: Your next dose is: Take 1 Cap by mouth daily. 1 Cap * Notice: This list has 2 medication(s) that are the same as other medications prescribed for you. Read the directions carefully, and ask your doctor or other care provider to review them with you. Opioid Education Prescription Opioids: What You Need to Know: 
 
Prescription opioids can be used to help relieve moderate-to-severe pain and are often prescribed following a surgery or injury, or for certain health conditions. These medications can be an important part of treatment but also come with serious risks. Opioids are strong pain medicines. Examples include hydrocodone, oxycodone, fentanyl, and morphine. Heroin is an example of an illegal opioid. It is important to work with your health care provider to make sure you are getting the safest, most effective care. WHAT ARE THE RISKS AND SIDE EFFECTS OF OPIOID USE?  
Prescription opioids carry serious risks of addiction and overdose, especially with prolonged use. An opioid overdose, often marked by slow breathing, can cause sudden death. The use of prescription opioids can have a number of side effects as well, even when taken as directed. · Tolerance-meaning you might need to take more of a medication for the same pain relief · Physical dependence-meaning you have symptoms of withdrawal when the medication is stopped. Withdrawal symptoms can include nausea, sweating, chills, diarrhea, stomach cramps, and muscle aches. Withdrawal can last up to several weeks, depending on which drug you took and how long you took it. · Increased sensitivity to pain · Constipation · Nausea, vomiting, and dry mouth · Sleepiness and dizziness · Confusion · Depression · Low levels of testosterone that can result in lower sex drive, energy, and strength · Itching and sweating RISKS ARE GREATER WITH:      
· History of drug misuse, substance use disorder, or overdose · Mental health conditions (such as depression or anxiety) · Sleep apnea · Older age (72 years or older) · Pregnancy Avoid alcohol while taking prescription opioids. Also, unless specifically advised by your health care provider, medications to avoid include: · Benzodiazepines (such as Xanax or Valium) · Muscle relaxants (such as Soma or Flexeril) · Hypnotics (such as Ambien or Lunesta) · Other prescription opioids KNOW YOUR OPTIONS Talk to your health care provider about ways to manage your pain that don't involve prescription opioids. Some of these options may actually work better and have fewer risks and side effects. Options may include: 
· Pain relievers such as acetaminophen, ibuprofen, and naproxen · Some medications that are also used for depression or seizures · Physical therapy and exercise · Counseling to help patients learn how to cope better with triggers of pain and stress. · Application of heat or cold compress · Massage therapy · Relaxation techniques Be Informed Make sure you know the name of your medication, how much and how often to take it, and its potential risks & side effects. IF YOU ARE PRESCRIBED OPIOIDS FOR PAIN: 
· Never take opioids in greater amounts or more often than prescribed. Remember the goal is not to be pain-free but to manage your pain at a tolerable level. · Follow up with your primary care provider to: · Work together to create a plan on how to manage your pain. · Talk about ways to help manage your pain that don't involve prescription opioids. · Talk about any and all concerns and side effects. · Help prevent misuse and abuse. · Never sell or share prescription opioids · Help prevent misuse and abuse. · Store prescription opioids in a secure place and out of reach of others (this may include visitors, children, friends, and family). · Safely dispose of unused/unwanted prescription opioids: Find your community drug take-back program or your pharmacy mail-back program, or flush them down the toilet, following guidance from the Food and Drug Administration (www.fda.gov/Drugs/ResourcesForYou). · Visit www.cdc.gov/drugoverdose to learn about the risks of opioid abuse and overdose. · If you believe you may be struggling with addiction, tell your health care provider and ask for guidance or call [employer] at 6-615-406-JCCG. Discharge Instructions Dear Bridgette Bower, It was a pleasure seeing you today. Here is our treatment plan: 
· Hold chemotherapy today due to renal functions. · We will recheck in one week, appointment in [facility] and labs · Do not take your torsemide until Saturday, and resume at only 1/2 dose (50mg daily) · Today, take 40mEq extra of your potassium · Please call Dr. Rambo Quintanilla office today or tomorrow to inquire about your uric acid medication and prednisone if any changes need to be made. · We will put in a referral for urology appointment to evaluate blood in your urine [address] [place], [place] 
[phone] [address] [place] [address], Sandor G7 Sharda, Jac6 Starr Amor Introducing [facility]! Dear Nohemy Villafana: Thank you for requesting a Anago account. Our records indicate that you already have an active Anago account. You can access your account anytime at [email or web address] Did you know that you can access your hospital and ER discharge instructions at any time in Anago? You can also review all of your test results from your hospital stay or ER visit. Additional Information If you have questions, please visit the Frequently Asked Questions section of the Anago website at [email or web address] Remember, Anago is NOT to be used for urgent needs. For medical emergencies, dial 911. Now available from your iPhone and Android! Introducing Castro Jones As [facility] patient, I wanted to make you aware of our electronic visit tool called Castro Jones. [facility] [employer] System 24/7 allows you to connect within minutes with a medical provider 24 hours a day, seven days a week via a mobile device or tablet or logging into a secure website from your computer. You can access Castro Jones from anywhere in the [place]. A virtual visit might be right for you when you have a simple condition and feel like you just dont want to get out of bed, or cant get away from work for an appointment, when your [facility] provider is not available (evenings, weekends or holidays), or when youre out of town and need minor care. Electronic visits cost only $49 and if the [facility] 24/7 provider determines a prescription is needed to treat your condition, one can be electronically transmitted to a nearby pharmacy*. Please take a moment to enroll today if you have not already done so. The enrollment process is free and takes just a few minutes. To enroll, please download the [employer]ster 24/7 melissa to your tablet or phone, or visit [email or web address] to enroll on your computer. And, as an [address] [place] [address] patient with a [employer] account, the results of your visits will be scanned into your electronic medical record and your primary care provider will be able to view the scanned results. We urge you to continue to see your regular [employer] provider for your ongoing medical care. And while your primary care provider may not be the one available when you seek a [employer] virtual visit, the peace of mind you get from getting a real diagnosis real time can be priceless. For more information on Kinetic Social, view our Frequently Asked Questions (FAQs) at [email or web address]. Sincerely, 
 
Margaret Garnica MD 
Chief Medical Officer [facility] Melissa May *:  certain medications cannot be prescribed via Kinetic Social Unresulted Labs-Please follow up with your PCP about these lab tests Order Current Status CANCER ANTIGEN 125 In process CREATININE, UR, RANDOM In process SODIUM, UR, RANDOM In process Your Primary Care Physician (PCP) Primary Care Physician Office Phone Office Fax [address] [facility], [facility] [address] [phone] You are allergic to the following Allergen Reactions Amlodipine Other (comments) Edema in LE's  
    
 Carboplatin Shortness of Breath Rash Itching Recent Documentation Height Weight Breastfeeding? BMI OB Status Smoking Status 1.575 m 102.1 kg No 41.15 kg/m2 Hysterectomy Former Smoker Emergency Contacts Name Discharge Info Relation Home Work Mobile [facility] CAREGIVER [3] Spouse [3] [phone] M,A  Spouse [3] [phone] Patient Belongings The following personal items are in your possession at time of discharge: 
     Visual Aid: Glasses Please provide this summary of care documentation to your next provider. Signatures-by signing, you are acknowledging that this After Visit Summary has been reviewed with you and you have received a copy. Patient Signature:  ____________________________________________________________ Date:  ____________________________________________________________  
  
Royal [facility]v Provider Signature:  ____________________________________________________________ Date:  ____________________________________________________________

## 2018-05-16 NOTE — PROGRESS NOTES
Outpatient Infusion Center - Chemotherapy Progress Note    0900 Pt admit to [facility] for Taxol/Avastin ambulatory with walker in stable condition but weak. Supportive  at the bedside. Assessment completed. Pt was recently discharged from [facility] [place] after 10 days of diuresis ing. Pt complains of lightheadness and weakness. Port accessed with positive blood return. Labs drawn and sent for processing. IV capped. Lab called with critical potassium of 2.6. Joan LEWIS made aware and decision made to [address] Chemotherapy Flowsheet 5/16/2018   Cycle C1   Date 5/16/2018   Drug / Regimen Taxol/Avastin   Dosage -   Pre Hydration -   Post Hydration -   Pre Meds HELD   Notes HELD       Visit Vitals    /75 (BP 1 Location: Right arm, BP Patient Position: At rest)    Pulse (!) 59    Temp 97.1 °F (36.2 °C)    Resp 18    Ht 5' 2\" (1.575 m)    Wt 102.1 kg (225 lb)    Breastfeeding No    BMI 41.15 kg/m2       Medications:  NONE    1150 Port flushed with positive blood return throughout treatment, de-accessed and heparinized according to policy. D/c home ambulatory in no distress. Pt to follow up again next week. Per Cristo LEWIS, [address] [address] will call to reschedule    Recent Results (from the past 12 hour(s))   CBC WITH AUTOMATED DIFF    Collection Time: 05/16/18  9:13 AM   Result Value Ref Range    WBC 13.1 (H) 3.6 - 11.0 K/uL    RBC 3.42 (L) 3.80 - 5.20 M/uL    HGB 10.3 (L) 11.5 - 16.0 g/dL    HCT 31.7 (L) 35.0 - 47.0 %    MCV 92.7 80.0 - 99.0 FL    MCH 30.1 26.0 - 34.0 PG    MCHC 32.5 30.0 - 36.5 g/dL    RDW 15.9 (H) 11.5 - 14.5 %    PLATELET 641 736 - 702 K/uL    MPV 10.0 8.9 - 12.9 FL    NRBC 0.0 0  WBC    ABSOLUTE NRBC 0.00 0.00 - 0.01 K/uL    NEUTROPHILS 85 (H) 32 - 75 %    BAND NEUTROPHILS 3 0 - 6 %    LYMPHOCYTES 4 (L) 12 - 49 %    MONOCYTES 4 (L) 5 - 13 %    EOSINOPHILS 1 0 - 7 %    BASOPHILS 0 0 - 1 %    METAMYELOCYTES 2 (H) 0 %    MYELOCYTES 1 (H) 0 %    IMMATURE GRANULOCYTES 0 %    ABS. NEUTROPHILS 11.5 (H) 1.8 - 8.0 K/UL    ABS. LYMPHOCYTES 0.5 (L) 0.8 - 3.5 K/UL    ABS. MONOCYTES 0.5 0.0 - 1.0 K/UL    ABS. EOSINOPHILS 0.1 0.0 - 0.4 K/UL    ABS. BASOPHILS 0.0 0.0 - 0.1 K/UL    ABS. IMM. GRANS. 0.0 K/UL    DF MANUAL      RBC COMMENTS ANISOCYTOSIS  1+        RBC COMMENTS POLYCHROMASIA  1+        RBC COMMENTS OVALOCYTES  PRESENT       METABOLIC PANEL, COMPREHENSIVE    Collection Time: 05/16/18  9:13 AM   Result Value Ref Range    Sodium 135 (L) 136 - 145 mmol/L    Potassium 2.6 (LL) 3.5 - 5.1 mmol/L    Chloride 83 (L) 97 - 108 mmol/L    CO2 36 (H) 21 - 32 mmol/L    Anion gap 16 (H) 5 - 15 mmol/L    Glucose 136 (H) 65 - 100 mg/dL    BUN 75 (H) 6 - 20 MG/DL    Creatinine 2.63 (H) 0.55 - 1.02 MG/DL    BUN/Creatinine ratio 29 (H) 12 - 20      GFR est AA 22 (L) >60 ml/min/1.73m2    GFR est non-AA 18 (L) >60 ml/min/1.73m2    Calcium 9.9 8.5 - 10.1 MG/DL    Bilirubin, total 0.8 0.2 - 1.0 MG/DL    ALT (SGPT) 26 12 - 78 U/L    AST (SGOT) 20 15 - 37 U/L    Alk.  phosphatase 124 (H) 45 - 117 U/L    Protein, total 8.0 6.4 - 8.2 g/dL    Albumin 4.0 3.5 - 5.0 g/dL    Globulin 4.0 2.0 - 4.0 g/dL    A-G Ratio 1.0 (L) 1.1 - 2.2     URINALYSIS W/ RFLX MICROSCOPIC    Collection Time: 05/16/18  9:13 AM   Result Value Ref Range    Color YELLOW/STRAW      Appearance CLEAR CLEAR      Specific gravity 1.008 1.003 - 1.030      pH (UA) 7.0 5.0 - 8.0      Protein NEGATIVE  NEG mg/dL    Glucose NEGATIVE  NEG mg/dL    Ketone NEGATIVE  NEG mg/dL    Bilirubin NEGATIVE  NEG      Blood NEGATIVE  NEG      Urobilinogen 0.2 0.2 - 1.0 EU/dL    Nitrites NEGATIVE  NEG      Leukocyte Esterase NEGATIVE  NEG     MAGNESIUM    Collection Time: 05/16/18  9:13 AM   Result Value Ref Range    Magnesium 2.0 1.6 - 2.4 mg/dL

## 2018-05-16 NOTE — IP AVS SNAPSHOT
Summary of Care Report The Summary of Care report has been created to help improve care coordination. Users with access to [employer] or [employer] [facility] (Web-based application) may access additional patient information including the Discharge Summary. If you are not currently a [address] user and need more information, please call the number listed below in the [place] [address] section and ask to be connected with Medical Records. Facility Information Name Address Phone [facility] [address] [place] [id number] [phone] Patient Information Patient Name Sex ALMA DELIA Madrid ([id number]) Female 1950 Discharge Information Admitting Provider Service Area Unit  
 (none) [address] [facility] / [phone] Discharge Provider Discharge Date/Time Discharge Disposition Destination (none) (none) (none) (none) Patient Language Language ENGLISH [13] Hospital Problems as of 2018  Reviewed: 2018  2:10 PM by Geronimo Colin MD  
 None Non-Hospital Problems as of 2018  Reviewed: 2018  2:10 PM by Geronimo Colin MD  
  
  
  
 Class Noted - Resolved Last Modified Active Problems LAP-BAND surgery status  2012 - Present 2012 by John hTacker NP Entered by John Thacker NP Overview Signed 2012  9:38 AM by John Thacker NP  
   2008 [facility]d [facility] / [place]   Vaginal cancer ([facility]  12/10/2015 - Present 2018 by Dale Velasco MD  
  Entered by Geronimo Colin MD  
  Hypertension, essential  2017 - Present 2017 by Rod Nunez MD  
  Entered by Rod Nunez MD  
  Hypercholesterolemia  2017 - Present 2017 by Rod Nunez MD  
  Entered by Rod Nunez MD  
 GERD without esophagitis  1/18/2017 - Present 1/5/2018 by Nando Garza MD  
  Entered by Jhoana Poon MD  
  Lymphedema of both lower extremities  6/14/2017 - Present 1/29/2018 by Jhoana Poon MD  
  Entered by Alejandro Nath NP Morbid obesity with BMI of 40.0-44.9, adult ([facility]  12/12/2017 - Present 1/29/2018 by Jhoana Poon MD  
  Entered by Arya Serna MD  
  CKD (chronic kidney disease) stage 3, GFR 30-59 ml/min  1/3/2018 - Present 1/29/2018 by Jhoana Poon MD  
  Entered by Alejandro Nath NP Overview Signed 1/29/2018 10:02 PM by Jhoana Poon MD  
   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  
  Entered by Tristian Lilly MD  
  Glucose intolerance (impaired glucose tolerance)  1/29/2018 - Present 1/29/2018 by Jhoana Poon MD  
  Entered by Jhoana Poon MD  
  Overview Signed 1/29/2018 10:09 PM by Jhoana Poon MD  
   At least partly steroid induced On antineoplastic chemotherapy  3/1/2018 - Present 3/1/2018 by DEBBIE Fuller Entered by DEBBIE Fuller Cancer related pain  3/1/2018 - Present 3/1/2018 by DEBBIE Fuller Entered by DEBBIE Fuller Metastatic neoplastic disease ([facility]  3/1/2018 - Present 3/1/2018 by DEBBIE Fuller Entered by DEBBIE Fuller Hematuria  5/16/2018 - Present 5/16/2018 by DEBBIE Fuller Entered by DEBBIE Fuller You are allergic to the following Allergen Reactions Amlodipine Other (comments) Edema in LE's  
    
 Carboplatin Shortness of Breath Rash Itching Current Discharge Medication List  
  
ASK your doctor about these medications Dose & Instructions Dispensing Information Comments  
 albuterol 90 mcg/actuation inhaler Commonly known as:  PROVENTIL HFA, VENTOLIN HFA, PROAIR HFA Dose:  2 Puff Take 2 Puffs by inhalation every six (6) hours as needed for Wheezing. Indications: BRONCHOSPASM PREVENTION Quantity:  1 Inhaler Refills:  1  
   
 amoxicillin 500 mg Tab Take  by mouth. Refills:  0  
   
 aspirin delayed-release 81 mg tablet Dose:  81 mg Take 1 Tab by mouth daily. Quantity:  30 Tab Refills:  0  
   
 atorvastatin 80 mg tablet Commonly known as:  LIPITOR Dose:  80 mg Take 1 Tab by mouth daily. Quantity:  90 Tab Refills:  3  
   
 carvedilol 6.25 mg tablet Commonly known as:  Rolene Michael Dose:  6.25 mg Take 1 Tab by mouth two (2) times daily (with meals). Indications: hypertension Quantity:  60 Tab Refills:  5  
   
 dexamethasone 4 mg tablet Commonly known as:  DECADRON Take 2 tablets with breakfast the day before chemo and for 2 days after chemo Quantity:  36 Tab Refills:  2  
   
 docusate sodium 100 mg capsule Commonly known as:  Eston Favors Dose:  100 mg Take 100 mg by mouth daily. Refills:  0  
   
 famotidine 20 mg tablet Commonly known as:  PEPCID Dose:  20 mg Take 1 Tab by mouth daily. Indications: gastroesophageal reflux disease Quantity:  30 Tab Refills:  0  
   
 * gabapentin 300 mg capsule Commonly known as:  NEURONTIN Dose:  300 mg Take 1 Cap by mouth nightly. Indications: NEUROPATHIC PAIN Quantity:  30 Cap Refills:  0  
   
 * gabapentin 300 mg capsule Commonly known as:  NEURONTIN  
 TAKE 1 CAPSULES BY MOUTH NIGHTLY Quantity:  30 Cap Refills:  0 Generic For:*NEURONTIN  300MG  
  
 ondansetron 4 mg disintegrating tablet Commonly known as:  ZOFRAN ODT Dose:  4 mg Take 1 Tab by mouth every eight (8) hours as needed for Nausea. Indications: CANCER CHEMOTHERAPY-INDUCED NAUSEA AND VOMITING Quantity:  30 Tab Refills:  2  
   
 oxyCODONE-acetaminophen 5-325 mg per tablet Commonly known as:  PERCOCET Dose:  1 Tab Take 1 Tab by mouth every six (6) hours as needed for Pain.  Max Daily Amount: 4 Tabs. Quantity:  30 Tab Refills:  0  
   
 potassium chloride SR 10 mEq tablet Commonly known as:  KLOR-CON 10 Dose:  20 mEq Take 20 mEq by mouth daily. Refills:  0  
   
 predniSONE 10 mg tablet Commonly known as:  Lisa Orris Dose:  10 mg Take 10 mg by mouth daily. Refills:  0  
   
 TYLENOL 325 mg tablet Generic drug:  acetaminophen Take  by mouth every four (4) hours as needed for Pain. Refills:  0  
   
 VITAMIN D3 1,000 unit Cap Generic drug:  cholecalciferol Dose:  1 Cap Take 1 Cap by mouth daily. Refills:  0  
   
 * Notice: This list has 2 medication(s) that are the same as other medications prescribed for you. Read the directions carefully, and ask your doctor or other care provider to review them with you. Current Immunizations Name Date Influenza High Dose Vaccine PF 12/1/2016 Influenza Vaccine 10/15/2015 Pneumococcal Conjugate (PCV-13) 9/4/2015 Pneumococcal Vaccine (Unspecified Type) 10/22/2015 Zoster Vaccine, Live 10/15/2015 Follow-up Information None Discharge Instructions Dear Ino Unger, It was a pleasure seeing you today. Here is our treatment plan: 
· Hold chemotherapy today due to renal functions. · We will recheck in one week, appointment in [facility] and labs · Do not take your torsemide until Saturday, and resume at only 1/2 dose (50mg daily) · Today, take 40mEq extra of your potassium · Please call Dr. Cindy Knowles office today or tomorrow to inquire about your uric acid medication and prednisone if any changes need to be made. · We will put in a referral for urology appointment to evaluate blood in your urine [address] [place], [place] 
[phone] [address] [place] [address], [address], [address] Chart Review Routing History Recipient Method Report Sent By Ana Rankin MD  
Phone: [phone] In Basket IP Auto Routed Notes Tammy Abdi MD [[id number]] 6/9/2017  6:22 AM 06/09/2017 Callie Pitts MD  
Phone: [phone] In Basket IP Auto Routed Notes Carin Helms MD [[id number]] 1/4/2018  4:27 AM 01/04/2018 Callie Pitts MD  
Phone: [phone] In [employer] IP Auto Routed Lisa Guevara MD [[id number]] 1/4/2018  9:05 PM 01/04/2018 Callie Pitts MD  
Phone: [phone] In [employer] Routed Notes [place], [place] [[id number]] 1/5/2018 12:18 PM 01/05/2018 Tammy Abdi MD  
Phone: [phone] In Basket Notes Report Alfonzo Gavin NP [[id number]] 5/2/2018  3:08 PM 5/2/2018

## 2018-05-23 NOTE — TELEPHONE ENCOUNTER
Patient needs refill on percocet, potassium, and gabapentin. She's in the [facility] for another hour.

## 2018-05-23 NOTE — PROGRESS NOTES
[facility] Note:    0800Pt arrived at [facility] ambulatory and in no distress for C1. Last week was held due to just having diuresis. Assessment stable, no new complaints voiced. Back and legs stilll hurt. Lower extremities still edematous. D Wall in to see patient. Medications received:  Benadryl  Pepcid   Decadron  Taxol  Avastin    1645 Tolerated treatment well, no adverse reaction noted.   D/Cd from [facility] ambulatory and in no distress accompanied by *spouse  Next appt 6/6  0900  Visit Vitals    Ht 5' 2\" (1.575 m)    Wt 105.2 kg (232 lb)    BMI 42.43 kg/m2

## 2018-05-23 NOTE — PROGRESS NOTES
Problem: Patient Education:  Go to Education Activity  Goal: Patient/Family Education  Outcome: Progressing Towards Goal  labs

## 2018-05-28 PROBLEM — N18.4 CKD (CHRONIC KIDNEY DISEASE) STAGE 4, GFR 15-29 ML/MIN (HCC): Status: ACTIVE | Noted: 2018-01-01

## 2018-05-29 NOTE — TELEPHONE ENCOUNTER
Called to check on pt and see if she had had her labs drawn as scheduled. Said she went away this long weekend and did not take her diuretics. She came back yesterday and took them and now is having difficulty urinating. Discussed that since her nephrologist is at [facility] and all her records from her previous hospitalization are there, she would be best served by proceeding on to [facility] ER for evaluation. Her  was also on the phone and will let us know what evolves. Told to call with any concerns or questions.

## 2018-05-30 NOTE — TELEPHONE ENCOUNTER
Called to check on pt. Yesterday, she was unable to urinate and I instructed her to to to the ER or call Dr. David Boggs (nephrologist). She called Dr. David Boggs and was instructed to increase her diuretics. She did and by the morning, she urinated after passing a large clot. She went to her previously scheduled urology appointment and they scanned her bladder and took a urinalysis/culture. She will follow up in 2 weeks with them and 3 weeks with Dr. David Boggs. I have instructed her if she has any difficulty urinating, to contact Dr David Boggs again or our office. Dr. David Boggs did not want labs at this time and was aware she had received her treatment last week. Pt also informed him that she has gained 25 lbs in one week and it is \"all in my legs\"    She will call with any further concerns or questions.

## 2018-06-12 NOTE — PROGRESS NOTES
[address] [facility] [address], [address] [facility]  [phone]  MD Amy Fernandez MD  Office Note  Patient ID:  Name:  Carmen Humphreys  MRN:  [id number]  :  67 y.o. Date:  2018      HISTORY OF PRESENT ILLNESS:  Carmen Humphreys is a 75 y.o.  postmenopausal female who was referred to me in 2015 for a pelvic mass by Dr. Mamadou Ferrara, who noted the mass on routine gynecologic exam.  He sent her for a CT of the pelvis, which revealed a mass at the vaginal apex, that appeared to be invading into the soft tissues of the left pelvis. There were several enlarged lymph nodes on the left. There was also questionable bladder invasion, and possibly even involvement of the rectosigmoid colon. She reported a history of having a mass removed from one of her ovaries in 1 at [facility], which required a second surgery to remove her uterus and contralateral ovary. She said she did not have to take any chemotherapy, but she did have to take some kind of pill for a year. She has not had any gynecologic issues since that time. Presumably her hysterectomy was a total, rather than a supracervical, but she is unsure and there are no records. She denied any vaginal bleeding, except for after her examination with Dr. Cl Aguilar. She reported minimal pain in the pelvis. I took her the OR for and exam under anesthesia with cystoscopy and proctoscopy. Pathology revealed a squamous cell carcinoma, consistent with a vaginal primary. Examination revealed a fixed mass involving the left upper vagina with extension to the left pelvic sidewall. There also appeared to be involvement of the posterior bladder wall, but no mucosal involvement. The mass did push against the rectal wall, but there did not appear to be any direct involvement. I referred her to radiation oncology and she saw Dr. Jennifer Gerber.   She completed pelvic radiation along with concurrent cisplatin chemotherapy in February 2016 and had a great response. When I saw her in the office in March 2017 for follow up she was doing well and without complaints. Her pelvic exam and pap smear at that time were both negative. Subsequent to that visit she began having back pain that had progressively worsened. She saw Reela nena Cortez in May and she ordered a CT. This unfortunately demonstrated extensive metastatic disease outside of the prior radiated field. Dr. Tianna Morgan then sent her for a PET/CT which demonstrated widely metastatic disease. I recommended systemic chemotherapy with Taxol/Cisplatin/Avastin, which is the standard for metastatic cervical cancer, since vaginal cancer behaves similar to cervical cancer. Unfortunately, the Avastin was denied by her insurance company. We chose to proceed with Taxol/Cisplatin. She completed 6 cycles. She has done well so far with her treatment. She denies nausea. She reports some persistent lower extremity edema, but that has overall improved. Her scan in October 2017 demonstrated slight progression of the lymphadenopathy, while the pulmonary and bony disease appeared stable. On my review, the changes didn't meet RECIST criteria for progression. I recommended continuing with therapy, but I suggested that we exchange Carboplatin for Cisplatin due to nephrotoxicity. Her creatinine had steadily climbed with the Cisplatin. She completed 8 cycles of Taxol/Carboplatin since the switch, although she had a severe reaction to the Carboplatin at the time of her 8th cycle. We stopped the Carboplatin and added Avastin to the Taxol. She has completed one cycle so far. She continues to have swelling in her lower extremities and reporting some more back pain. She has been hospitalized once at [facility] for diuresis with her nephrologist, Dr. Dominik Gibson.         Tumor testing  No loss of mismatch repair proteins      ECOG score: 1       ROS:   and GI review:  Negative  Cardiopulmonary review:  Negative   Musculoskeletal:  Negative    A comprehensive review of systems was negative except for that written in the History of Present Illness. , 10 point ROS        Problem List:  Patient Active Problem List    Diagnosis Date Noted    Hematuria 05/16/2018    On antineoplastic chemotherapy 03/01/2018    Cancer related pain 03/01/2018    Metastatic neoplastic disease ([address] 03/01/2018    Glucose intolerance (impaired glucose tolerance) 01/29/2018    Anemia 01/04/2018    CKD (chronic kidney disease) stage 4, GFR 15-29 ml/min ([address] 01/03/2018    Morbid obesity with BMI of 40.0-44.9, adult ([address] 12/12/2017    Lymphedema of both lower extremities 06/14/2017    Hypertension, essential 01/18/2017    Hypercholesterolemia 01/18/2017    GERD without esophagitis 01/18/2017    Vaginal cancer ([address] 12/10/2015    LAP-BAND surgery status 05/17/2012     PMH:  Past Medical History:   Diagnosis Date    Abnormal mammogram of left breast 12/5/2016    Cancer ([address] 06/2017    LYMPH NODES IN BACK    Cancer ([address] 2016    VAGINAL CANCER    GERD (gastroesophageal reflux disease) 6/11/06    Hypercholesterolemia 10/11/06    Hypertension 10/11/06    Morbid obesity ([address] 10/11/06      PSH:  Past Surgical History:   Procedure Laterality Date    HX APPENDECTOMY      HX GASTRIC BYPASS  2009    LAP BAND    HX GYN  12/4/15    EUA/Bx of pelvic mass and vagina/cystoscopy/proctoscopy    HX HYSTERECTOMY  1978    HX ORTHOPAEDIC  2009    right ankle    HX OTHER SURGICAL  2001?     benign mass removed from lower back    LAP, PLACE ADJUST GASTR BAND  1/30/08    EB: AP Std lap band, liver bx      Social History:  Social History   Substance Use Topics    Smoking status: Former Smoker     Packs/day: 0.50     Years: 15.00     Types: Cigarettes     Quit date: 1/1/1993    Smokeless tobacco: Never Used    Alcohol use Yes      Comment: socially-WINE ONCE A WEEK      Family History:  Family History   Problem Relation Age of Onset    Cancer Mother      colon WITH METS TO LIVER    Diabetes Father     Heart Disease Father     Cancer Sister      breast & bone    Cancer Maternal Aunt      BREAST    Cancer Paternal Aunt      BREAST    Anesth Problems Neg Hx       Medications: (reviewed)  Current Outpatient Prescriptions   Medication Sig    torsemide (DEMADEX) 100 mg tablet     oxyCODONE-acetaminophen (PERCOCET) 5-325 mg per tablet Take 1 Tab by mouth every six (6) hours as needed for Pain. Max Daily Amount: 4 Tabs.  gabapentin (NEURONTIN) 300 mg capsule Take 1 Cap by mouth nightly. Indications: NEUROPATHIC PAIN    predniSONE (DELTASONE) 10 mg tablet Take 10 mg by mouth daily.  potassium chloride SR (KLOR-CON 10) 10 mEq tablet Take 20 mEq by mouth daily.  ondansetron (ZOFRAN ODT) 4 mg disintegrating tablet Take 1 Tab by mouth every eight (8) hours as needed for Nausea. Indications: CANCER CHEMOTHERAPY-INDUCED NAUSEA AND VOMITING    gabapentin (NEURONTIN) 300 mg capsule TAKE 1 CAPSULES BY MOUTH NIGHTLY    albuterol (PROVENTIL HFA, VENTOLIN HFA, PROAIR HFA) 90 mcg/actuation inhaler Take 2 Puffs by inhalation every six (6) hours as needed for Wheezing. Indications: BRONCHOSPASM PREVENTION    acetaminophen (TYLENOL) 325 mg tablet Take  by mouth every four (4) hours as needed for Pain.  carvedilol (COREG) 6.25 mg tablet Take 1 Tab by mouth two (2) times daily (with meals). Indications: hypertension    famotidine (PEPCID) 20 mg tablet Take 1 Tab by mouth daily. Indications: gastroesophageal reflux disease    docusate sodium (COLACE) 100 mg capsule Take 100 mg by mouth daily.  dexamethasone (DECADRON) 4 mg tablet Take 2 tablets with breakfast the day before chemo and for 2 days after chemo    atorvastatin (LIPITOR) 80 mg tablet Take 1 Tab by mouth daily.  Cholecalciferol, Vitamin D3, (VITAMIN D3) 1,000 unit cap Take 1 Cap by mouth daily.      No current facility-administered medications for this visit. Facility-Administered Medications Ordered in Other Visits   Medication Dose Route Frequency    [START ON 6/13/2018] PACLitaxel (TAXOL) 288 mg in 0.9% sodium chloride 250 mL, overfill volume 25 mL chemo infusion  288 mg IntraVENous ONCE    [START ON 6/13/2018] diphenhydrAMINE (BENADRYL) injection 50 mg  50 mg IntraVENous ONCE    [START ON 6/13/2018] famotidine (PF) (PEPCID) 20 mg in sodium chloride 0.9% 10 mL injection  20 mg IntraVENous ONCE    [START ON 6/13/2018] dexamethasone (DECADRON) 12 mg in 0.9% sodium chloride 50 mL IVPB  12 mg IntraVENous ONCE    [START ON 6/13/2018] bevacizumab (AVASTIN) 1,560 mg in 0.9% sodium chloride 100 mL, overfill volume 10 mL IVPB  1,560 mg IntraVENous ONCE     Allergies: (reviewed)  Allergies   Allergen Reactions    Amlodipine Other (comments)     Edema in LE's    Carboplatin Shortness of Breath, Rash and Itching          OBJECTIVE:    Physical Exam:  VITAL SIGNS: Vitals:    06/12/18 0913 06/12/18 0922   BP: (!) 87/58 (!) 89/58   Pulse:  90   Weight: 238 lb 12.8 oz (108.3 kg)    Height: 5' 2.01\" (1.575 m)      Body mass index is 43.67 kg/(m^2). GENERAL MIGUELANGEL: Conversant, alert, oriented. No acute distress. HEENT: HEENT. No thyroid enlargement. No JVD. Neck: Supple without restrictions. RESPIRATORY: Clear to auscultation and percussion to the bases. No CVAT. CARDIOVASC: RRR without murmur/rub. GASTROINT: soft, non-tender, without masses or organomegaly   MUSCULOSKEL: no joint tenderness, deformity or swelling   EXTREMITIES: Marked edema of bilateral lower extremities, L>R. Skin noted with vascular changes on both legs. PELVIC: Deferred   RECTAL: Deferred   PATRICIO SURVEY: No suspicious lymphadenopathy. NEURO: Grossly intact. No acute deficit.      Lab Results   Component Value Date/Time    WBC 8.8 05/23/2018 08:09 AM    HGB 9.5 (L) 05/23/2018 08:09 AM    HCT 30.6 (L) 05/23/2018 08:09 AM    PLATELET 143 (L) 35/38/5969 08:09 AM MCV 97.1 05/23/2018 08:09 AM     Lab Results   Component Value Date/Time    Sodium 138 05/23/2018 08:09 AM    Potassium 4.7 05/23/2018 08:09 AM    Chloride 96 (L) 05/23/2018 08:09 AM    CO2 29 05/23/2018 08:09 AM    Anion gap 13 05/23/2018 08:09 AM    Glucose 113 (H) 05/23/2018 08:09 AM    BUN 59 (H) 05/23/2018 08:09 AM    Creatinine 2.54 (H) 05/23/2018 08:09 AM    BUN/Creatinine ratio 23 (H) 05/23/2018 08:09 AM    GFR est AA 23 (L) 05/23/2018 08:09 AM    GFR est non-AA 19 (L) 05/23/2018 08:09 AM    Calcium 9.3 05/23/2018 08:09 AM    Bilirubin, total 1.0 05/23/2018 08:09 AM    AST (SGOT) 16 05/23/2018 08:09 AM    Alk. phosphatase 121 (H) 05/23/2018 08:09 AM    Protein, total 7.4 05/23/2018 08:09 AM    Albumin 4.1 05/23/2018 08:09 AM    Globulin 3.3 05/23/2018 08:09 AM    A-G Ratio 1.2 05/23/2018 08:09 AM    ALT (SGPT) 21 05/23/2018 08:09 AM       CT of abdomen/pelvis (4/16/18)  Images through the mediastinum and hilar regions reveal no new mass or  adenopathy. Sclerotic/destructive changes involving the left manubrium  redemonstrated without appreciable interval change.     Images through the lung parenchyma reveal scattered bilateral small pulmonary  nodular densities unchanged.     Images through the liver reveal no new space-occupying lesion or biliary  dilatation.     There is adequate opacification of the hepatic and portal veins.     Images through the gallbladder fossa reveal multiple gallstones.     The spleen is normal in size.      The pancreas has a normal morphologic appearance.      Kidneys demonstrated normal morphologic appearance. No hydronephrosis is  present.     The right adrenal gland remains enlarged measuring 2.9 x 2.2 cm, previously 3.0  x 2.2 cm.     The visualized opacified bowel loops are nonobstructed.     Persistent confluent appearing retroperitoneal lymphadenopathy is demonstrated  with two dominant lesions measuring roughly 2.6 x 2.6 cm, previously 2.6 x 2.6  cm.  The other closely adjacent lesion measures roughly 2.1 x 1.7 cm, previously  2.1 x 1.7 cm.     Images through the pelvis no space-occupying lesion or lymphadenopathy. Prominent soft tissue swelling is present in the pelvis and lower extremities.     Scattered predominately sclerotic lesions in the lower lumbar vertebra and  sternum are redemonstrated without appreciable interval change.     IMPRESSION:  Status post gastric sleeve.     Stable scattered bilateral pulmonary nodular densities.     Stable retroperitoneal lymphadenopathy.     Multiple gallstones.     Stable bony metastatic disease.     Possible minimal left hydronephrosis.     Pelvic and lower extremity presumed lymphedema. IMPRESSION/PLAN:  Susu Russell is a 75 y.o. female with a history of stage III vaginal carcinoma. She was treated with pelvic radiation therapy and concurrent cisplatin chemotherapy. She now has recurrent disease with multiple sites of metastases. I had a long discussion with the patient and her  discussing the best course of management. Surgery is not an option due to the widespread nature of her disease. The same is true for radiation therapy. Her only viable option is systemic chemotherapy. Since vaginal cancer behaves like cervical carcinoma, I recommended using the standard regimen for metastatic cervical carcinoma, Taxol/Cisplatin/Avastin. Unfortunately, the Avastin was denied by her insurance company. We chose to proceed with Taxol/Cisplatin. She completed 6 cycles. On her CT in July 2017 her pulmonary nodules appeared stable and her retroperitoneal lymphadenopathy appeared slightly improved. Her most recent scan in October 2017 demonstrated slight progression of the lymphadenopathy, while the pulmonary and bony disease appeared stable. On my review, the changes didn't meet RECIST criteria for progression.   I recommended continuing with therapy, but I suggested that we exchange Carboplatin for Cisplatin due to nephrotoxicity. Her creatinine had steadily climbed with the Cisplatin. She completed 8 cycles of Taxol/Carboplatin since the switch, but following her Carboplatin reaction we stopped the Carboplatin. Her last CT scan still suggested stable disease. I recommended that we continue with Taxol and add Avastin. She has completed one cycle. We will get a repeat scan at this time and continue with the current regimen.       Signed By: Richard Cotter MD     6/12/2018/4:31 PM

## 2018-06-12 NOTE — PROGRESS NOTES
Pre chemo, labs done on 6/11 and for c11 taxol. avastin on 6/13. Pt c/o severe pain last night in the top of her legs and lower back. It was relieved by Percocet.

## 2018-06-13 PROBLEM — N30.00 ACUTE CYSTITIS WITHOUT HEMATURIA: Status: ACTIVE | Noted: 2018-01-01

## 2018-06-13 NOTE — PROGRESS NOTES
[facility] - Chemotherapy Progress Note    0815 Pt admit to [facility] for Taxol/Avastin via wheelchair in stable condition. Assessment completed. Pt continues to have pitting edema in bilateral lower extremities and cannot walk for long distances. Port accessed with positive blood return. Labs were drawn on 6/11/18 and used for today's treatment. Urinalysis was not collected so urine sample ordered and sent. Sample revealed +2 bacteria and Goran Sandoval NP made aware. IV attached to NS at [address] 6/13/2018   Cycle C11   Date 6/13/2018   Drug / Regimen Taxol/Avastin   Dosage -   Pre Hydration -   Post Hydration -   Pre Meds given   Notes given       Visit Vitals    /72 (BP 1 Location: Right arm, BP Patient Position: At rest)    Pulse 78    Temp 97.2 °F (36.2 °C)    Resp 18    Ht 5' 2\" (1.575 m)    Wt 108.4 kg (239 lb)    Breastfeeding No    BMI 43.71 kg/m2       Medications:  NS  Benadryl   Pepcid  Decadron  Taxol over 3 hours   Avastin     1415 Pt tolerated treatment well. Port maintained positive blood return throughout treatment, flushed with positive blood return at conclusion. D/c home ambulatory in no distress.  Pt aware of next appointment scheduled for 7/5/18    Recent Results (from the past 12 hour(s))   HEMOGLOBIN A1C WITH EAG    Collection Time: 06/13/18  8:37 AM   Result Value Ref Range    Hemoglobin A1c 5.4 4.2 - 6.3 %    Est. average glucose 108 mg/dL   TYPE + CROSSMATCH    Collection Time: 06/13/18  8:37 AM   Result Value Ref Range    Crossmatch Expiration 06/16/2018     ABO/Rh(D) O POSITIVE     Antibody screen NEG     Comment Previously identified Warm Autoantibodies     Unit number [id number]     Blood component type RC LR,2     Unit division 00     Status of unit REL FROM [facility]     Crossmatch result Compatible     Unit number [id number]     Blood component type RC LR     Unit division 00     Status of unit ALLOCATED     Crossmatch result Compatible Unit number [id number]     Blood component type RC LR     Unit division 00     Status of unit ALLOCATED     Crossmatch result Compatible    URINALYSIS W/ REFLEX CULTURE    Collection Time: 06/13/18 11:53 AM   Result Value Ref Range    Color YELLOW/STRAW      Appearance CLOUDY (A) CLEAR      Specific gravity 1.006 1.003 - 1.030      pH (UA) 5.5 5.0 - 8.0      Protein NEGATIVE  NEG mg/dL    Glucose NEGATIVE  NEG mg/dL    Ketone NEGATIVE  NEG mg/dL    Bilirubin NEGATIVE  NEG      Blood MODERATE (A) NEG      Urobilinogen 0.2 0.2 - 1.0 EU/dL    Nitrites NEGATIVE  NEG      Leukocyte Esterase LARGE (A) NEG      WBC >100 (H) 0 - 4 /hpf    RBC 5-10 0 - 5 /hpf    Epithelial cells FEW FEW /lpf    Bacteria 2+ (A) NEG /hpf    UA:UC IF INDICATED URINE CULTURE ORDERED (A) CNI      Hyaline cast 2-5 0 - 5 /lpf   URINE CULTURE HOLD SAMPLE    Collection Time: 06/13/18 12:00 PM   Result Value Ref Range    Urine culture hold        URINE ON HOLD IN MICROBIOLOGY DEPT FOR 3 DAYS. IF UNPRESERVED URINE IS SUBMITTED, IT CANNOT BE USED FOR ADDITIONAL TESTING AFTER 24 HRS, RECOLLECTION WILL BE REQUIRED.

## 2018-06-13 NOTE — PROGRESS NOTES
Talia Mack. ALONSO Meza PA      Date of visit: 6/13/2018   Gyn Onc: Dr. Ramiro Hendrix Dx: Vaginal cancer  HPI:  Lida Reyes is a 75 y.o.  postmenopausal female who was referred to Dr. Rosa Maria Wallis in December 2015 for a pelvic mass by Dr. Lanette Hummel, who noted the mass on routine gynecologic exam.  He sent her for a CT of the pelvis, which revealed a mass at the vaginal apex, that appeared to be invading into the soft tissues of the left pelvis. There were several enlarged lymph nodes on the left. There was also questionable bladder invasion, and possibly even involvement of the rectosigmoid colon. She reported a history of having a mass removed from one of her ovaries in 1 at [facility], which required a second surgery to remove her uterus and contralateral ovary. She said she did not have to take any chemotherapy, but she did have to take some kind of pill for a year. She has not had any gynecologic issues since that time. Presumably her hysterectomy was a total, rather than a supracervical, but she is unsure and there are no records. She denied any vaginal bleeding, except for after her examination with Dr. Jules Logan. She reported minimal pain in the pelvis. Dr. Rosa Maria Wallis took her the OR for and exam under anesthesia with cystoscopy and proctoscopy. Pathology revealed a squamous cell carcinoma, consistent with a vaginal primary. Examination revealed a fixed mass involving the left upper vagina with extension to the left pelvic sidewall. There also appeared to be involvement of the posterior bladder wall, but no mucosal involvement. The mass did push against the rectal wall, but there did not appear to be any direct involvement. He then referred her to radiation oncology and she saw Dr. Archana Pineda.   She completed pelvic radiation along with concurrent cisplatin chemotherapy in February 2016 and had a great response.       When he saw her in the office in March 2017 for follow up she was doing well and without complaints. Her pelvic exam and pap smear at that time were both negative. Subsequent to that visit she began having back pain that had progressively worsened. She saw Razoursula Mclain in May and she ordered a CT. This unfortunately demonstrated extensive metastatic disease outside of the prior radiated field. Dr. Vasquez Zurita then sent her for a PET/CT which demonstrated widely metastatic disease.       Dr. Devante Carreno then recommended systemic chemotherapy with Taxol/Cisplatin/Avastin, which is the standard for metastatic cervical cancer, since vaginal cancer behaves similar to cervical cancer. Unfortunately, the Avastin was denied by her insurance company. We chose to proceed with Taxol/Cisplatin. She completed 6 cycles. She had done well with her treatment. She has had some persistent lower extremity edema, but that has overall improved. Her scan in October 2017 demonstrated slight progression of the lymphadenopathy, while the pulmonary and bony disease appeared stable. On review, the changes didn't meet RECIST criteria for progression and he recommended continuing with therapy, but suggested that we exchange Carboplatin for Cisplatin due to nephrotoxicity. Her creatinine had steadily climbed with the Cisplatin. She completed 8 cycles of Taxol/Carboplatin since the switch. She had a severe reaction to the Carboplatin at the time of her 8th cycle. She felt more tired with the Carboplatin rather than the Cisplatin, but otherwise not much different. She continues to have swelling in her lower extremities and reporting some more back pain. She met with Dr. Devante Carreno 4/17 to review her recent CT scan and to discuss going forward with chemotherapy. She was seen in the office last week for some vaginal bleeding, but no source was seen. That has since subsided.       Oncology Treatment History:  12/4/2015: Dx with Vaginal cancer after an EUA and biopsies of pelvic mass and vagina                     Vagina, biopsy: Invasive squamous cell carcinoma, poorly differentiated. No loss of mismatch repair proteins   1/15/16-2/12/16: Weekly Cisplatin with XRT with good response. 5/16/17: CT scan showed Bilateral pulmonary nodules and retroperitoneal lymphadenopathy compatible with metastatic disease. 6/14/17-10/4/17: 6 cycles of Cisplatin/Taxol. Much difficulty with neuropathy  10/23/17: CT scan (without contrast) after 6 cycles showed  Slight progression of retroperitoneal adenopathy with stable appearing pulmonary and osseous metastases as above. 10/26/17: saw after above CT results. Dr. Marcin Quan felt the changes didn't meet RECIST criteria for progression. He recommend continuing with therapy, but suggested that we exchange Carboplatin for Cisplatin due to nephrotoxicity. Her creatinine has pamella with the Cisplatin. 11/1/17: Began Carbo/Taxol. 1/3/2018; Had mild reaction to Carbo, but received Taxol  1/19/17: due to elevated creat, were unable to proceed with CT scan .  1/24/2018: Again, had moderate reaction to Carbo, but was easily controlled with Solu Medrol  2/13/2018: Again, due to elevated creat, but symptomatic, a non-contrast CT was obtained before planned time. Resulted out as Stable pulmonary metastases. Stable bone metastases. Multiple gallstones. Stable para-aortic lymphadenopathy. See full report below. Dr. Marcin Quan wanted to complete all 9 cycles if possible of Taxol  4/17/18: Met with Dr. Marcin Quan. We will continue Taxol and added Avastin 5/23    Subjective:  She presents today in good spirits for cycle 11 of Taxol with Avastin (2nd infusion)  She has seen Dr. Marcin Galloway since last infusion and he has pt on continued diuretics and monitoring her creat. He agrees with proceeding with chemotherapy/Avastin at this point. Her creat is actually slightly improved this time.   She dis say she saw the uriologist and he too a urine culture and she has not heard back from him and assumed it was negative. She did say she had two episodes of chills this weekend and yesterday. Did not take tempeture at the time. Says she felt \"just puny\". Today denies chills. Says hematuria has resolved and she has a follow up next week with urologist.   Joey Pruden her HGB of 7.6 and pt does appear symptomatic. She says she feels \"just so tired\" and is short of breath with \"the smallest amount of exertion\"    Her only complaint continues to be her lower ext edema that she says limits her mobility. She continues to use the lymphedema pumps as directed and feels when she does this, it is a little better. Continues to deny fevers/chills or productive cough     is again at her side and supportive. Current Outpatient Prescriptions   Medication Sig    levoFLOXacin (LEVAQUIN) 250 mg tablet Take 1 Tab by mouth daily.  torsemide (DEMADEX) 100 mg tablet     oxyCODONE-acetaminophen (PERCOCET) 5-325 mg per tablet Take 1 Tab by mouth every six (6) hours as needed for Pain. Max Daily Amount: 4 Tabs.  gabapentin (NEURONTIN) 300 mg capsule Take 1 Cap by mouth nightly. Indications: NEUROPATHIC PAIN    predniSONE (DELTASONE) 10 mg tablet Take 10 mg by mouth daily.  potassium chloride SR (KLOR-CON 10) 10 mEq tablet Take 20 mEq by mouth daily.  ondansetron (ZOFRAN ODT) 4 mg disintegrating tablet Take 1 Tab by mouth every eight (8) hours as needed for Nausea. Indications: CANCER CHEMOTHERAPY-INDUCED NAUSEA AND VOMITING    gabapentin (NEURONTIN) 300 mg capsule TAKE 1 CAPSULES BY MOUTH NIGHTLY    albuterol (PROVENTIL HFA, VENTOLIN HFA, PROAIR HFA) 90 mcg/actuation inhaler Take 2 Puffs by inhalation every six (6) hours as needed for Wheezing. Indications: BRONCHOSPASM PREVENTION    acetaminophen (TYLENOL) 325 mg tablet Take  by mouth every four (4) hours as needed for Pain.     carvedilol (COREG) 6.25 mg tablet Take 1 Tab by mouth two (2) times daily (with meals). Indications: hypertension    famotidine (PEPCID) 20 mg tablet Take 1 Tab by mouth daily. Indications: gastroesophageal reflux disease    docusate sodium (COLACE) 100 mg capsule Take 100 mg by mouth daily.  dexamethasone (DECADRON) 4 mg tablet Take 2 tablets with breakfast the day before chemo and for 2 days after chemo    atorvastatin (LIPITOR) 80 mg tablet Take 1 Tab by mouth daily.  Cholecalciferol, Vitamin D3, (VITAMIN D3) 1,000 unit cap Take 1 Cap by mouth daily. Current Facility-Administered Medications   Medication Dose Route Frequency    sodium chloride (NS) flush 5-10 mL  5-10 mL IntraVENous PRN    heparin (porcine) pf 500 Units  500 Units IntraVENous PRN    0.9% sodium chloride infusion  50 mL/hr IntraVENous PRN    0.9% sodium chloride infusion 250 mL  250 mL IntraVENous PRN       Review of Systems:  General: Denies wt loss. Performs self care mostly, requires some assistance from  . He does most meal prep and is very supportive. She now requires wheelchair for out of house walking  HEENT: Denies visual changes, dysphagia or headache  Resp:Continues with BLACKBURN and now SOB as described above. Denies hemoptysis or wheezing  CV: Denies CP, palpitations   GI/:Continues with Miralax for occasional constipation. Denies N/V, bloating, diarrhea or dysuria. Says her appetite has decreased the last 2 weeks some. . Continues to denies early satiety   Musk[place]el: Continues bilat L>R lymphedema  Neuro:bilat LE foot neuropathy, but as above, feels the Neurontin may be helping some. Denies dizzyness or syncope  Psych: Denies depression or feelings of sadness.       Objective:     Visit Vitals    /72 (BP 1 Location: Right arm, BP Patient Position: At rest)    Pulse 78    Temp 97.2 °F (36.2 °C)    Resp 18    Ht 5' 2\" (1.575 m)    Wt 239 lb (108.4 kg)    BMI 43.71 kg/m2         Physical Exam:  General: A&O X3, tired appearing with pleasant affect  HEENT: Sclera pale, anicteric, mucosa pale, moist. Pupils equal and reactive to light. Neck: No JVD or head/neck adenopathy appreciated. Port Site: without redness, tenderness or swelling. Accessed without difficulty  Heart: Regular without M/R/G (no tachycardia noted)  Lungs: CTA Bilat without wheezing or rales noted. Abd: Soft, obese without tenderness, fluid wave or distention. + BS throughout  Ext: Extensive bilat lower ext edema persist, but slightly improve with vascular skin changes previously seen appearing improved today as well  Neuro: grossly intact         Lab Results   Component Value Date/Time    WBC 9.7 06/11/2018 01:50 PM    HGB 7.6 (L) 06/11/2018 01:50 PM    HCT 24.3 (L) 06/11/2018 01:50 PM    PLATELET 712 (L) 46/80/6376 01:50 PM    MCV 93 06/11/2018 01:50 PM     Lab Results   Component Value Date/Time    CA-125 120 (H) 05/16/2018 09:13 AM    Cancer Ag (CA) 125 95.5 (H) 06/11/2018 01:50 PM     Lab Results   Component Value Date/Time    Sodium 135 06/11/2018 01:50 PM    Potassium 3.6 06/11/2018 01:50 PM    Chloride 86 (L) 06/11/2018 01:50 PM    CO2 27 06/11/2018 01:50 PM    Anion gap 13 05/23/2018 08:09 AM    Glucose 217 (H) 06/11/2018 01:50 PM    BUN 53 (H) 06/11/2018 01:50 PM    Creatinine 2.25 (H) 06/11/2018 01:50 PM    BUN/Creatinine ratio 24 06/11/2018 01:50 PM    GFR est AA 25 (L) 06/11/2018 01:50 PM    GFR est non-AA 22 (L) 06/11/2018 01:50 PM    Calcium 8.6 (L) 06/11/2018 01:50 PM    Bilirubin, total 0.4 06/11/2018 01:50 PM    AST (SGOT) 16 06/11/2018 01:50 PM    Alk. phosphatase 107 06/11/2018 01:50 PM    Protein, total 5.8 (L) 06/11/2018 01:50 PM    Albumin 3.1 (L) 06/11/2018 01:50 PM    Globulin 3.3 05/23/2018 08:09 AM    A-G Ratio 1.1 (L) 06/11/2018 01:50 PM    ALT (SGPT) 13 06/11/2018 01:50 PM       Results for Aziza James (MRN [id number]) as of 6/13/2018 14:23   Ref.  Range 6/13/2018 11:53   Color Latest Units:   YELLOW/STRAW   Appearance Latest Ref Range: CLEAR   CLOUDY (A)   Specific gravity Latest Ref Range: 1.003 - 1.030   1.006   pH (UA) Latest Ref Range: 5.0 - 8.0   5.5   Protein Latest Ref Range: NEG mg/dL NEGATIVE   Glucose Latest Ref Range: NEG mg/dL NEGATIVE   Ketone Latest Ref Range: NEG mg/dL NEGATIVE   Blood Latest Ref Range: NEG   MODERATE (A)   Bilirubin Latest Ref Range: NEG   NEGATIVE   Urobilinogen Latest Ref Range: 0.2 - 1.0 EU/dL 0.2   Nitrites Latest Ref Range: NEG   NEGATIVE   Leukocyte Esterase Latest Ref Range: NEG   LARGE (A)   Epithelial cells Latest Ref Range: FEW /lpf FEW   WBC Latest Ref Range: 0 - 4 /hpf >100 (H)   RBC Latest Ref Range: 0 - 5 /hpf 5-10   Bacteria Latest Ref Range: NEG /hpf 2+ (A)   Hyaline cast Latest Ref Range: 0 - 5 /lpf 2-5       IMAGING:  CT chest/abd/pelvis on April 16, 2018  FINDINGS:      Previous studies for comparison:  February 13, 2018. October 23, 2017.     Images through the mediastinum and hilar regions reveal no new mass or  adenopathy. Sclerotic/destructive changes involving the left manubrium  redemonstrated without appreciable interval change.     Images through the lung parenchyma reveal scattered bilateral small pulmonary  nodular densities unchanged.     Images through the liver reveal no new space-occupying lesion or biliary  dilatation.     There is adequate opacification of the hepatic and portal veins.     Images through the gallbladder fossa reveal multiple gallstones.     The spleen is normal in size.      The pancreas has a normal morphologic appearance.      Kidneys demonstrated normal morphologic appearance. No hydronephrosis is  present.     The right adrenal gland remains enlarged measuring 2.9 x 2.2 cm, previously 3.0  x 2.2 cm.     The visualized opacified bowel loops are nonobstructed.     Persistent confluent appearing retroperitoneal lymphadenopathy is demonstrated  with two dominant lesions measuring roughly 2.6 x 2.6 cm, previously 2.6 x 2.6  cm.  The other closely adjacent lesion measures roughly 2.1 x 1.7 cm, previously  2.1 x 1.7 cm.     Images through the pelvis no space-occupying lesion or lymphadenopathy. Prominent soft tissue swelling is present in the pelvis and lower extremities.     Scattered predominately sclerotic lesions in the lower lumbar vertebra and  sternum are redemonstrated without appreciable interval change. IMPRESSION:     Status post gastric sleeve. Stable scattered bilateral pulmonary nodular densities. Stable retroperitoneal lymphadenopathy. Multiple gallstones. Stable bony metastatic disease. Possible minimal left hydronephrosis. Pelvic and lower extremity presumed lymphedema. CT chest/abd/Pelvis 2/13/2018  FINDINGS:      Previous studies for comparison:  October 23, 2017. Images through the mediastinum and hilar regions reveal no new mass or  adenopathy. Images through the lung parenchyma redemonstrate scattered bilateral small  pulmonary nodules which appear stable to slightly smaller. Small peripheral nodule with cystic appearance in the right lower lobe measures  7.2 mm, previously 7.9 mm. Small peripheral nodule in the left lower lobe measures 6.3 mm, previously 6.3  mm. Nodular density in the right base measures 11.4 x 7.0 mm, previously 11.1 x 7.0  mm. Images through the liver reveal no new space-occupying lesion or biliary  dilatation. Status post lap band.   Images through the gallbladder fossa reveal gallstones. The spleen is normal in size. The pancreas has a normal morphologic appearance. The kidneys demonstrated normal morphologic appearance. Renal function cannot be  assessed due to lack of intravenous contrast.  The adrenal glands are notable for persistent right adrenal enlargement  measuring 3.0 x 2.2 cm, previously 2.8 x 2.5 cm. Persistent para-aortic retroperitoneal lymphadenopathy measuring roughly 2.6 x  2.6 cm, previously 2.7 x 2.5 cm. Another adjacent lymph node measures 2.1 x 1.7  cm, previously 2.1 x 1.7 cm.   The visualized opacified bowel loops are nonobstructed. Images through the pelvis and a straight apparent prior hysterectomy. Overall  stable appearance. Apparent sclerotic and lytic processes involving the sternum vertebra. IMPRESSION:     Stable pulmonary metastases.     Stable bone metastases.     Multiple gallstones.     Stable para-aortic lymphadenopathy.       Assessment:     Patient Active Problem List   Diagnosis Code    LAP-BAND surgery status Z98.84    Vaginal cancer ([facility] [address] C52    Hypertension, essential I10    Hypercholesterolemia E78.00    GERD without esophagitis K21.9    Lymphedema of both lower extremities I89.0    Morbid obesity with BMI of 40.0-44.9, adult (HCC) E66.01, Z68.41    CKD (chronic kidney disease) stage 4, GFR 15-29 ml/min ([facility]) N18.4    Anemia D64.9    Glucose intolerance (impaired glucose tolerance) R73.02    On antineoplastic chemotherapy Z79.899    Cancer related pain G89.3    Metastatic neoplastic disease ([facility]) C79.9    Hematuria R31.9    Acute cystitis without hematuria N30.00         Plan: Will proceed with C11 of Taxol  With second infusion of Avastin today for persistent vaginal cancer. Follow up with nephrologist as regularly scheduled and let us know of any changes he makes (Dr. Mitesh Griffin)  Lymphedema - continue with her compression machine daily and follow up with Lymphedema clinic   Foot neuropathy/pain, continue with Neurontin 600mg qhs for further evaluation   Continue following a diabetic diet with her . Anemia: with Hgb 7.6 today, will plan on transfusion tomorrow at [facility]. She will be typed and crossed today to expedite tomorrow's appointment. She will let us know if she becomes any more symptomatic. CT of abd/pelvis ordered by Dr. Anibal Humphrey is scheduled for 6/18. Due to creat, will not be able to use contrast at this point  Will begin Levaquin, 250 mg daily for 7 days and have pt call urologist and follow up on culture (culture was also sent today).   Instructed that if she has any further chills, begins with hematuria or dysuria, she is to notify either our office immediately   Continue with anti-hypertensive  Continue to monitor creat and hydration encouraged and if she feels she is becoming dehydrated at any time, to call and we will get her in for IV infusion.   Encouraged to call for any concerns or questions    Bryce Patino NP

## 2018-06-14 NOTE — PROGRESS NOTES
0810 Pt arrived at [facility] in wheelchair and in no distress for transfusion of 2 units PRBCs. Assessment completed, no new complaints voiced. Pt with weakness and fatigue. Port accessed yesterday for chemo, intact, flushed with positive blood return. Signs/symptoms of adverse blood reaction discussed with pt, voiced understanding. Patient Vitals for the past 12 hrs:   Temp Pulse Resp BP   06/14/18 1355 97.9 °F (36.6 °C) (!) 43 18 139/70   06/14/18 1255 97.8 °F (36.6 °C) (!) 46 18 137/68   06/14/18 1225 97.9 °F (36.6 °C) (!) 51 18 126/64   06/14/18 1210 97.8 °F (36.6 °C) (!) 46 18 130/65   06/14/18 1140 98 °F (36.7 °C) (!) 53 18 138/70   06/14/18 1040 97.6 °F (36.4 °C) (!) 47 18 152/75   06/14/18 0940 97.8 °F (36.6 °C) (!) 52 18 129/63   06/14/18 0910 97.6 °F (36.4 °C) (!) 45 18 126/68   06/14/18 0855 97.6 °F (36.4 °C) (!) 50 18 144/65   06/14/18 0836 97.7 °F (36.5 °C) 62 18 145/76   06/14/18 0813 97.7 °F (36.5 °C) 70 18 146/82       Medications received:  NS @ KVO    0840:  1st unit PRBCs started and infusing without difficulty, observed x 15 minutes  1125:  1st unit completed without adverse reaction noted, NS flushing line. 1155:  2nd unit PRBCs started and infusing without difficulty, observed x 15 minutes  1405:  2nd unit completed without adverse reaction noted, NS flushing line. 1410 Tolerated transfusion well, no adverse reaction noted. D/C instructions reviewed, pt voiced understanding. Patient declined 1 hour post transfusion observation. D/Cd from [facility] in wheelchair and in no distress accompanied by spouse. Next chemo appt 7/5/18 @ 0900.

## 2018-06-19 NOTE — TELEPHONE ENCOUNTER
[facility] and needs pt's latest urine cx due to she has an appt tomorrow, needs to be faxed to [phone]

## 2018-07-03 NOTE — PROGRESS NOTES
Pre chemo, ct scan review, labs on 7/2 and for C12 taxol/carbo on 7/5.  1. Have you been to the ER, urgent care clinic since your last visit? Hospitalized since your last visit? No    2. Have you seen or consulted any other health care providers outside of the [address] [place] [address] since your last visit? Include any pap smears or colon screening.  No

## 2018-07-03 NOTE — PROGRESS NOTES
[address] [place] [address] [address], [address] [facility]  [phone]  MD Ashu Johnson MD  Office Note  Patient ID:  Name:  Kandice Davison  MRN:  [id number]  :  67 y.o. Date:  7/3/2018      HISTORY OF PRESENT ILLNESS:  Kandice Davison is a 75 y.o.  postmenopausal female who was referred to me in 2015 for a pelvic mass by Dr. Selena Ballard, who noted the mass on routine gynecologic exam.  He sent her for a CT of the pelvis, which revealed a mass at the vaginal apex, that appeared to be invading into the soft tissues of the left pelvis. There were several enlarged lymph nodes on the left. There was also questionable bladder invasion, and possibly even involvement of the rectosigmoid colon. She reported a history of having a mass removed from one of her ovaries in 1 at [facility], which required a second surgery to remove her uterus and contralateral ovary. She said she did not have to take any chemotherapy, but she did have to take some kind of pill for a year. She has not had any gynecologic issues since that time. Presumably her hysterectomy was a total, rather than a supracervical, but she is unsure and there are no records. She denied any vaginal bleeding, except for after her examination with Dr. Arya Dutton. She reported minimal pain in the pelvis. I took her the OR for and exam under anesthesia with cystoscopy and proctoscopy. Pathology revealed a squamous cell carcinoma, consistent with a vaginal primary. Examination revealed a fixed mass involving the left upper vagina with extension to the left pelvic sidewall. There also appeared to be involvement of the posterior bladder wall, but no mucosal involvement. The mass did push against the rectal wall, but there did not appear to be any direct involvement. I referred her to radiation oncology and she saw Dr. Rojas Curtis.   She completed pelvic radiation along with concurrent cisplatin chemotherapy in February 2016 and had a great response. When I saw her in the office in March 2017 for follow up she was doing well and without complaints. Her pelvic exam and pap smear at that time were both negative. Subsequent to that visit she began having back pain that had progressively worsened. She saw Jodi Johnson in May and she ordered a CT. This unfortunately demonstrated extensive metastatic disease outside of the prior radiated field. Dr. Gale Mora then sent her for a PET/CT which demonstrated widely metastatic disease. I recommended systemic chemotherapy with Taxol/Cisplatin/Avastin, which is the standard for metastatic cervical cancer, since vaginal cancer behaves similar to cervical cancer. Unfortunately, the Avastin was denied by her insurance company. We chose to proceed with Taxol/Cisplatin. She completed 6 cycles. She has done well so far with her treatment. She denies nausea. She reports some persistent lower extremity edema, but that has overall improved. Her scan in October 2017 demonstrated slight progression of the lymphadenopathy, while the pulmonary and bony disease appeared stable. On my review, the changes didn't meet RECIST criteria for progression. I recommended continuing with therapy, but I suggested that we exchange Carboplatin for Cisplatin due to nephrotoxicity. Her creatinine had steadily climbed with the Cisplatin. She completed 8 cycles of Taxol/Carboplatin since the switch, although she had a severe reaction to the Carboplatin at the time of her 8th cycle. We stopped the Carboplatin and added Avastin to the Taxol. She has completed 2 cycles so far since that switch. She continues to have swelling in her lower extremities and reporting some more back pain. She has been hospitalized once at [facility] for diuresis with her nephrologist, Dr. Kaelyn Del Cid.         Tumor testing  No loss of mismatch repair proteins      ECOG score: 1 ROS:   and GI review:  Negative  Cardiopulmonary review:  Negative   Musculoskeletal:  Negative    A comprehensive review of systems was negative except for that written in the History of Present Illness. , 10 point ROS        Problem List:  Patient Active Problem List    Diagnosis Date Noted    Acute cystitis without hematuria 06/13/2018    Hematuria 05/16/2018    On antineoplastic chemotherapy 03/01/2018    Cancer related pain 03/01/2018    Metastatic neoplastic disease ([address] 03/01/2018    Glucose intolerance (impaired glucose tolerance) 01/29/2018    Anemia 01/04/2018    CKD (chronic kidney disease) stage 4, GFR 15-29 ml/min ([address] 01/03/2018    Morbid obesity with BMI of 40.0-44.9, adult ([address] 12/12/2017    Lymphedema of both lower extremities 06/14/2017    Hypertension, essential 01/18/2017    Hypercholesterolemia 01/18/2017    GERD without esophagitis 01/18/2017    Vaginal cancer ([address] 12/10/2015    LAP-BAND surgery status 05/17/2012     PMH:  Past Medical History:   Diagnosis Date    Abnormal mammogram of left breast 12/5/2016    Cancer ([address] 06/2017    LYMPH NODES IN BACK    Cancer ([address] 2016    VAGINAL CANCER    GERD (gastroesophageal reflux disease) 6/11/06    Hypercholesterolemia 10/11/06    Hypertension 10/11/06    Morbid obesity ([address] 10/11/06      PSH:  Past Surgical History:   Procedure Laterality Date    HX APPENDECTOMY      HX GASTRIC BYPASS  2009    LAP BAND    HX GYN  12/4/15    EUA/Bx of pelvic mass and vagina/cystoscopy/proctoscopy    HX HYSTERECTOMY  1978    HX ORTHOPAEDIC  2009    right ankle    HX OTHER SURGICAL  2001?     benign mass removed from lower back    LAP, PLACE ADJUST GASTR BAND  1/30/08    EB: AP Std lap band, liver bx      Social History:  Social History   Substance Use Topics    Smoking status: Former Smoker     Packs/day: 0.50     Years: 15.00     Types: Cigarettes     Quit date: 1/1/1993    Smokeless tobacco: Never Used    Alcohol use Yes Comment: socially-WINE ONCE A WEEK      Family History:  Family History   Problem Relation Age of Onset    Cancer Mother      colon WITH METS TO LIVER    Diabetes Father     Heart Disease Father     Cancer Sister      breast & bone    Cancer Maternal Aunt      BREAST    Cancer Paternal Aunt      BREAST    Anesth Problems Neg Hx       Medications: (reviewed)  Current Outpatient Prescriptions   Medication Sig    torsemide (DEMADEX) 100 mg tablet     oxyCODONE-acetaminophen (PERCOCET) 5-325 mg per tablet Take 1 Tab by mouth every six (6) hours as needed for Pain. Max Daily Amount: 4 Tabs.  potassium chloride SR (KLOR-CON 10) 10 mEq tablet Take 20 mEq by mouth daily.  ondansetron (ZOFRAN ODT) 4 mg disintegrating tablet Take 1 Tab by mouth every eight (8) hours as needed for Nausea. Indications: CANCER CHEMOTHERAPY-INDUCED NAUSEA AND VOMITING    gabapentin (NEURONTIN) 300 mg capsule TAKE 1 CAPSULES BY MOUTH NIGHTLY    acetaminophen (TYLENOL) 325 mg tablet Take  by mouth every four (4) hours as needed for Pain.  famotidine (PEPCID) 20 mg tablet Take 1 Tab by mouth daily. Indications: gastroesophageal reflux disease    docusate sodium (COLACE) 100 mg capsule Take 100 mg by mouth daily.  dexamethasone (DECADRON) 4 mg tablet Take 2 tablets with breakfast the day before chemo and for 2 days after chemo    atorvastatin (LIPITOR) 80 mg tablet Take 1 Tab by mouth daily.  Cholecalciferol, Vitamin D3, (VITAMIN D3) 1,000 unit cap Take 1 Cap by mouth daily.  predniSONE (DELTASONE) 10 mg tablet Take 10 mg by mouth daily.  albuterol (PROVENTIL HFA, VENTOLIN HFA, PROAIR HFA) 90 mcg/actuation inhaler Take 2 Puffs by inhalation every six (6) hours as needed for Wheezing. Indications: BRONCHOSPASM PREVENTION    carvedilol (COREG) 6.25 mg tablet Take 1 Tab by mouth two (2) times daily (with meals). Indications: hypertension     No current facility-administered medications for this visit. Facility-Administered Medications Ordered in Other Visits   Medication Dose Route Frequency    [START ON 7/5/2018] PACLitaxel (TAXOL) 288 mg in 0.9% sodium chloride 250 mL, overfill volume 25 mL chemo infusion  288 mg IntraVENous ONCE    [START ON 7/5/2018] diphenhydrAMINE (BENADRYL) injection 50 mg  50 mg IntraVENous ONCE    [START ON 7/5/2018] famotidine (PF) (PEPCID) 20 mg in sodium chloride 0.9% 10 mL injection  20 mg IntraVENous ONCE    [START ON 7/5/2018] dexamethasone (DECADRON) 12 mg in 0.9% sodium chloride 50 mL IVPB  12 mg IntraVENous ONCE    [START ON 7/5/2018] bevacizumab (AVASTIN) 1,560 mg in 0.9% sodium chloride 100 mL, overfill volume 10 mL IVPB  1,560 mg IntraVENous ONCE     Allergies: (reviewed)  Allergies   Allergen Reactions    Amlodipine Other (comments)     Edema in LE's    Carboplatin Shortness of Breath, Rash and Itching          OBJECTIVE:    Physical Exam:  VITAL SIGNS: Vitals:    07/03/18 0958 07/03/18 1012   BP: 161/76 139/83   Pulse: 80 77   Weight: 249 lb 9.6 oz (113.2 kg)    Height: 5' 2.01\" (1.575 m)      Body mass index is 45.64 kg/(m^2). GENERAL MIGUELANGEL: Conversant, alert, oriented. No acute distress. HEENT: HEENT. No thyroid enlargement. No JVD. Neck: Supple without restrictions. RESPIRATORY: Clear to auscultation and percussion to the bases. No CVAT. CARDIOVASC: RRR without murmur/rub. GASTROINT: soft, non-tender, without masses or organomegaly   MUSCULOSKEL: no joint tenderness, deformity or swelling   EXTREMITIES: Marked edema of bilateral lower extremities, L>R. Skin noted with vascular changes on both legs. PELVIC: Deferred   RECTAL: Deferred   PATRICIO SURVEY: No suspicious lymphadenopathy. NEURO: Grossly intact. No acute deficit.      Lab Results   Component Value Date/Time    WBC 9.7 06/11/2018 01:50 PM    HGB 7.6 (L) 06/11/2018 01:50 PM    HCT 24.3 (L) 06/11/2018 01:50 PM    PLATELET 272 (L) 63/31/5742 01:50 PM    MCV 93 06/11/2018 01:50 PM     Lab Results   Component Value Date/Time    Sodium 142 07/02/2018 11:09 AM    Potassium 3.6 07/02/2018 11:09 AM    Chloride 93 (L) 07/02/2018 11:09 AM    CO2 31 (H) 07/02/2018 11:09 AM    Anion gap 13 05/23/2018 08:09 AM    Glucose 91 07/02/2018 11:09 AM    BUN 32 (H) 07/02/2018 11:09 AM    Creatinine 1.95 (H) 07/02/2018 11:09 AM    BUN/Creatinine ratio 16 07/02/2018 11:09 AM    GFR est AA 30 (L) 07/02/2018 11:09 AM    GFR est non-AA 26 (L) 07/02/2018 11:09 AM    Calcium 8.7 07/02/2018 11:09 AM    Bilirubin, total 0.4 07/02/2018 11:09 AM    AST (SGOT) 10 07/02/2018 11:09 AM    Alk. phosphatase 117 07/02/2018 11:09 AM    Protein, total 6.3 07/02/2018 11:09 AM    Albumin 3.5 (L) 07/02/2018 11:09 AM    Globulin 3.3 05/23/2018 08:09 AM    A-G Ratio 1.3 07/02/2018 11:09 AM    ALT (SGPT) 10 07/02/2018 11:09 AM       CT of abdomen/pelvis (6/26/18)  LUNG BASES: Clear. INCIDENTALLY IMAGED HEART AND MEDIASTINUM: 12 mm nodule at the right lung base  is slightly decreased in size. Previously measured 15 mm. There are patchy areas  of groundglass opacification in the bilateral lung bases which are new  LIVER: Hypodensity segment 2 near the dome is incompletely evaluated but likely  unchanged. It may represent a cyst. Small calcifications compatible with prior  granulomatous disease  GALLBLADDER: Multiple stones within a contracted gallbladder  SPLEEN: Spleen is mildly enlarged 14.4 cm with multiple calcifications  compatible with prior granulomatous disease  PANCREAS: No mass or ductal dilatation. ADRENALS: 3.0 x 2.3 cm right adrenal nodule measuring 2 Hounsfield units  compatible with an adenoma unchanged. Left adrenal gland is not enlarged  KIDNEYS/URETERS: No mass, calculus, or hydronephrosis. STOMACH: Patient is post gastric band  SMALL BOWEL: No dilatation or wall thickening. COLON: No dilatation or wall thickening. APPENDIX: Surgically absent  PERITONEUM: No ascites or pneumoperitoneum.   RETROPERITONEUM: Aorta is mildly calcified but normal in caliber. There is a  mantle of confluent nodes surrounding the aorta. This does not appear  significantly different. Representative nodes to the left of the aorta and  anterior to the aorta on series 3 image 45 measure 17 x 13 mm and 11 x 18 mm. This measures slightly smaller than on the previous study but this may be  technically given the poor margination of the ruddy mass. REPRODUCTIVE ORGANS: Uterus and ovaries are surgically absent. Soft tissue  thickening in the left pelvic sidewall has not significantly changed and no  recurrent pelvic mass lesion is noted  URINARY BLADDER: No mass or calculus. BONES: Sclerotic bone metastases have not significantly changed in the interval  ADDITIONAL COMMENTS: N/A     IMPRESSION:  1. Retroperitoneal confluent adenopathy stable to slightly decreased in size  2. Unchanged bony metastatic disease  3. Unchanged soft tissue in the left pelvic sidewall without evidence of local  recurrence  4. Pulmonary nodule right lung base slightly smaller  5. New areas of groundglass opacification at the lung bases nonspecific but may  be posttreatment related      IMPRESSION/PLAN:  Monika Russell is a 75 y.o. female with a history of stage III vaginal carcinoma. She was treated with pelvic radiation therapy and concurrent cisplatin chemotherapy. She now has recurrent disease with multiple sites of metastases. I had a long discussion with the patient and her  discussing the best course of management. Surgery is not an option due to the widespread nature of her disease. The same is true for radiation therapy. Her only viable option is systemic chemotherapy. Since vaginal cancer behaves like cervical carcinoma, I recommended using the standard regimen for metastatic cervical carcinoma, Taxol/Cisplatin/Avastin. Unfortunately, the Avastin was denied by her insurance company. We chose to proceed with Taxol/Cisplatin. She completed 6 cycles.   On her CT in July 2017 her pulmonary nodules appeared stable and her retroperitoneal lymphadenopathy appeared slightly improved. Her most recent scan in October 2017 demonstrated slight progression of the lymphadenopathy, while the pulmonary and bony disease appeared stable. On my review, the changes didn't meet RECIST criteria for progression. I recommended continuing with therapy, but I suggested that we exchange Carboplatin for Cisplatin due to nephrotoxicity. Her creatinine had steadily climbed with the Cisplatin. She completed 8 cycles of Taxol/Carboplatin since the switch, but following her Carboplatin reaction we stopped the Carboplatin. Her last CT scan still suggested stable disease. I recommended that we continue with Taxol and add Avastin. She has completed 2 cycles since the addition. Her newest CT demonstrates slight overall improvement. We will continue with the current regimen.       Signed By: Angelica Lopez MD     7/3/2018/4:31 PM

## 2018-07-05 NOTE — PROGRESS NOTES
[facility] GYNECOLOGIC ONCOLOGY  [address] [facility], [address], [address]  P [phone]  F [phone]      hospitals Progress Note  Rita James  75 y.o.    CC: Presents for infusion therapy    Noemí Edwards MD: Lorena Tavarez MD  PCP: Sandeep Eisenberg MD     Primary Onc Dx: Vaginal cancer  Date of Dx: 12/2015      Current Agent(s): Taxol/Avastin      HPI:  Rita James is a 75 y.o.  postmenopausal female who was referred to Dr. Catrachito Galdamez in December 2015 for a pelvic mass by Dr. Berniece Alpers, who noted the mass on routine gynecologic exam.  CT of the pelvis revealed a mass at the vaginal apex, that appeared to be invading into the soft tissues of the left pelvis. There were several enlarged lymph nodes on the left. There was also questionable bladder invasion, and possibly even involvement of the rectosigmoid colon. She reported a history of having a mass removed from one of her ovaries in 1 at [facility], which required a second surgery to remove her uterus and contralateral ovary. She said she did not have to take any chemotherapy, but she did have to take some kind of pill for a year. She has not had any gynecologic issues since that time. Presumably her hysterectomy was a total, rather than a supracervical, but she is unsure and there are no records. She underwent EUA with cystoscopy and proctoscopy. Pathology revealed a squamous cell carcinoma, consistent with a vaginal primary. Examination revealed a fixed mass involving the left upper vagina with extension to the left pelvic sidewall. There also appeared to be involvement of the posterior bladder wall, but no mucosal involvement. The mass did push against the rectal wall, but there did not appear to be any direct involvement. He then referred her to radiation oncology and she saw Dr. Светлана Bianchi. She completed pelvic radiation along with concurrent cisplatin chemotherapy in February 2016 and had a great response.     She recurred in mid 2017 with metastatic disease and has been undergoing chemotherapy, most recently started on Taxol/Avastin. Oncology Treatment History:  12/4/2015: Dx with Vaginal cancer after an EUA and biopsies of pelvic mass and vagina                     Vagina, biopsy: Invasive squamous cell carcinoma, poorly differentiated. No loss of mismatch repair proteins   1/15/16-2/12/16: Weekly Cisplatin with XRT with good response. 5/16/17: CT scan showed Bilateral pulmonary nodules and retroperitoneal lymphadenopathy compatible with metastatic disease. 6/14/17-10/4/17: 6 cycles of Cisplatin/Taxol. Much difficulty with neuropathy  10/23/17: CT scan (without contrast) after 6 cycles showed  Slight progression of retroperitoneal adenopathy with stable appearing pulmonary and osseous metastases as above. 10/26/17: saw after above CT results. Dr. Alissa Villarreal felt the changes didn't meet RECIST criteria for progression. He recommend continuing with therapy, but suggested that we exchange Carboplatin for Cisplatin due to nephrotoxicity. Her creatinine has pamella with the Cisplatin. 11/1/17 - 4/18/18: Carbo/Taxol, continued carboplatin reaction. Single agent taxol   2/13/2018: Due to elevated creat, a non-contrast CT was obtained before planned time. Resulted out as Stable pulmonary metastases. Stable bone metastases. Multiple gallstones. Stable para-aortic lymphadenopathy  4/16/18: stable metastatic disease, possible minimal left hydro. 5/1/18: admitted under Dr. Haynes (nephrology) to [facility] for diuresis. 60-70lbs and 2units PRBCs  5/16/18: taxol/avastin anticipated. Held d/t renal failure  5/23/18: Taxol/Avastin initiated  6/26/18: CT A/P:  1. Retroperitoneal adenopathy stable to slightly decreased in size, 2. Unchanged bony metastatic disease  3. Unchanged soft tissue in the left pelvic sidewall without evidence of local recurrence 4. Pulmonary nodule right lung base slightly smaller  5.  New areas of groundglass opacification at the lung bases nonspecific but may be posttreatment related      Subjective:  She is here with , Michael Camara. Feels much better, notes wt gain of 20#. She has fu/ with Dr. Samara Rider next week. She continues to pass occas small clots with urine void. Completed antibioitics, developed thrush, treated. no F/C since. Continues to have mild abd/flank pains on occasion, unrelated to voids. She has been using leg compression pumps less. Denies overt vaginal bleeding. Still with some lower extremity discomfort, but she can ambulate, prepare some meals and dress self. She arrives in wheelchair today, uses walker if she needs to go a long distance. At home gets around w/o walker. ROS:  Constitutional:  No fevers/night sweats  Respiratory: SOB with exertion  Cardiovascular: no chest pain  Heme: No bruising, bleeding  Gastrointestinal: no abdominal pain, change in bowel habits, or black or bloody stools  Genito-Urinary: no dysuria, trouble voiding. +hematuria  Musculoskeletal: +muscle pain  Neurological: + dizziness. bilat LE foot neuropathy  Derm: scaling bilat LE. No oral lesions  Psych: negative for anxiety and depression        Current Outpatient Prescriptions   Medication Sig    torsemide (DEMADEX) 100 mg tablet     oxyCODONE-acetaminophen (PERCOCET) 5-325 mg per tablet Take 1 Tab by mouth every six (6) hours as needed for Pain. Max Daily Amount: 4 Tabs.  predniSONE (DELTASONE) 10 mg tablet Take 10 mg by mouth daily.  potassium chloride SR (KLOR-CON 10) 10 mEq tablet Take 20 mEq by mouth daily.  ondansetron (ZOFRAN ODT) 4 mg disintegrating tablet Take 1 Tab by mouth every eight (8) hours as needed for Nausea. Indications: CANCER CHEMOTHERAPY-INDUCED NAUSEA AND VOMITING    gabapentin (NEURONTIN) 300 mg capsule TAKE 1 CAPSULES BY MOUTH NIGHTLY    albuterol (PROVENTIL HFA, VENTOLIN HFA, PROAIR HFA) 90 mcg/actuation inhaler Take 2 Puffs by inhalation every six (6) hours as needed for Wheezing. Indications: BRONCHOSPASM PREVENTION    acetaminophen (TYLENOL) 325 mg tablet Take  by mouth every four (4) hours as needed for Pain.  carvedilol (COREG) 6.25 mg tablet Take 1 Tab by mouth two (2) times daily (with meals). Indications: hypertension    famotidine (PEPCID) 20 mg tablet Take 1 Tab by mouth daily. Indications: gastroesophageal reflux disease    docusate sodium (COLACE) 100 mg capsule Take 100 mg by mouth daily.  dexamethasone (DECADRON) 4 mg tablet Take 2 tablets with breakfast the day before chemo and for 2 days after chemo    atorvastatin (LIPITOR) 80 mg tablet Take 1 Tab by mouth daily.  Cholecalciferol, Vitamin D3, (VITAMIN D3) 1,000 unit cap Take 1 Cap by mouth daily. Current Facility-Administered Medications   Medication Dose Route Frequency    sodium chloride 0.9% injection 10 mL  10 mL IntraVENous PRN    heparin (porcine) pf 500 Units  500 Units IntraVENous PRN    sodium chloride (NS) flush 5-10 mL  5-10 mL IntraVENous PRN    PACLitaxel (TAXOL) 288 mg in 0.9% sodium chloride 250 mL, overfill volume 25 mL chemo infusion  288 mg IntraVENous ONCE    bevacizumab (AVASTIN) 1,560 mg in 0.9% sodium chloride 100 mL, overfill volume 10 mL IVPB  1,560 mg IntraVENous ONCE         Objective:     Visit Vitals    /53 (BP 1 Location: Right arm, BP Patient Position: At rest)    Pulse 62    Temp 97.3 °F (36.3 °C)    Resp 18    Ht 5' 2\" (1.575 m)    Wt 249 lb 14.4 oz (113.4 kg)    BMI 45.71 kg/m2       Physical Exam:  General: A&O X3 in NAD with pleasant affect  HEENT: Sclera anicteric, mucosa pink, moist. No bleeding or inflammation noted. Neck: No JVD or cervical adenopathy appreciated. Port Site: without redness, tenderness or swelling. Accessed without difficulty  Heart: Regular without M/R/G  Lungs: CTA Bilat without wheezing or rales noted. Abd: Soft, obese without tenderness, fluid wave or distention. Ext: Extensive bilat lower ext edema.   Persist with vascular skin changes previously seen  Neuro: grossly intact         Lab Results   Component Value Date/Time    WBC 9.7 07/05/2018 09:02 AM    HGB 9.6 (L) 07/05/2018 09:02 AM    HCT 31.4 (L) 07/05/2018 09:02 AM    PLATELET 444 33/15/0721 09:02 AM    MCV 94.3 07/05/2018 09:02 AM     Lab Results   Component Value Date/Time    ABS. NEUTROPHILS 8.2 (H) 07/05/2018 09:02 AM     Lab Results   Component Value Date/Time    Sodium 142 07/02/2018 11:09 AM    Potassium 3.6 07/02/2018 11:09 AM    Chloride 93 (L) 07/02/2018 11:09 AM    CO2 31 (H) 07/02/2018 11:09 AM    Anion gap 13 05/23/2018 08:09 AM    Glucose 91 07/02/2018 11:09 AM    BUN 32 (H) 07/02/2018 11:09 AM    Creatinine 1.95 (H) 07/02/2018 11:09 AM    BUN/Creatinine ratio 16 07/02/2018 11:09 AM    GFR est AA 30 (L) 07/02/2018 11:09 AM    GFR est non-AA 26 (L) 07/02/2018 11:09 AM    Calcium 8.7 07/02/2018 11:09 AM    Bilirubin, total 0.4 07/02/2018 11:09 AM    AST (SGOT) 10 07/02/2018 11:09 AM    Alk. phosphatase 117 07/02/2018 11:09 AM    Protein, total 6.3 07/02/2018 11:09 AM    Albumin 3.5 (L) 07/02/2018 11:09 AM    Globulin 3.3 05/23/2018 08:09 AM    A-G Ratio 1.3 07/02/2018 11:09 AM    ALT (SGPT) 10 07/02/2018 11:09 AM     UA no protein      IMAGING:  CT A/P 6/26/18  TECHNIQUE:   Thin axial images were obtained through the abdomen and pelvis. Coronal and  sagittal reconstructions were generated. Oral contrast was administered. CT dose  reduction was achieved through use of a standardized protocol tailored for this  examination and automatic exposure control for dose modulation.      The absence of intravenous contrast material reduces the sensitivity for  evaluation of the solid parenchymal organs of the abdomen.      FINDINGS:   LUNG BASES: Clear. INCIDENTALLY IMAGED HEART AND MEDIASTINUM: 12 mm nodule at the right lung base  is slightly decreased in size. Previously measured 15 mm.  There are patchy areas  of groundglass opacification in the bilateral lung bases which are new  LIVER: Hypodensity segment 2 near the dome is incompletely evaluated but likely  unchanged. It may represent a cyst. Small calcifications compatible with prior  granulomatous disease  GALLBLADDER: Multiple stones within a contracted gallbladder  SPLEEN: Spleen is mildly enlarged 14.4 cm with multiple calcifications  compatible with prior granulomatous disease  PANCREAS: No mass or ductal dilatation. ADRENALS: 3.0 x 2.3 cm right adrenal nodule measuring 2 Hounsfield units  compatible with an adenoma unchanged. Left adrenal gland is not enlarged  KIDNEYS/URETERS: No mass, calculus, or hydronephrosis. STOMACH: Patient is post gastric band  SMALL BOWEL: No dilatation or wall thickening. COLON: No dilatation or wall thickening. APPENDIX: Surgically absent  PERITONEUM: No ascites or pneumoperitoneum. RETROPERITONEUM: Aorta is mildly calcified but normal in caliber. There is a  mantle of confluent nodes surrounding the aorta. This does not appear  significantly different. Representative nodes to the left of the aorta and  anterior to the aorta on series 3 image 45 measure 17 x 13 mm and 11 x 18 mm. This measures slightly smaller than on the previous study but this may be  technically given the poor margination of the ruddy mass. REPRODUCTIVE ORGANS: Uterus and ovaries are surgically absent. Soft tissue  thickening in the left pelvic sidewall has not significantly changed and no  recurrent pelvic mass lesion is noted  URINARY BLADDER: No mass or calculus. BONES: Sclerotic bone metastases have not significantly changed in the interval  ADDITIONAL COMMENTS: N/A     IMPRESSION  IMPRESSION:     1. Retroperitoneal confluent adenopathy stable to slightly decreased in size  2. Unchanged bony metastatic disease  3. Unchanged soft tissue in the left pelvic sidewall without evidence of local  recurrence  4. Pulmonary nodule right lung base slightly smaller  5.  New areas of groundglass opacification at the lung bases nonspecific but may  be posttreatment related        CT 2/13/18  COMPARISON: February 13, 2018. October 23, 2017.     CONTRAST: None.     TECHNIQUE:  5 mm axial images were obtained through the chest. Images were  obtained without contrast through the abdomen and pelvis as well. Coronal and  sagittal reconstructions were generated. CT dose reduction was achieved through  use of a standardized protocol tailored for this examination and automatic  exposure control for dose modulation.     The absence of intravenous contrast reduces the sensitivity for evaluation of  the mediastinum and upper abdominal organs.     FINDINGS:      Previous studies for comparison:  February 13, 2018. October 23, 2017.     Images through the mediastinum and hilar regions reveal no new mass or  adenopathy. Sclerotic/destructive changes involving the left manubrium  redemonstrated without appreciable interval change.     Images through the lung parenchyma reveal scattered bilateral small pulmonary  nodular densities unchanged.     Images through the liver reveal no new space-occupying lesion or biliary  dilatation.     There is adequate opacification of the hepatic and portal veins.     Images through the gallbladder fossa reveal multiple gallstones.     The spleen is normal in size.      The pancreas has a normal morphologic appearance.      Kidneys demonstrated normal morphologic appearance. No hydronephrosis is  present.     The right adrenal gland remains enlarged measuring 2.9 x 2.2 cm, previously 3.0  x 2.2 cm.     The visualized opacified bowel loops are nonobstructed.     Persistent confluent appearing retroperitoneal lymphadenopathy is demonstrated  with two dominant lesions measuring roughly 2.6 x 2.6 cm, previously 2.6 x 2.6  cm. The other closely adjacent lesion measures roughly 2.1 x 1.7 cm, previously  2.1 x 1.7 cm.     Images through the pelvis no space-occupying lesion or lymphadenopathy.   Prominent soft tissue swelling is present in the pelvis and lower extremities.     Scattered predominately sclerotic lesions in the lower lumbar vertebra and  sternum are redemonstrated without appreciable interval change.     IMPRESSION  IMPRESSION:     Status post gastric sleeve.     Stable scattered bilateral pulmonary nodular densities.     Stable retroperitoneal lymphadenopathy.     Multiple gallstones.     Stable bony metastatic disease.     Possible minimal left hydronephrosis.     Pelvic and lower extremity presumed lymphedema         Assessment/Plan:   75 y.o. with metastatic vaginal cancer, s/p initial XRT/CDDP. Ongoing antineoplastic chemotherapy    Patient Active Problem List   Diagnosis Code    LAP-BAND surgery status Z98.84    Vaginal cancer ([facility] [address] C52    Hypertension, essential I10    Hypercholesterolemia E78.00    GERD without esophagitis K21.9    Lymphedema of both lower extremities I89.0    Morbid obesity with BMI of 40.0-44.9, adult ([facility] E66.01, Z68.41    CKD (chronic kidney disease) stage 4, GFR 15-29 ml/min ([facility]) N18.4    Anemia D64.9    Glucose intolerance (impaired glucose tolerance) R73.02    On antineoplastic chemotherapy Z79.899    Cancer related pain G89.3    Metastatic neoplastic disease (HCC) C79.9    Hematuria R31.9    Acute cystitis without hematuria N30.00         Continue Taxol/Avastin   Hematuria: s/p RT. Seen by urology  Anemia: d/t chemo > hematuria. Improved s/p 2 units last month  Hx UTI, resolved  Chronic renal disease: Improved - stable. Continues to follow with Dr. Kuldip Manzanares  counseled to continue leg pumps. F/u with lymphedema clinic. Neuropathy: gabapentin  Psychosocial: well supported by . No financial concerns.       DEBBIE Man

## 2018-07-05 NOTE — PROGRESS NOTES
[facility]patient [facility]fusion [facility] - Chemotherapy Progress Note    0930 Pt admit to [facility] for Taxol/Avastin via wheelchair in stable condition. Assessment completed. Pt continues to have pitting edema in bilateral lower extremities and cannot walk for long distances. Port accessed with positive blood return. CMP drawn on 7/2/18 and used for today's treatment. Urinalysis, CBC and CA-125 drawn and sent for processing. IV attached to NS at [address] 7/5/2018   Cycle C12   Date 7/5/2018   Drug / Regimen Taxol/Avastin   Dosage -   Pre Hydration -   Post Hydration -   Pre Meds given   Notes given       Visit Vitals    /62 (BP 1 Location: Right arm, BP Patient Position: At rest)    Pulse (!) 57    Temp 97.3 °F (36.3 °C)    Resp 18    Ht 5' 2\" (1.575 m)    Wt 113.4 kg (249 lb 14.4 oz)    Breastfeeding No    BMI 45.71 kg/m2       Medications:  NS  Benadryl   Pepcid  Decadron  Taxol over 3 hours   Avastin     1530 Pt tolerated treatment well. Port maintained positive blood return throughout treatment, flushed with positive blood return at conclusion. D/c home ambulatory in no distress. Pt aware of next appointment scheduled for 7/25/18 9am    Recent Results (from the past 12 hour(s))   CBC WITH AUTOMATED DIFF    Collection Time: 07/05/18  9:02 AM   Result Value Ref Range    WBC 9.7 3.6 - 11.0 K/uL    RBC 3.33 (L) 3.80 - 5.20 M/uL    HGB 9.6 (L) 11.5 - 16.0 g/dL    HCT 31.4 (L) 35.0 - 47.0 %    MCV 94.3 80.0 - 99.0 FL    MCH 28.8 26.0 - 34.0 PG    MCHC 30.6 30.0 - 36.5 g/dL    RDW 18.6 (H) 11.5 - 14.5 %    PLATELET 966 440 - 751 K/uL    MPV 10.1 8.9 - 12.9 FL    NRBC 0.0 0  WBC    ABSOLUTE NRBC 0.00 0.00 - 0.01 K/uL    NEUTROPHILS 84 (H) 32 - 75 %    BAND NEUTROPHILS 1 0 - 6 %    LYMPHOCYTES 7 (L) 12 - 49 %    MONOCYTES 6 5 - 13 %    EOSINOPHILS 0 0 - 7 %    BASOPHILS 0 0 - 1 %    METAMYELOCYTES 1 (H) 0 %    MYELOCYTES 1 (H) 0 %    IMMATURE GRANULOCYTES 0 %    ABS.  NEUTROPHILS 8.2 (H) 1.8 - 8.0 K/UL    ABS. LYMPHOCYTES 0.7 (L) 0.8 - 3.5 K/UL    ABS. MONOCYTES 0.6 0.0 - 1.0 K/UL    ABS. EOSINOPHILS 0.0 0.0 - 0.4 K/UL    ABS. BASOPHILS 0.0 0.0 - 0.1 K/UL    ABS. IMM.  GRANS. 0.0 K/UL    DF AUTOMATED      RBC COMMENTS ANISOCYTOSIS  1+        RBC COMMENTS OVALOCYTES  PRESENT        RBC COMMENTS MACROCYTOSIS  1+       MAGNESIUM    Collection Time: 07/05/18  9:02 AM   Result Value Ref Range    Magnesium 1.9 1.6 - 2.4 mg/dL   URINALYSIS W/ REFLEX CULTURE    Collection Time: 07/05/18  9:02 AM   Result Value Ref Range    Color YELLOW/STRAW      Appearance CLEAR CLEAR      Specific gravity 1.015 1.003 - 1.030      pH (UA) 5.5 5.0 - 8.0      Protein NEGATIVE  NEG mg/dL    Glucose NEGATIVE  NEG mg/dL    Ketone NEGATIVE  NEG mg/dL    Bilirubin NEGATIVE  NEG      Blood NEGATIVE  NEG      Urobilinogen 0.2 0.2 - 1.0 EU/dL    Nitrites NEGATIVE  NEG      Leukocyte Esterase NEGATIVE  NEG      WBC 0-4 0 - 4 /hpf    RBC 0-5 0 - 5 /hpf    Epithelial cells FEW FEW /lpf    Bacteria NEGATIVE  NEG /hpf    UA:UC IF INDICATED CULTURE NOT INDICATED BY UA RESULT CNI

## 2018-07-30 NOTE — PROGRESS NOTES
Pre chemo, labs today and for C13 taxol and C4 avastin on 8/1/18. Pt c/o pain in lower back which she thinks is do to an incident where she almost fell.

## 2018-07-30 NOTE — PROGRESS NOTES
[facility] GYNECOLOGIC ONCOLOGY  [address] [place] [address], [address], [address]  P [phone]  F [phone]      [facility] Progress Note  Unknown Rossy  70 y.o.    CC: Presents for infusion therapy    [address] MD: Horace Prieto MD  PCP: Kelvin Vu MD     Primary Onc Dx: Vaginal cancer  Date of Dx: 12/2015      Current Agent(s): Taxol/Avastin      HPI:  Unknown Rossy is a 70 y.o.  postmenopausal female who was referred to Dr. Aurora Germain in December 2015 for a pelvic mass by Dr. Leopoldo Peak, who noted the mass on routine gynecologic exam.  CT of the pelvis revealed a mass at the vaginal apex, that appeared to be invading into the soft tissues of the left pelvis. There were several enlarged lymph nodes on the left. There was also questionable bladder invasion, and possibly even involvement of the rectosigmoid colon. She reported a history of having a mass removed from one of her ovaries in 1 at [facility], which required a second surgery to remove her uterus and contralateral ovary. She said she did not have to take any chemotherapy, but she did have to take some kind of pill for a year. She has not had any gynecologic issues since that time. Presumably her hysterectomy was a total, rather than a supracervical, but she is unsure and there are no records. She underwent EUA with cystoscopy and proctoscopy. Pathology revealed a squamous cell carcinoma, consistent with a vaginal primary. Examination revealed a fixed mass involving the left upper vagina with extension to the left pelvic sidewall. There also appeared to be involvement of the posterior bladder wall, but no mucosal involvement. The mass did push against the rectal wall, but there did not appear to be any direct involvement. He then referred her to radiation oncology and she saw Dr. Ovidio Leung. She completed pelvic radiation along with concurrent cisplatin chemotherapy in February 2016 and had a great response.     She recurred in mid 2017 with metastatic disease and has been undergoing chemotherapy, most recently started on Taxol/Avastin. Oncology Treatment History:  12/4/2015: Dx with Vaginal cancer after an EUA and biopsies of pelvic mass and vagina                     Vagina, biopsy: Invasive squamous cell carcinoma, poorly differentiated. No loss of mismatch repair proteins   1/15/16-2/12/16: Weekly Cisplatin with XRT with good response. 5/16/17: CT scan showed Bilateral pulmonary nodules and retroperitoneal lymphadenopathy compatible with metastatic disease. 6/14/17-10/4/17: 6 cycles of Cisplatin/Taxol. Much difficulty with neuropathy  10/23/17: CT scan (without contrast) after 6 cycles showed  Slight progression of retroperitoneal adenopathy with stable appearing pulmonary and osseous metastases as above. 10/26/17: saw after above CT results. Dr. Madeline Gauthier felt the changes didn't meet RECIST criteria for progression. He recommend continuing with therapy, but suggested that we exchange Carboplatin for Cisplatin due to nephrotoxicity. Her creatinine has pamella with the Cisplatin. 11/1/17 - 4/18/18: Carbo/Taxol, continued carboplatin reaction. Single agent taxol   2/13/2018: Due to elevated creat, a non-contrast CT was obtained before planned time. Resulted out as Stable pulmonary metastases. Stable bone metastases. Multiple gallstones. Stable para-aortic lymphadenopathy  4/16/18: stable metastatic disease, possible minimal left hydro. 5/1/18: admitted under Dr. Sherren Abrahams (nephrology) to [facility] for diuresis. 60-70lbs and 2units PRBCs  5/16/18: taxol/avastin anticipated. Held d/t renal failure  5/23/18: Taxol/Avastin initiated  6/26/18: CT A/P:  1. Retroperitoneal adenopathy stable to slightly decreased in size, 2. Unchanged bony metastatic disease  3. Unchanged soft tissue in the left pelvic sidewall without evidence of local recurrence 4. Pulmonary nodule right lung base slightly smaller  5.  New areas of groundglass opacification at the lung bases nonspecific but may be posttreatment related      Subjective:    Pt presents for pre-chemo visit and presents with her  who remains very supportive. She feels the edema in her legs continues to increase, but feels she is loosing wt to her upper body. Pt has gained 16 lbs since June 13th. She saw Dr. Matias Adams 2 weeks ago and does not have another apt with him for 4 weeks. She is taking some of her diuretics as prescribed, but continues to hold her B/P mediatation due to lower B/P in early June. She had and incident at home where she slid out of bed down to the floor due to under pad slipping when she was going to get out of bed. Says her lower back has been \"sore\" since. Feels it is getting a little better now. No other injury noted. Has not seen PCP for this. Continues to have mild abd/flank pains on occasion, unrelated to voids or this incident. C/O continued lower extremity discomfort, but she can ambulate, prepare some meals and dress self. She arrived in wheelchair today, uses walker if she needs to go a long distance. At home gets around mostly w/o walker. She has had only one other incident of bleeding with urination and no further for 2 weeks now. She has been using leg compression pumps less. Denies overt vaginal bleeding. ROS:  Constitutional:  No fevers/night sweats  Respiratory: SOB with exertion  Cardiovascular: no chest pain  Heme: No bruising, bleeding  Gastrointestinal: no abdominal pain, change in bowel habits, or black or bloody stools  Genito-Urinary: no dysuria, trouble voiding. +hematuria  Musculoskeletal: +muscle pain  Neurological: + dizziness. bilat LE foot neuropathy unchanged   Derm: scaling bilat LE.  No oral lesions  Psych: negative for anxiety and depression        Current Outpatient Prescriptions   Medication Sig    torsemide (DEMADEX) 100 mg tablet     oxyCODONE-acetaminophen (PERCOCET) 5-325 mg per tablet Take 1 Tab by mouth every six (6) hours as needed for Pain. Max Daily Amount: 4 Tabs.  predniSONE (DELTASONE) 10 mg tablet Take 10 mg by mouth daily.  potassium chloride SR (KLOR-CON 10) 10 mEq tablet Take 20 mEq by mouth daily.  ondansetron (ZOFRAN ODT) 4 mg disintegrating tablet Take 1 Tab by mouth every eight (8) hours as needed for Nausea. Indications: CANCER CHEMOTHERAPY-INDUCED NAUSEA AND VOMITING    gabapentin (NEURONTIN) 300 mg capsule TAKE 1 CAPSULES BY MOUTH NIGHTLY    albuterol (PROVENTIL HFA, VENTOLIN HFA, PROAIR HFA) 90 mcg/actuation inhaler Take 2 Puffs by inhalation every six (6) hours as needed for Wheezing. Indications: BRONCHOSPASM PREVENTION    acetaminophen (TYLENOL) 325 mg tablet Take  by mouth every four (4) hours as needed for Pain.  famotidine (PEPCID) 20 mg tablet Take 1 Tab by mouth daily. Indications: gastroesophageal reflux disease    docusate sodium (COLACE) 100 mg capsule Take 100 mg by mouth daily.  dexamethasone (DECADRON) 4 mg tablet Take 2 tablets with breakfast the day before chemo and for 2 days after chemo    atorvastatin (LIPITOR) 80 mg tablet Take 1 Tab by mouth daily.  Cholecalciferol, Vitamin D3, (VITAMIN D3) 1,000 unit cap Take 1 Cap by mouth daily.  carvedilol (COREG) 6.25 mg tablet Take 1 Tab by mouth two (2) times daily (with meals). Indications: hypertension     No current facility-administered medications for this visit.       Facility-Administered Medications Ordered in Other Visits   Medication Dose Route Frequency    [START ON 8/1/2018] PACLitaxel (TAXOL) 288 mg in 0.9% sodium chloride 250 mL, overfill volume 25 mL chemo infusion  288 mg IntraVENous ONCE    [START ON 8/1/2018] diphenhydrAMINE (BENADRYL) injection 50 mg  50 mg IntraVENous ONCE    [START ON 8/1/2018] famotidine (PF) (PEPCID) 20 mg in sodium chloride 0.9% 10 mL injection  20 mg IntraVENous ONCE    [START ON 8/1/2018] dexamethasone (DECADRON) 12 mg in 0.9% sodium chloride 50 mL IVPB  12 mg IntraVENous ONCE    [START ON 8/1/2018] bevacizumab (AVASTIN) 1,560 mg in 0.9% sodium chloride 100 mL, overfill volume 10 mL IVPB  1,560 mg IntraVENous ONCE         Objective:     Visit Vitals    /73 (BP 1 Location: Left arm, BP Patient Position: Sitting)    Pulse 68    Ht 5' 2.01\" (1.575 m)    Wt 255 lb 12.8 oz (116 kg)    BMI 46.77 kg/m2       Physical Exam:  General: A&O X3 in NAD with pleasant affect  HEENT: Sclera anicteric, mucosa pale, slightly dry,. No bleeding or inflammation noted. Neck: No JVD or cervical adenopathy appreciated. Small area to left upper chest/lbelow clavicle   Port Site: without redness, tenderness or swelling. Accessed without difficulty  Heart: Regular without M/R/G  Lungs: CTA Bilat without wheezing or rales noted. Abd: Soft, obese, NT/ND without fluid wave and + BS throughout  Ext: Extensive bilat lower ext edema from groin to feet. Persist with vascular skin changes previously seen  Neuro: grossly intact         Lab Results   Component Value Date/Time    WBC 9.7 07/05/2018 09:02 AM    HGB 9.6 (L) 07/05/2018 09:02 AM    HCT 31.4 (L) 07/05/2018 09:02 AM    PLATELET 092 95/68/8826 09:02 AM    MCV 94.3 07/05/2018 09:02 AM     Lab Results   Component Value Date/Time    ABS. NEUTROPHILS 8.2 (H) 07/05/2018 09:02 AM     Lab Results   Component Value Date/Time    Sodium 142 07/02/2018 11:09 AM    Potassium 3.6 07/02/2018 11:09 AM    Chloride 93 (L) 07/02/2018 11:09 AM    CO2 31 (H) 07/02/2018 11:09 AM    Anion gap 13 05/23/2018 08:09 AM    Glucose 91 07/02/2018 11:09 AM    BUN 32 (H) 07/02/2018 11:09 AM    Creatinine 1.95 (H) 07/02/2018 11:09 AM    BUN/Creatinine ratio 16 07/02/2018 11:09 AM    GFR est AA 30 (L) 07/02/2018 11:09 AM    GFR est non-AA 26 (L) 07/02/2018 11:09 AM    Calcium 8.7 07/02/2018 11:09 AM    Bilirubin, total 0.4 07/02/2018 11:09 AM    AST (SGOT) 10 07/02/2018 11:09 AM    Alk.  phosphatase 117 07/02/2018 11:09 AM    Protein, total 6.3 07/02/2018 11:09 AM    Albumin 3.5 (L) 07/02/2018 11:09 AM    Globulin 3.3 05/23/2018 08:09 AM    A-G Ratio 1.3 07/02/2018 11:09 AM    ALT (SGPT) 10 07/02/2018 11:09 AM     UA no protein      IMAGING:  CT A/P 6/26/18  TECHNIQUE:   Thin axial images were obtained through the abdomen and pelvis. Coronal and  sagittal reconstructions were generated. Oral contrast was administered. CT dose  reduction was achieved through use of a standardized protocol tailored for this  examination and automatic exposure control for dose modulation.      The absence of intravenous contrast material reduces the sensitivity for  evaluation of the solid parenchymal organs of the abdomen.      FINDINGS:   LUNG BASES: Clear. INCIDENTALLY IMAGED HEART AND MEDIASTINUM: 12 mm nodule at the right lung base  is slightly decreased in size. Previously measured 15 mm. There are patchy areas  of groundglass opacification in the bilateral lung bases which are new  LIVER: Hypodensity segment 2 near the dome is incompletely evaluated but likely  unchanged. It may represent a cyst. Small calcifications compatible with prior  granulomatous disease  GALLBLADDER: Multiple stones within a contracted gallbladder  SPLEEN: Spleen is mildly enlarged 14.4 cm with multiple calcifications  compatible with prior granulomatous disease  PANCREAS: No mass or ductal dilatation. ADRENALS: 3.0 x 2.3 cm right adrenal nodule measuring 2 Hounsfield units  compatible with an adenoma unchanged. Left adrenal gland is not enlarged  KIDNEYS/URETERS: No mass, calculus, or hydronephrosis. STOMACH: Patient is post gastric band  SMALL BOWEL: No dilatation or wall thickening. COLON: No dilatation or wall thickening. APPENDIX: Surgically absent  PERITONEUM: No ascites or pneumoperitoneum. RETROPERITONEUM: Aorta is mildly calcified but normal in caliber. There is a  mantle of confluent nodes surrounding the aorta. This does not appear  significantly different.  Representative nodes to the left of the aorta and  anterior to the aorta on series 3 image 45 measure 17 x 13 mm and 11 x 18 mm. This measures slightly smaller than on the previous study but this may be  technically given the poor margination of the ruddy mass. REPRODUCTIVE ORGANS: Uterus and ovaries are surgically absent. Soft tissue  thickening in the left pelvic sidewall has not significantly changed and no  recurrent pelvic mass lesion is noted  URINARY BLADDER: No mass or calculus. BONES: Sclerotic bone metastases have not significantly changed in the interval  ADDITIONAL COMMENTS: N/A     IMPRESSION  IMPRESSION:     1. Retroperitoneal confluent adenopathy stable to slightly decreased in size  2. Unchanged bony metastatic disease  3. Unchanged soft tissue in the left pelvic sidewall without evidence of local  recurrence  4. Pulmonary nodule right lung base slightly smaller  5. New areas of groundglass opacification at the lung bases nonspecific but may  be posttreatment related        CT 2/13/18  COMPARISON: February 13, 2018. October 23, 2017.     CONTRAST: None.     TECHNIQUE:  5 mm axial images were obtained through the chest. Images were  obtained without contrast through the abdomen and pelvis as well. Coronal and  sagittal reconstructions were generated. CT dose reduction was achieved through  use of a standardized protocol tailored for this examination and automatic  exposure control for dose modulation.     The absence of intravenous contrast reduces the sensitivity for evaluation of  the mediastinum and upper abdominal organs.     FINDINGS:      Previous studies for comparison:  February 13, 2018. October 23, 2017.     Images through the mediastinum and hilar regions reveal no new mass or  adenopathy.  Sclerotic/destructive changes involving the left manubrium  redemonstrated without appreciable interval change.     Images through the lung parenchyma reveal scattered bilateral small pulmonary  nodular densities unchanged.     Images through the liver reveal no new space-occupying lesion or biliary  dilatation.     There is adequate opacification of the hepatic and portal veins.     Images through the gallbladder fossa reveal multiple gallstones.     The spleen is normal in size.      The pancreas has a normal morphologic appearance.      Kidneys demonstrated normal morphologic appearance. No hydronephrosis is  present.     The right adrenal gland remains enlarged measuring 2.9 x 2.2 cm, previously 3.0  x 2.2 cm.     The visualized opacified bowel loops are nonobstructed.     Persistent confluent appearing retroperitoneal lymphadenopathy is demonstrated  with two dominant lesions measuring roughly 2.6 x 2.6 cm, previously 2.6 x 2.6  cm. The other closely adjacent lesion measures roughly 2.1 x 1.7 cm, previously  2.1 x 1.7 cm.     Images through the pelvis no space-occupying lesion or lymphadenopathy. Prominent soft tissue swelling is present in the pelvis and lower extremities.     Scattered predominately sclerotic lesions in the lower lumbar vertebra and  sternum are redemonstrated without appreciable interval change.     IMPRESSION  IMPRESSION:     Status post gastric sleeve.     Stable scattered bilateral pulmonary nodular densities.     Stable retroperitoneal lymphadenopathy.     Multiple gallstones.     Stable bony metastatic disease.     Possible minimal left hydronephrosis.     Pelvic and lower extremity presumed lymphedema         Assessment/Plan:   70 y.o. with metastatic vaginal cancer, s/p initial XRT/CDDP.   Ongoing antineoplastic chemotherapy    Patient Active Problem List   Diagnosis Code    LAP-BAND surgery status Z98.84    Vaginal cancer ([address] C52    Hypertension, essential I10    Hypercholesterolemia E78.00    GERD without esophagitis K21.9    Lymphedema of both lower extremities I89.0    Morbid obesity with BMI of 40.0-44.9, adult ([address] E66.01, Z68.41    CKD (chronic kidney disease) stage 4, GFR 15-29 ml/min ([facility]) N18.4    Anemia D64.9    Glucose intolerance (impaired glucose tolerance) R73.02    On antineoplastic chemotherapy Z79.899    Cancer related pain G89.3    Metastatic neoplastic disease (HCC) C79.9    Hematuria R31.9    Acute cystitis without hematuria N30.00         Will check labs today and if stable, will proceed with cycle 4 of Taxol/Avastin. Will monitor B/P and if elevated at next check, will re-start home medications at low dose. Hematuria: s/p RT, currently resolved. Being seen by urology. She will let us know if this re-occurs   Anemia: d/t chemo > hematuria. Improved s/p 2 units in June  Hx UTI, resolved  Chronic renal disease: Improved - stable. Continues to follow with Dr. Jesusita Ramsey  counseled to continue leg pumps. F/u with lymphedema clinic prn. Neuropathy: gabapentin, refilled today  Fall/Slip: pt will let us know if her lower back pain armas not improve or gets worse and will obtain xray   Pain: mostly in lower ext and flank area. Says she still requires pain meds to be able to sleep at night. Refilled percocet today  Psychosocial: well supported by . No financial concerns.       Lauren Hudson NP

## 2018-08-01 NOTE — PROGRESS NOTES
[facility], [address] [facility], [address] 
P [phone]  F [phone] [facility] Progress Note Jose Cummings 70 y.o. 
 
CC: Presents for infusion therapy [facility] MD: Elba Funes MD 
PCP: Rod Nunez MD 
  
Primary Onc Dx: Vaginal cancer Date of Dx: 12/2015 
   
Current Agent(s): Taxol/Avastin HPI: 
Jose Cummings is a 70 y.o.  postmenopausal female who was referred to Dr. Anibal Humphrey in December 2015 for a pelvic mass by Dr. Jimbo Trevino, who noted the mass on routine gynecologic exam.  CT of the pelvis revealed a mass at the vaginal apex, that appeared to be invading into the soft tissues of the left pelvis. There were several enlarged lymph nodes on the left. There was also questionable bladder invasion, and possibly even involvement of the rectosigmoid colon. She reported a history of having a mass removed from one of her ovaries in 1 at [facility], which required a second surgery to remove her uterus and contralateral ovary. She said she did not have to take any chemotherapy, but she did have to take some kind of pill for a year. She has not had any gynecologic issues since that time. Presumably her hysterectomy was a total, rather than a supracervical, but she is unsure and there are no records. She underwent EUA with cystoscopy and proctoscopy. Pathology revealed a squamous cell carcinoma, consistent with a vaginal primary. Examination revealed a fixed mass involving the left upper vagina with extension to the left pelvic sidewall. There also appeared to be involvement of the posterior bladder wall, but no mucosal involvement. The mass did push against the rectal wall, but there did not appear to be any direct involvement. He then referred her to radiation oncology and she saw Dr. Mart Watson. She completed pelvic radiation along with concurrent cisplatin chemotherapy in February 2016 and had a great response.    
She recurred in mid 2017 with metastatic disease and has been undergoing chemotherapy, most recently started on Taxol/Avastin. Oncology Treatment History: 
12/4/2015: Dx with Vaginal cancer after an EUA and biopsies of pelvic mass and vagina Vagina, biopsy: Invasive squamous cell carcinoma, poorly differentiated. No loss of mismatch repair proteins 1/15/16-2/12/16: Weekly Cisplatin with XRT with good response. 5/16/17: CT scan showed Bilateral pulmonary nodules and retroperitoneal lymphadenopathy compatible with metastatic disease. 6/14/17-10/4/17: 6 cycles of Cisplatin/Taxol. Much difficulty with neuropathy 10/23/17: CT scan (without contrast) after 6 cycles showed  Slight progression of retroperitoneal adenopathy with stable appearing pulmonary and osseous metastases as above. 10/26/17: saw after above CT results. Dr. Nena Taylor felt the changes didn't meet RECIST criteria for progression. He recommend continuing with therapy, but suggested that we exchange Carboplatin for Cisplatin due to nephrotoxicity. Her creatinine has pamella with the Cisplatin. 11/1/17 - 4/18/18: Carbo/Taxol, continued carboplatin reaction. Single agent taxol 2/13/2018: Due to elevated creat, a non-contrast CT was obtained before planned time. Resulted out as Stable pulmonary metastases. Stable bone metastases. Multiple gallstones. Stable para-aortic lymphadenopathy 4/16/18: stable metastatic disease, possible minimal left hydro. 5/1/18: admitted under Dr. Param Cabrera (nephrology) to [facility] for diuresis. 60-70lbs and 2units PRBCs 5/16/18: taxol/avastin anticipated. Held d/t renal failure 5/23/18: Taxol/Avastin initiated 6/26/18: CT A/P:  1. Retroperitoneal adenopathy stable to slightly decreased in size, 2. Unchanged bony metastatic disease 3. Unchanged soft tissue in the left pelvic sidewall without evidence of local recurrence 4. Pulmonary nodule right lung base slightly smaller 5.  New areas of groundglass opacification at the lung bases nonspecific but may be posttreatment related Subjective: Pt presents for Cycle 14 of Taxol and her 4th infusion of Avastin today and presents with her  who remains very supportive. She feels the edema in her legs continues to increase, but feels she is loosing wt to her upper body. Pt has gained 16 lbs since June 13th. She saw Dr. Cl Sandoval 2 weeks ago and does not have another apt with him for 4 weeks. She feels she will now call him and make it sooner. She is taking some of her diuretics as prescribed, but continues to hold her B/P mediatation due to lower B/P in early June. Today it is 140/73 and says she may begin it again Continues to have mild abd/flank pains on occasion, unrelated to voids or her recent incident of slipping down bed to floor. C/O continued lower extremity discomfort, but she can ambulate, prepare some meals and dress self. She arrived in wheelchair today, uses walker if she needs to go a long distance. At home gets around mostly w/o walker. She had had only one other incident of bleeding with urination and no further for 2 weeks, then on Tuesday, had a single occurrence without any pain or discomfort. None since. She has been using leg compression pumps less. Denies overt vaginal bleeding. ROS: 
Constitutional:  No fevers/night sweats Respiratory: SOB with exertion Cardiovascular: no chest pain Heme: No bruising, bleeding Gastrointestinal: no abdominal pain, change in bowel habits, or black or bloody stools Genito-Urinary: no dysuria, trouble voiding. +hematuria Musculoskeletal: +muscle pain, \"achyness\" and thinks it is related to her edema Neurological: + dizziness. bilat LE foot neuropathy unchanged/stable Derm: scaling bilat LE. No oral lesions Psych: negative for anxiety and depression Current Outpatient Prescriptions Medication Sig  
 gabapentin (NEURONTIN) 300 mg capsule TAKE 1 CAPSULES BY MOUTH NIGHTLY  oxyCODONE-acetaminophen (PERCOCET) 5-325 mg per tablet Take 1 Tab by mouth every six (6) hours as needed for Pain. Max Daily Amount: 4 Tabs.  torsemide (DEMADEX) 100 mg tablet  predniSONE (DELTASONE) 10 mg tablet Take 10 mg by mouth daily.  potassium chloride SR (KLOR-CON 10) 10 mEq tablet Take 20 mEq by mouth daily.  ondansetron (ZOFRAN ODT) 4 mg disintegrating tablet Take 1 Tab by mouth every eight (8) hours as needed for Nausea. Indications: CANCER CHEMOTHERAPY-INDUCED NAUSEA AND VOMITING  
 albuterol (PROVENTIL HFA, VENTOLIN HFA, PROAIR HFA) 90 mcg/actuation inhaler Take 2 Puffs by inhalation every six (6) hours as needed for Wheezing. Indications: BRONCHOSPASM PREVENTION  
 acetaminophen (TYLENOL) 325 mg tablet Take  by mouth every four (4) hours as needed for Pain.  carvedilol (COREG) 6.25 mg tablet Take 1 Tab by mouth two (2) times daily (with meals). Indications: hypertension  famotidine (PEPCID) 20 mg tablet Take 1 Tab by mouth daily. Indications: gastroesophageal reflux disease  docusate sodium (COLACE) 100 mg capsule Take 100 mg by mouth daily.  dexamethasone (DECADRON) 4 mg tablet Take 2 tablets with breakfast the day before chemo and for 2 days after chemo  atorvastatin (LIPITOR) 80 mg tablet Take 1 Tab by mouth daily.  Cholecalciferol, Vitamin D3, (VITAMIN D3) 1,000 unit cap Take 1 Cap by mouth daily. Current Facility-Administered Medications Medication Dose Route Frequency  sodium chloride (NS) flush 5-10 mL  5-10 mL IntraVENous PRN  
 heparin (porcine) pf 500 Units  500 Units IntraVENous PRN  
 sodium chloride 0.9% injection 10 mL  10 mL IntraVENous PRN  
 0.9% sodium chloride infusion  50 mL/hr IntraVENous CONTINUOUS  
 bevacizumab (AVASTIN) 1,560 mg in 0.9% sodium chloride 100 mL, overfill volume 10 mL IVPB  1,560 mg IntraVENous ONCE  
 
 
 
Objective:  
 
Visit Vitals  /73 (BP 1 Location: Right arm, BP Patient Position: Sitting)  Pulse 72  Temp 97.8 °F (36.6 °C)  Resp 20  
 Ht 5' 2\" (1.575 m)  Wt 247 lb (112 kg)  SpO2 96%  Breastfeeding No  
 BMI 45.18 kg/m2 Physical Exam: 
General: A&O X3 in NAD with pleasant affect. HEENT: Sclera anicteric, mucosa pale, slightly dry,. No bleeding or inflammation noted. Neck: No JVD or cervical adenopathy appreciated. Small area to left upper chest/lbelow clavicle unchanged Port Site: without redness, tenderness or swelling. Accessed without difficulty Heart: Regular without M/R/G Lungs: CTA Bilat without wheezing or rales noted. Abd: Soft, obese, NT/ND without fluid wave and + BS throughout Ext: Extensive bilat lower ext edema from groin to feet. Persist with vascular skin changes previously seen Neuro: grossly intact Lab Results Component Value Date/Time WBC 6.5 07/30/2018 10:55 AM  
 HGB 9.0 (L) 07/30/2018 10:55 AM  
 HCT 29.3 (L) 07/30/2018 10:55 AM  
 PLATELET 751 15/82/9654 10:55 AM  
 MCV 91 07/30/2018 10:55 AM  
 
Lab Results Component Value Date/Time  
 ABS. NEUTROPHILS 5.0 07/30/2018 10:55 AM  
 
Lab Results Component Value Date/Time Sodium 146 (H) 07/30/2018 10:55 AM  
 Potassium 3.7 07/30/2018 10:55 AM  
 Chloride 97 07/30/2018 10:55 AM  
 CO2 30 (H) 07/30/2018 10:55 AM  
 Anion gap 13 05/23/2018 08:09 AM  
 Glucose 95 07/30/2018 10:55 AM  
 BUN 29 (H) 07/30/2018 10:55 AM  
 Creatinine 1.80 (H) 07/30/2018 10:55 AM  
 BUN/Creatinine ratio 16 07/30/2018 10:55 AM  
 GFR est AA 33 (L) 07/30/2018 10:55 AM  
 GFR est non-AA 28 (L) 07/30/2018 10:55 AM  
 Calcium 9.0 07/30/2018 10:55 AM  
 Bilirubin, total 0.3 07/30/2018 10:55 AM  
 AST (SGOT) 10 07/30/2018 10:55 AM  
 Alk. phosphatase 84 07/30/2018 10:55 AM  
 Protein, total 6.7 07/30/2018 10:55 AM  
 Albumin 3.7 07/30/2018 10:55 AM  
 Globulin 3.3 05/23/2018 08:09 AM  
 A-G Ratio 1.2 07/30/2018 10:55 AM  
 ALT (SGPT) 7 07/30/2018 10:55 AM  
 
UA no protein IMAGING: 
CT A/P 6/26/18 TECHNIQUE:  
Thin axial images were obtained through the abdomen and pelvis. Coronal and 
sagittal reconstructions were generated. Oral contrast was administered. CT dose 
reduction was achieved through use of a standardized protocol tailored for this 
examination and automatic exposure control for dose modulation.  
  
The absence of intravenous contrast material reduces the sensitivity for 
evaluation of the solid parenchymal organs of the abdomen.  
  
FINDINGS:  
LUNG BASES: Clear. INCIDENTALLY IMAGED HEART AND MEDIASTINUM: 12 mm nodule at the right lung base 
is slightly decreased in size. Previously measured 15 mm. There are patchy areas 
of groundglass opacification in the bilateral lung bases which are new LIVER: Hypodensity segment 2 near the dome is incompletely evaluated but likely 
unchanged. It may represent a cyst. Small calcifications compatible with prior 
granulomatous disease GALLBLADDER: Multiple stones within a contracted gallbladder SPLEEN: Spleen is mildly enlarged 14.4 cm with multiple calcifications 
compatible with prior granulomatous disease PANCREAS: No mass or ductal dilatation. ADRENALS: 3.0 x 2.3 cm right adrenal nodule measuring 2 Hounsfield units 
compatible with an adenoma unchanged. Left adrenal gland is not enlarged KIDNEYS/URETERS: No mass, calculus, or hydronephrosis. STOMACH: Patient is post gastric band SMALL BOWEL: No dilatation or wall thickening. COLON: No dilatation or wall thickening. APPENDIX: Surgically absent PERITONEUM: No ascites or pneumoperitoneum. RETROPERITONEUM: Aorta is mildly calcified but normal in caliber. There is a 
mantle of confluent nodes surrounding the aorta. This does not appear 
significantly different. Representative nodes to the left of the aorta and 
anterior to the aorta on series 3 image 45 measure 17 x 13 mm and 11 x 18 mm.  
This measures slightly smaller than on the previous study but this may be 
technically given the poor margination of the ruddy mass. 
REPRODUCTIVE ORGANS: Uterus and ovaries are surgically absent. Soft tissue 
thickening in the left pelvic sidewall has not significantly changed and no 
recurrent pelvic mass lesion is noted URINARY BLADDER: No mass or calculus. BONES: Sclerotic bone metastases have not significantly changed in the interval 
ADDITIONAL COMMENTS: N/A 
  
IMPRESSION IMPRESSION: 
  
1. Retroperitoneal confluent adenopathy stable to slightly decreased in size 2. Unchanged bony metastatic disease 3. Unchanged soft tissue in the left pelvic sidewall without evidence of local 
recurrence 4. Pulmonary nodule right lung base slightly smaller 5. New areas of groundglass opacification at the lung bases nonspecific but may 
be posttreatment related CT 2/13/18 COMPARISON: February 13, 2018. October 23, 2017. 
  
CONTRAST: None. 
  
TECHNIQUE:  5 mm axial images were obtained through the chest. Images were 
obtained without contrast through the abdomen and pelvis as well. Coronal and 
sagittal reconstructions were generated. CT dose reduction was achieved through 
use of a standardized protocol tailored for this examination and automatic 
exposure control for dose modulation. 
  
The absence of intravenous contrast reduces the sensitivity for evaluation of 
the mediastinum and upper abdominal organs. 
  
FINDINGS:  
  
Previous studies for comparison:  February 13, 2018. October 23, 2017. 
  
Images through the mediastinum and hilar regions reveal no new mass or 
adenopathy.  Sclerotic/destructive changes involving the left manubrium 
redemonstrated without appreciable interval change. 
  
Images through the lung parenchyma reveal scattered bilateral small pulmonary 
nodular densities unchanged. 
  
Images through the liver reveal no new space-occupying lesion or biliary 
dilatation. 
  
There is adequate opacification of the hepatic and portal veins. 
  
Images through the gallbladder fossa reveal multiple gallstones. 
  
The spleen is normal in size.  
  
The pancreas has a normal morphologic appearance. 
  
 Kidneys demonstrated normal morphologic appearance. No hydronephrosis is 
present. 
  
The right adrenal gland remains enlarged measuring 2.9 x 2.2 cm, previously 3.0 
x 2.2 cm. 
  
The visualized opacified bowel loops are nonobstructed. 
  
Persistent confluent appearing retroperitoneal lymphadenopathy is demonstrated 
with two dominant lesions measuring roughly 2.6 x 2.6 cm, previously 2.6 x 2.6 
cm. The other closely adjacent lesion measures roughly 2.1 x 1.7 cm, previously 2.1 x 1.7 cm. 
  
Images through the pelvis no space-occupying lesion or lymphadenopathy. Prominent soft tissue swelling is present in the pelvis and lower extremities. 
  
Scattered predominately sclerotic lesions in the lower lumbar vertebra and 
sternum are redemonstrated without appreciable interval change. IMPRESSION: 
  
Status post gastric sleeve. 
  
Stable scattered bilateral pulmonary nodular densities. 
  
Stable retroperitoneal lymphadenopathy. 
  
Multiple gallstones. 
  
Stable bony metastatic disease. 
  
Possible minimal left hydronephrosis. 
  
Pelvic and lower extremity presumed lymphedema 
  
 
 
Assessment/Plan:  
70 y.o. with metastatic vaginal cancer, s/p initial XRT/CDDP. Ongoing antineoplastic chemotherapy Patient Active Problem List  
Diagnosis Code  LAP-BAND surgery status Z98.84  Vaginal cancer ([facility] [address] C52  Hypertension, essential I10  
 Hypercholesterolemia E78.00  GERD without esophagitis K21.9  Lymphedema of both lower extremities I89.0  Morbid obesity with BMI of 40.0-44.9, adult (HCC) E66.01, Z68.41  
 CKD (chronic kidney disease) stage 4, GFR 15-29 ml/min (HCC) N18.4  Anemia D64.9  Glucose intolerance (impaired glucose tolerance) R73.02  
 On antineoplastic chemotherapy Z79.899  
 Cancer related pain G89.3  Metastatic neoplastic disease ([address] C79.9  Hematuria R31.9  Acute cystitis without hematuria N30.00 Will check labs today and if stable, will proceed with cycle 14 of Taxol and 4th infusion of Avastin. Will have pt add back her B/P meds Hematuria: s/p RT, currently resolved. Being seen by urology. She will let us know if this re-occurs Anemia: d/t chemo > hematuria. Improved s/p 2 units in June Hx UTI, resolved Chronic renal disease: Improved - stable. Continues to follow with Dr. Rico Guerrero 
counseled to continue leg pumps. F/u with lymphedema clinic prn. Neuropathy: gabapentin, refilled today Fall/Slip: pt will let us know if her lower back pain armas not improve or gets worse and will obtain xray. Says she feels it is slowly getting better Pain: mostly in lower ext and flank area. Says she still requires pain meds to be able to sleep at night. Refilled percocet today Psychosocial: well supported by . No financial concerns.  
 
 
Diana Hall NP

## 2018-08-01 NOTE — PROGRESS NOTES
[facility] - Chemotherapy Progress Note 1005 Pt admit to [facility] for Taxol/Avastin. Chemotherapy Flowsheet 8/1/2018 Cycle C13 Date 8/1/2018 Drug / Regimen Taxol/Avastin Dosage -  
Pre Hydration -  
Post Hydration -  
Pre Meds given Notes given Pt ambulatory in stable condition. Assessment completed. Back/hip pain continues. No new concerns voiced. Port accessed with positive blood return. Labs drawn at previous appointment except urinalysis. Line connected to NS at [facility]. Recent Results (from the past 12 hour(s)) URINALYSIS W/ RFLX MICROSCOPIC Collection Time: 08/01/18 10:10 AM  
Result Value Ref Range Color YELLOW/STRAW Appearance CLEAR CLEAR Specific gravity 1.006 1.003 - 1.030    
 pH (UA) 7.0 5.0 - 8.0 Protein NEGATIVE  NEG mg/dL Glucose NEGATIVE  NEG mg/dL Ketone NEGATIVE  NEG mg/dL Bilirubin NEGATIVE  NEG Blood NEGATIVE  NEG Urobilinogen 0.2 0.2 - 1.0 EU/dL Nitrites NEGATIVE  NEG Leukocyte Esterase NEGATIVE  NEG Visit Vitals  /71  Pulse 69  Temp 97.8 °F (36.6 °C)  Resp 16  
 Ht 5' 2\" (1.575 m)  Wt 112 kg (247 lb)  SpO2 96%  Breastfeeding No  
 BMI 45.18 kg/m2 Medications: 
NS at [facility] Benadryl 50 MG IVP Decadron 12 MG IVP Pepcid 20 MG IVP Taxol Avastin 1545 Pt tolerated treatment well. Port maintained positive blood return throughout treatment, flushed with positive blood return at conclusion and de-accessed. D/c home ambulatory in no distress. Pt aware of next [facility] appointment scheduled for 08/22/18 at 1000.

## 2018-08-21 NOTE — PROGRESS NOTES
[address] [place] [address] [address]ias Mor[address], [address] [facility]  [phone]  MD Eben Campbell MD  Office Note  Patient ID:  Name:  Montie Bernheim  MRN:  [id number]  :  1950/68 y.o. Date:  2018      HISTORY OF PRESENT ILLNESS:  Montie Bernheim is a 70 y.o.  postmenopausal female who was referred to me in 2015 for a pelvic mass by Dr. Jean Marie Valentine, who noted the mass on routine gynecologic exam.  He sent her for a CT of the pelvis, which revealed a mass at the vaginal apex, that appeared to be invading into the soft tissues of the left pelvis. There were several enlarged lymph nodes on the left. There was also questionable bladder invasion, and possibly even involvement of the rectosigmoid colon. She reported a history of having a mass removed from one of her ovaries in 1 at [facility], which required a second surgery to remove her uterus and contralateral ovary. She said she did not have to take any chemotherapy, but she did have to take some kind of pill for a year. She has not had any gynecologic issues since that time. Presumably her hysterectomy was a total, rather than a supracervical, but she is unsure and there are no records. She denied any vaginal bleeding, except for after her examination with Dr. Corazon Gonzalez. She reported minimal pain in the pelvis. I took her the OR for and exam under anesthesia with cystoscopy and proctoscopy. Pathology revealed a squamous cell carcinoma, consistent with a vaginal primary. Examination revealed a fixed mass involving the left upper vagina with extension to the left pelvic sidewall. There also appeared to be involvement of the posterior bladder wall, but no mucosal involvement. The mass did push against the rectal wall, but there did not appear to be any direct involvement. I referred her to radiation oncology and she saw Dr. Francisco Cortez.   She completed pelvic radiation along with concurrent cisplatin chemotherapy in February 2016 and had a great response. When I saw her in the office in March 2017 for follow up she was doing well and without complaints. Her pelvic exam and pap smear at that time were both negative. Subsequent to that visit she began having back pain that had progressively worsened. She saw Praveena Hummel in May 2017 and she ordered a CT. This unfortunately demonstrated extensive metastatic disease outside of the prior radiated field. Dr. Felicita Echavarria then sent her for a PET/CT which demonstrated widely metastatic disease. I recommended systemic chemotherapy with Taxol/Cisplatin/Avastin, which is the standard for metastatic cervical cancer, since vaginal cancer behaves similar to cervical cancer. Unfortunately, the Avastin was denied by her insurance company. We chose to proceed with Taxol/Cisplatin. She completed 6 cycles. Her scan in October 2017 demonstrated slight progression of the lymphadenopathy, while the pulmonary and bony disease appeared stable. On my review, the changes didn't meet RECIST criteria for progression. I recommended continuing with therapy, but I suggested that we exchange Carboplatin for Cisplatin due to nephrotoxicity. Her creatinine had steadily climbed with the Cisplatin. She completed 8 cycles of Taxol/Carboplatin after the switch, although she had a severe reaction to the Carboplatin at the time of her 8th cycle. We stopped the Carboplatin and added Avastin to the Taxol. She has completed 4 cycles of that regimen. She continues to have severe swelling in her lower extremities and has been hospitalized at least twice at [facility] for aggressive diuresis with her nephrologist, Dr. Chava Gibson. She was actually just discharged from the hospital.  She is feeling quite a bit better.         Tumor testing  No loss of mismatch repair proteins      ECOG score: 1       ROS:   and GI review:  Negative  Cardiopulmonary review:  Negative Musculoskeletal:  Negative    A comprehensive review of systems was negative except for that written in the History of Present Illness. , 10 point ROS        Problem List:  Patient Active Problem List    Diagnosis Date Noted    Acute cystitis without hematuria 06/13/2018    Hematuria 05/16/2018    On antineoplastic chemotherapy 03/01/2018    Cancer related pain 03/01/2018    Metastatic neoplastic disease ([address] 03/01/2018    Glucose intolerance (impaired glucose tolerance) 01/29/2018    Anemia 01/04/2018    CKD (chronic kidney disease) stage 4, GFR 15-29 ml/min ([address] 01/03/2018    Morbid obesity with BMI of 40.0-44.9, adult ([address] 12/12/2017    Lymphedema of both lower extremities 06/14/2017    Hypertension, essential 01/18/2017    Hypercholesterolemia 01/18/2017    GERD without esophagitis 01/18/2017    Vaginal cancer ([address] 12/10/2015    LAP-BAND surgery status 05/17/2012     PMH:  Past Medical History:   Diagnosis Date    Abnormal mammogram of left breast 12/5/2016    Cancer ([address] 06/2017    LYMPH NODES IN BACK    Cancer ([address] 2016    VAGINAL CANCER    GERD (gastroesophageal reflux disease) 6/11/06    Hypercholesterolemia 10/11/06    Hypertension 10/11/06    Morbid obesity ([address] 10/11/06      PSH:  Past Surgical History:   Procedure Laterality Date    HX APPENDECTOMY      HX GASTRIC BYPASS  2009    LAP BAND    HX GYN  12/4/15    EUA/Bx of pelvic mass and vagina/cystoscopy/proctoscopy    HX HYSTERECTOMY  1978    HX ORTHOPAEDIC  2009    right ankle    HX OTHER SURGICAL  2001?     benign mass removed from lower back    LAP, PLACE ADJUST GASTR BAND  1/30/08    EB: AP Std lap band, liver bx      Social History:  Social History   Substance Use Topics    Smoking status: Former Smoker     Packs/day: 0.50     Years: 15.00     Types: Cigarettes     Quit date: 1/1/1993    Smokeless tobacco: Never Used    Alcohol use Yes      Comment: socially-WINE ONCE A WEEK      Family History:  Family History   Problem Relation Age of Onset    Cancer Mother      colon WITH METS TO LIVER    Diabetes Father     Heart Disease Father     Cancer Sister      breast & bone    Cancer Maternal Aunt      BREAST    Cancer Paternal Aunt      BREAST    Anesth Problems Neg Hx       Medications: (reviewed)  Current Outpatient Prescriptions   Medication Sig    atorvastatin (LIPITOR) 80 mg tablet TAKE 1 TABLET EVERY DAY    gabapentin (NEURONTIN) 300 mg capsule TAKE 1 CAPSULES BY MOUTH NIGHTLY    oxyCODONE-acetaminophen (PERCOCET) 5-325 mg per tablet Take 1 Tab by mouth every six (6) hours as needed for Pain. Max Daily Amount: 4 Tabs.  torsemide (DEMADEX) 100 mg tablet     predniSONE (DELTASONE) 10 mg tablet Take 10 mg by mouth daily.  potassium chloride SR (KLOR-CON 10) 10 mEq tablet Take 20 mEq by mouth daily.  ondansetron (ZOFRAN ODT) 4 mg disintegrating tablet Take 1 Tab by mouth every eight (8) hours as needed for Nausea. Indications: CANCER CHEMOTHERAPY-INDUCED NAUSEA AND VOMITING    albuterol (PROVENTIL HFA, VENTOLIN HFA, PROAIR HFA) 90 mcg/actuation inhaler Take 2 Puffs by inhalation every six (6) hours as needed for Wheezing. Indications: BRONCHOSPASM PREVENTION    acetaminophen (TYLENOL) 325 mg tablet Take  by mouth every four (4) hours as needed for Pain.  carvedilol (COREG) 6.25 mg tablet Take 1 Tab by mouth two (2) times daily (with meals). Indications: hypertension    famotidine (PEPCID) 20 mg tablet Take 1 Tab by mouth daily. Indications: gastroesophageal reflux disease    docusate sodium (COLACE) 100 mg capsule Take 100 mg by mouth daily.  dexamethasone (DECADRON) 4 mg tablet Take 2 tablets with breakfast the day before chemo and for 2 days after chemo    Cholecalciferol, Vitamin D3, (VITAMIN D3) 1,000 unit cap Take 1 Cap by mouth daily. No current facility-administered medications for this visit.       Allergies: (reviewed)  Allergies   Allergen Reactions    Amlodipine Other (comments)     Edema in LE's    Carboplatin Shortness of Breath, Rash and Itching          OBJECTIVE:    Physical Exam:  VITAL SIGNS: There were no vitals filed for this visit. There is no height or weight on file to calculate BMI. GENERAL MIGUELANGEL: Conversant, alert, oriented. No acute distress. HEENT: HEENT. No thyroid enlargement. No JVD. Neck: Supple without restrictions. RESPIRATORY: Clear to auscultation and percussion to the bases. No CVAT. CARDIOVASC: RRR without murmur/rub. GASTROINT: soft, non-tender, without masses or organomegaly   MUSCULOSKEL: no joint tenderness, deformity or swelling   EXTREMITIES: Marked edema of bilateral lower extremities, L>R. Skin noted with vascular changes on both legs. PELVIC: Deferred   RECTAL: Deferred   PATRICIO SURVEY: No suspicious lymphadenopathy. NEURO: Grossly intact. No acute deficit. Lab Results   Component Value Date/Time    WBC 6.5 07/30/2018 10:55 AM    HGB 9.0 (L) 07/30/2018 10:55 AM    HCT 29.3 (L) 07/30/2018 10:55 AM    PLATELET 444 31/44/2088 10:55 AM    MCV 91 07/30/2018 10:55 AM     Lab Results   Component Value Date/Time    Sodium 146 (H) 07/30/2018 10:55 AM    Potassium 3.7 07/30/2018 10:55 AM    Chloride 97 07/30/2018 10:55 AM    CO2 30 (H) 07/30/2018 10:55 AM    Anion gap 13 05/23/2018 08:09 AM    Glucose 95 07/30/2018 10:55 AM    BUN 29 (H) 07/30/2018 10:55 AM    Creatinine 1.80 (H) 07/30/2018 10:55 AM    BUN/Creatinine ratio 16 07/30/2018 10:55 AM    GFR est AA 33 (L) 07/30/2018 10:55 AM    GFR est non-AA 28 (L) 07/30/2018 10:55 AM    Calcium 9.0 07/30/2018 10:55 AM    Bilirubin, total 0.3 07/30/2018 10:55 AM    AST (SGOT) 10 07/30/2018 10:55 AM    Alk.  phosphatase 84 07/30/2018 10:55 AM    Protein, total 6.7 07/30/2018 10:55 AM    Albumin 3.7 07/30/2018 10:55 AM    Globulin 3.3 05/23/2018 08:09 AM    A-G Ratio 1.2 07/30/2018 10:55 AM    ALT (SGPT) 7 07/30/2018 10:55 AM       CT of abdomen/pelvis (6/26/18)  LUNG BASES: Clear.  INCIDENTALLY IMAGED HEART AND MEDIASTINUM: 12 mm nodule at the right lung base  is slightly decreased in size. Previously measured 15 mm. There are patchy areas  of groundglass opacification in the bilateral lung bases which are new  LIVER: Hypodensity segment 2 near the dome is incompletely evaluated but likely  unchanged. It may represent a cyst. Small calcifications compatible with prior  granulomatous disease  GALLBLADDER: Multiple stones within a contracted gallbladder  SPLEEN: Spleen is mildly enlarged 14.4 cm with multiple calcifications  compatible with prior granulomatous disease  PANCREAS: No mass or ductal dilatation. ADRENALS: 3.0 x 2.3 cm right adrenal nodule measuring 2 Hounsfield units  compatible with an adenoma unchanged. Left adrenal gland is not enlarged  KIDNEYS/URETERS: No mass, calculus, or hydronephrosis. STOMACH: Patient is post gastric band  SMALL BOWEL: No dilatation or wall thickening. COLON: No dilatation or wall thickening. APPENDIX: Surgically absent  PERITONEUM: No ascites or pneumoperitoneum. RETROPERITONEUM: Aorta is mildly calcified but normal in caliber. There is a  mantle of confluent nodes surrounding the aorta. This does not appear  significantly different. Representative nodes to the left of the aorta and  anterior to the aorta on series 3 image 45 measure 17 x 13 mm and 11 x 18 mm. This measures slightly smaller than on the previous study but this may be  technically given the poor margination of the ruddy mass. REPRODUCTIVE ORGANS: Uterus and ovaries are surgically absent. Soft tissue  thickening in the left pelvic sidewall has not significantly changed and no  recurrent pelvic mass lesion is noted  URINARY BLADDER: No mass or calculus. BONES: Sclerotic bone metastases have not significantly changed in the interval  ADDITIONAL COMMENTS: N/A     IMPRESSION:  1. Retroperitoneal confluent adenopathy stable to slightly decreased in size  2.  Unchanged bony metastatic disease  3. Unchanged soft tissue in the left pelvic sidewall without evidence of local  recurrence  4. Pulmonary nodule right lung base slightly smaller  5. New areas of groundglass opacification at the lung bases nonspecific but may  be posttreatment related      IMPRESSION/PLAN:  Deuce Noriega is a 70 y.o. female with a history of stage III vaginal carcinoma. She was treated with pelvic radiation therapy and concurrent cisplatin chemotherapy. She now has recurrent disease with multiple sites of metastases. I had a long discussion with the patient and her  discussing the best course of management. Surgery is not an option due to the widespread nature of her disease. The same is true for radiation therapy. Her only viable option is systemic chemotherapy. Since vaginal cancer behaves like cervical carcinoma, I recommended using the standard regimen for metastatic cervical carcinoma, Taxol/Cisplatin/Avastin. Unfortunately, the Avastin was denied by her insurance company. We chose to proceed with Taxol/Cisplatin. She completed 6 cycles. On her CT in July 2017 her pulmonary nodules appeared stable and her retroperitoneal lymphadenopathy appeared slightly improved. Her most recent scan in October 2017 demonstrated slight progression of the lymphadenopathy, while the pulmonary and bony disease appeared stable. On my review, the changes didn't meet RECIST criteria for progression. I recommended continuing with therapy, but I suggested that we exchange Carboplatin for Cisplatin due to nephrotoxicity. Her creatinine had steadily climbed with the Cisplatin. She completed 8 cycles of Taxol/Carboplatin since the switch, but following her Carboplatin reaction we stopped the Carboplatin. I recommended that we continue with Taxol and add Avastin. She has completed 4 cycles of that regimen. Her last CT demonstrated slight overall improvement.   I recommend that we get a repeat CT of the abdomen/pelvis at this time. As long as things look stable, I suggest that we continue with the current regimen. If the scan shows progression, we will discuss options.       Signed By: Eben Preston MD     8/21/2018/4:31 PM

## 2018-08-21 NOTE — PROGRESS NOTES
Dr. Mónica Beckford wanted pt to have a CT scan before administering C14 Taxol/#5 Avastin on 8/22/18. Per Page Tatum Callahan needs 24 hours to authorize scan. I cancelled all chemo appts with Teena at [facility] today, and will r/s after scan completed.

## 2018-08-21 NOTE — PROGRESS NOTES
Pre chemo, labs on 8/20 and for C14 taxol and C5 avastin on 8/22. Pt discharged from [facility] yesterday. Pt will be starting Keflex for cellulitis.

## 2018-08-30 NOTE — PROGRESS NOTES
[address] [place] [address] [address], [address] [facility]  [phone]  MD Donal Holder MD  Office Note  Patient ID:  Name:  Selin Davila  MRN:  [id number]  :  1950/68 y.o. Date:  2018      HISTORY OF PRESENT ILLNESS:  Selin Davila is a 70 y.o.  postmenopausal female who was referred to me in 2015 for a pelvic mass by Dr. Anuja Logan, who noted the mass on routine gynecologic exam.  He sent her for a CT of the pelvis, which revealed a mass at the vaginal apex, that appeared to be invading into the soft tissues of the left pelvis. There were several enlarged lymph nodes on the left. There was also questionable bladder invasion, and possibly even involvement of the rectosigmoid colon. She reported a history of having a mass removed from one of her ovaries in 1 at [facility], which required a second surgery to remove her uterus and contralateral ovary. She said she did not have to take any chemotherapy, but she did have to take some kind of pill for a year. She has not had any gynecologic issues since that time. Presumably her hysterectomy was a total, rather than a supracervical, but she is unsure and there are no records. She denied any vaginal bleeding, except for after her examination with Dr. Kishore Devi. She reported minimal pain in the pelvis. I took her the OR for and exam under anesthesia with cystoscopy and proctoscopy. Pathology revealed a squamous cell carcinoma, consistent with a vaginal primary. Examination revealed a fixed mass involving the left upper vagina with extension to the left pelvic sidewall. There also appeared to be involvement of the posterior bladder wall, but no mucosal involvement. The mass did push against the rectal wall, but there did not appear to be any direct involvement. I referred her to radiation oncology and she saw Dr. Abi Watts.   She completed pelvic radiation along with concurrent cisplatin chemotherapy in February 2016 and had a great response. When I saw her in the office in March 2017 for follow up she was doing well and without complaints. Her pelvic exam and pap smear at that time were both negative. Subsequent to that visit she began having back pain that had progressively worsened. She saw Leroy Brown in May 2017 and she ordered a CT. This unfortunately demonstrated extensive metastatic disease outside of the prior radiated field. Dr. Latonia Arceo then sent her for a PET/CT which demonstrated widely metastatic disease. I recommended systemic chemotherapy with Taxol/Cisplatin/Avastin, which is the standard for metastatic cervical cancer, since vaginal cancer behaves similar to cervical cancer. Unfortunately, the Avastin was denied by her insurance company. We chose to proceed with Taxol/Cisplatin. She completed 6 cycles. Her scan in October 2017 demonstrated slight progression of the lymphadenopathy, while the pulmonary and bony disease appeared stable. On my review, the changes didn't meet RECIST criteria for progression. I recommended continuing with therapy, but I suggested that we exchange Carboplatin for Cisplatin due to nephrotoxicity. Her creatinine had steadily climbed with the Cisplatin. She completed 8 cycles of Taxol/Carboplatin after the switch, although she had a severe reaction to the Carboplatin at the time of her 8th cycle. We stopped the Carboplatin and added Avastin to the Taxol. She has completed 5 cycles of that regimen. She continues to have severe swelling in her lower extremities and has been hospitalized at least twice at [facility] for aggressive diuresis with her nephrologist, Dr. Becki Gagnon.      Tumor testing  No loss of mismatch repair proteins      ECOG score: 1       ROS:   and GI review:  Negative  Cardiopulmonary review:  Negative   Musculoskeletal:  Negative    A comprehensive review of systems was negative except for that written in the History of Present Illness. , 10 point ROS        Problem List:  Patient Active Problem List    Diagnosis Date Noted    Acute cystitis without hematuria 06/13/2018    Hematuria 05/16/2018    On antineoplastic chemotherapy 03/01/2018    Cancer related pain 03/01/2018    Metastatic neoplastic disease ([address] 03/01/2018    Glucose intolerance (impaired glucose tolerance) 01/29/2018    Anemia 01/04/2018    CKD (chronic kidney disease) stage 4, GFR 15-29 ml/min ([address] 01/03/2018    Morbid obesity with BMI of 40.0-44.9, adult ([address] 12/12/2017    Lymphedema of both lower extremities 06/14/2017    Hypertension, essential 01/18/2017    Hypercholesterolemia 01/18/2017    GERD without esophagitis 01/18/2017    Vaginal cancer ([address] 12/10/2015    LAP-BAND surgery status 05/17/2012     PMH:  Past Medical History:   Diagnosis Date    Abnormal mammogram of left breast 12/5/2016    Cancer ([address] 06/2017    LYMPH NODES IN BACK    Cancer ([address] 2016    VAGINAL CANCER    GERD (gastroesophageal reflux disease) 6/11/06    Hypercholesterolemia 10/11/06    Hypertension 10/11/06    Morbid obesity ([address] 10/11/06      PSH:  Past Surgical History:   Procedure Laterality Date    HX APPENDECTOMY      HX GASTRIC BYPASS  2009    LAP BAND    HX GYN  12/4/15    EUA/Bx of pelvic mass and vagina/cystoscopy/proctoscopy    HX HYSTERECTOMY  1978    HX ORTHOPAEDIC  2009    right ankle    HX OTHER SURGICAL  2001?     benign mass removed from lower back    LAP, PLACE ADJUST GASTR BAND  1/30/08    EB: AP Std lap band, liver bx      Social History:  Social History   Substance Use Topics    Smoking status: Former Smoker     Packs/day: 0.50     Years: 15.00     Types: Cigarettes     Quit date: 1/1/1993    Smokeless tobacco: Never Used    Alcohol use Yes      Comment: socially-WINE ONCE A WEEK      Family History:  Family History   Problem Relation Age of Onset    Cancer Mother      colon WITH METS TO LIVER    Diabetes Father     Heart Disease Father     Cancer Sister      breast & bone    Cancer Maternal Aunt      BREAST    Cancer Paternal Aunt      BREAST    Anesth Problems Neg Hx       Medications: (reviewed)  Current Outpatient Prescriptions   Medication Sig    oxyCODONE-acetaminophen (PERCOCET) 5-325 mg per tablet Take 1 Tab by mouth every six (6) hours as needed for Pain. Max Daily Amount: 4 Tabs.  atorvastatin (LIPITOR) 80 mg tablet TAKE 1 TABLET EVERY DAY    gabapentin (NEURONTIN) 300 mg capsule TAKE 1 CAPSULES BY MOUTH NIGHTLY    torsemide (DEMADEX) 100 mg tablet     predniSONE (DELTASONE) 10 mg tablet Take 10 mg by mouth daily.  potassium chloride SR (KLOR-CON 10) 10 mEq tablet Take 20 mEq by mouth daily.  ondansetron (ZOFRAN ODT) 4 mg disintegrating tablet Take 1 Tab by mouth every eight (8) hours as needed for Nausea. Indications: CANCER CHEMOTHERAPY-INDUCED NAUSEA AND VOMITING    albuterol (PROVENTIL HFA, VENTOLIN HFA, PROAIR HFA) 90 mcg/actuation inhaler Take 2 Puffs by inhalation every six (6) hours as needed for Wheezing. Indications: BRONCHOSPASM PREVENTION    acetaminophen (TYLENOL) 325 mg tablet Take  by mouth every four (4) hours as needed for Pain.  famotidine (PEPCID) 20 mg tablet Take 1 Tab by mouth daily. Indications: gastroesophageal reflux disease    docusate sodium (COLACE) 100 mg capsule Take 100 mg by mouth daily.  dexamethasone (DECADRON) 4 mg tablet Take 2 tablets with breakfast the day before chemo and for 2 days after chemo    Cholecalciferol, Vitamin D3, (VITAMIN D3) 1,000 unit cap Take 1 Cap by mouth daily.  carvedilol (COREG) 6.25 mg tablet Take 1 Tab by mouth two (2) times daily (with meals). Indications: hypertension     No current facility-administered medications for this visit.       Allergies: (reviewed)  Allergies   Allergen Reactions    Amlodipine Other (comments)     Edema in LE's    Carboplatin Shortness of Breath, Rash and Itching OBJECTIVE:    Physical Exam:  VITAL SIGNS: Vitals:    08/30/18 1106 08/30/18 1120   BP: (!) 142/97 158/89   Pulse: 81 76   Weight: 231 lb 9.6 oz (105.1 kg)    Height: 5' 2.01\" (1.575 m)      Body mass index is 42.35 kg/(m^2). GENERAL MIGUELANGEL: Conversant, alert, oriented. No acute distress. HEENT: HEENT. No thyroid enlargement. No JVD. Neck: Supple without restrictions. RESPIRATORY: Clear to auscultation and percussion to the bases. No CVAT. CARDIOVASC: RRR without murmur/rub. GASTROINT: soft, non-tender, without masses or organomegaly   MUSCULOSKEL: no joint tenderness, deformity or swelling   EXTREMITIES: Marked edema of bilateral lower extremities, L>R. Skin noted with vascular changes on both legs. PELVIC: Deferred   RECTAL: Deferred   PATRICIO SURVEY: No suspicious lymphadenopathy. NEURO: Grossly intact. No acute deficit. Lab Results   Component Value Date/Time    WBC 6.5 07/30/2018 10:55 AM    HGB 9.0 (L) 07/30/2018 10:55 AM    HCT 29.3 (L) 07/30/2018 10:55 AM    PLATELET 255 61/90/8944 10:55 AM    MCV 91 07/30/2018 10:55 AM     Lab Results   Component Value Date/Time    Sodium 146 (H) 07/30/2018 10:55 AM    Potassium 3.7 07/30/2018 10:55 AM    Chloride 97 07/30/2018 10:55 AM    CO2 30 (H) 07/30/2018 10:55 AM    Anion gap 13 05/23/2018 08:09 AM    Glucose 95 07/30/2018 10:55 AM    BUN 29 (H) 07/30/2018 10:55 AM    Creatinine 1.80 (H) 07/30/2018 10:55 AM    BUN/Creatinine ratio 16 07/30/2018 10:55 AM    GFR est AA 33 (L) 07/30/2018 10:55 AM    GFR est non-AA 28 (L) 07/30/2018 10:55 AM    Calcium 9.0 07/30/2018 10:55 AM    Bilirubin, total 0.3 07/30/2018 10:55 AM    AST (SGOT) 10 07/30/2018 10:55 AM    Alk.  phosphatase 84 07/30/2018 10:55 AM    Protein, total 6.7 07/30/2018 10:55 AM    Albumin 3.7 07/30/2018 10:55 AM    Globulin 3.3 05/23/2018 08:09 AM    A-G Ratio 1.2 07/30/2018 10:55 AM    ALT (SGPT) 7 07/30/2018 10:55 AM       CT of abdomen/pelvis (8/27/18)  CHEST:   Stable several small pulmonary nodules, some cavitated, largest located at the  right lung base measuring 1.4 x 0.9 cm (image 4-37), previous 1.5 x 0.9 cm. Stable low-grade mediastinal nodes: precarinal node measures 1.6 x 1.3 cm (image  3-21); lateral AP window node measures 1.5 x 1.1 cm (image 3-19); subcarinal  node difficult to discretely measure. Small benign calcified right hilar nodes.     There is no pleural or pericardial effusion. Stable manubrial lytic/sclerotic  tumor. Visualized thyroid and lower neck soft tissues are unremarkable for age.     ABDOMEN PELVIS:  No significant change of retroperitoneal/para-aortic confluent adenopathy,  bilateral pelvic adenopathy, and left deep pelvic soft tissue/scar. There is no  inflammation, ascites, or pneumoperitoneum. Liver is unremarkable other than a  stable left lobe cyst. Bile ducts and spleen are not enlarged. Pancreas shows no  mass or inflammation. Cholelithiasis. Stable right adrenal adenoma. Normal left  adrenal. Kidneys show no stone, mass or hydronephrosis. Aorta is calcified  without aneurysm. Gastric band in place.     The bladder is not distended. The distal ureters are not dilated. Slight  progression of sclerosis within L3, L4 and L5 without fracture or soft tissue  component.     IMPRESSION:   1. Stable lung metastases. 2. Stable low grade mediastinal adenopathy. 3. Stable retroperitoneal/pelvic adenopathy and left deep pelvic soft tissue. 4. Stable manubrial metastasis. Nonspecific small progressive sclerosis within  L3, L4 and L5. IMPRESSION/PLAN:  Modesta He is a 70 y.o. female with a history of stage III vaginal carcinoma. She was treated with pelvic radiation therapy and concurrent cisplatin chemotherapy. She now has recurrent disease with multiple sites of metastases. I had a long discussion with the patient and her  discussing the best course of management. Surgery is not an option due to the widespread nature of her disease.   The same is true for radiation therapy. Her only viable option is systemic chemotherapy. Since vaginal cancer behaves like cervical carcinoma, I recommended using the standard regimen for metastatic cervical carcinoma, Taxol/Cisplatin/Avastin. Unfortunately, the Avastin was denied by her insurance company. We chose to proceed with Taxol/Cisplatin. She completed 6 cycles. On her CT in July 2017 her pulmonary nodules appeared stable and her retroperitoneal lymphadenopathy appeared slightly improved. Her most recent scan in October 2017 demonstrated slight progression of the lymphadenopathy, while the pulmonary and bony disease appeared stable. On my review, the changes didn't meet RECIST criteria for progression. I recommended continuing with therapy, but I suggested that we exchange Carboplatin for Cisplatin due to nephrotoxicity. Her creatinine had steadily climbed with the Cisplatin. She completed 8 cycles of Taxol/Carboplatin since the switch, but following her Carboplatin reaction we stopped the Carboplatin. I recommended that we continue with Taxol and add Avastin. She has completed 5 cycles of that regimen. Her CT again shows stable disease. Considering her neuropathy symptoms and the concerns with continued use of Taxol, I suggested that we stop the Taxol and continue with single agent Avastin. We will repeat a scan in a few months to assess continued response/stability of disease. She and her  agree with this plan. I refilled her prescription for Percocet today, as she continues to have significant cancer-related pain.        Signed By: Dean Casarez MD     8/30/2018/4:31 PM

## 2018-09-12 PROBLEM — E87.6 HYPOKALEMIA: Status: ACTIVE | Noted: 2018-01-01

## 2018-09-12 NOTE — PROGRESS NOTES
[facility] GYNECOLOGIC ONCOLOGY  [address] [place] [address], [address], [address]  P [phone]  F [phone]      [facility] Progress Note  Cuauhtemoc Gerber  70 y.o.    CC: Presents for infusion therapy    Andrew Brantley MD: Alok Wallace MD  PCP: Callie Pitts MD     Primary Onc Dx: Vaginal cancer  Date of Dx: 12/2015      Current Agent(s): Taxol/Avastin      HPI:  Cuauhtemoc Gerber is a 70 y.o.  postmenopausal female who was referred to Dr. Ramiro Quan in December 2015 for a pelvic mass by Dr. Bradford Lees, who noted the mass on routine gynecologic exam.  CT of the pelvis revealed a mass at the vaginal apex, that appeared to be invading into the soft tissues of the left pelvis. There were several enlarged lymph nodes on the left. There was also questionable bladder invasion, and possibly even involvement of the rectosigmoid colon. She reported a history of having a mass removed from one of her ovaries in 1 at [facility], which required a second surgery to remove her uterus and contralateral ovary. She said she did not have to take any chemotherapy, but she did have to take some kind of pill for a year. She has not had any gynecologic issues since that time. Presumably her hysterectomy was a total, rather than a supracervical, but she is unsure and there are no records. She underwent EUA with cystoscopy and proctoscopy. Pathology revealed a squamous cell carcinoma, consistent with a vaginal primary. Examination revealed a fixed mass involving the left upper vagina with extension to the left pelvic sidewall. There also appeared to be involvement of the posterior bladder wall, but no mucosal involvement. The mass did push against the rectal wall, but there did not appear to be any direct involvement. He then referred her to radiation oncology and she saw Dr. Morales Kaur. She completed pelvic radiation along with concurrent cisplatin chemotherapy in February 2016 and had a great response.     She recurred in mid 2017 with metastatic disease and had been undergoing chemotherapy with Taxol/Avastin beginning 5/23/18. Considering her neuropathy symptoms and the concerns with continued use of Taxol, Dr. Shanice Brink suggested that we stop the Taxol and continue with single agent Avastin  beginning 9/12/2018. .    Tumor testing  No loss of mismatch repair proteins    Oncology Treatment History:  12/4/2015: Dx with Vaginal cancer after an EUA and biopsies of pelvic mass and vagina                     Vagina, biopsy: Invasive squamous cell carcinoma, poorly differentiated. No loss of mismatch repair proteins   1/15/16-2/12/16: Weekly Cisplatin with XRT with good response. 5/16/17: CT scan showed Bilateral pulmonary nodules and retroperitoneal lymphadenopathy compatible with metastatic disease. 6/14/17-10/4/17: 6 cycles of Cisplatin/Taxol. Much difficulty with neuropathy  10/23/17: CT scan (without contrast) after 6 cycles showed  Slight progression of retroperitoneal adenopathy with stable appearing pulmonary and osseous metastases as above. 10/26/17: saw after above CT results. Dr. Shanice Brink felt the changes didn't meet RECIST criteria for progression. He recommend continuing with therapy, but suggested that we exchange Carboplatin for Cisplatin due to nephrotoxicity. Her creatinine has pamella with the Cisplatin. 11/1/17 - 4/18/18: Carbo/Taxol, continued carboplatin reaction. Single agent taxol   2/13/2018: Due to elevated creat, a non-contrast CT was obtained before planned time. Resulted out as Stable pulmonary metastases. Stable bone metastases. Multiple gallstones. Stable para-aortic lymphadenopathy  4/16/18: stable metastatic disease, possible minimal left hydro. 5/1/18: admitted under Dr. Juliet Grimm (nephrology) to [facility] for diuresis. 60-70lbs and 2units PRBCs  5/16/18: taxol/avastin anticipated. Held d/t renal failure  5/23/18: Taxol/Avastin initiated  6/26/18: CT A/P:  1.  Retroperitoneal adenopathy stable to slightly decreased in size, 2. Unchanged bony metastatic disease 3. Unchanged soft tissue in the left pelvic sidewall without evidence of local recurrence                                4. Pulmonary nodule right lung base slightly smaller  5. New areas of groundglass opacification at the lung bases nonspecific but may be posttreatment related  8/27/2018: CT chest/abd/pelvis: 1. Retroperitoneal confluent adenopathy stable to slightly decreased in size                                                 2. Unchanged bony metastatic disease 3. Unchanged soft tissue in the left pelvic sidewall without evidence of local Recurrence 4. Pulmonary nodule right lung base slightly smaller                                                    5. New areas of groundglass opacification at the lung bases nonspecific but may be posttreatment related (see full report below)      9/12/210: Proceeded with single agent Avastin        Subjective:  Pt presents for 5th infusion of Avastin today and presents with her  who remains very supportive. Since last infusion, she again has been hospitalized for diuresis at [facility] under the care of Dr. Timmy Sharp August and was diuresed over 60 lbs again, but her lower legs remain debilitatingly large and make it very difficult for her to ambulate. For any distance, she requires a wheelchair. She has had \"a few falls\" in the last month without injury  Continues to have mild abd/flank pains on occasion with negative urinalysis   C/O continued lower extremity discomfort. She can still prepare some meals and dress self. At home gets around mostly with walker. Denies overt vaginal bleeding. ROS:  Constitutional:  No fevers/night sweats  Respiratory: SOB with exertion  Cardiovascular: no chest pain  Heme: No bruising, bleeding  Gastrointestinal: no abdominal pain, change in bowel habits, or black or bloody stools  Genito-Urinary: no dysuria, trouble voiding.  Some +hematuria  Musculoskeletal: +muscle pain, \"achyness\" and thinks it is related to her edema. She does complain of lower back pain that she is relating to having to walk with her enlarged lower legs. Neurological: + dizziness. bilat LE foot neuropathy unchanged/stable   Derm: scaling bilat LE. No oral lesions  Psych: negative for anxiety and depression        Current Outpatient Prescriptions   Medication Sig    oxyCODONE-acetaminophen (PERCOCET) 5-325 mg per tablet Take 1 Tab by mouth every six (6) hours as needed for Pain. Max Daily Amount: 4 Tabs.  atorvastatin (LIPITOR) 80 mg tablet TAKE 1 TABLET EVERY DAY    gabapentin (NEURONTIN) 300 mg capsule TAKE 1 CAPSULES BY MOUTH NIGHTLY    torsemide (DEMADEX) 100 mg tablet     predniSONE (DELTASONE) 10 mg tablet Take 10 mg by mouth daily.  potassium chloride SR (KLOR-CON 10) 10 mEq tablet Take 20 mEq by mouth daily.  ondansetron (ZOFRAN ODT) 4 mg disintegrating tablet Take 1 Tab by mouth every eight (8) hours as needed for Nausea. Indications: CANCER CHEMOTHERAPY-INDUCED NAUSEA AND VOMITING    albuterol (PROVENTIL HFA, VENTOLIN HFA, PROAIR HFA) 90 mcg/actuation inhaler Take 2 Puffs by inhalation every six (6) hours as needed for Wheezing. Indications: BRONCHOSPASM PREVENTION    acetaminophen (TYLENOL) 325 mg tablet Take  by mouth every four (4) hours as needed for Pain.  carvedilol (COREG) 6.25 mg tablet Take 1 Tab by mouth two (2) times daily (with meals). Indications: hypertension    famotidine (PEPCID) 20 mg tablet Take 1 Tab by mouth daily. Indications: gastroesophageal reflux disease    docusate sodium (COLACE) 100 mg capsule Take 100 mg by mouth daily.  dexamethasone (DECADRON) 4 mg tablet Take 2 tablets with breakfast the day before chemo and for 2 days after chemo    Cholecalciferol, Vitamin D3, (VITAMIN D3) 1,000 unit cap Take 1 Cap by mouth daily.      Current Facility-Administered Medications   Medication Dose Route Frequency    0.9% sodium chloride infusion 500 mL  500 mL IntraVENous CONTINUOUS         Objective:     Visit Vitals    /73    Pulse (!) 58    Temp 97 °F (36.1 °C)    Resp 18    Ht 5' 2\" (1.575 m)    Wt 240 lb 12.8 oz (109.2 kg)    BMI 44.04 kg/m2       Physical Exam:  General: A&O X3 in NAD with pleasant affect. She does appear to have actually lowt weight in upper portion of body and appears thinner here. HEENT: Sclera anicteric, mucosa pink, slightly dry,. No bleeding, lesions or inflammation noted. Neck: No JVD or cervical adenopathy appreciated. Small area to left upper chest/lbelow clavicle unchanged  Port Site: without redness, tenderness or swelling. Accessed without difficulty  Heart: Regular without M/R/G  Lungs: CTA Bilat without wheezing or rales noted. Abd: Soft, obese, NT/ND without fluid wave and + BS throughout  Ext: Extensive bilat lower ext edema from groin to feet. Increased from last visit even after aggressive diureses. Persist with vascular skin changes previously seen  Neuro: grossly intact         Lab Results   Component Value Date/Time    WBC 9.3 09/12/2018 10:25 AM    HGB 9.9 (L) 09/12/2018 10:25 AM    HCT 32.2 (L) 09/12/2018 10:25 AM    PLATELET 798 74/43/0599 10:25 AM    MCV 95.8 09/12/2018 10:25 AM     Lab Results   Component Value Date/Time    ABS. NEUTROPHILS 7.2 09/12/2018 10:25 AM     Lab Results   Component Value Date/Time    Sodium 139 09/12/2018 10:25 AM    Potassium 3.0 (L) 09/12/2018 10:25 AM    Chloride 96 (L) 09/12/2018 10:25 AM    CO2 28 09/12/2018 10:25 AM    Anion gap 15 09/12/2018 10:25 AM    Glucose 106 (H) 09/12/2018 10:25 AM    BUN 46 (H) 09/12/2018 10:25 AM    Creatinine 2.70 (H) 09/12/2018 10:25 AM    BUN/Creatinine ratio 17 09/12/2018 10:25 AM    GFR est AA 21 (L) 09/12/2018 10:25 AM    GFR est non-AA 17 (L) 09/12/2018 10:25 AM    Calcium 8.9 09/12/2018 10:25 AM    Bilirubin, total 0.4 09/12/2018 10:25 AM    AST (SGOT) 13 (L) 09/12/2018 10:25 AM    Alk.  phosphatase 132 (H) 09/12/2018 10:25 AM    Protein, total 7.8 09/12/2018 10:25 AM    Albumin 3.6 09/12/2018 10:25 AM    Globulin 4.2 (H) 09/12/2018 10:25 AM    A-G Ratio 0.9 (L) 09/12/2018 10:25 AM    ALT (SGPT) 12 09/12/2018 10:25 AM     UA no protein      IMAGING:  CT Chest/Abd/Pelvis 8/27/18  CHEST:   Stable several small pulmonary nodules, some cavitated, largest located at the  right lung base measuring 1.4 x 0.9 cm (image 4-37), previous 1.5 x 0.9 cm. Stable low-grade mediastinal nodes: precarinal node measures 1.6 x 1.3 cm (image  3-21); lateral AP window node measures 1.5 x 1.1 cm (image 3-19); subcarinal  node difficult to discretely measure. Small benign calcified right hilar nodes.     There is no pleural or pericardial effusion. Stable manubrial lytic/sclerotic  tumor. Visualized thyroid and lower neck soft tissues are unremarkable for age.     ABDOMEN PELVIS:  No significant change of retroperitoneal/para-aortic confluent adenopathy,  bilateral pelvic adenopathy, and left deep pelvic soft tissue/scar. There is no  inflammation, ascites, or pneumoperitoneum. Liver is unremarkable other than a  stable left lobe cyst. Bile ducts and spleen are not enlarged. Pancreas shows no  mass or inflammation. Cholelithiasis. Stable right adrenal adenoma. Normal left  adrenal. Kidneys show no stone, mass or hydronephrosis. Aorta is calcified  without aneurysm. Gastric band in place.     The bladder is not distended. The distal ureters are not dilated. Slight  progression of sclerosis within L3, L4 and L5 without fracture or soft tissue  component.     IMPRESSION  IMPRESSION:   1. Stable lung metastases. 2. Stable low grade mediastinal adenopathy. 3. Stable retroperitoneal/pelvic adenopathy and left deep pelvic soft tissue. 4. Stable manubrial metastasis. Nonspecific small progressive sclerosis within  L3, L4 and L5.          CT A/P 6/26/18  TECHNIQUE:   Thin axial images were obtained through the abdomen and pelvis.  Coronal and  sagittal reconstructions were generated. Oral contrast was administered. CT dose  reduction was achieved through use of a standardized protocol tailored for this  examination and automatic exposure control for dose modulation.      The absence of intravenous contrast material reduces the sensitivity for  evaluation of the solid parenchymal organs of the abdomen.      FINDINGS:   LUNG BASES: Clear. INCIDENTALLY IMAGED HEART AND MEDIASTINUM: 12 mm nodule at the right lung base  is slightly decreased in size. Previously measured 15 mm. There are patchy areas  of groundglass opacification in the bilateral lung bases which are new  LIVER: Hypodensity segment 2 near the dome is incompletely evaluated but likely  unchanged. It may represent a cyst. Small calcifications compatible with prior  granulomatous disease  GALLBLADDER: Multiple stones within a contracted gallbladder  SPLEEN: Spleen is mildly enlarged 14.4 cm with multiple calcifications  compatible with prior granulomatous disease  PANCREAS: No mass or ductal dilatation. ADRENALS: 3.0 x 2.3 cm right adrenal nodule measuring 2 Hounsfield units  compatible with an adenoma unchanged. Left adrenal gland is not enlarged  KIDNEYS/URETERS: No mass, calculus, or hydronephrosis. STOMACH: Patient is post gastric band  SMALL BOWEL: No dilatation or wall thickening. COLON: No dilatation or wall thickening. APPENDIX: Surgically absent  PERITONEUM: No ascites or pneumoperitoneum. RETROPERITONEUM: Aorta is mildly calcified but normal in caliber. There is a  mantle of confluent nodes surrounding the aorta. This does not appear  significantly different. Representative nodes to the left of the aorta and  anterior to the aorta on series 3 image 45 measure 17 x 13 mm and 11 x 18 mm. This measures slightly smaller than on the previous study but this may be  technically given the poor margination of the ruddy mass. REPRODUCTIVE ORGANS: Uterus and ovaries are surgically absent.  Soft tissue  thickening in the left pelvic sidewall has not significantly changed and no  recurrent pelvic mass lesion is noted  URINARY BLADDER: No mass or calculus. BONES: Sclerotic bone metastases have not significantly changed in the interval  ADDITIONAL COMMENTS: N/A     IMPRESSION  IMPRESSION:     1. Retroperitoneal confluent adenopathy stable to slightly decreased in size  2. Unchanged bony metastatic disease  3. Unchanged soft tissue in the left pelvic sidewall without evidence of local  recurrence  4. Pulmonary nodule right lung base slightly smaller  5. New areas of groundglass opacification at the lung bases nonspecific but may  be posttreatment related        CT 2/13/18  COMPARISON: February 13, 2018. October 23, 2017.     CONTRAST: None.     TECHNIQUE:  5 mm axial images were obtained through the chest. Images were  obtained without contrast through the abdomen and pelvis as well. Coronal and  sagittal reconstructions were generated. CT dose reduction was achieved through  use of a standardized protocol tailored for this examination and automatic  exposure control for dose modulation.     The absence of intravenous contrast reduces the sensitivity for evaluation of  the mediastinum and upper abdominal organs.     FINDINGS:      Previous studies for comparison:  February 13, 2018. October 23, 2017.     Images through the mediastinum and hilar regions reveal no new mass or  adenopathy.  Sclerotic/destructive changes involving the left manubrium  redemonstrated without appreciable interval change.     Images through the lung parenchyma reveal scattered bilateral small pulmonary  nodular densities unchanged.     Images through the liver reveal no new space-occupying lesion or biliary  dilatation.     There is adequate opacification of the hepatic and portal veins.     Images through the gallbladder fossa reveal multiple gallstones.     The spleen is normal in size.      The pancreas has a normal morphologic appearance.      Kidneys demonstrated normal morphologic appearance. No hydronephrosis is  present.     The right adrenal gland remains enlarged measuring 2.9 x 2.2 cm, previously 3.0  x 2.2 cm.     The visualized opacified bowel loops are nonobstructed.     Persistent confluent appearing retroperitoneal lymphadenopathy is demonstrated  with two dominant lesions measuring roughly 2.6 x 2.6 cm, previously 2.6 x 2.6  cm. The other closely adjacent lesion measures roughly 2.1 x 1.7 cm, previously  2.1 x 1.7 cm.     Images through the pelvis no space-occupying lesion or lymphadenopathy. Prominent soft tissue swelling is present in the pelvis and lower extremities.     Scattered predominately sclerotic lesions in the lower lumbar vertebra and  sternum are redemonstrated without appreciable interval change. IMPRESSION:     Status post gastric sleeve.     Stable scattered bilateral pulmonary nodular densities.     Stable retroperitoneal lymphadenopathy.     Multiple gallstones.     Stable bony metastatic disease.     Possible minimal left hydronephrosis.     Pelvic and lower extremity presumed lymphedema         Assessment/Plan:   70 y.o. with metastatic vaginal cancer, s/p initial XRT/CDDP.   Ongoing antineoplastic chemotherapy    Patient Active Problem List   Diagnosis Code    LAP-BAND surgery status Z98.84    Vaginal cancer ([facility] [address] C52    Hypertension, essential I10    Hypercholesterolemia E78.00    GERD without esophagitis K21.9    Lymphedema of both lower extremities I89.0    Morbid obesity with BMI of 40.0-44.9, adult ([facility] [address] E66.01, Z68.41    CKD (chronic kidney disease) stage 4, GFR 15-29 ml/min (HCC) N18.4    Anemia D64.9    Glucose intolerance (impaired glucose tolerance) R73.02    On antineoplastic chemotherapy Z79.899    Cancer related pain G89.3    Metastatic neoplastic disease (HCC) C79.9    Hematuria R31.9    Acute cystitis without hematuria N30.00    Hypokalemia E87.6         Will check labs today and if stable,5th infusion of Avastin. Hypokalemia: will repla with oral K+ and she will have her labs checked next Monday at Dr. Josue Aponte office  Anemia: d/t chemo > hematuria. Hx UTI, resolved  Chronic renal disease: Creat elevated today(about stable for recent labs per pt at [facility]). Continues to follow with Dr. Mitesh Griffin  F/u with lymphedema clinic prn. Neuropathy: gabapentin, refilled today  Fall/Slip:cautioned pt on mobility. Discussed referal to PT, but she will wait to see what she does with Dr. Mitesh Griffin next week. Pain: mostly in lower ext and flank area. Says she still requires pain meds to be able to sleep at night. Dr. Anibal Humphrey recently refilled Rx  Psychosocial: well supported by . No financial concerns. We will repeat a scan in a few months to assess continued response/stability of disease. She and her  agree with this plan. Encouraged to call for any concerns or questions.    Greater than 45 minutes spent with pt with > 50% spent on counseling/managment of disease   Trevor Weems NP

## 2018-09-12 NOTE — PROGRESS NOTES
[facility] - Chemotherapy Progress Note    7642 Pt admit to [facility] for Avastin ambulatory in stable condition. Assessment completed. No new concerns voiced. Port with positive blood return. Labs drawn and reviewed and treatment started. Chemotherapy Flowsheet 9/12/2018   Cycle -   Date 9/12/2018   Drug / Regimen Avastin    Dosage -   Pre Hydration -   Post Hydration -   Pre Meds -   Notes given          Visit Vitals    /73    Pulse (!) 58    Temp 97 °F (36.1 °C)    Resp 18    Ht 5' 2\" (1.575 m)    Wt 109.2 kg (240 lb 12.8 oz)    BMI 44.04 kg/m2       Medications: Avastin  PO K     1430 Pt tolerated treatment well. Port maintained positive blood return throughout treatment. Flushed, heparinized and de-accessed per protocol. D/c home ambulatory in no distress. Pt aware of next appointment scheduled for 10/3/18. Recent Results (from the past 12 hour(s))   CBC WITH AUTOMATED DIFF    Collection Time: 09/12/18 10:25 AM   Result Value Ref Range    WBC 9.3 3.6 - 11.0 K/uL    RBC 3.36 (L) 3.80 - 5.20 M/uL    HGB 9.9 (L) 11.5 - 16.0 g/dL    HCT 32.2 (L) 35.0 - 47.0 %    MCV 95.8 80.0 - 99.0 FL    MCH 29.5 26.0 - 34.0 PG    MCHC 30.7 30.0 - 36.5 g/dL    RDW 18.8 (H) 11.5 - 14.5 %    PLATELET 834 609 - 650 K/uL    MPV 9.7 8.9 - 12.9 FL    NRBC 0.2 (H) 0  WBC    ABSOLUTE NRBC 0.02 (H) 0.00 - 0.01 K/uL    NEUTROPHILS 74 32 - 75 %    BAND NEUTROPHILS 3 0 - 6 %    LYMPHOCYTES 16 12 - 49 %    MONOCYTES 3 (L) 5 - 13 %    EOSINOPHILS 0 0 - 7 %    BASOPHILS 0 0 - 1 %    METAMYELOCYTES 3 (H) 0 %    MYELOCYTES 1 (H) 0 %    IMMATURE GRANULOCYTES 0 %    ABS. NEUTROPHILS 7.2 1.8 - 8.0 K/UL    ABS. LYMPHOCYTES 1.5 0.8 - 3.5 K/UL    ABS. MONOCYTES 0.3 0.0 - 1.0 K/UL    ABS. EOSINOPHILS 0.0 0.0 - 0.4 K/UL    ABS. BASOPHILS 0.0 0.0 - 0.1 K/UL    ABS. IMM.  GRANS. 0.0 K/UL    DF MANUAL      RBC COMMENTS ANISOCYTOSIS  1+        RBC COMMENTS TEARDROP CELLS  1+        WBC COMMENTS ATYPICAL LYMPHOCYTES PRESENT METABOLIC PANEL, COMPREHENSIVE    Collection Time: 09/12/18 10:25 AM   Result Value Ref Range    Sodium 139 136 - 145 mmol/L    Potassium 3.0 (L) 3.5 - 5.1 mmol/L    Chloride 96 (L) 97 - 108 mmol/L    CO2 28 21 - 32 mmol/L    Anion gap 15 5 - 15 mmol/L    Glucose 106 (H) 65 - 100 mg/dL    BUN 46 (H) 6 - 20 MG/DL    Creatinine 2.70 (H) 0.55 - 1.02 MG/DL    BUN/Creatinine ratio 17 12 - 20      GFR est AA 21 (L) >60 ml/min/1.73m2    GFR est non-AA 17 (L) >60 ml/min/1.73m2    Calcium 8.9 8.5 - 10.1 MG/DL    Bilirubin, total 0.4 0.2 - 1.0 MG/DL    ALT (SGPT) 12 12 - 78 U/L    AST (SGOT) 13 (L) 15 - 37 U/L    Alk.  phosphatase 132 (H) 45 - 117 U/L    Protein, total 7.8 6.4 - 8.2 g/dL    Albumin 3.6 3.5 - 5.0 g/dL    Globulin 4.2 (H) 2.0 - 4.0 g/dL    A-G Ratio 0.9 (L) 1.1 - 2.2     CANCER ANTIGEN 125    Collection Time: 09/12/18 10:25 AM   Result Value Ref Range    CA-125 191 (H) 1.5 - 35.0 U/mL   URINALYSIS W/ REFLEX CULTURE    Collection Time: 09/12/18 10:25 AM   Result Value Ref Range    Color YELLOW/STRAW      Appearance CLEAR CLEAR      Specific gravity 1.019 1.003 - 1.030      pH (UA) 5.0 5.0 - 8.0      Protein NEGATIVE  NEG mg/dL    Glucose NEGATIVE  NEG mg/dL    Ketone NEGATIVE  NEG mg/dL    Bilirubin NEGATIVE  NEG      Blood NEGATIVE  NEG      Urobilinogen 0.2 0.2 - 1.0 EU/dL    Nitrites NEGATIVE  NEG      Leukocyte Esterase NEGATIVE  NEG      WBC 0-4 0 - 4 /hpf    RBC 0-5 0 - 5 /hpf    Epithelial cells FEW FEW /lpf    Bacteria NEGATIVE  NEG /hpf    UA:UC IF INDICATED CULTURE NOT INDICATED BY UA RESULT CNI

## 2018-09-27 NOTE — TELEPHONE ENCOUNTER
Patient couldn't urinate so she went to the hospital. A blood clot was found. They put a catheter in and she needs to know who will take it out?

## 2018-09-28 NOTE — PROGRESS NOTES
Labs ordered to be drawn 10/1/18 to be used for chemo on 10/3/18 per vo Dr. Aurora Germain today at [phone]. Lab req faxed to [facility] lab ayala at [phone].

## 2018-09-28 NOTE — TELEPHONE ENCOUNTER
Pt calling to state she was seen in ER several days ago with her usual \" blood clot's in bladder, and unable to urinate \", she had a schroeder catheter placed and sent home. Was told to call one of her doctors to f/u. Advised pt to call her Urologist Dr. Guanaco Alvarez in f/u.

## 2018-10-02 NOTE — PROGRESS NOTES
Pre chemo, labs on 10/1 and for C6 Avastin on 10/3.  1. Have you been to the ER, urgent care clinic since your last visit? Hospitalized since your last visit? Pt seen in ER , urinary catheter placed for clots in her bladder. Pt was unable to urinate    2. Have you seen or consulted any other health care providers outside of the [address] [place] [address] since your last visit? Include any pap smears or colon screening. Pt is scheduled for a follow up with the urologist today.

## 2018-10-02 NOTE — PROGRESS NOTES
[address] [place] [address] [address]ias [address], [address] [facility]  [phone]  MD Emily James MD  Office Note  Patient ID:  Name:  Sahara Crespo  MRN:  [id number]  :  1950/68 y.o. Date:  10/2/2018      HISTORY OF PRESENT ILLNESS:  Sahara Crespo is a 70 y.o.  postmenopausal female who was referred to me in 2015 for a pelvic mass by Dr. Emma Alonso, who noted the mass on routine gynecologic exam.  He sent her for a CT of the pelvis, which revealed a mass at the vaginal apex, that appeared to be invading into the soft tissues of the left pelvis. There were several enlarged lymph nodes on the left. There was also questionable bladder invasion, and possibly even involvement of the rectosigmoid colon. She reported a history of having a mass removed from one of her ovaries in 1 at [facility], which required a second surgery to remove her uterus and contralateral ovary. She said she did not have to take any chemotherapy, but she did have to take some kind of pill for a year. She has not had any gynecologic issues since that time. Presumably her hysterectomy was a total, rather than a supracervical, but she is unsure and there are no records. She denied any vaginal bleeding, except for after her examination with Dr. Evie Scott. She reported minimal pain in the pelvis. I took her the OR for and exam under anesthesia with cystoscopy and proctoscopy. Pathology revealed a squamous cell carcinoma, consistent with a vaginal primary. Examination revealed a fixed mass involving the left upper vagina with extension to the left pelvic sidewall. There also appeared to be involvement of the posterior bladder wall, but no mucosal involvement. The mass did push against the rectal wall, but there did not appear to be any direct involvement. I referred her to radiation oncology and she saw Dr. Mynor Coyne.   She completed pelvic radiation along with concurrent cisplatin chemotherapy in February 2016 and had a great response. When I saw her in the office in March 2017 for follow up she was doing well and without complaints. Her pelvic exam and pap smear at that time were both negative. Subsequent to that visit she began having back pain that had progressively worsened. She saw Stacy Freeman in May 2017 and she ordered a CT. This unfortunately demonstrated extensive metastatic disease outside of the prior radiated field. Dr. Isaac Zamora then sent her for a PET/CT which demonstrated widely metastatic disease. I recommended systemic chemotherapy with Taxol/Cisplatin/Avastin, which is the standard for metastatic cervical cancer, since vaginal cancer behaves similar to cervical cancer. Unfortunately, the Avastin was denied by her insurance company. We chose to proceed with Taxol/Cisplatin. She completed 6 cycles. Her scan in October 2017 demonstrated slight progression of the lymphadenopathy, while the pulmonary and bony disease appeared stable. On my review, the changes didn't meet RECIST criteria for progression. I recommended continuing with therapy, but I suggested that we exchange Carboplatin for Cisplatin due to nephrotoxicity. Her creatinine had steadily climbed with the Cisplatin. She completed 8 cycles of Taxol/Carboplatin after the switch, although she had a severe reaction to the Carboplatin at the time of her 8th cycle. We stopped the Carboplatin and added Avastin to the Taxol. She completed 5 cycles of that regimen. Considering her neuropathy symptoms and the concerns with continued use of Taxol, I suggested that we stop the Taxol and continue with single agent Avastin. She has completed one cycle of single agent Avastin. She continues to have severe swelling in her lower extremities and has been hospitalized at least twice at [facility] for aggressive diuresis with her nephrologist, Dr. Cornel Monahan.   She has been having some hematuria with passage of clots. She is seeing urology and may need to have a cystoscopic fulgaration of the bleeding. Tumor testing  No loss of mismatch repair proteins      ECOG score: 1       ROS:   and GI review:  Negative  Cardiopulmonary review:  Negative   Musculoskeletal:  Negative    A comprehensive review of systems was negative except for that written in the History of Present Illness. , 10 point ROS        Problem List:  Patient Active Problem List    Diagnosis Date Noted    Hypokalemia 09/12/2018    Acute cystitis without hematuria 06/13/2018    Hematuria 05/16/2018    On antineoplastic chemotherapy 03/01/2018    Cancer related pain 03/01/2018    Metastatic neoplastic disease ([address] 03/01/2018    Glucose intolerance (impaired glucose tolerance) 01/29/2018    Anemia 01/04/2018    CKD (chronic kidney disease) stage 4, GFR 15-29 ml/min ([address] 01/03/2018    Morbid obesity with BMI of 40.0-44.9, adult ([address] 12/12/2017    Lymphedema of both lower extremities 06/14/2017    Hypertension, essential 01/18/2017    Hypercholesterolemia 01/18/2017    GERD without esophagitis 01/18/2017    Vaginal cancer ([address] 12/10/2015    LAP-BAND surgery status 05/17/2012     PMH:  Past Medical History:   Diagnosis Date    Abnormal mammogram of left breast 12/5/2016    Cancer ([address] 06/2017    LYMPH NODES IN BACK    Cancer ([address] 2016    VAGINAL CANCER    GERD (gastroesophageal reflux disease) 6/11/06    Hypercholesterolemia 10/11/06    Hypertension 10/11/06    Morbid obesity ([address] 10/11/06      PSH:  Past Surgical History:   Procedure Laterality Date    HX APPENDECTOMY      HX GASTRIC BYPASS  2009    LAP BAND    HX GYN  12/4/15    EUA/Bx of pelvic mass and vagina/cystoscopy/proctoscopy    HX HYSTERECTOMY  1978    HX ORTHOPAEDIC  2009    right ankle    HX OTHER SURGICAL  2001?     benign mass removed from lower back    LAP, PLACE ADJUST GASTR BAND  1/30/08    EB: AP Std lap band, liver bx Social History:  Social History   Substance Use Topics    Smoking status: Former Smoker     Packs/day: 0.50     Years: 15.00     Types: Cigarettes     Quit date: 1/1/1993    Smokeless tobacco: Never Used    Alcohol use Yes      Comment: socially-WINE ONCE A WEEK      Family History:  Family History   Problem Relation Age of Onset    Cancer Mother      colon WITH METS TO LIVER    Diabetes Father     Heart Disease Father     Cancer Sister      breast & bone    Cancer Maternal Aunt      BREAST    Cancer Paternal Aunt      BREAST    Anesth Problems Neg Hx       Medications: (reviewed)  Current Outpatient Prescriptions   Medication Sig    oxyCODONE-acetaminophen (PERCOCET) 5-325 mg per tablet Take 1 Tab by mouth every six (6) hours as needed for Pain. Max Daily Amount: 4 Tabs.  atorvastatin (LIPITOR) 80 mg tablet TAKE 1 TABLET EVERY DAY    gabapentin (NEURONTIN) 300 mg capsule TAKE 1 CAPSULES BY MOUTH NIGHTLY    torsemide (DEMADEX) 100 mg tablet     predniSONE (DELTASONE) 10 mg tablet Take 10 mg by mouth daily.  potassium chloride SR (KLOR-CON 10) 10 mEq tablet Take 20 mEq by mouth daily.  ondansetron (ZOFRAN ODT) 4 mg disintegrating tablet Take 1 Tab by mouth every eight (8) hours as needed for Nausea. Indications: CANCER CHEMOTHERAPY-INDUCED NAUSEA AND VOMITING    albuterol (PROVENTIL HFA, VENTOLIN HFA, PROAIR HFA) 90 mcg/actuation inhaler Take 2 Puffs by inhalation every six (6) hours as needed for Wheezing. Indications: BRONCHOSPASM PREVENTION    acetaminophen (TYLENOL) 325 mg tablet Take  by mouth every four (4) hours as needed for Pain.  carvedilol (COREG) 6.25 mg tablet Take 1 Tab by mouth two (2) times daily (with meals). Indications: hypertension    famotidine (PEPCID) 20 mg tablet Take 1 Tab by mouth daily. Indications: gastroesophageal reflux disease    docusate sodium (COLACE) 100 mg capsule Take 100 mg by mouth daily.     dexamethasone (DECADRON) 4 mg tablet Take 2 tablets with breakfast the day before chemo and for 2 days after chemo    Cholecalciferol, Vitamin D3, (VITAMIN D3) 1,000 unit cap Take 1 Cap by mouth daily. No current facility-administered medications for this visit. Allergies: (reviewed)  Allergies   Allergen Reactions    Amlodipine Other (comments)     Edema in LE's    Carboplatin Shortness of Breath, Rash and Itching          OBJECTIVE:    Physical Exam:  VITAL SIGNS: There were no vitals filed for this visit. There is no height or weight on file to calculate BMI. GENERAL MIGUELANGEL: Conversant, alert, oriented. No acute distress. HEENT: HEENT. No thyroid enlargement. No JVD. Neck: Supple without restrictions. RESPIRATORY: Clear to auscultation and percussion to the bases. No CVAT. CARDIOVASC: RRR without murmur/rub. GASTROINT: soft, non-tender, without masses or organomegaly   MUSCULOSKEL: no joint tenderness, deformity or swelling   EXTREMITIES: Marked edema of bilateral lower extremities, L>R. Skin noted with vascular changes on both legs. PELVIC: Deferred   RECTAL: Deferred   PATRICIO SURVEY: No suspicious lymphadenopathy. NEURO: Grossly intact. No acute deficit.      Lab Results   Component Value Date/Time    WBC 8.0 10/01/2018 01:19 PM    HGB 9.0 (L) 10/01/2018 01:19 PM    HCT 28.4 (L) 10/01/2018 01:19 PM    PLATELET 902 41/66/9525 01:19 PM    MCV 90 10/01/2018 01:19 PM     Lab Results   Component Value Date/Time    Sodium 138 10/01/2018 01:19 PM    Potassium 2.6 (L) 10/01/2018 01:19 PM    Chloride 81 (L) 10/01/2018 01:19 PM    CO2 35 (H) 10/01/2018 01:19 PM    Anion gap 15 09/12/2018 10:25 AM    Glucose 122 (H) 10/01/2018 01:19 PM    BUN 50 (H) 10/01/2018 01:19 PM    Creatinine 2.35 (H) 10/01/2018 01:19 PM    BUN/Creatinine ratio 21 10/01/2018 01:19 PM    GFR est AA 24 (L) 10/01/2018 01:19 PM    GFR est non-AA 21 (L) 10/01/2018 01:19 PM    Calcium 9.1 10/01/2018 01:19 PM    Bilirubin, total 0.5 10/01/2018 01:19 PM    AST (SGOT) 17 10/01/2018 01:19 PM    Alk. phosphatase 193 (H) 10/01/2018 01:19 PM    Protein, total 7.0 10/01/2018 01:19 PM    Albumin 4.2 10/01/2018 01:19 PM    Globulin 4.2 (H) 09/12/2018 10:25 AM    A-G Ratio 1.5 10/01/2018 01:19 PM    ALT (SGPT) 9 10/01/2018 01:19 PM       CT of abdomen/pelvis (8/27/18)  CHEST:   Stable several small pulmonary nodules, some cavitated, largest located at the  right lung base measuring 1.4 x 0.9 cm (image 4-37), previous 1.5 x 0.9 cm. Stable low-grade mediastinal nodes: precarinal node measures 1.6 x 1.3 cm (image  3-21); lateral AP window node measures 1.5 x 1.1 cm (image 3-19); subcarinal  node difficult to discretely measure. Small benign calcified right hilar nodes.     There is no pleural or pericardial effusion. Stable manubrial lytic/sclerotic  tumor. Visualized thyroid and lower neck soft tissues are unremarkable for age.     ABDOMEN PELVIS:  No significant change of retroperitoneal/para-aortic confluent adenopathy,  bilateral pelvic adenopathy, and left deep pelvic soft tissue/scar. There is no  inflammation, ascites, or pneumoperitoneum. Liver is unremarkable other than a  stable left lobe cyst. Bile ducts and spleen are not enlarged. Pancreas shows no  mass or inflammation. Cholelithiasis. Stable right adrenal adenoma. Normal left  adrenal. Kidneys show no stone, mass or hydronephrosis. Aorta is calcified  without aneurysm. Gastric band in place.     The bladder is not distended. The distal ureters are not dilated. Slight  progression of sclerosis within L3, L4 and L5 without fracture or soft tissue  component.     IMPRESSION:   1. Stable lung metastases. 2. Stable low grade mediastinal adenopathy. 3. Stable retroperitoneal/pelvic adenopathy and left deep pelvic soft tissue. 4. Stable manubrial metastasis. Nonspecific small progressive sclerosis within  L3, L4 and L5. IMPRESSION/PLAN:  Jose L Todd is a 70 y.o. female with a history of stage III vaginal carcinoma.   She was treated with pelvic radiation therapy and concurrent cisplatin chemotherapy. She now has recurrent disease with multiple sites of metastases. I had a long discussion with the patient and her  discussing the best course of management. Surgery is not an option due to the widespread nature of her disease. The same is true for radiation therapy. Her only viable option is systemic chemotherapy. Since vaginal cancer behaves like cervical carcinoma, I recommended using the standard regimen for metastatic cervical carcinoma, Taxol/Cisplatin/Avastin. Unfortunately, the Avastin was denied by her insurance company. We chose to proceed with Taxol/Cisplatin. She completed 6 cycles. On her CT in July 2017 her pulmonary nodules appeared stable and her retroperitoneal lymphadenopathy appeared slightly improved. Her most recent scan in October 2017 demonstrated slight progression of the lymphadenopathy, while the pulmonary and bony disease appeared stable. On my review, the changes didn't meet RECIST criteria for progression. I recommended continuing with therapy, but I suggested that we exchange Carboplatin for Cisplatin due to nephrotoxicity. Her creatinine had steadily climbed with the Cisplatin. She completed 8 cycles of Taxol/Carboplatin since the switch, but following her Carboplatin reaction we stopped the Carboplatin. I recommended that we continue with Taxol and add Avastin. She has completed 5 cycles of that regimen. Her last CT again showed stable disease. Considering her neuropathy symptoms and the concerns with continued use of Taxol, I suggested that we stop the Taxol and continue with single agent Avastin. She has completed one cycle of single agent Avastin. The plan is to repeat a scan in a few months to assess continued response/stability of disease.         Signed By: Alton Carrero MD     10/2/2018/4:31 PM

## 2018-10-03 NOTE — PROGRESS NOTES
[facility] Note:  [id number] arrived at [facility] whel chair and able to ransfer and in no distress for Avasti. Assessment stable. Pt had catheter placed few days ago and large amount urine and clots drained. She . Had catheter removed and f/u in few weeks. Potassium low and office aware but this was drawn after the diuresing. Medications received: Avastin  family  200 Tolerated treatment well, no adverse reaction noted. D/Cd from [facility] ambulatory and in no distress accompanied by   family. Next appt 10/24  Visit Vitals    /73    Pulse 69    Temp 97.9 °F (36.6 °C)    Resp 18    Ht 5' 2\" (1.575 m)    Wt 108.4 kg (239 lb)    BMI 43.71 kg/m2     No results found for this or any previous visit (from the past 12 hour(s)). Drawn 2 day ago.

## 2018-10-23 NOTE — PROGRESS NOTES
[address] [place] [address] [address], 1962 [facility]  [phone]  MD Adair Perez MD  Office Note  Patient ID:  Name:  Carolina Aj  MRN:  [id number]  :  1950/68 y.o. Date:  10/23/2018      HISTORY OF PRESENT ILLNESS:  Carolina Aj is a 70 y.o.  postmenopausal female who was referred to me in 2015 for a pelvic mass by Dr. Severa Lacks, who noted the mass on routine gynecologic exam.  He sent her for a CT of the pelvis, which revealed a mass at the vaginal apex, that appeared to be invading into the soft tissues of the left pelvis. There were several enlarged lymph nodes on the left. There was also questionable bladder invasion, and possibly even involvement of the rectosigmoid colon. She reported a history of having a mass removed from one of her ovaries in 1 at [facility], which required a second surgery to remove her uterus and contralateral ovary. She said she did not have to take any chemotherapy, but she did have to take some kind of pill for a year. She has not had any gynecologic issues since that time. Presumably her hysterectomy was a total, rather than a supracervical, but she is unsure and there are no records. She denied any vaginal bleeding, except for after her examination with Dr. Jose Maria Goodwin. She reported minimal pain in the pelvis. I took her the OR for and exam under anesthesia with cystoscopy and proctoscopy. Pathology revealed a squamous cell carcinoma, consistent with a vaginal primary. Examination revealed a fixed mass involving the left upper vagina with extension to the left pelvic sidewall. There also appeared to be involvement of the posterior bladder wall, but no mucosal involvement. The mass did push against the rectal wall, but there did not appear to be any direct involvement. I referred her to radiation oncology and she saw Dr. Carlita Steele.   She completed pelvic radiation along with concurrent cisplatin chemotherapy in February 2016 and had a great response. When I saw her in the office in March 2017 for follow up she was doing well and without complaints. Her pelvic exam and pap smear at that time were both negative. Subsequent to that visit she began having back pain that had progressively worsened. She saw Waleska Chris in May 2017 and she ordered a CT. This unfortunately demonstrated extensive metastatic disease outside of the prior radiated field. Dr. Sulaiman Clay then sent her for a PET/CT which demonstrated widely metastatic disease. I recommended systemic chemotherapy with Taxol/Cisplatin/Avastin, which is the standard for metastatic cervical cancer, since vaginal cancer behaves similar to cervical cancer. Unfortunately, the Avastin was denied by her insurance company. We chose to proceed with Taxol/Cisplatin. She completed 6 cycles. Her scan in October 2017 demonstrated slight progression of the lymphadenopathy, while the pulmonary and bony disease appeared stable. On my review, the changes didn't meet RECIST criteria for progression. I recommended continuing with therapy, but I suggested that we exchange Carboplatin for Cisplatin due to nephrotoxicity. Her creatinine had steadily climbed with the Cisplatin. She completed 8 cycles of Taxol/Carboplatin after the switch, although she had a severe reaction to the Carboplatin at the time of her 8th cycle. We stopped the Carboplatin and added Avastin to the Taxol. She completed 5 cycles of that regimen. Considering her neuropathy symptoms and the concerns with continued use of Taxol, I suggested that we stop the Taxol and continue with single agent Avastin. She has completed 2 cycles of single agent Avastin. She continues to have severe swelling in her lower extremities and has been hospitalized at least twice at [facility] for aggressive diuresis with her nephrologist, Dr. Indu Trujillo.   She has been having some hematuria with passage of clots. She is seeing urology and may need to have a cystoscopic fulgaration of the bleeding at some point. Tumor testing  No loss of mismatch repair proteins      ECOG score: 1       ROS:   and GI review:  Negative  Cardiopulmonary review:  Negative   Musculoskeletal:  Negative    A comprehensive review of systems was negative except for that written in the History of Present Illness. , 10 point ROS        Problem List:  Patient Active Problem List    Diagnosis Date Noted    Hypokalemia 09/12/2018    Acute cystitis without hematuria 06/13/2018    Hematuria 05/16/2018    On antineoplastic chemotherapy 03/01/2018    Cancer related pain 03/01/2018    Metastatic neoplastic disease ([address] 03/01/2018    Glucose intolerance (impaired glucose tolerance) 01/29/2018    Anemia 01/04/2018    CKD (chronic kidney disease) stage 4, GFR 15-29 ml/min ([address] 01/03/2018    Morbid obesity with BMI of 40.0-44.9, adult ([address] 12/12/2017    Lymphedema of both lower extremities 06/14/2017    Hypertension, essential 01/18/2017    Hypercholesterolemia 01/18/2017    GERD without esophagitis 01/18/2017    Vaginal cancer ([address] 12/10/2015    LAP-BAND surgery status 05/17/2012     PMH:  Past Medical History:   Diagnosis Date    Abnormal mammogram of left breast 12/5/2016    Cancer ([address] 06/2017    LYMPH NODES IN BACK    Cancer ([address] 2016    VAGINAL CANCER    GERD (gastroesophageal reflux disease) 6/11/06    Hypercholesterolemia 10/11/06    Hypertension 10/11/06    Morbid obesity ([address] 10/11/06      PSH:  Past Surgical History:   Procedure Laterality Date    HX APPENDECTOMY      HX GASTRIC BYPASS  2009    LAP BAND    HX GYN  12/4/15    EUA/Bx of pelvic mass and vagina/cystoscopy/proctoscopy    HX HYSTERECTOMY  1978    HX ORTHOPAEDIC  2009    right ankle    HX OTHER SURGICAL  2001?     benign mass removed from lower back    LAP, PLACE ADJUST GASTR BAND  1/30/08    EB: AP Std lap band, liver bx      Social History:  Social History     Tobacco Use    Smoking status: Former Smoker     Packs/day: 0.50     Years: 15.00     Pack years: 7.50     Types: Cigarettes     Last attempt to quit: 1993     Years since quittin.8    Smokeless tobacco: Never Used   Substance Use Topics    Alcohol use: Yes     Comment: socially-WINE ONCE A WEEK      Family History:  Family History   Problem Relation Age of Onset    Cancer Mother         colon WITH METS TO LIVER    Diabetes Father     Heart Disease Father     Cancer Sister         breast & bone    Cancer Maternal Aunt         BREAST    Cancer Paternal Aunt         BREAST    Anesth Problems Neg Hx       Medications: (reviewed)  Current Outpatient Medications   Medication Sig    oxyCODONE-acetaminophen (PERCOCET) 5-325 mg per tablet Take 1 Tab by mouth every six (6) hours as needed for Pain. Max Daily Amount: 4 Tabs.  gabapentin (NEURONTIN) 300 mg capsule TAKE 1 CAPSULES BY MOUTH NIGHTLY    torsemide (DEMADEX) 100 mg tablet     predniSONE (DELTASONE) 10 mg tablet Take 10 mg by mouth daily.  potassium chloride SR (KLOR-CON 10) 10 mEq tablet Take 20 mEq by mouth daily.  ondansetron (ZOFRAN ODT) 4 mg disintegrating tablet Take 1 Tab by mouth every eight (8) hours as needed for Nausea. Indications: CANCER CHEMOTHERAPY-INDUCED NAUSEA AND VOMITING    acetaminophen (TYLENOL) 325 mg tablet Take  by mouth every four (4) hours as needed for Pain.  carvedilol (COREG) 6.25 mg tablet Take 1 Tab by mouth two (2) times daily (with meals). Indications: hypertension    famotidine (PEPCID) 20 mg tablet Take 1 Tab by mouth daily. Indications: gastroesophageal reflux disease    dexamethasone (DECADRON) 4 mg tablet Take 2 tablets with breakfast the day before chemo and for 2 days after chemo    Cholecalciferol, Vitamin D3, (VITAMIN D3) 1,000 unit cap Take 1 Cap by mouth daily.     atorvastatin (LIPITOR) 80 mg tablet TAKE 1 TABLET EVERY DAY    albuterol (PROVENTIL HFA, VENTOLIN HFA, PROAIR HFA) 90 mcg/actuation inhaler Take 2 Puffs by inhalation every six (6) hours as needed for Wheezing. Indications: BRONCHOSPASM PREVENTION    docusate sodium (COLACE) 100 mg capsule Take 100 mg by mouth daily. No current facility-administered medications for this visit. Allergies: (reviewed)  Allergies   Allergen Reactions    Amlodipine Other (comments)     Edema in LE's    Carboplatin Shortness of Breath, Rash and Itching          OBJECTIVE:    Physical Exam:  VITAL SIGNS: Vitals:    10/23/18 1123   BP: 138/72   Pulse: 72   Weight: 230 lb 3.2 oz (104.4 kg)   Height: 5' 2.01\" (1.575 m)     Body mass index is 42.09 kg/m². GENERAL MIGUELANGEL: Conversant, alert, oriented. No acute distress. HEENT: HEENT. No thyroid enlargement. No JVD. Neck: Supple without restrictions. RESPIRATORY: Clear to auscultation and percussion to the bases. No CVAT. CARDIOVASC: RRR without murmur/rub. GASTROINT: soft, non-tender, without masses or organomegaly   MUSCULOSKEL: no joint tenderness, deformity or swelling   EXTREMITIES: Marked edema of bilateral lower extremities, L>R. Skin noted with vascular changes on both legs. PELVIC: Deferred   RECTAL: Deferred   PATRICIO SURVEY: No suspicious lymphadenopathy. NEURO: Grossly intact. No acute deficit.      Lab Results   Component Value Date/Time    WBC 9.1 10/22/2018 12:13 PM    HGB 9.1 (L) 10/22/2018 12:13 PM    HCT 30.5 (L) 10/22/2018 12:13 PM    PLATELET 091 71/39/7455 12:13 PM    MCV 93 10/22/2018 12:13 PM     Lab Results   Component Value Date/Time    Sodium 141 10/22/2018 12:13 PM    Potassium 3.6 10/22/2018 12:13 PM    Chloride 84 (L) 10/22/2018 12:13 PM    CO2 35 (H) 10/22/2018 12:13 PM    Anion gap 15 09/12/2018 10:25 AM    Glucose 112 (H) 10/22/2018 12:13 PM    BUN 50 (H) 10/22/2018 12:13 PM    Creatinine 1.98 (H) 10/22/2018 12:13 PM    BUN/Creatinine ratio 25 10/22/2018 12:13 PM    GFR est AA 29 (L) 10/22/2018 12:13 PM GFR est non-AA 25 (L) 10/22/2018 12:13 PM    Calcium 9.4 10/22/2018 12:13 PM    Bilirubin, total 0.4 10/22/2018 12:13 PM    AST (SGOT) 13 10/22/2018 12:13 PM    Alk. phosphatase 203 (H) 10/22/2018 12:13 PM    Protein, total 7.0 10/22/2018 12:13 PM    Albumin 3.9 10/22/2018 12:13 PM    Globulin 4.2 (H) 09/12/2018 10:25 AM    A-G Ratio 1.3 10/22/2018 12:13 PM    ALT (SGPT) 9 10/22/2018 12:13 PM       CT of abdomen/pelvis (8/27/18)  CHEST:   Stable several small pulmonary nodules, some cavitated, largest located at the  right lung base measuring 1.4 x 0.9 cm (image 4-37), previous 1.5 x 0.9 cm. Stable low-grade mediastinal nodes: precarinal node measures 1.6 x 1.3 cm (image  3-21); lateral AP window node measures 1.5 x 1.1 cm (image 3-19); subcarinal  node difficult to discretely measure. Small benign calcified right hilar nodes.     There is no pleural or pericardial effusion. Stable manubrial lytic/sclerotic  tumor. Visualized thyroid and lower neck soft tissues are unremarkable for age.     ABDOMEN PELVIS:  No significant change of retroperitoneal/para-aortic confluent adenopathy,  bilateral pelvic adenopathy, and left deep pelvic soft tissue/scar. There is no  inflammation, ascites, or pneumoperitoneum. Liver is unremarkable other than a  stable left lobe cyst. Bile ducts and spleen are not enlarged. Pancreas shows no  mass or inflammation. Cholelithiasis. Stable right adrenal adenoma. Normal left  adrenal. Kidneys show no stone, mass or hydronephrosis. Aorta is calcified  without aneurysm. Gastric band in place.     The bladder is not distended. The distal ureters are not dilated. Slight  progression of sclerosis within L3, L4 and L5 without fracture or soft tissue  component.     IMPRESSION:   1. Stable lung metastases. 2. Stable low grade mediastinal adenopathy. 3. Stable retroperitoneal/pelvic adenopathy and left deep pelvic soft tissue. 4. Stable manubrial metastasis.  Nonspecific small progressive sclerosis within  L3, L4 and L5. IMPRESSION/PLAN:  Chris Rain is a 70 y.o. female with a history of stage III vaginal carcinoma. She was treated with pelvic radiation therapy and concurrent cisplatin chemotherapy. She now has recurrent disease with multiple sites of metastases. I had a long discussion with the patient and her  discussing the best course of management. Surgery is not an option due to the widespread nature of her disease. The same is true for radiation therapy. Her only viable option is systemic chemotherapy. Since vaginal cancer behaves like cervical carcinoma, I recommended using the standard regimen for metastatic cervical carcinoma, Taxol/Cisplatin/Avastin. Unfortunately, the Avastin was denied by her insurance company. We chose to proceed with Taxol/Cisplatin. She completed 6 cycles. On her CT in July 2017 her pulmonary nodules appeared stable and her retroperitoneal lymphadenopathy appeared slightly improved. Her most recent scan in October 2017 demonstrated slight progression of the lymphadenopathy, while the pulmonary and bony disease appeared stable. On my review, the changes didn't meet RECIST criteria for progression. I recommended continuing with therapy, but I suggested that we exchange Carboplatin for Cisplatin due to nephrotoxicity. Her creatinine had steadily climbed with the Cisplatin. She completed 8 cycles of Taxol/Carboplatin since the switch, but following her Carboplatin reaction we stopped the Carboplatin. I recommended that we continue with Taxol and add Avastin. She has completed 5 cycles of that regimen. Her last CT again showed stable disease. Considering her neuropathy symptoms and the concerns with continued use of Taxol, I suggested that we stop the Taxol and continue with single agent Avastin. She has completed 2 cycles of single agent Avastin.   The plan is to repeat a scan after this cycle to assess continued response/stability of disease.         Signed By: Hansa Jacome MD     10/23/2018/4:31 PM

## 2018-10-24 PROBLEM — C79.51 BONE METASTASES (HCC): Status: ACTIVE | Noted: 2018-01-01

## 2018-10-24 NOTE — PROGRESS NOTES
[facility] - Chemotherapy Progress Note    [id number] Pt admit to [facility] for Cycle 3 Avastin ambulatory in stable condition. Assessment completed. No new concerns voiced. Port accessed with positive blood return. No labs needed today as labs were done on 10/22/18. Order in for repeat Urinalysis. Spoke with DEBBIE Dsouza who stated no need to send another specimen. Order cancelled. Port flushed and NS started at [facility]. Chemotherapy Flowsheet 10/24/2018   Cycle C3   Date 10/24/2018   Drug / Regimen Avastin   Dosage -   Pre Hydration -   Post Hydration -   Pre Meds -   Notes given         Visit Vitals  /72 (BP 1 Location: Right arm, BP Patient Position: Sitting)   Pulse 80   Temp 97.1 °F (36.2 °C)   Resp 18   Ht 5' 2\" (1.575 m)   Wt 104.5 kg (230 lb 6.4 oz)   Breastfeeding? No   BMI 42.14 kg/m²       Medications:  NS KVO  Avastin  NS flushes  Heparin     1230 Pt tolerated treatment well. Port maintained positive blood return throughout treatment. Flushed, heparinized and de-accessed per protocol. D/c home ambulatory in no distress. Pt aware of next appointment scheduled for 11/14/18. Please see labs results from 10/22/18.

## 2018-10-24 NOTE — PROGRESS NOTES
[facility] GYNECOLOGIC ONCOLOGY  [address] [place] [address], [address], [address]  P [phone]  F [phone]      [facility] Progress Note  Georgia Chowdhury  70 y.o.    CC: Presents for infusion therapy    Yaneth Mtz MD: Durga Gamboa MD  PCP: Leonora Cox MD     Primary Onc Dx: Vaginal cancer  Date of Dx: 12/2015      Current Agent(s): Taxol/Avastin      HPI:  Georgia Chowdhury is a 70 y.o.  postmenopausal female who was referred to Dr. Criselda Mendez in December 2015 for a pelvic mass by Dr. Rubina Paz, who noted the mass on routine gynecologic exam.  CT of the pelvis revealed a mass at the vaginal apex, that appeared to be invading into the soft tissues of the left pelvis. There were several enlarged lymph nodes on the left. There was also questionable bladder invasion, and possibly even involvement of the rectosigmoid colon. She reported a history of having a mass removed from one of her ovaries in 1 at [facility], which required a second surgery to remove her uterus and contralateral ovary. She said she did not have to take any chemotherapy, but she did have to take some kind of pill for a year. She has not had any gynecologic issues since that time. Presumably her hysterectomy was a total, rather than a supracervical, but she is unsure and there are no records. She underwent EUA with cystoscopy and proctoscopy. Pathology revealed a squamous cell carcinoma, consistent with a vaginal primary. Examination revealed a fixed mass involving the left upper vagina with extension to the left pelvic sidewall. There also appeared to be involvement of the posterior bladder wall, but no mucosal involvement. The mass did push against the rectal wall, but there did not appear to be any direct involvement. He then referred her to radiation oncology and she saw Dr. Eliezer Alberto. She completed pelvic radiation along with concurrent cisplatin chemotherapy in February 2016 and had a great response.     She recurred in mid 2017 with metastatic disease and had been undergoing chemotherapy with Taxol/Avastin beginning 5/23/18. Considering her neuropathy symptoms and the concerns with continued use of Taxol, Dr. Devante Carreno suggested that we stop the Taxol and continue with single agent Avastin  beginning 9/12/2018. .    Tumor testing  No loss of mismatch repair proteins    Oncology Treatment History:  12/4/2015: Dx with Vaginal cancer after an EUA and biopsies of pelvic mass and vagina                     Vagina, biopsy: Invasive squamous cell carcinoma, poorly differentiated. No loss of mismatch repair proteins   1/15/16-2/12/16: Weekly Cisplatin with XRT with good response. 5/16/17: CT scan showed Bilateral pulmonary nodules and retroperitoneal lymphadenopathy compatible with metastatic disease. 6/14/17-10/4/17: 6 cycles of Cisplatin/Taxol. Much difficulty with neuropathy  10/23/17: CT scan (without contrast) after 6 cycles showed  Slight progression of retroperitoneal adenopathy with stable appearing pulmonary and osseous metastases as above. 10/26/17: saw after above CT results. Dr. Devante Carreno felt the changes didn't meet RECIST criteria for progression. He recommend continuing with therapy, but suggested that we exchange Carboplatin for Cisplatin due to nephrotoxicity. Her creatinine has pamella with the Cisplatin. 11/1/17 - 4/18/18: Carbo/Taxol, continued carboplatin reaction. Single agent taxol   2/13/2018: Due to elevated creat, a non-contrast CT was obtained before planned time. Resulted out as Stable pulmonary metastases. Stable bone metastases. Multiple gallstones. Stable para-aortic lymphadenopathy  4/16/18: stable metastatic disease, possible minimal left hydro. 5/1/18: admitted under Dr. Rico Guerrero (nephrology) to [facility] for diuresis. 60-70lbs and 2units PRBCs  5/16/18: taxol/avastin anticipated. Held d/t renal failure  5/23/18: Taxol/Avastin initiated  6/26/18: CT A/P:  1.  Retroperitoneal adenopathy stable to slightly decreased in size, 2. Unchanged bony metastatic disease 3. Unchanged soft tissue in the left pelvic sidewall without evidence of local recurrence                                4. Pulmonary nodule right lung base slightly smaller  5. New areas of groundglass opacification at the lung bases nonspecific but may be posttreatment related  8/27/2018: CT chest/abd/pelvis: 1. Retroperitoneal confluent adenopathy stable to slightly decreased in size                                                 2. Unchanged bony metastatic disease 3. Unchanged soft tissue in the left pelvic sidewall without evidence of local Recurrence 4. Pulmonary nodule right lung base slightly smaller                                                    5. New areas of groundglass opacification at the lung bases nonspecific but may be posttreatment related (see full report below)      9/12/210: Proceeded with single agent Avastin        Subjective:  Presents for ongoing Avastin therapy. She overall feels better. Low back/right leg pain has been severe at times, taking two percocet PRN. Positionally incited. Heat helps. Notes occas oral soreness. No dysphagia. Neuropathy persists, continues gabapentin. She continues under the care of Dr. Lady Haider, remains on diuretic and potassium replacement therapy. Using wheelchair for mobility outside of home. In home, bracing with furniture. She has had \"a few falls\" in the last month without injury. Able to perform self care, some light housework, remains in recliner ~85% of day. ROS:  Constitutional:  No fevers/night sweats  Respiratory: SOB with exertion  Cardiovascular: no chest pain  Heme: No bruising, bleeding  Gastrointestinal: no abdominal pain, change in bowel habits, or black or bloody stools  Genito-Urinary: no dysuria, +hematuria, no recent retention issues. Musculoskeletal: +muscle pain, \"achyness\" and thinks it is related to her edema.   She does complain of lower back pain that she is relating to having to walk with her enlarged lower legs. Neurological: + dizziness. bilat LE foot neuropathy unchanged/stable   Derm: scaling bilat LE. No oral lesions  Psych: negative for anxiety and depression        Current Outpatient Medications   Medication Sig    oxyCODONE-acetaminophen (PERCOCET) 5-325 mg per tablet Take 1 Tab by mouth every six (6) hours as needed for Pain. Max Daily Amount: 4 Tabs.  atorvastatin (LIPITOR) 80 mg tablet TAKE 1 TABLET EVERY DAY    gabapentin (NEURONTIN) 300 mg capsule TAKE 1 CAPSULES BY MOUTH NIGHTLY    torsemide (DEMADEX) 100 mg tablet     predniSONE (DELTASONE) 10 mg tablet Take 10 mg by mouth daily.  potassium chloride SR (KLOR-CON 10) 10 mEq tablet Take 20 mEq by mouth daily.  ondansetron (ZOFRAN ODT) 4 mg disintegrating tablet Take 1 Tab by mouth every eight (8) hours as needed for Nausea. Indications: CANCER CHEMOTHERAPY-INDUCED NAUSEA AND VOMITING    albuterol (PROVENTIL HFA, VENTOLIN HFA, PROAIR HFA) 90 mcg/actuation inhaler Take 2 Puffs by inhalation every six (6) hours as needed for Wheezing. Indications: BRONCHOSPASM PREVENTION    acetaminophen (TYLENOL) 325 mg tablet Take  by mouth every four (4) hours as needed for Pain.  carvedilol (COREG) 6.25 mg tablet Take 1 Tab by mouth two (2) times daily (with meals). Indications: hypertension    famotidine (PEPCID) 20 mg tablet Take 1 Tab by mouth daily. Indications: gastroesophageal reflux disease    docusate sodium (COLACE) 100 mg capsule Take 100 mg by mouth daily.  dexamethasone (DECADRON) 4 mg tablet Take 2 tablets with breakfast the day before chemo and for 2 days after chemo    Cholecalciferol, Vitamin D3, (VITAMIN D3) 1,000 unit cap Take 1 Cap by mouth daily.      Current Facility-Administered Medications   Medication Dose Route Frequency    0.9% sodium chloride infusion 500 mL  500 mL IntraVENous CONTINUOUS    saline peripheral flush soln 10 mL  10 mL InterCATHeter PRN    sodium chloride 0.9% injection 10 mL  10 mL IntraVENous PRN    heparin (porcine) pf 300-500 Units  300-500 Units InterCATHeter PRN    bevacizumab (AVASTIN) 1,577 mg in 0.9% sodium chloride 100 mL, overfill volume 10 mL IVPB  15 mg/kg (Treatment Plan Recorded) IntraVENous ONCE         Objective:     Visit Vitals  /78 (BP 1 Location: Right arm, BP Patient Position: Sitting)   Pulse 77   Temp 97.1 °F (36.2 °C)   Resp 18   Ht 5' 2\" (1.575 m)   Wt 230 lb 6.4 oz (104.5 kg)   BMI 42.14 kg/m²       Physical Exam:  General: A&O X3 in NAD with pleasant affect. HEENT: Sclera anicteric, mucosa pink, no lesions. Neck: No JVD or cervical adenopathy appreciated. Port Site: without redness, tenderness or swelling. Accessed without difficulty  Heart: Regular without M/R/G  Lungs: CTA Bilat without wheezing or rales noted. Abd: Soft, obese, NT/ND without fluid wave  No CVAT  Ext: Extensive bilat lower ext edema from groin to feet. Increased from last visit even after aggressive diureses. Persist with vascular skin changes previously seen. Proximal right leg, no change, no pain on compression. Neuro: grossly intact       Wt Readings from Last 3 Encounters:   10/24/18 230 lb 6.4 oz (104.5 kg)   10/23/18 230 lb 3.2 oz (104.4 kg)   10/03/18 239 lb (108.4 kg)       Lab Results   Component Value Date/Time    WBC 9.1 10/22/2018 12:13 PM    HGB 9.1 (L) 10/22/2018 12:13 PM    HCT 30.5 (L) 10/22/2018 12:13 PM    PLATELET 766 80/91/0899 12:13 PM    MCV 93 10/22/2018 12:13 PM     Lab Results   Component Value Date/Time    ABS.  NEUTROPHILS 7.7 (H) 10/22/2018 12:13 PM     Lab Results   Component Value Date/Time    Sodium 141 10/22/2018 12:13 PM    Potassium 3.6 10/22/2018 12:13 PM    Chloride 84 (L) 10/22/2018 12:13 PM    CO2 35 (H) 10/22/2018 12:13 PM    Anion gap 15 09/12/2018 10:25 AM    Glucose 112 (H) 10/22/2018 12:13 PM    BUN 50 (H) 10/22/2018 12:13 PM    Creatinine 1.98 (H) 10/22/2018 12:13 PM    BUN/Creatinine ratio 25 10/22/2018 12:13 PM    GFR est AA 29 (L) 10/22/2018 12:13 PM    GFR est non-AA 25 (L) 10/22/2018 12:13 PM    Calcium 9.4 10/22/2018 12:13 PM    Bilirubin, total 0.4 10/22/2018 12:13 PM    AST (SGOT) 13 10/22/2018 12:13 PM    Alk. phosphatase 203 (H) 10/22/2018 12:13 PM    Protein, total 7.0 10/22/2018 12:13 PM    Albumin 3.9 10/22/2018 12:13 PM    Globulin 4.2 (H) 09/12/2018 10:25 AM    A-G Ratio 1.3 10/22/2018 12:13 PM    ALT (SGPT) 9 10/22/2018 12:13 PM     UA no protein      IMAGING:  CT Chest/Abd/Pelvis 8/27/18  CHEST:   Stable several small pulmonary nodules, some cavitated, largest located at the  right lung base measuring 1.4 x 0.9 cm (image 4-37), previous 1.5 x 0.9 cm. Stable low-grade mediastinal nodes: precarinal node measures 1.6 x 1.3 cm (image  3-21); lateral AP window node measures 1.5 x 1.1 cm (image 3-19); subcarinal  node difficult to discretely measure. Small benign calcified right hilar nodes.     There is no pleural or pericardial effusion. Stable manubrial lytic/sclerotic  tumor. Visualized thyroid and lower neck soft tissues are unremarkable for age.     ABDOMEN PELVIS:  No significant change of retroperitoneal/para-aortic confluent adenopathy,  bilateral pelvic adenopathy, and left deep pelvic soft tissue/scar. There is no  inflammation, ascites, or pneumoperitoneum. Liver is unremarkable other than a  stable left lobe cyst. Bile ducts and spleen are not enlarged. Pancreas shows no  mass or inflammation. Cholelithiasis. Stable right adrenal adenoma. Normal left  adrenal. Kidneys show no stone, mass or hydronephrosis. Aorta is calcified  without aneurysm. Gastric band in place.     The bladder is not distended. The distal ureters are not dilated. Slight  progression of sclerosis within L3, L4 and L5 without fracture or soft tissue  component.     IMPRESSION  IMPRESSION:   1. Stable lung metastases. 2. Stable low grade mediastinal adenopathy.   3. Stable retroperitoneal/pelvic adenopathy and left deep pelvic soft tissue. 4. Stable manubrial metastasis. Nonspecific small progressive sclerosis within  L3, L4 and L5.          CT A/P 6/26/18  TECHNIQUE:   Thin axial images were obtained through the abdomen and pelvis. Coronal and  sagittal reconstructions were generated. Oral contrast was administered. CT dose  reduction was achieved through use of a standardized protocol tailored for this  examination and automatic exposure control for dose modulation.      The absence of intravenous contrast material reduces the sensitivity for  evaluation of the solid parenchymal organs of the abdomen.      FINDINGS:   LUNG BASES: Clear. INCIDENTALLY IMAGED HEART AND MEDIASTINUM: 12 mm nodule at the right lung base  is slightly decreased in size. Previously measured 15 mm. There are patchy areas  of groundglass opacification in the bilateral lung bases which are new  LIVER: Hypodensity segment 2 near the dome is incompletely evaluated but likely  unchanged. It may represent a cyst. Small calcifications compatible with prior  granulomatous disease  GALLBLADDER: Multiple stones within a contracted gallbladder  SPLEEN: Spleen is mildly enlarged 14.4 cm with multiple calcifications  compatible with prior granulomatous disease  PANCREAS: No mass or ductal dilatation. ADRENALS: 3.0 x 2.3 cm right adrenal nodule measuring 2 Hounsfield units  compatible with an adenoma unchanged. Left adrenal gland is not enlarged  KIDNEYS/URETERS: No mass, calculus, or hydronephrosis. STOMACH: Patient is post gastric band  SMALL BOWEL: No dilatation or wall thickening. COLON: No dilatation or wall thickening. APPENDIX: Surgically absent  PERITONEUM: No ascites or pneumoperitoneum. RETROPERITONEUM: Aorta is mildly calcified but normal in caliber. There is a  mantle of confluent nodes surrounding the aorta. This does not appear  significantly different.  Representative nodes to the left of the aorta and  anterior to the aorta on series 3 image 45 measure 17 x 13 mm and 11 x 18 mm. This measures slightly smaller than on the previous study but this may be  technically given the poor margination of the ruddy mass. REPRODUCTIVE ORGANS: Uterus and ovaries are surgically absent. Soft tissue  thickening in the left pelvic sidewall has not significantly changed and no  recurrent pelvic mass lesion is noted  URINARY BLADDER: No mass or calculus. BONES: Sclerotic bone metastases have not significantly changed in the interval  ADDITIONAL COMMENTS: N/A     IMPRESSION  IMPRESSION:     1. Retroperitoneal confluent adenopathy stable to slightly decreased in size  2. Unchanged bony metastatic disease  3. Unchanged soft tissue in the left pelvic sidewall without evidence of local  recurrence  4. Pulmonary nodule right lung base slightly smaller  5. New areas of groundglass opacification at the lung bases nonspecific but may  be posttreatment related        CT 2/13/18  COMPARISON: February 13, 2018. October 23, 2017.     CONTRAST: None.     TECHNIQUE:  5 mm axial images were obtained through the chest. Images were  obtained without contrast through the abdomen and pelvis as well. Coronal and  sagittal reconstructions were generated. CT dose reduction was achieved through  use of a standardized protocol tailored for this examination and automatic  exposure control for dose modulation.     The absence of intravenous contrast reduces the sensitivity for evaluation of  the mediastinum and upper abdominal organs.     FINDINGS:      Previous studies for comparison:  February 13, 2018. October 23, 2017.     Images through the mediastinum and hilar regions reveal no new mass or  adenopathy.  Sclerotic/destructive changes involving the left manubrium  redemonstrated without appreciable interval change.     Images through the lung parenchyma reveal scattered bilateral small pulmonary  nodular densities unchanged.     Images through the liver reveal no new space-occupying lesion or biliary  dilatation.     There is adequate opacification of the hepatic and portal veins.     Images through the gallbladder fossa reveal multiple gallstones.     The spleen is normal in size.      The pancreas has a normal morphologic appearance.      Kidneys demonstrated normal morphologic appearance. No hydronephrosis is  present.     The right adrenal gland remains enlarged measuring 2.9 x 2.2 cm, previously 3.0  x 2.2 cm.     The visualized opacified bowel loops are nonobstructed.     Persistent confluent appearing retroperitoneal lymphadenopathy is demonstrated  with two dominant lesions measuring roughly 2.6 x 2.6 cm, previously 2.6 x 2.6  cm. The other closely adjacent lesion measures roughly 2.1 x 1.7 cm, previously  2.1 x 1.7 cm.     Images through the pelvis no space-occupying lesion or lymphadenopathy. Prominent soft tissue swelling is present in the pelvis and lower extremities.     Scattered predominately sclerotic lesions in the lower lumbar vertebra and  sternum are redemonstrated without appreciable interval change. IMPRESSION:     Status post gastric sleeve.     Stable scattered bilateral pulmonary nodular densities.     Stable retroperitoneal lymphadenopathy.     Multiple gallstones.     Stable bony metastatic disease.     Possible minimal left hydronephrosis.     Pelvic and lower extremity presumed lymphedema         Assessment/Plan:   70 y.o. with metastatic vaginal cancer, s/p initial XRT/CDDP.   Ongoing antineoplastic chemotherapy  ECO-3    Patient Active Problem List   Diagnosis Code    Vaginal cancer ([facility] [address] C52    Hypertension, essential I10    Hypercholesterolemia E78.00    GERD without esophagitis K21.9    Lymphedema of both lower extremities I89.0    Morbid obesity with BMI of 40.0-44.9, adult (HCC) E66.01, Z68.41    CKD (chronic kidney disease) stage 4, GFR 15-29 ml/min (HCC) N18.4    Anemia D64.9    Glucose intolerance (impaired glucose tolerance) R73.02    On antineoplastic chemotherapy Z79.899    Cancer related pain G89.3    Metastatic neoplastic disease (HCC) C79.9    Hematuria R31.9    Acute cystitis without hematuria N30.00    Hypokalemia E87.6    Bone metastases (HCC) C79.51         Continue Avastin - tolerating with mild toxicities, labs stable  Hypokalemia: stable. Continue f/u with Dr. Samara Rider. Anemia: d/t chemo > hematuria - stable    Chronic renal disease: Creat elevated today(about stable for recent labs per pt at [facility]). Continues to follow with Dr. Samara Rider. Weight improved. F/u with lymphedema clinic prn - counseled on compression hose, refusing  Neuropathy: gabapentin, stable  Pain: Progressive d/t metastatic disease? Mostly limited to right proximal leg/flank, varying inciting factors. Hydrocodone with trial of lorazepam in place of hydrocodone for sleep/pain and PRN. Rx provided. Mucositis/oral pain: resolved currently. Klacks Rx provided for PRN. Psychosocial: well supported by . No financial concerns. We will repeat a scan next month to assess continued response/stability of disease. She and her  agree with this plan. Encouraged to call for any concerns or questions.          DEBBIE Montana

## 2018-11-07 NOTE — PROGRESS NOTES
Follow up to review ct scan results. 1. Have you been to the ER, urgent care clinic since your last visit? Hospitalized since your last visit? No    2. Have you seen or consulted any other health care providers outside of the [address] [place] [address] since your last visit? Include any pap smears or colon screening.  No

## 2018-11-08 NOTE — PROGRESS NOTES
[address], [address]  [phone]  MD Tien Molina MD  Office Note  Patient ID:  Name:  Kelly Hatch  MRN:  [id number]  :  1950/68 y.o. Date:  2018      HISTORY OF PRESENT ILLNESS:  Kelly Hatch is a 70 y.o.  postmenopausal female who was referred to me in 2015 for a pelvic mass by Dr. Adela Alicea, who noted the mass on routine gynecologic exam.  He sent her for a CT of the pelvis, which revealed a mass at the vaginal apex, that appeared to be invading into the soft tissues of the left pelvis. There were several enlarged lymph nodes on the left. There was also questionable bladder invasion, and possibly even involvement of the rectosigmoid colon. She reported a history of having a mass removed from one of her ovaries in 1 at [address] [place] [address], which required a second surgery to remove her uterus and contralateral ovary. She said she did not have to take any chemotherapy, but she did have to take some kind of pill for a year. She has not had any gynecologic issues since that time. Presumably her hysterectomy was a total, rather than a supracervical, but she is unsure and there are no records. She denied any vaginal bleeding, except for after her examination with Dr. Rico Platt. She reported minimal pain in the pelvis. I took her the OR for and exam under anesthesia with cystoscopy and proctoscopy. Pathology revealed a squamous cell carcinoma, consistent with a vaginal primary. Examination revealed a fixed mass involving the left upper vagina with extension to the left pelvic sidewall. There also appeared to be involvement of the posterior bladder wall, but no mucosal involvement. The mass did push against the rectal wall, but there did not appear to be any direct involvement. I referred her to radiation oncology and she saw Dr. Adelso Richard.   She completed pelvic radiation along with concurrent cisplatin chemotherapy in February 2016 and had a great response. When I saw her in the office in March 2017 for follow up she was doing well and without complaints. Her pelvic exam and pap smear at that time were both negative. Subsequent to that visit she began having back pain that had progressively worsened. She saw Erendira Ravi in May 2017 and she ordered a CT. This unfortunately demonstrated extensive metastatic disease outside of the prior radiated field. Dr. Miller then sent her for a PET/CT which demonstrated widely metastatic disease. I recommended systemic chemotherapy with Taxol/Cisplatin/Avastin, which is the standard for metastatic cervical cancer, since vaginal cancer behaves similar to cervical cancer. Unfortunately, the Avastin was denied by her insurance company. We chose to proceed with Taxol/Cisplatin. She completed 6 cycles. Her scan in October 2017 demonstrated slight progression of the lymphadenopathy, while the pulmonary and bony disease appeared stable. On my review, the changes didn't meet RECIST criteria for progression. I recommended continuing with therapy, but I suggested that we exchange Carboplatin for Cisplatin due to nephrotoxicity. Her creatinine had steadily climbed with the Cisplatin. She completed 8 cycles of Taxol/Carboplatin after the switch, although she had a severe reaction to the Carboplatin at the time of her 8th cycle. We stopped the Carboplatin and added Avastin to the Taxol. She completed 5 cycles of that regimen. Considering her neuropathy symptoms and the concerns with continued use of Taxol, I suggested that we stop the Taxol and continue with single agent Avastin. She has completed 3 cycles of single agent Avastin. She continues to have severe swelling in her lower extremities and has been hospitalized at least twice at [facility] for aggressive diuresis with her nephrologist, Dr. Daniel Frederick.   She has been having some hematuria with passage of clots. She has been seeing urology and may need to have a cystoscopic fulgaration of the bleeding at some point. She presents today to review her latest CT. Tumor testing  No loss of mismatch repair proteins      ECOG score: 1       ROS:   and GI review:  Negative  Cardiopulmonary review:  Negative   Musculoskeletal:  Negative    A comprehensive review of systems was negative except for that written in the History of Present Illness.  , 10 point ROS        Problem List:  Patient Active Problem List    Diagnosis Date Noted    Bone metastases ([address] 10/24/2018    Hypokalemia 09/12/2018    Acute cystitis without hematuria 06/13/2018    Hematuria 05/16/2018    On antineoplastic chemotherapy 03/01/2018    Cancer related pain 03/01/2018    Metastatic neoplastic disease ([address] 03/01/2018    Glucose intolerance (impaired glucose tolerance) 01/29/2018    Anemia 01/04/2018    CKD (chronic kidney disease) stage 4, GFR 15-29 ml/min ([address] 01/03/2018    Morbid obesity with BMI of 40.0-44.9, adult ([address] 12/12/2017    Lymphedema of both lower extremities 06/14/2017    Hypertension, essential 01/18/2017    Hypercholesterolemia 01/18/2017    GERD without esophagitis 01/18/2017    Vaginal cancer ([address] 12/10/2015     PMH:  Past Medical History:   Diagnosis Date    Abnormal mammogram of left breast 12/5/2016    Cancer ([address] 06/2017    LYMPH NODES IN BACK    Cancer ([address] 2016    VAGINAL CANCER    GERD (gastroesophageal reflux disease) 6/11/06    Hypercholesterolemia 10/11/06    Hypertension 10/11/06    LAP-BAND surgery status 5/17/2012 January 2008 AP Std Band / Brendolyn Mallet Morbid obesity ([address] 10/11/06      PSH:  Past Surgical History:   Procedure Laterality Date    HX APPENDECTOMY      HX GASTRIC BYPASS  2009    LAP BAND    HX GYN  12/4/15    EUA/Bx of pelvic mass and vagina/cystoscopy/proctoscopy    HX HYSTERECTOMY  1978    HX ORTHOPAEDIC  2009    right ankle    HX OTHER SURGICAL  2001? benign mass removed from lower back    LAP, PLACE ADJUST GASTR BAND  08    EB: AP Std lap band, liver bx      Social History:  Social History     Tobacco Use    Smoking status: Former Smoker     Packs/day: 0.50     Years: 15.00     Pack years: 7.50     Types: Cigarettes     Last attempt to quit: 1993     Years since quittin.8    Smokeless tobacco: Never Used   Substance Use Topics    Alcohol use: Yes     Comment: socially-WINE ONCE A WEEK      Family History:  Family History   Problem Relation Age of Onset    Cancer Mother         colon WITH METS TO LIVER    Diabetes Father     Heart Disease Father     Cancer Sister         breast & bone    Cancer Maternal Aunt         BREAST    Cancer Paternal Aunt         BREAST    Anesth Problems Neg Hx       Medications: (reviewed)  Current Outpatient Medications   Medication Sig    gabapentin (NEURONTIN) 300 mg capsule TAKE 1 CAPSULE BY MOUTH AT BEDTIME    LORazepam (ATIVAN) 1 mg tablet Take 1 Tab by mouth every four (4) hours as needed for Anxiety. Max Daily Amount: 6 mg.  klack's mixture Solution Diphenhydramine elixir 12.5mg/ml 500ml, Nystatin suspension 120ml,Tetracycline 500mg capsules  12 capsules (6g), Hydrocortisone 20mg tablet 12 tablets (240mg), Water for irrigation QS ad 1000ml    Swish and spit every 6 hours for oral lesions/oral soreness    oxyCODONE-acetaminophen (PERCOCET) 5-325 mg per tablet Take 1 Tab by mouth every six (6) hours as needed for Pain. Max Daily Amount: 4 Tabs.  atorvastatin (LIPITOR) 80 mg tablet TAKE 1 TABLET EVERY DAY    torsemide (DEMADEX) 100 mg tablet     predniSONE (DELTASONE) 10 mg tablet Take 10 mg by mouth daily.  potassium chloride SR (KLOR-CON 10) 10 mEq tablet Take 20 mEq by mouth daily.  ondansetron (ZOFRAN ODT) 4 mg disintegrating tablet Take 1 Tab by mouth every eight (8) hours as needed for Nausea.  Indications: CANCER CHEMOTHERAPY-INDUCED NAUSEA AND VOMITING    albuterol (PROVENTIL HFA, VENTOLIN HFA, PROAIR HFA) 90 mcg/actuation inhaler Take 2 Puffs by inhalation every six (6) hours as needed for Wheezing. Indications: BRONCHOSPASM PREVENTION    acetaminophen (TYLENOL) 325 mg tablet Take  by mouth every four (4) hours as needed for Pain.  carvedilol (COREG) 6.25 mg tablet Take 1 Tab by mouth two (2) times daily (with meals). Indications: hypertension    famotidine (PEPCID) 20 mg tablet Take 1 Tab by mouth daily. Indications: gastroesophageal reflux disease    docusate sodium (COLACE) 100 mg capsule Take 100 mg by mouth daily.  dexamethasone (DECADRON) 4 mg tablet Take 2 tablets with breakfast the day before chemo and for 2 days after chemo    Cholecalciferol, Vitamin D3, (VITAMIN D3) 1,000 unit cap Take 1 Cap by mouth daily. No current facility-administered medications for this visit. Allergies: (reviewed)  Allergies   Allergen Reactions    Amlodipine Other (comments)     Edema in LE's    Carboplatin Shortness of Breath, Rash and Itching          OBJECTIVE:    Physical Exam:  VITAL SIGNS: There were no vitals filed for this visit. There is no height or weight on file to calculate BMI. GENERAL MIGUELANGEL: Conversant, alert, oriented. No acute distress. HEENT: HEENT. No thyroid enlargement. No JVD. Neck: Supple without restrictions. RESPIRATORY: Clear to auscultation and percussion to the bases. No CVAT. CARDIOVASC: RRR without murmur/rub. GASTROINT: soft, non-tender, without masses or organomegaly   MUSCULOSKEL: no joint tenderness, deformity or swelling   EXTREMITIES: Marked edema of bilateral lower extremities, L>R. Skin noted with vascular changes on both legs. PELVIC: Deferred   RECTAL: Deferred   PATRICIO SURVEY: No suspicious lymphadenopathy. NEURO: Grossly intact. No acute deficit.      Lab Results   Component Value Date/Time    WBC 9.1 10/22/2018 12:13 PM    HGB 9.1 (L) 10/22/2018 12:13 PM    HCT 30.5 (L) 10/22/2018 12:13 PM    PLATELET 277 10/22/2018 12:13 PM    MCV 93 10/22/2018 12:13 PM     Lab Results   Component Value Date/Time    Sodium 141 10/22/2018 12:13 PM    Potassium 3.6 10/22/2018 12:13 PM    Chloride 84 (L) 10/22/2018 12:13 PM    CO2 35 (H) 10/22/2018 12:13 PM    Anion gap 15 09/12/2018 10:25 AM    Glucose 112 (H) 10/22/2018 12:13 PM    BUN 50 (H) 10/22/2018 12:13 PM    Creatinine 1.98 (H) 10/22/2018 12:13 PM    BUN/Creatinine ratio 25 10/22/2018 12:13 PM    GFR est AA 29 (L) 10/22/2018 12:13 PM    GFR est non-AA 25 (L) 10/22/2018 12:13 PM    Calcium 9.4 10/22/2018 12:13 PM    Bilirubin, total 0.4 10/22/2018 12:13 PM    AST (SGOT) 13 10/22/2018 12:13 PM    Alk. phosphatase 203 (H) 10/22/2018 12:13 PM    Protein, total 7.0 10/22/2018 12:13 PM    Albumin 3.9 10/22/2018 12:13 PM    Globulin 4.2 (H) 09/12/2018 10:25 AM    A-G Ratio 1.3 10/22/2018 12:13 PM    ALT (SGPT) 9 10/22/2018 12:13 PM       CT of abdomen/pelvis (8/27/18)  CHEST:   Stable several small pulmonary nodules, some cavitated, largest located at the  right lung base measuring 1.4 x 0.9 cm (image 4-37), previous 1.5 x 0.9 cm. Stable low-grade mediastinal nodes: precarinal node measures 1.6 x 1.3 cm (image  3-21); lateral AP window node measures 1.5 x 1.1 cm (image 3-19); subcarinal  node difficult to discretely measure. Small benign calcified right hilar nodes.     There is no pleural or pericardial effusion. Stable manubrial lytic/sclerotic  tumor. Visualized thyroid and lower neck soft tissues are unremarkable for age.     ABDOMEN PELVIS:  No significant change of retroperitoneal/para-aortic confluent adenopathy,  bilateral pelvic adenopathy, and left deep pelvic soft tissue/scar. There is no  inflammation, ascites, or pneumoperitoneum. Liver is unremarkable other than a  stable left lobe cyst. Bile ducts and spleen are not enlarged. Pancreas shows no  mass or inflammation. Cholelithiasis. Stable right adrenal adenoma.  Normal left  adrenal. Kidneys show no stone, mass or hydronephrosis. Aorta is calcified  without aneurysm. Gastric band in place.     The bladder is not distended. The distal ureters are not dilated. Slight  progression of sclerosis within L3, L4 and L5 without fracture or soft tissue  component.     IMPRESSION:   1. Stable lung metastases. 2. Stable low grade mediastinal adenopathy. 3. Stable retroperitoneal/pelvic adenopathy and left deep pelvic soft tissue. 4. Stable manubrial metastasis. Nonspecific small progressive sclerosis within  L3, L4 and L5.      CT of the abdomen/pelvis (11/7/18)  LUNG BASES: Pulmonary nodules are redemonstrated in the lung bases. The largest  is seen in the right lower lobe measuring 14 mm, unchanged from prior exam. A  nodule in the right middle lobe has increased in size now measuring 12 mm versus  8 mm on the prior study. INCIDENTALLY IMAGED HEART AND MEDIASTINUM: Calcified lymph nodes. LIVER: Limited evaluation without intravenous contrast. Multiple low-density  lesions have developed throughout the liver. The largest lesions are seen in the  left lobe measuring 2.8 cm image 14, right lobe image 25 measuring 2.2 cm, and  right lobe image 31 measuring 3.1 cm. GALLBLADDER: Cholelithiasis. SPLEEN: Calcified granulomas are noted in the spleen. PANCREAS: No abnormality seen on this unenhanced study. ADRENALS: Unchanged right adrenal adenoma. KIDNEYS/URETERS: Limited evaluation without contrast. No calculi or  hydronephrosis. STOMACH: Status post lap band procedure, which appears to be appropriately  positioned. SMALL BOWEL: No dilatation or wall thickening. COLON: Scattered diverticula are noted in the colon. No obstruction. APPENDIX: Not discretely seen. PERITONEUM: No new peritoneal thickening noted along the left paracolic gutter  extending along the left anterior pararenal space. No measurable mass. No  ascites or pneumoperitoneum.   RETROPERITONEUM: Confluent retroperitoneal lymphadenopathy in the left  para-aortic region redemonstrated. Representative lymph nodes are measured on  image 45 measuring 2.1 x 3.8 cm versus 1.7 x 3.2 cm on the prior and image 51  measuring 3.3 x 1.8 cm versus 2.9 x 1.9 cm on the prior. Unchanged soft tissue  along the left pelvic sidewall measuring 2.2 x 3.0 cm image 77 versus 3.1 x 1.9  cm on the prior. REPRODUCTIVE ORGANS: Status post SASHA/BSO. URINARY BLADDER: No mass or calculus. BONES: Sclerotic osseous metastases redemonstrated in the spine and pelvis. No  fracture. ADDITIONAL COMMENTS: Generalized body wall edema is redemonstrated.     IMPRESSION:  1. Interval development of multiple low-density masses throughout the liver most  consistent with metastatic disease.     2. Para-aortic lymph nodes demonstrate slight increase in size. Left pelvic  sidewall soft tissue demonstrates no significant change.     3. Most of the visualized pulmonary nodules demonstrate no significant interval  change. However, a right middle lobe nodule demonstrates increase in size.     4. Other findings described above. IMPRESSION/PLAN:  Maicol Moreno is a 70 y.o. female with a history of stage III vaginal carcinoma. She was treated with pelvic radiation therapy and concurrent cisplatin chemotherapy. She now has recurrent disease with multiple sites of metastases. I had a long discussion with the patient and her  discussing the best course of management. Surgery is not an option due to the widespread nature of her disease. The same is true for radiation therapy. Her only viable option is systemic chemotherapy. Since vaginal cancer behaves like cervical carcinoma, I recommended using the standard regimen for metastatic cervical carcinoma, Taxol/Cisplatin/Avastin. Unfortunately, the Avastin was denied by her insurance company. We chose to proceed with Taxol/Cisplatin. She completed 6 cycles.   On her CT in July 2017 her pulmonary nodules appeared stable and her retroperitoneal lymphadenopathy appeared slightly improved. Her most recent scan in October 2017 demonstrated slight progression of the lymphadenopathy, while the pulmonary and bony disease appeared stable. On my review, the changes didn't meet RECIST criteria for progression. I recommended continuing with therapy, but I suggested that we exchange Carboplatin for Cisplatin due to nephrotoxicity. Her creatinine had steadily climbed with the Cisplatin. She completed 8 cycles of Taxol/Carboplatin since the switch, but following her Carboplatin reaction we stopped the Carboplatin. I recommended that we continue with Taxol and add Avastin. She completed 5 cycles of that regimen. A CT again showed stable disease. Considering her neuropathy symptoms and the concerns with continued use of Taxol, I suggested that we stop the Taxol and continue with single agent Avastin. She has completed 3 cycles of single agent Avastin. Her CT now demonstrates progressive disease, including new liver metastases. I reviewed the images with her and her  and discussed options. We really don't have any viable treatment options at this point. 5-FU would be an option, but I don't think it would be that effective and I did not recommend any additional therapy. I had a long discussion with her and , including a discussion of hospice. We will arrange for a hospice consultation at this time.         Signed By: Waldo Danielle MD     11/8/2018/4:31 PM

## 2018-11-09 NOTE — TELEPHONE ENCOUNTER
Veena with [facility] ([phone]). Veena stated Dr Ayaz Salinas referred patient to hospice and hospice wants to know if patient should be followed by Dr Mag Caba or the hospice physician. Per Dr Mag Caba it should be patient choice.

## 2018-12-12 NOTE — TELEPHONE ENCOUNTER
Pt stated that she is having tremors so bad that her  could not hold her last night and she fell. She did not hurt herself, but she would like to know what she can do about the tremors. Pt can be reached at [phone].

## 2018-12-26 ENCOUNTER — APPOINTMENT (OUTPATIENT)
Dept: INFUSION THERAPY | Age: 68
End: 2018-12-26

## 2019-01-16 ENCOUNTER — APPOINTMENT (OUTPATIENT)
Dept: INFUSION THERAPY | Age: 69
End: 2019-01-16

## 2022-12-19 NOTE — TELEPHONE ENCOUNTER
Pt will call 10/30/17 for chemo schedule and will have labs drawn at [facility] 10/30/17 PROVIDER:[TOKEN:[[id number]]] PROVIDER:[TOKEN:[[id number]]],PROVIDER:[TOKEN:[[id number]]],PROVIDER:[TOKEN:[[id number]]]

## (undated) DEVICE — NEEDLE HYPO 25GA L1.5IN BVL ORIENTED ECLIPSE

## (undated) DEVICE — REM POLYHESIVE ADULT PATIENT RETURN ELECTRODE: Brand: VALLEYLAB

## (undated) DEVICE — LIGHT HANDLE: Brand: DEVON

## (undated) DEVICE — (D)SYR 10ML 1/5ML GRAD NSAF -- PKGING CHANGE USE ITEM 338027

## (undated) DEVICE — MASTISOL ADHESIVE LIQ 2/3ML

## (undated) DEVICE — INFECTION CONTROL KIT SYS

## (undated) DEVICE — SUTURE MCRYL SZ 4-0 L27IN ABSRB UD L19MM PS-2 1/2 CIR PRIM Y426H

## (undated) DEVICE — DRAPE XR C ARM 41X74IN LF --

## (undated) DEVICE — SUTURE VCRL SZ 3-0 L27IN ABSRB UD L26MM SH 1/2 CIR J416H

## (undated) DEVICE — 1200 GUARD II KIT W/5MM TUBE W/O VAC TUBE: Brand: GUARDIAN

## (undated) DEVICE — 3M™ TEGADERM™ TRANSPARENT FILM DRESSING FRAME STYLE, 1626W, 4 IN X 4-3/4 IN (10 CM X 12 CM), 50/CT 4CT/CASE: Brand: 3M™ TEGADERM™

## (undated) DEVICE — DRAPE,LAPAROTOMY,PED,STERILE: Brand: MEDLINE

## (undated) DEVICE — DEVON™ KNEE AND BODY STRAP 60" X 3" (1.5 M X 7.6 CM): Brand: DEVON

## (undated) DEVICE — SOLUTION IV 500ML 0.9% SOD CHL FLX CONT

## (undated) DEVICE — Device

## (undated) DEVICE — TRAY PREP DRY W/ PREM GLV 2 APPL 6 SPNG 2 UNDPD 1 OVERWRAP

## (undated) DEVICE — GAUZE SPONGES,8 PLY: Brand: CURITY

## (undated) DEVICE — SUTURE PROL SZ 2-0 L36IN NONABSORBABLE BLU SH L26MM 1/2 CIR 8523H

## (undated) DEVICE — 3M™ STERI-STRIP™ REINFORCED ADHESIVE SKIN CLOSURES, R1546, 1/4 IN X 4 IN (6 MM X 100 MM), 10 STRIPS/ENVELOPE: Brand: 3M™ STERI-STRIP™